# Patient Record
Sex: FEMALE | Race: WHITE | NOT HISPANIC OR LATINO | Employment: OTHER | URBAN - METROPOLITAN AREA
[De-identification: names, ages, dates, MRNs, and addresses within clinical notes are randomized per-mention and may not be internally consistent; named-entity substitution may affect disease eponyms.]

---

## 2017-01-03 ENCOUNTER — TELEPHONE (OUTPATIENT)
Dept: INTERNAL MEDICINE | Facility: CLINIC | Age: 82
End: 2017-01-03

## 2017-01-03 DIAGNOSIS — R19.7 DIARRHEA OF PRESUMED INFECTIOUS ORIGIN: Primary | ICD-10-CM

## 2017-01-03 NOTE — TELEPHONE ENCOUNTER
Spoke with patient. Recurrent diarrhea since Christmas. No f/c or abdominal pain. Likely recurrent C diff. Will obtain stool sample, check lytes since has been hypokalemic in past, and contact GI re possible fecal transplant. Patient agrees with plan. Orders placed.

## 2017-01-03 NOTE — TELEPHONE ENCOUNTER
Spoke with pt, c/o watery stools for the last 5 days ,  has about 3 per day. Pt said she is able to sleep thru the night with out any loose stools at night.  Appt offered,but wondering if she can get some advise over the phone.  Please call pt.  Renetta Alejandre RN  ---------------------          ----- Message from Camila Hill sent at 1/3/2017  8:38 AM CST -----  Regarding: pt with C diff again  Contact: [phone]  Pt calling with C diff and she wants to know what her next step is going to be? She has had it in the past and she wants to know how she is going to get rid of it. Maybe see a specialist? Please call the pt back at this number [phone].    Thanks,  [facility]  Please DO NOT send message and or reply back to sender. Call center Representatives DO NOT respond to Messages

## 2017-01-06 DIAGNOSIS — R19.7 DIARRHEA OF PRESUMED INFECTIOUS ORIGIN: ICD-10-CM

## 2017-01-06 LAB
ANION GAP SERPL CALCULATED.3IONS-SCNC: 9 MMOL/L (ref 3–14)
BUN SERPL-MCNC: 25 MG/DL (ref 7–30)
CALCIUM SERPL-MCNC: 9.2 MG/DL (ref 8.5–10.1)
CHLORIDE SERPL-SCNC: 106 MMOL/L (ref 94–109)
CO2 SERPL-SCNC: 24 MMOL/L (ref 20–32)
CREAT SERPL-MCNC: 0.95 MG/DL (ref 0.52–1.04)
GFR SERPL CREATININE-BSD FRML MDRD: 56 ML/MIN/1.7M2
GLUCOSE SERPL-MCNC: 118 MG/DL (ref 70–99)
POTASSIUM SERPL-SCNC: 3.6 MMOL/L (ref 3.4–5.3)
SODIUM SERPL-SCNC: 139 MMOL/L (ref 133–144)

## 2017-01-06 PROCEDURE — 80048 BASIC METABOLIC PNL TOTAL CA: CPT | Performed by: INTERNAL MEDICINE

## 2017-01-06 PROCEDURE — 36415 COLL VENOUS BLD VENIPUNCTURE: CPT | Performed by: INTERNAL MEDICINE

## 2017-01-10 ENCOUNTER — PRE VISIT (OUTPATIENT)
Dept: GASTROENTEROLOGY | Facility: CLINIC | Age: 82
End: 2017-01-10

## 2017-01-10 NOTE — TELEPHONE ENCOUNTER
1.  Date/reason for appt: 1/31/17 -- c diff  2.  Referring provider: Frankie Martinez  3.  Call to patient (Yes / No - short description): no, referred  4.  Previous care at / records requested from: [facility] -- records in [facility].

## 2017-01-11 DIAGNOSIS — R19.7 DIARRHEA OF PRESUMED INFECTIOUS ORIGIN: ICD-10-CM

## 2017-01-11 LAB
C DIFF TOX B STL QL: NORMAL
SPECIMEN SOURCE: NORMAL

## 2017-01-11 PROCEDURE — 87493 C DIFF AMPLIFIED PROBE: CPT | Performed by: INTERNAL MEDICINE

## 2017-01-26 ENCOUNTER — TELEPHONE (OUTPATIENT)
Dept: GASTROENTEROLOGY | Facility: CLINIC | Age: 82
End: 2017-01-26

## 2017-01-31 ENCOUNTER — OFFICE VISIT (OUTPATIENT)
Dept: GASTROENTEROLOGY | Facility: CLINIC | Age: 82
End: 2017-01-31

## 2017-01-31 VITALS
OXYGEN SATURATION: 98 % | HEART RATE: 74 BPM | BODY MASS INDEX: 26.21 KG/M2 | WEIGHT: 167 LBS | SYSTOLIC BLOOD PRESSURE: 130 MMHG | HEIGHT: 67 IN | DIASTOLIC BLOOD PRESSURE: 67 MMHG

## 2017-01-31 DIAGNOSIS — A04.72 C. DIFFICILE COLITIS: Primary | ICD-10-CM

## 2017-01-31 ASSESSMENT — PAIN SCALES - GENERAL: PAINLEVEL: NO PAIN (0)

## 2017-01-31 NOTE — PATIENT INSTRUCTIONS
It was a pleasure taking care of you today.  I've included a brief summary of our discussion and care plan from today's visit below.  Please review this information with your primary care provider.  _______________________________________________________________________    My recommendations are summarized as follows:    --  If you have diarrhea - please have your stool tested for C diff    --  If you test positive for C diff, we will treat you with Dificid (Fidaxomicin) then plan for a fecal transplant    --  Please review the fecal transplant literature given to you today in the offie.     Return to GI Clinic in 6 months to review your progress.    _______________________________________________________________________    Who do I call with any questions after my visit?  Please be in touch if there are any further questions that arise following today's visit.  There are multiple ways to contact your gastroenterology care team.        During business hours, you may reach a Gastroenterology nurse at [phone] and choose option 3.         To schedule or reschedule an appointment, please call [phone].       You can always send a secure message through Coin.  Coin messages are answered by your nurse or doctor typically within 24 hours.  Please allow extra time on weekends and holidays.        For urgent/emergent questions after business hours, you may reach the on-call GI Fellow by contacting the [facility] at [phone].     How will I get the results of any tests ordered?    You will receive all of your results.  If you have signed up for Coin, any tests ordered at your visit will be available to you after your physician reviews them.  Typically this takes 1-2 weeks.  If there are urgent results that require a change in your care plan, your physician or nurse will call you to discuss the next steps.      What is Coin?  Coin is a secure way for you to access all of your  healthcare records from the [facility].  It is a web based computer program, so you can sign on to it from any location.  It also allows you to send secure messages to your care team.  I recommend signing up for [employer] access if you have not already done so and are comfortable with using a computer.      How to I schedule a follow-up visit?  If you did not schedule a follow-up visit today, please call [phone] to schedule a follow-up office visit.        Sincerely,    Masood Tinsley MD    [facility]  Division of Gastroenterology

## 2017-01-31 NOTE — MR AVS SNAPSHOT
After Visit Summary   1/31/2017    Dulce Yeh    MRN: [id number]           Patient Information     Date Of Birth          1935        Visit Information        Provider Department      1/31/2017 11:40 AM Masood Tinsley MD [facility]        Today's Diagnoses     C. difficile colitis    -  1       Care Instructions    It was a pleasure taking care of you today.  I've included a brief summary of our discussion and care plan from today's visit below.  Please review this information with your primary care provider.  _______________________________________________________________________    My recommendations are summarized as follows:    --  If you have diarrhea - please have your stool tested for C diff    --  If you test positive for C diff, we will treat you with Dificid (Fidaxomicin) then plan for a fecal transplant    --  [facility]     Return to [facility] Clinic in 6 months to review your progress.    _______________________________________________________________________    Who do I call with any questions after my visit?  Please be in touch if there are any further questions that arise following today's visit.  There are multiple ways to contact your gastroenterology care team.        During business hours, you may reach a Gastroenterology nurse at [phone] and choose option 3.         To schedule or reschedule an appointment, please call [phone].       You can always send a secure message through [employer].  [employer] messages are answered by your nurse or doctor typically within 24 hours.  Please allow extra time on weekends and holidays.        For urgent/emergent questions after business hours, you may reach the on-call GI Fellow by contacting the [facility] at [phone].     How will I get the results of any tests ordered?    You will receive all of your results.  If  you have signed up for Zhaoganghart, any tests ordered at your visit will be available to you after your physician reviews them.  Typically this takes 1-2 weeks.  If there are urgent results that require a change in your care plan, your physician or nurse will call you to discuss the next steps.      What is Zhaoganghart?  [employer] is a secure way for you to access all of your healthcare records from the [facility].  It is a web based computer program, so you can sign on to it from any location.  It also allows you to send secure messages to your care team.  I recommend signing up for [employer] access if you have not already done so and are comfortable with using a computer.      How to I schedule a follow-up visit?  If you did not schedule a follow-up visit today, please call [phone] to schedule a follow-up office visit.        Sincerely,    Masood Tinsley MD    [facility]  Division of Gastroenterology              Follow-ups after your visit        Your next 10 appointments already scheduled     Feb 06, 2017 12:30 PM   (Arrive by 12:15 PM)   Return Visit with Ginger Davis MD   [facility] ([facility])    [address] [place] [address]  [address]rd [facility] [id number] [phone]            May 30, 2017  8:40 AM   (Arrive by 8:25 AM)   Return Visit with Masood Tinsley MD   [facility] ([facility])    [address] [place] [address]  [address] [facility] [id number] [phone]              Future tests that were ordered for you today     Open Standing Orders        Priority Remaining Interval Expires Ordered    Clostridium difficile toxin B PCR Routine 5/5 1/31/2018 1/31/2017            Who to contact     Please call your clinic at [phone] to:    Ask questions about your health    Make or cancel appointments    Discuss your medicines    Learn about your test results    Speak to your doctor  "  If you have compliments or concerns about an experience at your clinic, or if you wish to file a complaint, please contact [facility] Patient Relations at [phone] or email us at [email or web address]         Additional Information About Your Visit        FashFoliohart Information     Dianxin is an electronic gateway that provides easy, online access to your medical records. With Dianxin, you can request a clinic appointment, read your test results, renew a prescription or communicate with your care team.     To sign up for Dianxin visit the website at [email or web address]   You will be asked to enter the access code listed below, as well as some personal information. Please follow the directions to create your username and password.     Your access code is: -[email or web address]  Expires: 2017  6:32 PM     Your access code will  in 90 days. If you need help or a new code, please contact your [facility] or call [phone] for assistance.        Care EveryWhere ID     This is your Care EveryWhere ID. This could be used by other organizations to access your [place] medical records  [id number]        Your Vitals Were     Pulse Height BMI (Body Mass Index) Pulse Oximetry Last Period       74 1.702 m (5' 7\") 26.15 kg/m2 98% (LMP Unknown)        Blood Pressure from Last 3 Encounters:   17 130/67   16 161/69   16 155/87    Weight from Last 3 Encounters:   17 75.751 kg (167 lb)   16 76.93 kg (169 lb 9.6 oz)   16 81.33 kg (179 lb 4.8 oz)               Primary Care Provider Office Phone # Fax #    Nathalie Turner [phone]        [facility] [address] [place] [address] 747  [facility] [address]        Thank you!     Thank you for choosing [facility]  for your care. Our goal is always to provide you with excellent care. Hearing back from our patients is one way we can " continue to improve our services. Please take a few minutes to complete the written survey that you may receive in the mail after your visit with us. Thank you!             Your Updated Medication List - Protect others around you: Learn how to safely use, store and throw away your medicines at www.disposemymeds.org.          This list is accurate as of: 1/31/17 12:51 PM.  Always use your most recent med list.                   Brand Name Dispense Instructions for use    ascorbic acid 500 MG tablet    VITAMIN C     Take 500 mg by mouth daily.       aspirin 81 MG tablet      Take 81 mg by mouth daily       BIOTIN PO      Take by mouth daily       calcium carb 1250 mg (500 mg [place])/vitamin D 200 units 500-200 MG-UNIT per tablet    OSCAL with D     Take 1 tablet by mouth 2 times daily (with meals).       clobetasol 0.05 % Crea cream    CLOBETASOL PROPIONATE EMULSION    15 g    Apply topically as needed       cyanocobalamin 1000 MCG tablet    vitamin  B-12     Take 1,000 mcg by mouth daily.       glucosamine chondroitin 1500 complex Caps      Take  by mouth.       hydrochlorothiazide 25 MG tablet    HYDRODIURIL    90 tablet    Take 1 tablet (25 mg) by mouth daily       hydrocortisone 2.5 % cream    ANUSOL-HC    30 g    Apply to hemorrhoids twice a day as needed.       levothyroxine 88 MCG tablet    SYNTHROID/LEVOTHROID    90 tablet    Take 1 tablet (88 mcg) by mouth daily       Magnesium 300 MG Caps      Take  by mouth.       metoprolol 100 MG 24 hr tablet    TOPROL-XL    90 tablet    Take 1 tablet (100 mg) by mouth daily       MILK OF MAGNESIA PO          OMEGA 3 PO          potassium chloride SA 20 MEQ CR tablet    K-DUR/KLOR-CON M    100 tablet    Take 1 tablet (20 mEq) by mouth daily       terbinafine 1 % cream    lamISIL AT    30 g    Apply to affected areas on feet daily for 4 weeks.       triamcinolone 0.025 % cream    KENALOG    15 g    Apply topically 2 times daily       valsartan 320 MG tablet    DIOVAN    90  tablet    Take 1 tablet (320 mg) by mouth daily       ZYRTEC PO      Take  by mouth.

## 2017-01-31 NOTE — PROGRESS NOTES
GI CLINIC VISIT    CC/REFERRING MD:  Nathalie Turner  REASON FOR CONSULTATION:   The pt is a 82 year old female who I was asked to see in consultation at the request of Dr. Nathalie Turner for   Chief Complaint   Patient presents with     Consult     HX of C-Diff       ASSESSMENT/PLAN:  82 year old female with multiple recurrent C difficile infection who is currently not symptomatic and is  C diff negative.      We had a long discussion about the risks and benefits of FMT for multiple recurrent C difficile infection.  We discussed that the efficacy for multiple recurrent C diff is greater than 90% and that there have not been serious side effects reported in the literature.  We discussed the risks including transmission of potentially pathogenic microbiota (with possible infectious, inflammatory or metabolic consequences).  We discussed that there were likely unknown risks and that the long term risks were currently unknown. We discussed our donor screening protocol.  Patient was given handouts on FMT as well as FMT consent process to review.      -- If diarrhea returns, test for C diff - 5 standing tests ordered today  -- If positive in next 6 months without any new antibiotic trigger, plan for initial treatment with fidaxomicin given poor tolerance to high dose vancomycin, followed by FMT  -- If C diff positive more than 6 months out or positive due to a new antibiotic provication would favor 10 days of fixacomicin following by 4 weeks of low dose vancomycin and then watching for further spontaneous recurrences.        RTC 6 months    Thank you for this consultation.  It was a pleasure to participate in the care of this patient; please contact us with any further questions.  A total of 55 minutes was spent with this patient, >50% of which was counseling regarding the above delineated issues.      Masood Tinsley MD   of Medicine  Division of Gastroenterology, Hepatology and  [facility]      HPI  Initialy diagnosis of C diff after knee replacement in 2015.  Was treated with flagyl for 2 weeks.  Had a spontaneous recurrence in 2015.  At that time was hospitalized for dehydration and low potassium.  She was treated with oral vancomycin for 2 weeks.  She has had multiple recurrences as outlined below.  Treatment regimens include two courses of vancomycin including a vancomycin taper (summer 2016).  She had also had fidaxomicin twice.      She has had prior normal colonoscopies and is no longer getting screening colonoscopies.  Currently she is feeling well having three stools a day.  Most recent C diff testing was negative.     C diff:   8/6/15: Positive  9/30/15: Positive  8/3/16: Positive - fidaxomicin followed by vancomycin taper (6 weeks)   11/5/16: Positive   1/11/17: Negative     C difficile checklist  History of severe/complicated CDI: No  Other hospitalizations related to CDI: Yes    Courses of treatment for CDI:  [x] Flagyl  [x] Vancomycin 10-14 days  [x] Vancomycin taper  [] Rifaximin chaser  [x] fidaxomicin or fidaxomicin chaser  [] Other:     Estimated duration of CDI + recurrent CDI: 18 months  Estimated # of CDI recurrences: 3-4  Concurrent infections and other treatments: No  History of fecal incontinence: No  Renal insufficiency: No    Pertinent CDI medications:   [] PPI  [] Immunosuppressive drugs:   [x] Statins: valsartan  [x] Probiotics  [] Other relevant medications  Previous GI problems: constipation    Surgical history:   [] Appendectomy  [] Gastric surgery  [x] Cholecystectomy  [] Other abdominal surgery  [] Other surgery  Specific diets: Mediterranean   Smoking: No  History of antibiotics in distant past: No  Current antibiotic treatment: No    ROS:    No fevers or chills  No weight loss  No blurry vision, double vision or change in vision  No sore throat  No lymphadenopathy  No headache, paraesthesias, or weakness in a limb  No shortness of  breath or wheezing  No chest pain or pressure  No arthralgias or myalgias  No rashes or skin changes  No odynophagia or dysphagia  No BRBPR, hematochezia, melena  No dysuria, frequency or urgency  No hot/cold intolerance or polyria  No anxiety or depression    PROBLEM LIST  Patient Active Problem List    Diagnosis Date Noted     Essential hypertension 10/11/2011     Priority: High     Problem list name updated by automated process. 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       Hyperlipidemia 10/11/2011     Priority: Medium     Problem list name updated by automated process. 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 Provider to review       S/P cholecystectomy 10/11/2011     Priority: Low       PERTINENT PAST MEDICAL HISTORY:  Past Medical History   Diagnosis Date     Hypertension      Allergy      Eosinophilia 10/11/2011     Aortic stenosis 10/11/2011     Unspecified hypothyroidism  10/11/2011     S/P cholecystectomy 10/11/2011     History of chickenpox      Hyperlipidaemia      C. 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 August 2016       PREVIOUS SURGERIES:  Past Surgical History   Procedure Laterality Date     Bilateral tubal ligation       Appendectomy       Ankle surgery       Cholecystectomy       Orthopedic surgery         PREVIOUS ENDOSCOPY:  Colonoscopy 2010: Normal    ALLERGIES:     Allergies   Allergen Reactions     Sulfa Drugs Hives     Clonidine Rash     Diltiazem Rash       PERTINENT MEDICATIONS:    Current outpatient prescriptions:      aspirin 81 MG tablet, Take 81 mg by mouth daily, Disp: , Rfl:      potassium chloride SA (K-DUR,KLOR-CON M) 20 MEQ tablet, Take 1 tablet (20 mEq) by mouth daily, Disp: 100 tablet, Rfl: 1     levothyroxine (SYNTHROID, LEVOTHROID) 88 MCG tablet, Take 1 tablet (88 mcg) by mouth daily, Disp: 90 tablet, Rfl: 3     metoprolol (TOPROL-XL) 100 MG 24 hr tablet, Take 1 tablet (100 mg) by mouth daily, Disp: 90 tablet, Rfl: 3     hydrochlorothiazide (HYDRODIURIL) 25 MG tablet, Take 1 tablet (25 mg) by mouth daily, Disp: 90 tablet, Rfl: 3     valsartan (DIOVAN) 320 MG tablet, Take 1 tablet (320 mg) by mouth daily, Disp: 90 tablet, Rfl: 2     BIOTIN PO, Take by mouth daily, Disp: , Rfl:      calcium carb 1250 mg, 500 mg [place],/vitamin D 200 units (OSCAL WITH D) 500-200 MG-UNIT per tablet, Take 1 tablet by mouth 2 times daily (with meals)., Disp: , Rfl:      ascorbic acid (VITAMIN C) 500 MG tablet, Take 500 mg by mouth daily., Disp: , Rfl:      cyanocolbalamin (VITAMIN B-12) 1000 MCG tablet, Take 1,000 mcg by mouth daily., Disp: , Rfl:      hydrocortisone (ANUSOL-HC) 2.5 % rectal cream, Apply to hemorrhoids twice a day as needed., Disp: 30 g, Rfl: 3     clobetasol (CLOBETASOL PROPIONATE EMULSION) 0.05 % CREA, Apply topically as needed, Disp: 15 g, Rfl: 0     triamcinolone (KENALOG) 0.025 % cream, Apply topically 2  "times daily, Disp: 15 g, Rfl: 0     terbinafine (LAMISIL AT) 1 % cream, Apply to affected areas on feet daily for 4 weeks., Disp: 30 g, Rfl: 2     Magnesium Hydroxide (MILK OF MAGNESIA PO), , Disp: , Rfl:      Omega-3 Fatty Acids (OMEGA 3 PO), , Disp: , Rfl:      Glucosamine-Chondroit-Vit C-Mn (GLUCOSAMINE CHONDR 1500 COMPLX) CAPS, Take  by mouth., Disp: , Rfl:      Magnesium 300 MG CAPS, Take  by mouth., Disp: , Rfl:      Cetirizine HCl (ZYRTEC PO), Take  by mouth., Disp: , Rfl:     SOCIAL HISTORY:  Social History     Social History     Marital Status:      Spouse Name: N/A     Number of Children: 3     Years of Education: N/A     Occupational History     Not on file.     Social History Main Topics     Smoking status: Never Smoker      Smokeless tobacco: Never Used     Alcohol Use: No     Drug Use: No     Sexual Activity: Yes     Birth Control/ Protection: Post-menopausal     Other Topics Concern     Caffeine Concern Yes     coffee, tea     Exercise Yes     swims 3x a week     Social History Narrative    Lives alone, children nearby       FAMILY HISTORY:  Family History   Problem Relation Age of Onset     CANCER Mother      lung cancer       Past/family/social history reviewed and no changes    PHYSICAL EXAMINATION:  Constitutional: aaox3, cooperative, pleasant, not dyspneic/diaphoretic, no acute distress  Vitals reviewed: /67 mmHg  Pulse 74  Ht 1.702 m (5' 7\")  Wt 75.751 kg (167 lb)  BMI 26.15 kg/m2  SpO2 98%  LMP  (LMP Unknown)  Wt:   Wt Readings from Last 2 Encounters:   01/31/17 75.751 kg (167 lb)   11/07/16 76.93 kg (169 lb 9.6 oz)      Eyes: Sclera anicteric/injected  Ears/nose/mouth/throat: Normal oropharynx without ulcers or exudate, mucus membranes moist, hearing intact  Neck: supple, thyroid normal size  CV: No edema  Respiratory: Unlabored breathing  Lymph: No axillary, submandibular, supraclavicular or inguinal lymphadenopathy  Abd: Nondistended, +bs, no hepatosplenomegaly, nontender, " no peritoneal signs  Skin: warm, perfused, no jaundice  Psych: Normal affect  MSK: Normal gait      PERTINENT STUDIES:  Most recent CBC:  Recent Labs   Lab Test  11/04/16   1807  08/02/16   1901   WBC  9.5  6.5   HGB  12.8  13.6   HCT  37.0  39.9   PLT  300  195     Most recent hepatic panel:  Recent Labs   Lab Test  08/07/15   1741  08/05/15   1537   ALT  20  30   AST  11  24     Most recent creatinine:  Recent Labs   Lab Test  01/06/17   0852  11/04/16   1807   CR  0.95  1.43*

## 2017-01-31 NOTE — LETTER
1/31/2017       RE: Dulce Yeh  [address]  [place] [address]     Dear Colleague,    Thank you for referring your patient, Dulce Yeh, to the [facility] GASTROENTEROLOGY AND IBD at [facility]. Please see a copy of my visit note below.    GI CLINIC VISIT    CC/REFERRING MD:  Nathalie Turner  REASON FOR CONSULTATION:   The pt is a 82 year old female who I was asked to see in consultation at the request of Dr. Nathalie Turner for   Chief Complaint   Patient presents with     Consult     HX of C-Diff       ASSESSMENT/PLAN:  82 year old female with multiple recurrent C difficile infection who is currently not symptomatic and is  C diff negative.      We had a long discussion about the risks and benefits of FMT for multiple recurrent C difficile infection.  We discussed that the efficacy for multiple recurrent C diff is greater than 90% and that there have not been serious side effects reported in the literature.  We discussed the risks including transmission of potentially pathogenic microbiota (with possible infectious, inflammatory or metabolic consequences).  We discussed that there were likely unknown risks and that the long term risks were currently unknown. We discussed our donor screening protocol.  Patient was given handouts on FMT as well as FMT consent process to review.      -- If diarrhea returns, test for C diff - 5 standing tests ordered today  -- If positive in next 6 months without any new antibiotic trigger, plan for initial treatment with fidaxomicin given poor tolerance to high dose vancomycin, followed by FMT  -- If C diff positive more than 6 months out or positive due to a new antibiotic provication would favor 10 days of fixacomicin following by 4 weeks of low dose vancomycin and then watching for further spontaneous recurrences.        RTC 6 months    Thank you for this consultation.  It was a pleasure to participate in the care of  this patient; please contact us with any further questions.  A total of 55 minutes was spent with this patient, >50% of which was counseling regarding the above delineated issues.      Masood Tinsley MD   of Medicine  Division of Gastroenterology, Hepatology and Nutrition  [facility]      HPI  Initialy diagnosis of C diff after knee replacement in 2015.  Was treated with flagyl for 2 weeks.  Had a spontaneous recurrence in 2015.  At that time was hospitalized for dehydration and low potassium.  She was treated with oral vancomycin for 2 weeks.  She has had multiple recurrences as outlined below.  Treatment regimens include two courses of vancomycin including a vancomycin taper (summer 2016).  She had also had fidaxomicin twice.      She has had prior normal colonoscopies and is no longer getting screening colonoscopies.  Currently she is feeling well having three stools a day.  Most recent C diff testing was negative.     C diff:   8/6/15: Positive  9/30/15: Positive  8/3/16: Positive - fidaxomicin followed by vancomycin taper (6 weeks)   11/5/16: Positive   1/11/17: Negative     C difficile checklist  History of severe/complicated CDI: No  Other hospitalizations related to CDI: Yes    Courses of treatment for CDI:  [x] Flagyl  [x] Vancomycin 10-14 days  [x] Vancomycin taper  [] Rifaximin chaser  [x] fidaxomicin or fidaxomicin chaser  [] Other:     Estimated duration of CDI + recurrent CDI: 18 months  Estimated # of CDI recurrences: 3-4  Concurrent infections and other treatments: No  History of fecal incontinence: No  Renal insufficiency: No    Pertinent CDI medications:   [] PPI  [] Immunosuppressive drugs:   [x] Statins: valsartan  [x] Probiotics  [] Other relevant medications  Previous GI problems: constipation    Surgical history:   [] Appendectomy  [] Gastric surgery  [x] Cholecystectomy  [] Other abdominal surgery  [] Other surgery  Specific diets: Mediterranean   Smoking:  No  History of antibiotics in distant past: No  Current antibiotic treatment: No    ROS:    No fevers or chills  No weight loss  No blurry vision, double vision or change in vision  No sore throat  No lymphadenopathy  No headache, paraesthesias, or weakness in a limb  No shortness of breath or wheezing  No chest pain or pressure  No arthralgias or myalgias  No rashes or skin changes  No odynophagia or dysphagia  No BRBPR, hematochezia, melena  No dysuria, frequency or urgency  No hot/cold intolerance or polyria  No anxiety or depression    PROBLEM LIST  Patient Active Problem List    Diagnosis Date Noted     Essential hypertension 10/11/2011     Priority: High     Problem list name updated by automated process. 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       Hyperlipidemia 10/11/2011     Priority: Medium     Problem list name updated by automated process. Provider to review       Elevated LFTs 10/11/2011     Priority: Medium     Recurrent vulvar infection 10/18/2011     Priority: Low     Grp D enterococcus sensitive to Ampicillin if needs       Hypothyroidism 10/11/2011     Priority: Low     Problem list name updated by automated  process. Provider to review       S/P cholecystectomy 10/11/2011     Priority: Low       PERTINENT PAST MEDICAL HISTORY:  Past Medical History   Diagnosis Date     Hypertension      Allergy      Eosinophilia 10/11/2011     Aortic stenosis 10/11/2011     Unspecified hypothyroidism 10/11/2011     S/P cholecystectomy 10/11/2011     History of chickenpox      Hyperlipidaemia      C. 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 August 2016       PREVIOUS SURGERIES:  Past Surgical History   Procedure Laterality Date     Bilateral tubal ligation       Appendectomy       Ankle surgery       Cholecystectomy       Orthopedic surgery         PREVIOUS ENDOSCOPY:  Colonoscopy 2010: Normal    ALLERGIES:     Allergies   Allergen Reactions     Sulfa Drugs Hives     Clonidine Rash     Diltiazem Rash       PERTINENT MEDICATIONS:    Current outpatient prescriptions:      aspirin 81 MG tablet, Take 81 mg by mouth daily, Disp: , Rfl:      potassium chloride SA (K-DUR,KLOR-CON M) 20 MEQ tablet, Take 1 tablet (20 mEq) by mouth daily, Disp: 100 tablet, Rfl: 1     levothyroxine (SYNTHROID, LEVOTHROID) 88 MCG tablet, Take 1 tablet (88 mcg) by mouth daily, Disp: 90 tablet, Rfl: 3     metoprolol (TOPROL-XL) 100 MG 24 hr tablet, Take 1 tablet (100 mg) by mouth daily, Disp: 90 tablet, Rfl: 3     hydrochlorothiazide (HYDRODIURIL) 25 MG tablet, Take 1 tablet (25 mg) by mouth daily, Disp: 90 tablet, Rfl: 3     valsartan (DIOVAN) 320 MG tablet, Take 1 tablet (320 mg) by mouth daily, Disp: 90 tablet, Rfl: 2     BIOTIN PO, Take by mouth daily, Disp: , Rfl:      calcium carb 1250 mg, 500 mg [place],/vitamin D 200 units (OSCAL WITH D) 500-200 MG-UNIT per tablet, Take 1 tablet by mouth 2 times daily (with meals)., Disp: , Rfl:      ascorbic acid (VITAMIN C) 500 MG tablet, Take 500 mg by mouth daily., Disp: , Rfl:      cyanocolbalamin (VITAMIN B-12) 1000 MCG tablet, Take 1,000 mcg by mouth daily., Disp: , Rfl:      " hydrocortisone (ANUSOL-HC) 2.5 % rectal cream, Apply to hemorrhoids twice a day as needed., Disp: 30 g, Rfl: 3     clobetasol (CLOBETASOL PROPIONATE EMULSION) 0.05 % CREA, Apply topically as needed, Disp: 15 g, Rfl: 0     triamcinolone (KENALOG) 0.025 % cream, Apply topically 2 times daily, Disp: 15 g, Rfl: 0     terbinafine (LAMISIL AT) 1 % cream, Apply to affected areas on feet daily for 4 weeks., Disp: 30 g, Rfl: 2     Magnesium Hydroxide (MILK OF MAGNESIA PO), , Disp: , Rfl:      Omega-3 Fatty Acids (OMEGA 3 PO), , Disp: , Rfl:      Glucosamine-Chondroit-Vit C-Mn (GLUCOSAMINE CHONDR 1500 COMPLX) CAPS, Take  by mouth., Disp: , Rfl:      Magnesium 300 MG CAPS, Take  by mouth., Disp: , Rfl:      Cetirizine HCl (ZYRTEC PO), Take  by mouth., Disp: , Rfl:     SOCIAL HISTORY:  Social History     Social History     Marital Status:      Spouse Name: N/A     Number of Children: 3     Years of Education: N/A     Occupational History     Not on file.     Social History Main Topics     Smoking status: Never Smoker      Smokeless tobacco: Never Used     Alcohol Use: No     Drug Use: No     Sexual Activity: Yes     Birth Control/ Protection: Post-menopausal     Other Topics Concern     Caffeine Concern Yes     coffee, tea     Exercise Yes     swims 3x a week     Social History Narrative    Lives alone, children nearby       FAMILY HISTORY:  Family History   Problem Relation Age of Onset     CANCER Mother      lung cancer       Past/family/social history reviewed and no changes    PHYSICAL EXAMINATION:  Constitutional: aaox3, cooperative, pleasant, not dyspneic/diaphoretic, no acute distress  Vitals reviewed: /67 mmHg  Pulse 74  Ht 1.702 m (5' 7\")  Wt 75.751 kg (167 lb)  BMI 26.15 kg/m2  SpO2 98%  LMP  (LMP Unknown)  Wt:   Wt Readings from Last 2 Encounters:   01/31/17 75.751 kg (167 lb)   11/07/16 76.93 kg (169 lb 9.6 oz)      Eyes: Sclera anicteric/injected  Ears/nose/mouth/throat: Normal oropharynx " without ulcers or exudate, mucus membranes moist, hearing intact  Neck: supple, thyroid normal size  CV: No edema  Respiratory: Unlabored breathing  Lymph: No axillary, submandibular, supraclavicular or inguinal lymphadenopathy  Abd: Nondistended, +bs, no hepatosplenomegaly, nontender, no peritoneal signs  Skin: warm, perfused, no jaundice  Psych: Normal affect  MSK: Normal gait      PERTINENT STUDIES:  Most recent CBC:  Recent Labs   Lab Test  11/04/16   1807  08/02/16   1901   WBC  9.5  6.5   HGB  12.8  13.6   HCT  37.0  39.9   PLT  300  195     Most recent hepatic panel:  Recent Labs   Lab Test  08/07/15   1741  08/05/15   1537   ALT  20  30   AST  11  24     Most recent creatinine:  Recent Labs   Lab Test  01/06/17   0852  11/04/16   1807   CR  0.95  1.43*       Again, thank you for allowing me to participate in the care of your patient.      Sincerely,    Masood Tinsley MD

## 2017-01-31 NOTE — NURSING NOTE
"Chief Complaint   Patient presents with     Consult     HX of C-Diff       Filed Vitals:    01/31/17 1143   BP: 130/67   Pulse: 74   Height: 1.702 m (5' 7\")   Weight: 75.751 kg (167 lb)   SpO2: 98%       Body mass index is 26.15 kg/(m^2).                              "

## 2017-02-03 ENCOUNTER — PRE VISIT (OUTPATIENT)
Dept: CARDIOLOGY | Facility: CLINIC | Age: 82
End: 2017-02-03

## 2017-02-03 DIAGNOSIS — I35.0 AORTIC VALVE STENOSIS, UNSPECIFIED ETIOLOGY: Primary | ICD-10-CM

## 2017-02-06 ENCOUNTER — RADIANT APPOINTMENT (OUTPATIENT)
Dept: CARDIOLOGY | Facility: CLINIC | Age: 82
End: 2017-02-06

## 2017-02-06 ENCOUNTER — OFFICE VISIT (OUTPATIENT)
Dept: CARDIOLOGY | Facility: CLINIC | Age: 82
End: 2017-02-06
Attending: INTERNAL MEDICINE
Payer: MEDICARE

## 2017-02-06 VITALS
SYSTOLIC BLOOD PRESSURE: 168 MMHG | DIASTOLIC BLOOD PRESSURE: 79 MMHG | OXYGEN SATURATION: 100 % | BODY MASS INDEX: 26.48 KG/M2 | WEIGHT: 168.7 LBS | HEART RATE: 65 BPM | HEIGHT: 67 IN

## 2017-02-06 DIAGNOSIS — I10 POORLY-CONTROLLED HYPERTENSION: Primary | ICD-10-CM

## 2017-02-06 DIAGNOSIS — I35.0 AORTIC VALVE STENOSIS, UNSPECIFIED ETIOLOGY: ICD-10-CM

## 2017-02-06 DIAGNOSIS — I44.7 LBBB (LEFT BUNDLE BRANCH BLOCK): ICD-10-CM

## 2017-02-06 PROCEDURE — 99213 OFFICE O/P EST LOW 20 MIN: CPT | Mod: ZF

## 2017-02-06 PROCEDURE — 93010 ELECTROCARDIOGRAM REPORT: CPT | Mod: ZP | Performed by: INTERNAL MEDICINE

## 2017-02-06 PROCEDURE — 99214 OFFICE O/P EST MOD 30 MIN: CPT | Mod: 25 | Performed by: INTERNAL MEDICINE

## 2017-02-06 PROCEDURE — 93005 ELECTROCARDIOGRAM TRACING: CPT | Mod: ZF

## 2017-02-06 RX ORDER — AMLODIPINE BESYLATE 2.5 MG/1
2.5 TABLET ORAL DAILY
Qty: 90 TABLET | Refills: 0 | Status: SHIPPED | OUTPATIENT
Start: 2017-02-06 | End: 2017-10-18

## 2017-02-06 ASSESSMENT — PAIN SCALES - GENERAL: PAINLEVEL: NO PAIN (0)

## 2017-02-06 NOTE — Clinical Note
2/6/2017      RE: Dulce Yeh  [address]  [place] [address]       Dear Colleague,    Thank you for the opportunity to participate in the care of your patient, Dulce Yeh, at the [facility] at [facility]. Please see a copy of my visit note below.    HPI: 82 year old woman who returns for reevaluation of aortic valve stenosis, She was initially referred for cardiac evaluation in 2015 prior to left knee replacement. She was noted to have progression of the aortic valve stenosis. Echo in 2013 showed mild-moderate AS, mean gradient 15 and estimated valve area of 1.2 cm2. In 2015, aortic valve gradients had increased with a mean gradient of 25 mmHg and valve area 0.8-1cm2, consistent with moderate to severe aortic stenosis. She was advised to proceed with surgery without further workup since she was asymptomatic from the valve disease. She also hypertension, for several years for which she takes HCTZ, valsartan and metoprolol. The patient does not have a history of stroke, heart failure, syncope. Denies renal disease or diabetes.     She reports no cardiopulmonary issues after her surgery. She developed C diff which was difficult to treat after left knee replacement at [facility]. She reports being free of infection now. She is doing well overall and  Goes to the  3-4 times a week, swims and does strength training. Gets mildly dyspneic with 3 flights of stairs, this has been stable over years. From a cardiac standpoint, denies any chest pain, orthopnea, PND, palpitations or syncope.       PAST MEDICAL HISTORY:  Past Medical History   Diagnosis Date     Hypertension      Allergy      Eosinophilia 10/11/2011     Aortic stenosis 10/11/2011     Unspecified hypothyroidism 10/11/2011     S/P cholecystectomy 10/11/2011     History of chickenpox      Hyperlipidaemia      C. difficile colitis August-October 2015     Recurrent colitis due to Clostridium  difficile August 2016       CURRENT MEDICATIONS:  Current Outpatient Prescriptions   Medication Sig Dispense Refill     aspirin 81 MG tablet Take 81 mg by mouth daily       hydrocortisone (ANUSOL-HC) 2.5 % rectal cream Apply to hemorrhoids twice a day as needed. 30 g 3     potassium chloride SA (K-DUR,KLOR-CON M) 20 MEQ tablet Take 1 tablet (20 mEq) by mouth daily 100 tablet 1     levothyroxine (SYNTHROID, LEVOTHROID) 88 MCG tablet Take 1 tablet (88 mcg) by mouth daily 90 tablet 3     metoprolol (TOPROL-XL) 100 MG 24 hr tablet Take 1 tablet (100 mg) by mouth daily 90 tablet 3     clobetasol (CLOBETASOL PROPIONATE EMULSION) 0.05 % CREA Apply topically as needed 15 g 0     hydrochlorothiazide (HYDRODIURIL) 25 MG tablet Take 1 tablet (25 mg) by mouth daily 90 tablet 3     valsartan (DIOVAN) 320 MG tablet Take 1 tablet (320 mg) by mouth daily 90 tablet 2     triamcinolone (KENALOG) 0.025 % cream Apply topically 2 times daily 15 g 0     terbinafine (LAMISIL AT) 1 % cream Apply to affected areas on feet daily for 4 weeks. 30 g 2     Magnesium Hydroxide (MILK OF MAGNESIA PO)        Omega-3 Fatty Acids (OMEGA 3 PO)        BIOTIN PO Take by mouth daily       calcium carb 1250 mg, 500 mg [place],/vitamin D 200 units (OSCAL WITH D) 500-200 MG-UNIT per tablet Take 1 tablet by mouth 2 times daily (with meals).       ascorbic acid (VITAMIN C) 500 MG tablet Take 500 mg by mouth daily.       cyanocolbalamin (VITAMIN B-12) 1000 MCG tablet Take 1,000 mcg by mouth daily.       Glucosamine-Chondroit-Vit C-Mn (GLUCOSAMINE CHONDR 1500 COMPLX) CAPS Take  by mouth.       Magnesium 300 MG CAPS Take  by mouth.       Cetirizine HCl (ZYRTEC PO) Take  by mouth.         PAST SURGICAL HISTORY:  Past Surgical History   Procedure Laterality Date     Bilateral tubal ligation       Appendectomy       Ankle surgery       Cholecystectomy       Orthopedic surgery         ALLERGIES     Allergies   Allergen Reactions     Sulfa Drugs Hives     Clonidine Rash  "    Diltiazem Rash       FAMILY HISTORY:  Family History   Problem Relation Age of Onset     CANCER Mother      lung cancer       SOCIAL HISTORY:  History     Social History     Marital Status:      Spouse Name: N/A     Number of Children: 3     Years of Education: N/A     Social History Main Topics     Smoking status: Never Smoker      Smokeless tobacco: Never Used     Alcohol Use: No     Drug Use: No     Sexual Activity: Yes     Birth Control/ Protection: Post-menopausal     Other Topics Concern     Caffeine Concern Yes     coffee, tea     Exercise Yes     swims 3x a week     Social History Narrative    Lives alone, children nearby       ROS:   Constitutional: No fever, chills, or sweats. No weight gain/loss   ENT: No visual disturbance, ear ache, epistaxis, sore throat  Allergies/Immunologic: Negative.   Respiratory: No cough, hemoptysia  Cardiovascular: As per HPI  GI: No nausea, vomiting, hematemesis, melena, or hematochezia  : No urinary frequency, dysuria, or hematuria  Integument: Negative  Psychiatric: Negative  Neuro: Negative  Endocrinology: Negative   Musculoskeletal: Negative    EXAM:  /79 mmHg  Pulse 65  Ht 1.702 m (5' 7\")  Wt 76.522 kg (168 lb 11.2 oz)  BMI 26.42 kg/m2  SpO2 100%  LMP  (LMP Unknown)     Repeat 180/84 mmHg    In general, the patient is a pleasant female in no apparent distress.      HEENT: NC/AT.  PERRLA.  EOMI.  Sclerae white, not injected.    Neck: Carotids 2+ bilaterally without bruits.  No jugular venous distension. No thyromegaly.   Heart: RRR. Normal S1, 3/6 systolic ejection murmur with radiation to both carotids, preserved S2. No rub, click, or gallop. There is no heave.    Lungs: Clear bilaterally.  No rhonchi, wheezes, rales.   Abdomen: Soft, nontender, nondistended.   Extremities: No edema.  The pulses are 2+at the radial and DP bilaterally.  Neuro: grossly non focal, gait mildly antalgic.  Skin: no rashes.    Labs:  LIPID RESULTS:  Lab Results "   Component Value Date    CHOL 205* 10/10/2012    HDL 34* 10/10/2012     10/10/2012    TRIG 216* 10/10/2012    CHOLHDLRATIO 6.1* 10/10/2012       LIVER ENZYME RESULTS:  Lab Results   Component Value Date    AST 11 08/07/2015    ALT 20 08/07/2015       CBC RESULTS:  Lab Results   Component Value Date    WBC 9.5 11/04/2016    RBC 4.38 11/04/2016    HGB 12.8 11/04/2016    HCT 37.0 11/04/2016    MCV 85 11/04/2016    MCH 29.2 11/04/2016    MCHC 34.6 11/04/2016    RDW 13.5 11/04/2016     11/04/2016       BMP RESULTS:  Lab Results   Component Value Date     01/06/2017    POTASSIUM 3.6 01/06/2017    CHLORIDE 106 01/06/2017    CO2 24 01/06/2017    ANIONGAP 9 01/06/2017    * 01/06/2017    BUN 25 01/06/2017    CR 0.95 01/06/2017    GFRESTIMATED 56* 01/06/2017    GFRESTBLACK 68 01/06/2017    CR 9.2 01/06/2017        A1C RESULTS:  Lab Results   Component Value Date    A1C 6.0 10/10/2012       Cardiac data:  ECG from today shows SR, LBBB    Echo from today was personally reviewed  AV stenosis - moderate to severe, 3.1m/s 22 mmHg, DANIS 1 cm2.    EKG 5/29/15   Sinus bradycardia  Left bundle branch block  Abnormal ECG  When compared with ECG of 02-OCT-2013 09:21,  No significant change was found    ECHO 6.1.15  Preserved biventricular function, moderate to severe aortic stenosis,mean gradient 25 mmHg and valve area 0.8-1cm2. No pulmonary hypertension. no pericardial effusion.      NM MPI Adenosine (3/15/2013)  1. Normal myocardial SPECT study with a summed stress score of 0.  2. Normal left ventricular systolic function.    ECHO (3/10/2013)  No significant change from prior study  Mild to moderate aortic stenosis is present      Assessment and Plan:   82 year old with hypertension, moderate to severe AS.   1. Hypertension - elevated today. Advised to start amlodipine 2.5 mg/d if SBP remains >150 mmHg.    2. LBBB - we reviewed the findings of her ECG that again demonstrates LBBB and sinus rhythm   3. Aortic  stenosis, moderate to severe - her echo today is notable for stable moderate to severe aortic stenosis. She does not report any active anginal or heart failure symptoms or syncope. Advised continued surveillance. OK to continue her current level of exercise and advised against heavy lifting.  4. CV Prevention, primary - she is currently on aspirin. Declines statin as she is worried about the side effect profile.     Follow up in 6 months; with Dr. Turner in 2 weeks for blood pressure.    Ginger Davis MD, MS  Staff Cardiologist, [facility]   Pager: [phone]      CC  Patient Care Team:  Nathalie Turner MD as PCP - General (Internal Medicine)  Gian Khanna MD as MD (Orthopedics)  Mary Jane Carrillo MD as MD (Internal Medicine)

## 2017-02-06 NOTE — NURSING NOTE
Chief Complaint   Patient presents with     Follow Up For     manage aortic stenosis and hypertension/ECHO prior to visit/EKG

## 2017-02-06 NOTE — PATIENT INSTRUCTIONS
PATIENT INSTRUCTIONS:  1. Follow up with Dr. Davis in six months  2. Maintain a cardiac healthy diet  3. New medication changes   Amlodipine 2.5 mg by mouth every day (take BP for a week before you start the medication)  4. I will call you next week, write down your blood pressures to see if the new medication is working          Destinee Shaikh RN  Nurse Coordinator  Phone number [phone]  Please use [employer] to communicate with your HealthCare Provider      If you have an urgent need after hours (8:00 am to 4:30 pm) please call [phone] and ask for the cardiology fellow on call.

## 2017-02-06 NOTE — MR AVS SNAPSHOT
After Visit Summary   2/6/2017    Dulce Yeh    MRN: [id number]           Patient Information     Date Of Birth          1935        Visit Information        Provider Department      2/6/2017 12:30 PM Ginger Davis MD [facility]        Today's Diagnoses     Aortic valve stenosis, unspecified etiology           Care Instructions    PATIENT INSTRUCTIONS:  1. Follow up with Dr. Davis in six months  2. Maintain a cardiac healthy diet  3. New medication changes   Amlodipine 2.5 mg by mouth every day (take BP for a week before you start the medication)  4. I will call you next week, write down your blood pressures to see if the new medication is working          Destinee Shaikh RN  Nurse Coordinator  Phone number [phone]  Please use [employer] to communicate with your HealthCare Provider      If you have an urgent need after hours (8:00 am to 4:30 pm) please call [phone] and ask for the cardiology fellow on call.              Follow-ups after your visit        Follow-up notes from your care team     Return in about 6 months (around 8/6/2017), or if symptoms worsen or fail to improve, for Dr. Davis for aortic stenosis.      Your next 10 appointments already scheduled     Feb 10, 2017  9:30 AM   (Arrive by 9:15 AM)   Return Visit with Aaliyah Cordero MD   [facility] ([facility])    [address] [place] [id number] [phone]            May 30, 2017  8:40 AM   (Arrive by 8:25 AM)   Return Visit with Masood Tinsley MD   [facility] ([facility])    [address] [place] [id number]   [phone]            Aug 07, 2017 12:30 PM   (Arrive by 12:15 PM)   Return Visit with Ginger Davis MD   [facility] ([facility])    [address] [place] [id number] [phone]              Who to  "contact     If you have questions or need follow up information about today's clinic visit or your schedule please contact [facility] directly at [phone].  Normal or non-critical lab and imaging results will be communicated to you by MyChart, letter or phone within 4 business days after the clinic has received the results. If you do not hear from us within 7 days, please contact the clinic through [facility] or phone. If you have a critical or abnormal lab result, we will notify you by phone as soon as possible.  Submit refill requests through "[employer] or call your pharmacy and they will forward the refill request to us. Please allow 3 business days for your refill to be completed.          Additional Information About Your Visit        [facility] [employer] [facility] Information     "[employer] lets you send messages to your doctor, view your test results, renew your prescriptions, schedule appointments and more. To sign up, go to [email or web address] . Click on \"Log in\" on the left side of the screen, which will take you to the Welcome page. Then click on \"Sign up Now\" on the right side of the page.     You will be asked to enter the access code listed below, as well as some personal information. Please follow the directions to create your username and password.     Your access code is: [email or web address]  Expires: 2017  1:25 PM     Your access code will  in 90 days. If you need help or a new code, please call your [facility] clinic or [phone].        Care EveryWhere ID     This is your Care EveryWhere ID. This could be used by other organizations to access your [facility] medical records  [id number]        Your Vitals Were     Pulse Height BMI (Body Mass Index) Pulse Oximetry Last Period       65 1.702 m (5' 7\") 26.42 kg/m2 100% (LMP Unknown)        Blood Pressure from Last 3 Encounters:   17 168/79   17 130/67   16 161/69    Weight from Last 3 Encounters:   17 76.522 kg (168 lb 11.2 oz) "   01/31/17 75.751 kg (167 lb)   11/07/16 76.93 kg (169 lb 9.6 oz)              We Performed the Following     EKG 12-lead, tracing only (Future)          Today's Medication Changes          These changes are accurate as of: 2/6/17  1:25 PM.  If you have any questions, ask your nurse or doctor.               Start taking these medicines.        Dose/Directions    amLODIPine 2.5 MG tablet   Commonly known as:  NORVASC   Used for:  Aortic valve stenosis, unspecified etiology   Started by:  Ginger Davis MD        Dose:  2.5 mg   Take 1 tablet (2.5 mg) by mouth daily   Quantity:  90 tablet   Refills:  0            Where to get your medicines      These medications were sent to [id number] - [place], [place] - [address] AT [facility]  [address], [place] [address]    Hours:  24-hours Phone:  [phone]    - amLODIPine 2.5 MG tablet             Primary Care Provider Office Phone # Fax #    Nathalie Turner [phone]        [facility] [address] [facility] [address]  [facility] [address]        Thank you!     Thank you for choosing [facility]  for your care. Our goal is always to provide you with excellent care. Hearing back from our patients is one way we can continue to improve our services. Please take a few minutes to complete the written survey that you may receive in the mail after your visit with us. Thank you!             Your Updated Medication List - Protect others around you: Learn how to safely use, store and throw away your medicines at www.disposemymeds.org.          This list is accurate as of: 2/6/17  1:25 PM.  Always use your most recent med list.                   Brand Name Dispense Instructions for use    amLODIPine 2.5 MG tablet    NORVASC    90 tablet    Take 1 tablet (2.5 mg) by mouth daily       ascorbic acid 500 MG tablet    VITAMIN C     Take 500 mg by mouth daily.       aspirin 81 MG tablet      Take 81 mg by mouth  daily       BIOTIN PO      Take by mouth daily       calcium carb 1250 mg (500 mg [place])/vitamin D 200 units 500-200 MG-UNIT per tablet    OSCAL with D     Take 1 tablet by mouth 2 times daily (with meals).       clobetasol 0.05 % Crea cream    CLOBETASOL PROPIONATE EMULSION    15 g    Apply topically as needed       cyanocobalamin 1000 MCG tablet    vitamin  B-12     Take 1,000 mcg by mouth daily.       glucosamine chondroitin 1500 complex Caps      Take  by mouth.       hydrochlorothiazide 25 MG tablet    HYDRODIURIL    90 tablet    Take 1 tablet (25 mg) by mouth daily       hydrocortisone 2.5 % cream    ANUSOL-HC    30 g    Apply to hemorrhoids twice a day as needed.       levothyroxine 88 MCG tablet    SYNTHROID/LEVOTHROID    90 tablet    Take 1 tablet (88 mcg) by mouth daily       Magnesium 300 MG Caps      Take  by mouth.       metoprolol 100 MG 24 hr tablet    TOPROL-XL    90 tablet    Take 1 tablet (100 mg) by mouth daily       MILK OF MAGNESIA PO          OMEGA 3 PO          potassium chloride SA 20 MEQ CR tablet    K-DUR/KLOR-CON M    100 tablet    Take 1 tablet (20 mEq) by mouth daily       terbinafine 1 % cream    lamISIL AT    30 g    Apply to affected areas on feet daily for 4 weeks.       triamcinolone 0.025 % cream    KENALOG    15 g    Apply topically 2 times daily       valsartan 320 MG tablet    DIOVAN    90 tablet    Take 1 tablet (320 mg) by mouth daily       ZYRTEC PO      Take  by mouth.

## 2017-02-06 NOTE — PROGRESS NOTES
HPI: 82 year old woman who returns for reevaluation of aortic valve stenosis, She was initially referred for cardiac evaluation in 2015 prior to left knee replacement. She was noted to have progression of the aortic valve stenosis. Echo in 2013 showed mild-moderate AS, mean gradient 15 and estimated valve area of 1.2 cm2. In 2015, aortic valve gradients had increased with a mean gradient of 25 mmHg and valve area 0.8-1cm2, consistent with moderate to severe aortic stenosis. She was advised to proceed with surgery without further workup since she was asymptomatic from the valve disease. She also hypertension, for several years for which she takes HCTZ, valsartan and metoprolol. The patient does not have a history of stroke, heart failure, syncope. Denies renal disease or diabetes.     She reports no cardiopulmonary issues after her surgery. She developed C diff which was difficult to treat after left knee replacement at [facility]. She reports being free of infection now. She is doing well overall and  Goes to the  3-4 times a week, swims and does strength training. Gets mildly dyspneic with 3 flights of stairs, this has been stable over years. From a cardiac standpoint, denies any chest pain, orthopnea, PND, palpitations or syncope.       PAST MEDICAL HISTORY:  Past Medical History   Diagnosis Date     Hypertension      Allergy      Eosinophilia 10/11/2011     Aortic stenosis 10/11/2011     Unspecified hypothyroidism 10/11/2011     S/P cholecystectomy 10/11/2011     History of chickenpox      Hyperlipidaemia      C. 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 August 2016       CURRENT MEDICATIONS:  Current Outpatient Prescriptions   Medication Sig Dispense Refill     aspirin 81 MG tablet Take 81 mg by mouth daily       hydrocortisone (ANUSOL-HC) 2.5 % rectal cream Apply to hemorrhoids twice a day as needed. 30 g 3     potassium chloride SA (K-DUR,KLOR-CON M) 20 MEQ  tablet Take 1 tablet (20 mEq) by mouth daily 100 tablet 1     levothyroxine (SYNTHROID, LEVOTHROID) 88 MCG tablet Take 1 tablet (88 mcg) by mouth daily 90 tablet 3     metoprolol (TOPROL-XL) 100 MG 24 hr tablet Take 1 tablet (100 mg) by mouth daily 90 tablet 3     clobetasol (CLOBETASOL PROPIONATE EMULSION) 0.05 % CREA Apply topically as needed 15 g 0     hydrochlorothiazide (HYDRODIURIL) 25 MG tablet Take 1 tablet (25 mg) by mouth daily 90 tablet 3     valsartan (DIOVAN) 320 MG tablet Take 1 tablet (320 mg) by mouth daily 90 tablet 2     triamcinolone (KENALOG) 0.025 % cream Apply topically 2 times daily 15 g 0     terbinafine (LAMISIL AT) 1 % cream Apply to affected areas on feet daily for 4 weeks. 30 g 2     Magnesium Hydroxide (MILK OF MAGNESIA PO)        Omega-3 Fatty Acids (OMEGA 3 PO)        BIOTIN PO Take by mouth daily       calcium carb 1250 mg, 500 mg [place],/vitamin D 200 units (OSCAL WITH D) 500-200 MG-UNIT per tablet Take 1 tablet by mouth 2 times daily (with meals).       ascorbic acid (VITAMIN C) 500 MG tablet Take 500 mg by mouth daily.       cyanocolbalamin (VITAMIN B-12) 1000 MCG tablet Take 1,000 mcg by mouth daily.       Glucosamine-Chondroit-Vit C-Mn (GLUCOSAMINE CHONDR 1500 COMPLX) CAPS Take  by mouth.       Magnesium 300 MG CAPS Take  by mouth.       Cetirizine HCl (ZYRTEC PO) Take  by mouth.         PAST SURGICAL HISTORY:  Past Surgical History   Procedure Laterality Date     Bilateral tubal ligation       Appendectomy       Ankle surgery       Cholecystectomy       Orthopedic surgery         ALLERGIES     Allergies   Allergen Reactions     Sulfa Drugs Hives     Clonidine Rash     Diltiazem Rash       FAMILY HISTORY:  Family History   Problem Relation Age of Onset     CANCER Mother      lung cancer       SOCIAL HISTORY:  History     Social History     Marital Status:      Spouse Name: N/A     Number of Children: 3     Years of Education: N/A     Social History Main Topics     Smoking  "status: Never Smoker      Smokeless tobacco: Never Used     Alcohol Use: No     Drug Use: No     Sexual Activity: Yes     Birth Control/ Protection: Post-menopausal     Other Topics Concern     Caffeine Concern Yes     coffee, tea     Exercise Yes     swims 3x a week     Social History Narrative    Lives alone, children nearby       ROS:   Constitutional: No fever, chills, or sweats. No weight gain/loss   ENT: No visual disturbance, ear ache, epistaxis, sore throat  Allergies/Immunologic: Negative.   Respiratory: No cough, hemoptysia  Cardiovascular: As per HPI  GI: No nausea, vomiting, hematemesis, melena, or hematochezia  : No urinary frequency, dysuria, or hematuria  Integument: Negative  Psychiatric: Negative  Neuro: Negative  Endocrinology: Negative   Musculoskeletal: Negative    EXAM:  /79 mmHg  Pulse 65  Ht 1.702 m (5' 7\")  Wt 76.522 kg (168 lb 11.2 oz)  BMI 26.42 kg/m2  SpO2 100%  LMP  (LMP Unknown)     Repeat 180/84 mmHg    In general, the patient is a pleasant female in no apparent distress.      HEENT: NC/AT.  PERRLA.  EOMI.  Sclerae white, not injected.    Neck: Carotids 2+ bilaterally without bruits.  No jugular venous distension. No thyromegaly.   Heart: RRR. Normal S1, 3/6 systolic ejection murmur with radiation to both carotids, preserved S2. No rub, click, or gallop. There is no heave.    Lungs: Clear bilaterally.  No rhonchi, wheezes, rales.   Abdomen: Soft, nontender, nondistended.   Extremities: No edema.  The pulses are 2+at the radial and DP bilaterally.  Neuro: grossly non focal, gait mildly antalgic.  Skin: no rashes.    Labs:  LIPID RESULTS:  Lab Results   Component Value Date    CHOL 205* 10/10/2012    HDL 34* 10/10/2012     10/10/2012    TRIG 216* 10/10/2012    CHOLHDLRATIO 6.1* 10/10/2012       LIVER ENZYME RESULTS:  Lab Results   Component Value Date    AST 11 08/07/2015    ALT 20 08/07/2015       CBC RESULTS:  Lab Results   Component Value Date    WBC 9.5 11/04/2016 "    RBC 4.38 11/04/2016    HGB 12.8 11/04/2016    HCT 37.0 11/04/2016    MCV 85 11/04/2016    MCH 29.2 11/04/2016    MCHC 34.6 11/04/2016    RDW 13.5 11/04/2016     11/04/2016       BMP RESULTS:  Lab Results   Component Value Date     01/06/2017    POTASSIUM 3.6 01/06/2017    CHLORIDE 106 01/06/2017    CO2 24 01/06/2017    ANIONGAP 9 01/06/2017    * 01/06/2017    BUN 25 01/06/2017    CR 0.95 01/06/2017    GFRESTIMATED 56* 01/06/2017    GFRESTBLACK 68 01/06/2017    CR 9.2 01/06/2017        A1C RESULTS:  Lab Results   Component Value Date    A1C 6.0 10/10/2012       Cardiac data:  ECG from today shows SR, LBBB    Echo from today was personally reviewed  AV stenosis - moderate to severe, 3.1m/s 22 mmHg, DANIS 1 cm2.    EKG 5/29/15   Sinus bradycardia  Left bundle branch block  Abnormal ECG  When compared with ECG of 02-OCT-2013 09:21,  No significant change was found    ECHO 6.1.15  Preserved biventricular function, moderate to severe aortic stenosis,mean gradient 25 mmHg and valve area 0.8-1cm2. No pulmonary hypertension. no pericardial effusion.      NM MPI Adenosine (3/15/2013)  1. Normal myocardial SPECT study with a summed stress score of 0.  2. Normal left ventricular systolic function.    ECHO (3/10/2013)  No significant change from prior study  Mild to moderate aortic stenosis is present      Assessment and Plan:   82 year old with hypertension, moderate to severe AS.   1. Hypertension - elevated today. Advised to start amlodipine 2.5 mg/d if SBP remains >150 mmHg.    2. LBBB - we reviewed the findings of her ECG that again demonstrates LBBB and sinus rhythm   3. Aortic stenosis, moderate to severe - her echo today is notable for stable moderate to severe aortic stenosis. She does not report any active anginal or heart failure symptoms or syncope. Advised continued surveillance. OK to continue her current level of exercise and advised against heavy lifting.  4. CV Prevention, primary - she is  currently on aspirin. Declines statin as she is worried about the side effect profile.     Follow up in 6 months; with Dr. Marie in 2 weeks for blood pressure.    Ginger Davis MD, MS  Staff Cardiologist, [facility]   Pager: [phone]      CC  Patient Care Team:  Nathalie Marie MD as PCP - General (Internal Medicine)  Gian Khanna MD as MD (Orthopedics)  Mary Jane Carrillo MD as MD (Internal Medicine)  NATHALIE MARIE

## 2017-02-08 LAB — INTERPRETATION ECG - MUSE: NORMAL

## 2017-02-10 ENCOUNTER — OFFICE VISIT (OUTPATIENT)
Dept: DERMATOLOGY | Facility: CLINIC | Age: 82
End: 2017-02-10

## 2017-02-10 DIAGNOSIS — L82.1 SEBORRHEIC KERATOSIS: ICD-10-CM

## 2017-02-10 DIAGNOSIS — L21.9 DERMATITIS, SEBORRHEIC: Primary | ICD-10-CM

## 2017-02-10 DIAGNOSIS — D18.01 CHERRY ANGIOMA: ICD-10-CM

## 2017-02-10 DIAGNOSIS — B35.3 TINEA PEDIS OF BOTH FEET: ICD-10-CM

## 2017-02-10 RX ORDER — FLUOCINONIDE TOPICAL SOLUTION USP, 0.05% 0.5 MG/ML
SOLUTION TOPICAL
Qty: 60 ML | Refills: 3 | Status: SHIPPED | OUTPATIENT
Start: 2017-02-10 | End: 2017-10-18

## 2017-02-10 RX ORDER — KETOCONAZOLE 20 MG/ML
SHAMPOO TOPICAL
Qty: 120 ML | Refills: 11 | Status: SHIPPED | OUTPATIENT
Start: 2017-02-10 | End: 2017-10-18

## 2017-02-10 ASSESSMENT — PAIN SCALES - GENERAL: PAINLEVEL: NO PAIN (0)

## 2017-02-10 NOTE — LETTER
2/10/2017       RE: Dulce Yeh  [address] [place] [address]     Dear Colleague,    Thank you for referring your patient, Dulce Yeh, to the [facility] at [facility]. Please see a copy of my visit note below.    DERMATOLOGY CLINIC VISIT NOTE      CHIEF COMPLAINT:  Multiple rashes and skin check.      DERMATOLOGY PROBLEM LIST:    1.  History of tinea pedis.   2.  Cherry angioma.   3.  Benign lichenoid keratoses.   4.  Seborrheic keratoses.   5.  Seborrheic dermatitis of the scalp.   6.  Xerosis cutis with associated pruritus.      HISTORY OF PRESENT ILLNESS:  Ms. Yeh is an 82-year-old female presenting to Dermatology Clinic for skin check today.  She has no history of skin cancer.  She notes widespread pruritus over her upper back and intermittent eruptions of a rash on her anterior chest.  She washes her body with Dial soap.  She uses Jergens lotion sometimes.  She has scaling of her scalp.  She bought Head and Shoulders 2 days ago and has used this once.  She notes that her toenails are also abnormal.  She has never been treated for a fungal infection.        Patient Active Problem List   Diagnosis     Essential hypertension     Hyperlipidemia     Eosinophilia     Aortic stenosis     Hypothyroidism     S/P cholecystectomy     Elevated LFTs     Vulvar dystrophy -- LS     Recurrent vulvar infection     Steatohepatitis     Fatigue     Weakness     Diaphoresis     Hemorrhoids     Near syncope     Lichen sclerosus et atrophicus of the vulva     Hypothyroidism due to acquired atrophy of thyroid     Recurrent colitis due to Clostridium difficile       Allergies   Allergen Reactions     Sulfa Drugs Hives     Clonidine Rash     Diltiazem Rash         Current Outpatient Prescriptions   Medication     ketoconazole (NIZORAL) 2 % shampoo     fluocinonide (LIDEX) 0.05 % solution     amLODIPine (NORVASC) 2.5 MG tablet     aspirin 81 MG tablet      hydrocortisone (ANUSOL-HC) 2.5 % rectal cream     potassium chloride SA (K-DUR,KLOR-CON M) 20 MEQ tablet     levothyroxine (SYNTHROID, LEVOTHROID) 88 MCG tablet     metoprolol (TOPROL-XL) 100 MG 24 hr tablet     clobetasol (CLOBETASOL PROPIONATE EMULSION) 0.05 % CREA     hydrochlorothiazide (HYDRODIURIL) 25 MG tablet     valsartan (DIOVAN) 320 MG tablet     triamcinolone (KENALOG) 0.025 % cream     terbinafine (LAMISIL AT) 1 % cream     Magnesium Hydroxide (MILK OF MAGNESIA PO)     Omega-3 Fatty Acids (OMEGA 3 PO)     BIOTIN PO     calcium carb 1250 mg, 500 mg [place],/vitamin D 200 units (OSCAL WITH D) 500-200 MG-UNIT per tablet     ascorbic acid (VITAMIN C) 500 MG tablet     cyanocolbalamin (VITAMIN B-12) 1000 MCG tablet     Glucosamine-Chondroit-Vit C-Mn (GLUCOSAMINE CHONDR 1500 COMPLX) CAPS     Magnesium 300 MG CAPS     Cetirizine HCl (ZYRTEC PO)     No current facility-administered medications for this visit.            REVIEW OF SYSTEMS:  Feeling well without additional skin cancers.      PHYSICAL EXAMINATION:   GENERAL:  The patient is a healthy-appearing, 82-year-old female in no distress.   HEENT:  Conjunctivae are clear.   PULMONARY:  Breathing comfortably on room air.   ABDOMEN:  No abdominal distention.   CARDIOVASCULAR:  Extremities are warm and well perfused.   SKIN:  Exam today includes the scalp, face, neck, chest, abdomen, back, arms, legs, hands, feet and buttocks.  Skin exam is normal except for as follows:   - Examination of the scalp shows diffuse xerosis with central xerotic scale.   - Examination of the occipital inferior scalp with scale with slight erythema and induration.   - Circular, angulated erosions on the upper back and anterior chest with diffuse xerosis.   - Distal subungual debris and white discoloration of the toenail plates.   - Scattered, hyperpigmented, light-brown and medium-brown, hyperkeratotic papules on the legs.   - Smooth-topped, cherry-red papules on the chest and abdomen.    - Waxy, 2 mm, hyperkeratotic papule on the left lateral canthus.      ASSESSMENT AND PLAN:   1.  Seborrheic keratoses:  Discussed that these are benign growths of the top layer of skin.  No treatment advised.       2.  Pruritus:  The patient with xerosis today.  Advised to use a more mild soap and a thick emollient on a daily basis to prevent xerosis, which will be more likely to result in dermatitis and irritation.     3.  Seborrheic dermatitis:  Prescribed Lidex solution to use on the scalp nightly as needed in addition to ketoconazole shampoo to be used 3-4 times per week as needed.     4.  Cherry angiomas:  Benign vascular papules.  No treatment advised.     5.  Tinea pedis and onychomycosis:  Oral terbinafine would likely be required to treat nail infection.  The patient declines this treatment option.      The patient is to return in 1 year or sooner if needed.     Aaliyah Cordero MD  Dermatology Staff

## 2017-02-10 NOTE — PROGRESS NOTES
DERMATOLOGY CLINIC VISIT NOTE      CHIEF COMPLAINT:  Multiple rashes and skin check.      DERMATOLOGY PROBLEM LIST:    1.  History of tinea pedis.   2.  Cherry angioma.   3.  Benign lichenoid keratoses.   4.  Seborrheic keratoses.   5.  Seborrheic dermatitis of the scalp.   6.  Xerosis cutis with associated pruritus.      HISTORY OF PRESENT ILLNESS:  Ms. Yeh is an 82-year-old female presenting to Dermatology Clinic for skin check today.  She has no history of skin cancer.  She notes widespread pruritus over her upper back and intermittent eruptions of a rash on her anterior chest.  She washes her body with Dial soap.  She uses Jergens lotion sometimes.  She has scaling of her scalp.  She bought Head and Shoulders 2 days ago and has used this once.  She notes that her toenails are also abnormal.  She has never been treated for a fungal infection.        Patient Active Problem List   Diagnosis     Essential hypertension     Hyperlipidemia     Eosinophilia     Aortic stenosis     Hypothyroidism     S/P cholecystectomy     Elevated LFTs     Vulvar dystrophy -- LS     Recurrent vulvar infection     Steatohepatitis     Fatigue     Weakness     Diaphoresis     Hemorrhoids     Near syncope     Lichen sclerosus et atrophicus of the vulva     Hypothyroidism due to acquired atrophy of thyroid     Recurrent colitis due to Clostridium difficile       Allergies   Allergen Reactions     Sulfa Drugs Hives     Clonidine Rash     Diltiazem Rash         Current Outpatient Prescriptions   Medication     ketoconazole (NIZORAL) 2 % shampoo     fluocinonide (LIDEX) 0.05 % solution     amLODIPine (NORVASC) 2.5 MG tablet     aspirin 81 MG tablet     hydrocortisone (ANUSOL-HC) 2.5 % rectal cream     potassium chloride SA (K-DUR,KLOR-CON M) 20 MEQ tablet     levothyroxine (SYNTHROID, LEVOTHROID) 88 MCG tablet     metoprolol (TOPROL-XL) 100 MG 24 hr tablet     clobetasol (CLOBETASOL PROPIONATE EMULSION) 0.05 % CREA     hydrochlorothiazide  (HYDRODIURIL) 25 MG tablet     valsartan (DIOVAN) 320 MG tablet     triamcinolone (KENALOG) 0.025 % cream     terbinafine (LAMISIL AT) 1 % cream     Magnesium Hydroxide (MILK OF MAGNESIA PO)     Omega-3 Fatty Acids (OMEGA 3 PO)     BIOTIN PO     calcium carb 1250 mg, 500 mg [place],/vitamin D 200 units (OSCAL WITH D) 500-200 MG-UNIT per tablet     ascorbic acid (VITAMIN C) 500 MG tablet     cyanocolbalamin (VITAMIN B-12) 1000 MCG tablet     Glucosamine-Chondroit-Vit C-Mn (GLUCOSAMINE CHONDR 1500 COMPLX) CAPS     Magnesium 300 MG CAPS     Cetirizine HCl (ZYRTEC PO)     No current facility-administered medications for this visit.            REVIEW OF SYSTEMS:  Feeling well without additional skin cancers.      PHYSICAL EXAMINATION:   GENERAL:  The patient is a healthy-appearing, 82-year-old female in no distress.   HEENT:  Conjunctivae are clear.   PULMONARY:  Breathing comfortably on room air.   ABDOMEN:  No abdominal distention.   CARDIOVASCULAR:  Extremities are warm and well perfused.   SKIN:  Exam today includes the scalp, face, neck, chest, abdomen, back, arms, legs, hands, feet and buttocks.  Skin exam is normal except for as follows:   - Examination of the scalp shows diffuse xerosis with central xerotic scale.   - Examination of the occipital inferior scalp with scale with slight erythema and induration.   - Circular, angulated erosions on the upper back and anterior chest with diffuse xerosis.   - Distal subungual debris and white discoloration of the toenail plates.   - Scattered, hyperpigmented, light-brown and medium-brown, hyperkeratotic papules on the legs.   - Smooth-topped, cherry-red papules on the chest and abdomen.   - Waxy, 2 mm, hyperkeratotic papule on the left lateral canthus.      ASSESSMENT AND PLAN:   1.  Seborrheic keratoses:  Discussed that these are benign growths of the top layer of skin.  No treatment advised.       2.  Pruritus:  The patient with xerosis today.  Advised to use a more mild  soap and a thick emollient on a daily basis to prevent xerosis, which will be more likely to result in dermatitis and irritation.     3.  Seborrheic dermatitis:  Prescribed Lidex solution to use on the scalp nightly as needed in addition to ketoconazole shampoo to be used 3-4 times per week as needed.     4.  Cherry angiomas:  Benign vascular papules.  No treatment advised.     5.  Tinea pedis and onychomycosis:  Oral terbinafine would likely be required to treat nail infection.  The patient declines this treatment option.      The patient is to return in 1 year or sooner if needed.     Aaliyah Cordero MD  Dermatology Staff

## 2017-02-10 NOTE — PATIENT INSTRUCTIONS
"Pediatric Dermatology  [facility]  [address]  [place] [address]  [phone]    Gentle Skin Care  Below is a list of products our providers recommend for gentle skin care.  Moisturizers:      Thicker; Aquaphor Ointment, Vaseline, Petrolium Jelly, Eucerin and Vanicream    Avoid Lotions (too thin)  Mild Cleansers:    Dove- Fragrance Free    CeraVe     Vanicream Cleansing Bar    Cetaphil Cleanser     Aquaphor 2 in1 Gentle Wash and Shampoo       Laundry Products:    All Free and Clear    Cheer Free    Generic Brands are okay as long as they are  Fragrance Free      Avoid fabric softeners  and dryer sheets   Sunscreens: SPF 30 or greater     Sunscreens that contain Zinc Oxide or Titanium Dioxide should be applied, these are physical blockers. Spray or  chemical  sunscreens should be avoided.        Shampoo and Conditioners:    Free and Clear by Vanicream    Aquaphor 2 in 1 Gentle Wash and Shampoo    California Baby  super sensitive   Oils:    Mineral Oil     Emu Oil     For some patients, coconut and sunflower seed oil      Generic Products are an okay substitute, but make sure they are fragrance free.  *Avoid product that have fragrance added to them. Organic does not mean  fragrance free.  In fact patients with sensitive skin can become quite irritated by organic products.     1. Daily bathing is recommended. Make sure you are applying a good moisturizer after bathing every time.  2. Use Moisturizing creams at least twice daily to the whole body. Your provider may recommend a lighter or heavier moisturizer based on your child s severity and that time of year it is.  3. Creams are more moisturizing than lotions  4. Products should be fragrance free- soaps, creams, detergents.  Products such as Gary and Gary as well as the Cetaphil \"Baby\" line contain fragrance and may irritate your child's sensitive skin.    Care Plan:  1. Keep bathing and showering short, less than 15 " minutes   2. Always use lukewarm warm when possible. AVOID very HOT or COLD water  3. DO NOT use bubble bath  4. Limit the use of soaps. Focus on the skin folds, face, armpits, groin and feet  5. Do NOT vigorously scrub when you cleanse your skin  6. After bathing, PAT your skin lightly with a towel. DO NOT rub or scrub when drying  7. ALWAYS apply a moisturizer immediately after bathing. This helps to  lock in  the moisture. * IF YOU WERE PRESCRIBED A TOPICAL MEDICATION, APPLY YOUR MEDICATION FIRST THEN COVER WITH YOUR DAILY MOISTURIZER  8. Reapply moisturizing agents at least twice daily to your whole body  9. Do not use products such as powders, perfumes, or colognes on your skin  10. Avoid saunas and steam baths. This temperature is too HOT  11. Avoid tight or  scratchy  clothing such as wool  12. Always wash new clothing before wearing them for the first time  13. Sometimes a humidifier or vaporizer can be used at night can help the dry skin. Remember to keep it clean to avoid mold growth.  14.

## 2017-02-10 NOTE — NURSING NOTE
Dermatology Rooming Note    Dulce Yeh's goals for this visit include:   Chief Complaint   Patient presents with     Derm Problem     Skin Cancer Exam, back of the head, right posterior knee,under left arm.      Lyn Iyer CMA

## 2017-02-10 NOTE — MR AVS SNAPSHOT
After Visit Summary   2/10/2017    Dulce Yeh    MRN: [id number]           Patient Information     Date Of Birth          1935        Visit Information        Provider Department      2/10/2017 9:30 AM Aaliyah Cordero MD [facility] Dermatology        Today's Diagnoses     Dermatitis, seborrheic    -  1       Care Instructions    Pediatric Dermatology  [address]  [place] [address]  [phone]    Gentle Skin Care  Below is a list of products our providers recommend for gentle skin care.  Moisturizers:      Thicker; Aquaphor Ointment, Vaseline, Petrolium Jelly, Eucerin and Vanicream    Avoid Lotions (too thin)  Mild Cleansers:    Dove- Fragrance Free    CeraVe     Vanicream Cleansing Bar    Cetaphil Cleanser     Aquaphor 2 in1 Gentle Wash and Shampoo       Laundry Products:    All Free and Clear    Cheer Free    Generic Brands are okay as long as they are  Fragrance Free      Avoid fabric softeners  and dryer sheets   Sunscreens: SPF 30 or greater     Sunscreens that contain Zinc Oxide or Titanium Dioxide should be applied, these are physical blockers. Spray or  chemical  sunscreens should be avoided.        Shampoo and Conditioners:    Free and Clear by Vanicream    Aquaphor 2 in 1 Gentle Wash and Shampoo    California Baby  super sensitive   Oils:    Mineral Oil     Emu Oil     For some patients, coconut and sunflower seed oil      Generic Products are an okay substitute, but make sure they are fragrance free.  *Avoid product that have fragrance added to them. Organic does not mean  fragrance free.  In fact patients with sensitive skin can become quite irritated by organic products.     1. Daily bathing is recommended. Make sure you are applying a good moisturizer after bathing every time.  2. Use Moisturizing creams at least twice daily to the whole body. Your provider may recommend a lighter or heavier moisturizer based on your child s  "severity and that time of year it is.  3. Creams are more moisturizing than lotions  4. Products should be fragrance free- soaps, creams, detergents.  Products such as Gary and Gary as well as the Cetaphil \"Baby\" line contain fragrance and may irritate your child's sensitive skin.    Care Plan:  1. Keep bathing and showering short, less than 15 minutes   2. Always use lukewarm warm when possible. AVOID very HOT or COLD water  3. DO NOT use bubble bath  4. Limit the use of soaps. Focus on the skin folds, face, armpits, groin and feet  5. Do NOT vigorously scrub when you cleanse your skin  6. After bathing, PAT your skin lightly with a towel. DO NOT rub or scrub when drying  7. ALWAYS apply a moisturizer immediately after bathing. This helps to  lock in  the moisture. * IF YOU WERE PRESCRIBED A TOPICAL MEDICATION, APPLY YOUR MEDICATION FIRST THEN COVER WITH YOUR DAILY MOISTURIZER  8. Reapply moisturizing agents at least twice daily to your whole body  9. Do not use products such as powders, perfumes, or colognes on your skin  10. Avoid saunas and steam baths. This temperature is too HOT  11. Avoid tight or  scratchy  clothing such as wool  12. Always wash new clothing before wearing them for the first time  13. Sometimes a humidifier or vaporizer can be used at night can help the dry skin. Remember to keep it clean to avoid mold growth.  14.         Follow-ups after your visit        Your next 10 appointments already scheduled     May 30, 2017  8:40 AM   (Arrive by 8:25 AM)   Return Visit with Masood Tinsley MD   [facility] ([facility])    [address] [place] [address]  4th [facility] [id number] [phone]            Aug 07, 2017 12:30 PM   (Arrive by 12:15 PM)   Return Visit with Ginger Davis MD   [facility] ([facility])    [address] [place] [id number] [phone]              Who to " contact     Please call your clinic at [phone] to:    Ask questions about your health    Make or cancel appointments    Discuss your medicines    Learn about your test results    Speak to your doctor   If you have compliments or concerns about an experience at your clinic, or if you wish to file a complaint, please contact [facility] Patient Relations at [phone] or email us at [email or web address]         Additional Information About Your Visit        [employer] Information     Specle is an electronic gateway that provides easy, online access to your medical records. With Specle, you can request a clinic appointment, read your test results, renew a prescription or communicate with your care team.     To sign up for Specle visit the website at [email or web address]   You will be asked to enter the access code listed below, as well as some personal information. Please follow the directions to create your username and password.     Your access code is: [email or web address]  Expires: 2017  1:25 PM     Your access code will  in 90 days. If you need help or a new code, please contact your [facility] Clinic or call [phone] for assistance.        Care EveryWhere ID     This is your Care EveryWhere ID. This could be used by other organizations to access your [place] medical records  [id number]        Your Vitals Were     Last Period                   (LMP Unknown)            Blood Pressure from Last 3 Encounters:   17 168/79   17 130/67   16 161/69    Weight from Last 3 Encounters:   17 76.522 kg (168 lb 11.2 oz)   17 75.751 kg (167 lb)   16 76.93 kg (169 lb 9.6 oz)              Today, you had the following     No orders found for display         Today's Medication Changes          These changes are accurate as of: 2/10/17  9:51 AM.  If you have any questions, ask your nurse or doctor.                Start taking these medicines.        Dose/Directions    fluocinonide 0.05 % solution   Commonly known as:  LIDEX   Used for:  Dermatitis, seborrheic        To scalp nightly as needed for rash and itch.   Quantity:  60 mL   Refills:  3       ketoconazole 2 % shampoo   Commonly known as:  NIZORAL   Used for:  Dermatitis, seborrheic        Leave on scalp 5 minutes then rinse. Use 3 times per week.   Quantity:  120 mL   Refills:  11            Where to get your medicines      These medications were sent to [id number] - [place], [place] - [address] AT [facility] [address]  [address], [place] [address]    Hours:  24-hours Phone:  [phone]    - fluocinonide 0.05 % solution  - ketoconazole 2 % shampoo             Primary Care Provider Office Phone # Fax #    Nathalie Turner [phone]        [facility] [address] [place] [address] [phone]  [facility] [address]        Thank you!     Thank you for choosing [facility] DERMATOLOGY  for your care. Our goal is always to provide you with excellent care. Hearing back from our patients is one way we can continue to improve our services. Please take a few minutes to complete the written survey that you may receive in the mail after your visit with us. Thank you!             Your Updated Medication List - Protect others around you: Learn how to safely use, store and throw away your medicines at www.disposemymeds.org.          This list is accurate as of: 2/10/17  9:51 AM.  Always use your most recent med list.                   Brand Name Dispense Instructions for use    amLODIPine 2.5 MG tablet    NORVASC    90 tablet    Take 1 tablet (2.5 mg) by mouth daily       ascorbic acid 500 MG tablet    VITAMIN C     Take 500 mg by mouth daily.       aspirin 81 MG tablet      Take 81 mg by mouth daily       BIOTIN PO      Take by mouth daily       calcium carb 1250 mg (500 mg [place])/vitamin D 200 units 500-200 MG-UNIT per tablet    OSCAL  with D     Take 1 tablet by mouth 2 times daily (with meals).       clobetasol 0.05 % Crea cream    CLOBETASOL PROPIONATE EMULSION    15 g    Apply topically as needed       cyanocobalamin 1000 MCG tablet    vitamin  B-12     Take 1,000 mcg by mouth daily.       fluocinonide 0.05 % solution    LIDEX    60 mL    To scalp nightly as needed for rash and itch.       glucosamine chondroitin 1500 complex Caps      Take  by mouth.       hydrochlorothiazide 25 MG tablet    HYDRODIURIL    90 tablet    Take 1 tablet (25 mg) by mouth daily       hydrocortisone 2.5 % cream    ANUSOL-HC    30 g    Apply to hemorrhoids twice a day as needed.       ketoconazole 2 % shampoo    NIZORAL    120 mL    Leave on scalp 5 minutes then rinse. Use 3 times per week.       levothyroxine 88 MCG tablet    SYNTHROID/LEVOTHROID    90 tablet    Take 1 tablet (88 mcg) by mouth daily       Magnesium 300 MG Caps      Take  by mouth.       metoprolol 100 MG 24 hr tablet    TOPROL-XL    90 tablet    Take 1 tablet (100 mg) by mouth daily       MILK OF MAGNESIA PO          OMEGA 3 PO          potassium chloride SA 20 MEQ CR tablet    K-DUR/KLOR-CON M    100 tablet    Take 1 tablet (20 mEq) by mouth daily       terbinafine 1 % cream    lamISIL AT    30 g    Apply to affected areas on feet daily for 4 weeks.       triamcinolone 0.025 % cream    KENALOG    15 g    Apply topically 2 times daily       valsartan 320 MG tablet    DIOVAN    90 tablet    Take 1 tablet (320 mg) by mouth daily       ZYRTEC PO      Take  by mouth.

## 2017-02-13 DIAGNOSIS — I10 ESSENTIAL HYPERTENSION: ICD-10-CM

## 2017-02-13 PROBLEM — L82.1 SEBORRHEIC KERATOSIS: Status: ACTIVE | Noted: 2017-02-13

## 2017-02-13 PROBLEM — B35.3 TINEA PEDIS OF BOTH FEET: Status: ACTIVE | Noted: 2017-02-13

## 2017-02-13 PROBLEM — L21.9 DERMATITIS, SEBORRHEIC: Status: ACTIVE | Noted: 2017-02-13

## 2017-02-13 PROBLEM — D18.01 CHERRY ANGIOMA: Status: ACTIVE | Noted: 2017-02-13

## 2017-02-13 RX ORDER — VALSARTAN 320 MG/1
320 TABLET ORAL DAILY
Qty: 90 TABLET | Refills: 2 | Status: SHIPPED | OUTPATIENT
Start: 2017-02-13 | End: 2017-10-18

## 2017-05-23 ENCOUNTER — TELEPHONE (OUTPATIENT)
Dept: GASTROENTEROLOGY | Facility: CLINIC | Age: 82
End: 2017-05-23

## 2017-05-24 ENCOUNTER — CARE COORDINATION (OUTPATIENT)
Dept: GASTROENTEROLOGY | Facility: CLINIC | Age: 82
End: 2017-05-24

## 2017-05-24 ENCOUNTER — TELEPHONE (OUTPATIENT)
Dept: GASTROENTEROLOGY | Facility: CLINIC | Age: 82
End: 2017-05-24

## 2017-05-24 DIAGNOSIS — A04.72 C. DIFFICILE COLITIS: ICD-10-CM

## 2017-05-24 LAB
C DIFF TOX B STL QL: NORMAL
SPECIMEN SOURCE: NORMAL

## 2017-05-24 PROCEDURE — 87493 C DIFF AMPLIFIED PROBE: CPT | Performed by: INTERNAL MEDICINE

## 2017-05-24 NOTE — TELEPHONE ENCOUNTER
Dulce is calling to CX her 5/30 appointment with Dr. Tinsley.  She reports she's doing fine, denies questions or issues for Dr. Tinsley.  She will reschedule, aware it may be September.   She will call if has other concerns.

## 2017-06-20 ENCOUNTER — RADIANT APPOINTMENT (OUTPATIENT)
Dept: MAMMOGRAPHY | Facility: CLINIC | Age: 82
End: 2017-06-20

## 2017-06-20 DIAGNOSIS — Z12.31 VISIT FOR SCREENING MAMMOGRAM: ICD-10-CM

## 2017-09-05 ENCOUNTER — TELEPHONE (OUTPATIENT)
Dept: INTERNAL MEDICINE | Facility: CLINIC | Age: 82
End: 2017-09-05

## 2017-09-05 DIAGNOSIS — I10 ESSENTIAL HYPERTENSION: Primary | ICD-10-CM

## 2017-09-05 RX ORDER — METOPROLOL SUCCINATE 100 MG/1
100 TABLET, EXTENDED RELEASE ORAL DAILY
Qty: 90 TABLET | Refills: 0 | Status: SHIPPED | OUTPATIENT
Start: 2017-09-05 | End: 2017-10-18

## 2017-09-05 NOTE — TELEPHONE ENCOUNTER
----- Message from Yenny Carrizales sent at 2017  3:50 PM CDT -----  Regarding: metoprolol Rx Expires in 2 weeks  Contact: [phone]  Maggy has 18 pills left of Metoprolol.  Her prescription is due to  and she does have a final appointment scheduled with Dr. Turner in October.      Thank you,    Madeleine

## 2017-09-15 ENCOUNTER — PRE VISIT (OUTPATIENT)
Dept: CARDIOLOGY | Facility: CLINIC | Age: 82
End: 2017-09-15

## 2017-09-15 DIAGNOSIS — I35.0 AORTIC VALVE STENOSIS: Primary | ICD-10-CM

## 2017-09-18 ENCOUNTER — OFFICE VISIT (OUTPATIENT)
Dept: CARDIOLOGY | Facility: CLINIC | Age: 82
End: 2017-09-18
Attending: INTERNAL MEDICINE
Payer: MEDICARE

## 2017-09-18 VITALS
HEART RATE: 77 BPM | OXYGEN SATURATION: 97 % | SYSTOLIC BLOOD PRESSURE: 151 MMHG | DIASTOLIC BLOOD PRESSURE: 77 MMHG | HEIGHT: 67 IN | WEIGHT: 177.8 LBS | BODY MASS INDEX: 27.91 KG/M2

## 2017-09-18 DIAGNOSIS — I35.0 AORTIC VALVE STENOSIS, UNSPECIFIED ETIOLOGY: ICD-10-CM

## 2017-09-18 DIAGNOSIS — I10 ESSENTIAL HYPERTENSION: ICD-10-CM

## 2017-09-18 DIAGNOSIS — I44.7 LBBB (LEFT BUNDLE BRANCH BLOCK): Primary | ICD-10-CM

## 2017-09-18 PROCEDURE — 99213 OFFICE O/P EST LOW 20 MIN: CPT | Mod: ZF

## 2017-09-18 PROCEDURE — 99214 OFFICE O/P EST MOD 30 MIN: CPT | Mod: ZP | Performed by: INTERNAL MEDICINE

## 2017-09-18 PROCEDURE — 93010 ELECTROCARDIOGRAM REPORT: CPT | Mod: ZP | Performed by: INTERNAL MEDICINE

## 2017-09-18 PROCEDURE — 93005 ELECTROCARDIOGRAM TRACING: CPT | Mod: ZF

## 2017-09-18 ASSESSMENT — PAIN SCALES - GENERAL: PAINLEVEL: NO PAIN (0)

## 2017-09-18 NOTE — NURSING NOTE

## 2017-09-18 NOTE — PROGRESS NOTES
HPI: 82 year old woman who returns for reevaluation of aortic valve stenosis, She was initially referred for cardiac evaluation in 2015 prior to left knee replacement for progressive aortic valve stenosis. Echo in 2013 showed mild-moderate AS, mean gradient 15 and estimated valve area of 1.2 cm2. In 2015, aortic valve gradients had increased with a mean gradient of 25 mmHg and valve area 0.8-1cm2, consistent with moderate to severe aortic stenosis. She was advised to proceed with surgery without further workup since she was asymptomatic from the valve disease. She also has hypertension, for several years for which she takes HCTZ, valsartan and metoprolol. The patient does not have a history of stroke, heart failure, syncope. Denies renal disease or diabetes.     From a cardiac standpoint, denies any chest pain, orthopnea, PND, palpitations or syncope. Reports stable exertional dyspnea, occasional dizziness while in bed and has undergone hearing evaluation. She did not start amlodipine. Otherwise, no major issues.       PAST MEDICAL HISTORY:  Past Medical History:   Diagnosis Date     Allergy      Aortic stenosis 10/11/2011     C. difficile colitis August-October 2015     Eosinophilia 10/11/2011     History of chickenpox      Hyperlipidaemia      Hypertension      Recurrent colitis due to Clostridium difficile August 2016     S/P cholecystectomy 10/11/2011     Unspecified hypothyroidism 10/11/2011       CURRENT MEDICATIONS:  Current Outpatient Prescriptions   Medication Sig Dispense Refill     metoprolol (TOPROL-XL) 100 MG 24 hr tablet Take 1 tablet (100 mg) by mouth daily 90 tablet 0     valsartan (DIOVAN) 320 MG tablet Take 1 tablet (320 mg) by mouth daily 90 tablet 2     fluocinonide (LIDEX) 0.05 % solution To scalp nightly as needed for rash and itch. 60 mL 3     aspirin 81 MG tablet Take 81 mg by mouth daily       hydrocortisone (ANUSOL-HC) 2.5 % rectal cream Apply to hemorrhoids twice a day as needed. 30 g 3      potassium chloride SA (K-DUR,KLOR-CON M) 20 MEQ tablet Take 1 tablet (20 mEq) by mouth daily 100 tablet 1     levothyroxine (SYNTHROID, LEVOTHROID) 88 MCG tablet Take 1 tablet (88 mcg) by mouth daily 90 tablet 3     clobetasol (CLOBETASOL PROPIONATE EMULSION) 0.05 % CREA Apply topically as needed 15 g 0     hydrochlorothiazide (HYDRODIURIL) 25 MG tablet Take 1 tablet (25 mg) by mouth daily 90 tablet 3     triamcinolone (KENALOG) 0.025 % cream Apply topically 2 times daily 15 g 0     terbinafine (LAMISIL AT) 1 % cream Apply to affected areas on feet daily for 4 weeks. 30 g 2     Magnesium Hydroxide (MILK OF MAGNESIA PO)        Omega-3 Fatty Acids (OMEGA 3 PO)        BIOTIN PO Take by mouth daily       calcium carb 1250 mg, 500 mg [place],/vitamin D 200 units (OSCAL WITH D) 500-200 MG-UNIT per tablet Take 1 tablet by mouth 2 times daily (with meals).       ascorbic acid (VITAMIN C) 500 MG tablet Take 500 mg by mouth daily.       cyanocolbalamin (VITAMIN B-12) 1000 MCG tablet Take 1,000 mcg by mouth daily.       Glucosamine-Chondroit-Vit C-Mn (GLUCOSAMINE CHONDR 1500 COMPLX) CAPS Take  by mouth.       Magnesium 300 MG CAPS Take  by mouth.       Cetirizine HCl (ZYRTEC PO) Take  by mouth.       ketoconazole (NIZORAL) 2 % shampoo Leave on scalp 5 minutes then rinse. Use 3 times per week. (Patient not taking: Reported on 9/18/2017) 120 mL 11     amLODIPine (NORVASC) 2.5 MG tablet Take 1 tablet (2.5 mg) by mouth daily (Patient not taking: Reported on 9/18/2017) 90 tablet 0       PAST SURGICAL HISTORY:  Past Surgical History:   Procedure Laterality Date     ANKLE SURGERY       APPENDECTOMY       bilateral tubal ligation       CHOLECYSTECTOMY       ORTHOPEDIC SURGERY         ALLERGIES     Allergies   Allergen Reactions     Sulfa Drugs Hives     Clonidine Rash     Diltiazem Rash       FAMILY HISTORY:  Family History   Problem Relation Age of Onset     CANCER Mother      lung cancer       SOCIAL HISTORY:  History     Social  "History     Marital Status:      Spouse Name: N/A     Number of Children: 3     Years of Education: N/A     Social History Main Topics     Smoking status: Never Smoker      Smokeless tobacco: Never Used     Alcohol Use: No     Drug Use: No     Sexual Activity: Yes     Birth Control/ Protection: Post-menopausal     Other Topics Concern     Caffeine Concern Yes     coffee, tea     Exercise Yes     swims 3x a week     Social History Narrative    Lives alone, children nearby       ROS:   Constitutional: No fever, chills, or sweats. No weight gain/loss   ENT: No visual disturbance, ear ache, epistaxis, sore throat  Allergies/Immunologic: Negative.   Respiratory: No cough, hemoptysia  Cardiovascular: As per HPI  GI: No nausea, vomiting, hematemesis, melena, or hematochezia  : No urinary frequency, dysuria, or hematuria  Integument: Negative  Psychiatric: Negative  Neuro: Negative  Endocrinology: Negative   Musculoskeletal: Negative    EXAM:  /77 (BP Location: Left arm, Patient Position: Chair, Cuff Size: Adult Regular)  Pulse 77  Ht 1.702 m (5' 7\")  Wt 80.6 kg (177 lb 12.8 oz)  LMP  (LMP Unknown)  SpO2 97%  BMI 27.85 kg/m2     In general, the patient is a pleasant female in no apparent distress.      HEENT: NC/AT.  PERRLA.  EOMI.  Sclerae white, not injected.    Neck: Carotids 2+ bilaterally without bruits.  No jugular venous distension. No thyromegaly.   Heart: RRR. Normal S1, 3/6 systolic ejection murmur with radiation to both carotids, preserved S2. No rub, click, or gallop. There is no heave.    Lungs: Clear bilaterally.  No rhonchi, wheezes, rales.   Abdomen: Soft, nontender, nondistended.   Extremities: No edema.  The pulses are 2+at the radial and DP bilaterally.  Neuro: grossly non focal, gait mildly antalgic.  Skin: no rashes.    Labs:  LIPID RESULTS:  Lab Results   Component Value Date    CHOL 205 (H) 10/10/2012    HDL 34 (L) 10/10/2012     10/10/2012    TRIG 216 (H) 10/10/2012    " CHOLHDLRATIO 6.1 (H) 10/10/2012       LIVER ENZYME RESULTS:  Lab Results   Component Value Date    AST 11 08/07/2015    ALT 20 08/07/2015       CBC RESULTS:  Lab Results   Component Value Date    WBC 9.5 11/04/2016    RBC 4.38 11/04/2016    HGB 12.8 11/04/2016    HCT 37.0 11/04/2016    MCV 85 11/04/2016    MCH 29.2 11/04/2016    MCHC 34.6 11/04/2016    RDW 13.5 11/04/2016     11/04/2016       BMP RESULTS:  Lab Results   Component Value Date     01/06/2017    POTASSIUM 3.6 01/06/2017    CHLORIDE 106 01/06/2017    CO2 24 01/06/2017    ANIONGAP 9 01/06/2017     (H) 01/06/2017    BUN 25 01/06/2017    CR 0.95 01/06/2017    GFRESTIMATED 56 (L) 01/06/2017    GFRESTBLACK 68 01/06/2017    CR 9.2 01/06/2017        A1C RESULTS:  Lab Results   Component Value Date    A1C 6.0 10/10/2012       Cardiac data:  ECG from today shows SR, LBBB    Echo 2/6/17  AV stenosis - moderate to severe, 3.1m/s 22 mmHg, DANIS 1 cm2. Normal biventricular function.    EKG 5/29/15   Sinus bradycardia  Left bundle branch block  Abnormal ECG  When compared with ECG of 02-OCT-2013 09:21,  No significant change was found    ECHO 6.1.15  Preserved biventricular function, moderate to severe aortic stenosis,mean gradient 25 mmHg and valve area 0.8-1cm2. No pulmonary hypertension. no pericardial effusion.      NM MPI Adenosine (3/15/2013)  1. Normal myocardial SPECT study with a summed stress score of 0.  2. Normal left ventricular systolic function.    ECHO (3/10/2013)  No significant change from prior study  Mild to moderate aortic stenosis is present      Assessment and Plan:   82 year old with hypertension, moderate to severe AS.   1. Hypertension - 151/77 mmHg today. Currently on diovan 320 mg/d, metoprolol xl 100mg/d, will add hctz 25 mg/d since SBP remains >150 mmHg. Reassess in 1 week.  2. LBBB -chronic LBBB and in sinus rhythm   3. Aortic stenosis, moderate to severe - her echo in Feb 2017 notable for stable moderate to severe  aortic stenosis. She does not report any active anginal or heart failure symptoms or syncope. Advised continued surveillance. OK to continue her current level of exercise and advised against heavy lifting.  4. CV Prevention, primary - she is currently on aspirin. Declines statin as she is worried about the side effect profile. Limited data in her age group to prove benefit.     Follow up in 6 months    Ginger Davis MD, MS  Staff Cardiologist, [facility]   Pager: [phone]      CC  Patient Care Team:  Nathalie Marie MD as PCP - General (Internal Medicine)  Gian Khanna MD as MD (Orthopedics)  Mar yJane Carrillo MD as MD (Internal Medicine)  NATHALIE MARIE

## 2017-09-18 NOTE — PATIENT INSTRUCTIONS
Patient Instructions:  It was a pleasure to see you in the cardiology clinic today.      If you have any questions, you can reach my nurse, Destinee Shaikh, at [phone].  Press Option #1 for the [facility], and then press Option #3 for nursing.  We are encouraging the use of [employer] to communicate with your HealthCare Provider    Note the new medications: none  Stop the following medications: none    Follow the American Heart Association Diet and Lifestyle recommendations:  Limit saturated fat, trans fat, sodium, red meat, sweets and sugar-sweetened beverages. If you choose to eat red meat, compare labels and select the leanest cuts available.  Aim for at least 150 minutes of moderate physical activity or 75 minutes of vigorous physical activity   or an equal combination of both   each week.    The results from today include: none  Please follow up with Dr. Ginger Davis in six months with a limited ECHO    Sincerely,    Ginger Davis MD     If you have an urgent need after hours (8:00 am to 4:30 pm) please call [phone] and ask for the cardiology fellow on call.

## 2017-09-18 NOTE — NURSING NOTE
Chief Complaint   Patient presents with     Follow Up For     manage HTN & AS/EKG/last visit February 2017     Vitals were taken and medications were reconciled.  ANIKA Medina  12:57 PM

## 2017-09-18 NOTE — LETTER
9/18/2017      RE: Dulce Yeh  [address]  [place] [address]       Dear Colleague,    Thank you for the opportunity to participate in the care of your patient, Dulce Yeh, at the [facility]. Please see a copy of my visit note below.    HPI: 82 year old woman who returns for reevaluation of aortic valve stenosis, She was initially referred for cardiac evaluation in 2015 prior to left knee replacement for progressive aortic valve stenosis. Echo in 2013 showed mild-moderate AS, mean gradient 15 and estimated valve area of 1.2 cm2. In 2015, aortic valve gradients had increased with a mean gradient of 25 mmHg and valve area 0.8-1cm2, consistent with moderate to severe aortic stenosis. She was advised to proceed with surgery without further workup since she was asymptomatic from the valve disease. She also has hypertension, for several years for which she takes HCTZ, valsartan and metoprolol. The patient does not have a history of stroke, heart failure, syncope. Denies renal disease or diabetes.     From a cardiac standpoint, denies any chest pain, orthopnea, PND, palpitations or syncope. Reports stable exertional dyspnea, occasional dizziness while in bed and has undergone hearing evaluation. She did not start amlodipine. Otherwise, no major issues.       PAST MEDICAL HISTORY:  Past Medical History:   Diagnosis Date     Allergy      Aortic stenosis 10/11/2011     C. difficile colitis August-October 2015     Eosinophilia 10/11/2011     History of chickenpox      Hyperlipidaemia      Hypertension      Recurrent colitis due to Clostridium difficile August 2016     S/P cholecystectomy 10/11/2011     Unspecified hypothyroidism 10/11/2011       CURRENT MEDICATIONS:  Current Outpatient Prescriptions   Medication Sig Dispense Refill     metoprolol (TOPROL-XL) 100 MG 24 hr tablet Take 1 tablet (100 mg) by mouth daily 90 tablet 0     valsartan  (DIOVAN) 320 MG tablet Take 1 tablet (320 mg) by mouth daily 90 tablet 2     fluocinonide (LIDEX) 0.05 % solution To scalp nightly as needed for rash and itch. 60 mL 3     aspirin 81 MG tablet Take 81 mg by mouth daily       hydrocortisone (ANUSOL-HC) 2.5 % rectal cream Apply to hemorrhoids twice a day as needed. 30 g 3     potassium chloride SA (K-DUR,KLOR-CON M) 20 MEQ tablet Take 1 tablet (20 mEq) by mouth daily 100 tablet 1     levothyroxine (SYNTHROID, LEVOTHROID) 88 MCG tablet Take 1 tablet (88 mcg) by mouth daily 90 tablet 3     clobetasol (CLOBETASOL PROPIONATE EMULSION) 0.05 % CREA Apply topically as needed 15 g 0     hydrochlorothiazide (HYDRODIURIL) 25 MG tablet Take 1 tablet (25 mg) by mouth daily 90 tablet 3     triamcinolone (KENALOG) 0.025 % cream Apply topically 2 times daily 15 g 0     terbinafine (LAMISIL AT) 1 % cream Apply to affected areas on feet daily for 4 weeks. 30 g 2     Magnesium Hydroxide (MILK OF MAGNESIA PO)        Omega-3 Fatty Acids (OMEGA 3 PO)        BIOTIN PO Take by mouth daily       calcium carb 1250 mg, 500 mg [place],/vitamin D 200 units (OSCAL WITH D) 500-200 MG-UNIT per tablet Take 1 tablet by mouth 2 times daily (with meals).       ascorbic acid (VITAMIN C) 500 MG tablet Take 500 mg by mouth daily.       cyanocolbalamin (VITAMIN B-12) 1000 MCG tablet Take 1,000 mcg by mouth daily.       Glucosamine-Chondroit-Vit C-Mn (GLUCOSAMINE CHONDR 1500 COMPLX) CAPS Take  by mouth.       Magnesium 300 MG CAPS Take  by mouth.       Cetirizine HCl (ZYRTEC PO) Take  by mouth.       ketoconazole (NIZORAL) 2 % shampoo Leave on scalp 5 minutes then rinse. Use 3 times per week. (Patient not taking: Reported on 9/18/2017) 120 mL 11     amLODIPine (NORVASC) 2.5 MG tablet Take 1 tablet (2.5 mg) by mouth daily (Patient not taking: Reported on 9/18/2017) 90 tablet 0       PAST SURGICAL HISTORY:  Past Surgical History:   Procedure Laterality Date     ANKLE SURGERY       APPENDECTOMY       bilateral  "tubal ligation       CHOLECYSTECTOMY       ORTHOPEDIC SURGERY         ALLERGIES     Allergies   Allergen Reactions     Sulfa Drugs Hives     Clonidine Rash     Diltiazem Rash       FAMILY HISTORY:  Family History   Problem Relation Age of Onset     CANCER Mother      lung cancer       SOCIAL HISTORY:  History     Social History     Marital Status:      Spouse Name: N/A     Number of Children: 3     Years of Education: N/A     Social History Main Topics     Smoking status: Never Smoker      Smokeless tobacco: Never Used     Alcohol Use: No     Drug Use: No     Sexual Activity: Yes     Birth Control/ Protection: Post-menopausal     Other Topics Concern     Caffeine Concern Yes     coffee, tea     Exercise Yes     swims 3x a week     Social History Narrative    Lives alone, children nearby       ROS:   Constitutional: No fever, chills, or sweats. No weight gain/loss   ENT: No visual disturbance, ear ache, epistaxis, sore throat  Allergies/Immunologic: Negative.   Respiratory: No cough, hemoptysia  Cardiovascular: As per HPI  GI: No nausea, vomiting, hematemesis, melena, or hematochezia  : No urinary frequency, dysuria, or hematuria  Integument: Negative  Psychiatric: Negative  Neuro: Negative  Endocrinology: Negative   Musculoskeletal: Negative    EXAM:  /77 (BP Location: Left arm, Patient Position: Chair, Cuff Size: Adult Regular)  Pulse 77  Ht 1.702 m (5' 7\")  Wt 80.6 kg (177 lb 12.8 oz)  LMP  (LMP Unknown)  SpO2 97%  BMI 27.85 kg/m2     In general, the patient is a pleasant female in no apparent distress.      HEENT: NC/AT.  PERRLA.  EOMI.  Sclerae white, not injected.    Neck: Carotids 2+ bilaterally without bruits.  No jugular venous distension. No thyromegaly.   Heart: RRR. Normal S1, 3/6 systolic ejection murmur with radiation to both carotids, preserved S2. No rub, click, or gallop. There is no heave.    Lungs: Clear bilaterally.  No rhonchi, wheezes, rales.   Abdomen: Soft, nontender, " nondistended.   Extremities: No edema.  The pulses are 2+at the radial and DP bilaterally.  Neuro: grossly non focal, gait mildly antalgic.  Skin: no rashes.    Labs:  LIPID RESULTS:  Lab Results   Component Value Date    CHOL 205 (H) 10/10/2012    HDL 34 (L) 10/10/2012     10/10/2012    TRIG 216 (H) 10/10/2012    CHOLHDLRATIO 6.1 (H) 10/10/2012       LIVER ENZYME RESULTS:  Lab Results   Component Value Date    AST 11 08/07/2015    ALT 20 08/07/2015       CBC RESULTS:  Lab Results   Component Value Date    WBC 9.5 11/04/2016    RBC 4.38 11/04/2016    HGB 12.8 11/04/2016    HCT 37.0 11/04/2016    MCV 85 11/04/2016    MCH 29.2 11/04/2016    MCHC 34.6 11/04/2016    RDW 13.5 11/04/2016     11/04/2016       BMP RESULTS:  Lab Results   Component Value Date     01/06/2017    POTASSIUM 3.6 01/06/2017    CHLORIDE 106 01/06/2017    CO2 24 01/06/2017    ANIONGAP 9 01/06/2017     (H) 01/06/2017    BUN 25 01/06/2017    CR 0.95 01/06/2017    GFRESTIMATED 56 (L) 01/06/2017    GFRESTBLACK 68 01/06/2017    CR 9.2 01/06/2017        A1C RESULTS:  Lab Results   Component Value Date    A1C 6.0 10/10/2012       Cardiac data:  ECG from today shows SR, LBBB    Echo 2/6/17  AV stenosis - moderate to severe, 3.1m/s 22 mmHg, DANIS 1 cm2. Normal biventricular function.    EKG 5/29/15   Sinus bradycardia  Left bundle branch block  Abnormal ECG  When compared with ECG of 02-OCT-2013 09:21,  No significant change was found    ECHO 6.1.15  Preserved biventricular function, moderate to severe aortic stenosis,mean gradient 25 mmHg and valve area 0.8-1cm2. No pulmonary hypertension. no pericardial effusion.      NM MPI Adenosine (3/15/2013)  1. Normal myocardial SPECT study with a summed stress score of 0.  2. Normal left ventricular systolic function.    ECHO (3/10/2013)  No significant change from prior study  Mild to moderate aortic stenosis is present      Assessment and Plan:   82 year old with hypertension, moderate to  severe AS.   1. Hypertension - 151/77 mmHg today. Currently on diovan 320 mg/d, metoprolol xl 100mg/d, will add hctz 25 mg/d since SBP remains >150 mmHg. Reassess in 1 week.  2. LBBB -chronic LBBB and in sinus rhythm   3. Aortic stenosis, moderate to severe - her echo in Feb 2017 notable for stable moderate to severe aortic stenosis. She does not report any active anginal or heart failure symptoms or syncope. Advised continued surveillance. OK to continue her current level of exercise and advised against heavy lifting.  4. CV Prevention, primary - she is currently on aspirin. Declines statin as she is worried about the side effect profile. Limited data in her age group to prove benefit.     Follow up in 6 months    Ginger Davis MD, MS  Staff Cardiologist, [facility]   Pager: [phone]      CC  Patient Care Team:  Nathalie Marie MD as PCP - General (Internal Medicine)  Gian Khanna MD as MD (Orthopedics)  Mary Jane Carrillo MD as MD (Internal Medicine)  NATHALIE MARIE

## 2017-09-18 NOTE — MR AVS SNAPSHOT
After Visit Summary   9/18/2017    Dulce Yeh    MRN: [id number]           Patient Information     Date Of Birth          1935        Visit Information        Provider Department      9/18/2017 1:00 PM Ginger Davis MD [facility]        Today's Diagnoses     Aortic valve stenosis          Care Instructions    Patient Instructions:  It was a pleasure to see you in the cardiology clinic today.      If you have any questions, you can reach my nurse, Destinee Shaikh, at [phone].  Press Option #1 for the [facility], and then press Option #3 for nursing.  We are encouraging the use of V[employer] to communicate with your HealthCare Provider    Note the new medications: none  Stop the following medications: none    Follow the American Heart Association Diet and Lifestyle recommendations:  Limit saturated fat, trans fat, sodium, red meat, sweets and sugar-sweetened beverages. If you choose to eat red meat, compare labels and select the leanest cuts available.  Aim for at least 150 minutes of moderate physical activity or 75 minutes of vigorous physical activity - or an equal combination of both - each week.    The results from today include: none  Please follow up with Dr. Ginger Davis in six months with a limited ECHO    Sincerely,    Ginger Davis MD     If you have an urgent need after hours (8:00 am to 4:30 pm) please call [phone] and ask for the cardiology fellow on call.                    Follow-ups after your visit        Follow-up notes from your care team     Return in about 6 months (around 3/18/2018), or if symptoms worsen or fail to improve.      Your next 10 appointments already scheduled     Oct 18, 2017 10:25 AM CDT   (Arrive by 10:10 AM)   Return Visit with Nathalie Turner MD   [facility] ([facility])    [address] [place] [address] [phone]            Mar 26,  "2018  1:30 PM CDT   Ech Limited with [id number]   [facility] ([facility])    [address] [place] [address] [phone]           1.  Please bring or wear a comfortable two-piece outfit. 2.  You may eat, drink and take your normal medicines. 3.  For any questions that cannot be answered, please contact the ordering physician            Mar 26, 2018  2:30 PM CDT   (Arrive by 2:15 PM)   Return Visit with Ginger Davis MD   [facility] ([facility])    [address] [place] [address] [phone]              Future tests that were ordered for you today     Open Future Orders        Priority Expected Expires Ordered    Echocardiogram Limited Routine  9/18/2018 9/18/2017            Who to contact     If you have questions or need follow up information about today's clinic visit or your schedule please contact [facility] directly at [phone].  Normal or non-critical lab and imaging results will be communicated to you by Sosedihart, letter or phone within 4 business days after the clinic has received the results. If you do not hear from us within 7 days, please contact the clinic through idiagt or phone. If you have a critical or abnormal lab result, we will notify you by phone as soon as possible.  Submit refill requests through [employer] or call your pharmacy and they will forward the refill request to us. Please allow 3 business days for your refill to be completed.          Additional Information About Your Visit        [employer] Information     [employer] lets you send messages to your doctor, view your test results, renew your prescriptions, schedule appointments and more. To sign up, go to [email or web address] . Click on \"Log in\" on the left side of the screen, which will take you to the Welcome page. Then click on \"Sign up Now\" on the right side of the page.     You will be asked to enter " "the access code listed below, as well as some personal information. Please follow the directions to create your username and password.     Your access code is: [email or web address]  Expires: 2017  1:44 PM     Your access code will  in 90 days. If you need help or a new code, please call your [facility] clinic or [phone].        Care EveryWhere ID     This is your Care EveryWhere ID. This could be used by other organizations to access your [facility] medical records  [id number]        Your Vitals Were     Pulse Height Last Period Pulse Oximetry BMI (Body Mass Index)       77 1.702 m (5' 7\") (LMP Unknown) 97% 27.85 kg/m2        Blood Pressure from Last 3 Encounters:   17 151/77   17 168/79   17 130/67    Weight from Last 3 Encounters:   17 80.6 kg (177 lb 12.8 oz)   17 76.5 kg (168 lb 11.2 oz)   17 75.8 kg (167 lb)              We Performed the Following     EKG 12-lead, tracing only (Future)        Primary Care Provider Office Phone # Fax #    Nathalie Turner [phone]       [address] [place] [address] [address] [place] [address]        Equal Access to Services     [facility] : Hadii aad ku hadasho Soomaali, waaxda luqadaha, qaybta kaalmada adeegyada, nancy jennings . So [facility] [phone].    ATENCIÓN: Si habla español, tiene a merritt disposición servicios gratuitos de asistencia lingüística. Llame al [phone].    We comply with applicable federal civil rights laws and [place] laws. We do not discriminate on the basis of race, color, national origin, age, disability sex, sexual orientation or gender identity.            Thank you!     Thank you for choosing [facility]  for your care. Our goal is always to provide you with excellent care. Hearing back from our patients is one way we can continue to improve our services. Please take a few minutes to complete the written survey that you may receive in the mail after your visit " with us. Thank you!             Your Updated Medication List - Protect others around you: Learn how to safely use, store and throw away your medicines at www.disposemymeds.org.          This list is accurate as of: 9/18/17  1:44 PM.  Always use your most recent med list.                   Brand Name Dispense Instructions for use Diagnosis    amLODIPine 2.5 MG tablet    NORVASC    90 tablet    Take 1 tablet (2.5 mg) by mouth daily    Aortic valve stenosis, unspecified etiology       ascorbic acid 500 MG tablet    VITAMIN C     Take 500 mg by mouth daily.        aspirin 81 MG tablet      Take 81 mg by mouth daily        BIOTIN PO      Take by mouth daily        calcium carb 1250 mg (500 mg [place])/vitamin D 200 units 500-200 MG-UNIT per tablet    OSCAL with D     Take 1 tablet by mouth 2 times daily (with meals).        clobetasol 0.05 % Crea cream    CLOBETASOL PROPIONATE EMULSION    15 g    Apply topically as needed    Lichen sclerosus et atrophicus of the vulva       cyanocobalamin 1000 MCG tablet    vitamin  B-12     Take 1,000 mcg by mouth daily.        fluocinonide 0.05 % solution    LIDEX    60 mL    To scalp nightly as needed for rash and itch.    Dermatitis, seborrheic       glucosamine chondroitin 1500 complex Caps      Take  by mouth.        hydrochlorothiazide 25 MG tablet    HYDRODIURIL    90 tablet    Take 1 tablet (25 mg) by mouth daily    Benign essential hypertension       hydrocortisone 2.5 % cream    ANUSOL-HC    30 g    Apply to hemorrhoids twice a day as needed.    Hemorrhoids, unspecified hemorrhoid type       ketoconazole 2 % shampoo    NIZORAL    120 mL    Leave on scalp 5 minutes then rinse. Use 3 times per week.    Dermatitis, seborrheic       levothyroxine 88 MCG tablet    SYNTHROID/LEVOTHROID    90 tablet    Take 1 tablet (88 mcg) by mouth daily    Hypothyroidism due to acquired atrophy of thyroid       Magnesium 300 MG Caps      Take  by mouth.        metoprolol 100 MG 24 hr tablet     TOPROL-XL    90 tablet    Take 1 tablet (100 mg) by mouth daily    Essential hypertension       MILK OF MAGNESIA PO           OMEGA 3 PO           potassium chloride SA 20 MEQ CR tablet    K-DUR/KLOR-CON M    100 tablet    Take 1 tablet (20 mEq) by mouth daily    Hypokalemia, C. difficile colitis       terbinafine 1 % cream    lamISIL AT    30 g    Apply to affected areas on feet daily for 4 weeks.    Tinea pedis       triamcinolone 0.025 % cream    KENALOG    15 g    Apply topically 2 times daily    Rash and nonspecific skin eruption       valsartan 320 MG tablet    DIOVAN    90 tablet    Take 1 tablet (320 mg) by mouth daily    Essential hypertension       ZYRTEC PO      Take  by mouth.

## 2017-09-19 LAB — INTERPRETATION ECG - MUSE: NORMAL

## 2017-10-18 ENCOUNTER — OFFICE VISIT (OUTPATIENT)
Dept: INTERNAL MEDICINE | Facility: CLINIC | Age: 82
End: 2017-10-18

## 2017-10-18 VITALS
BODY MASS INDEX: 28.36 KG/M2 | RESPIRATION RATE: 16 BRPM | WEIGHT: 181.1 LBS | SYSTOLIC BLOOD PRESSURE: 152 MMHG | HEART RATE: 69 BPM | DIASTOLIC BLOOD PRESSURE: 70 MMHG

## 2017-10-18 DIAGNOSIS — I10 BENIGN ESSENTIAL HYPERTENSION: ICD-10-CM

## 2017-10-18 DIAGNOSIS — E03.4 HYPOTHYROIDISM DUE TO ACQUIRED ATROPHY OF THYROID: ICD-10-CM

## 2017-10-18 DIAGNOSIS — I35.0 AORTIC VALVE STENOSIS, ETIOLOGY OF CARDIAC VALVE DISEASE UNSPECIFIED: ICD-10-CM

## 2017-10-18 DIAGNOSIS — Z23 NEED FOR PROPHYLACTIC VACCINATION AND INOCULATION AGAINST INFLUENZA: ICD-10-CM

## 2017-10-18 DIAGNOSIS — K64.9 HEMORRHOIDS, UNSPECIFIED HEMORRHOID TYPE: ICD-10-CM

## 2017-10-18 DIAGNOSIS — I10 ESSENTIAL HYPERTENSION: Primary | ICD-10-CM

## 2017-10-18 DIAGNOSIS — K75.81 STEATOHEPATITIS: ICD-10-CM

## 2017-10-18 DIAGNOSIS — N90.4 LICHEN SCLEROSUS ET ATROPHICUS OF THE VULVA: ICD-10-CM

## 2017-10-18 DIAGNOSIS — Z23 NEED FOR TDAP VACCINATION: ICD-10-CM

## 2017-10-18 LAB
ALBUMIN SERPL-MCNC: 3.9 G/DL (ref 3.4–5)
ALP SERPL-CCNC: 52 U/L (ref 40–150)
ALT SERPL W P-5'-P-CCNC: 39 U/L (ref 0–50)
ANION GAP SERPL CALCULATED.3IONS-SCNC: 8 MMOL/L (ref 3–14)
AST SERPL W P-5'-P-CCNC: 33 U/L (ref 0–45)
BILIRUB SERPL-MCNC: 0.6 MG/DL (ref 0.2–1.3)
BUN SERPL-MCNC: 24 MG/DL (ref 7–30)
CALCIUM SERPL-MCNC: 9.3 MG/DL (ref 8.5–10.1)
CHLORIDE SERPL-SCNC: 101 MMOL/L (ref 94–109)
CO2 SERPL-SCNC: 27 MMOL/L (ref 20–32)
CREAT SERPL-MCNC: 0.87 MG/DL (ref 0.52–1.04)
GFR SERPL CREATININE-BSD FRML MDRD: 62 ML/MIN/1.7M2
GLUCOSE SERPL-MCNC: 109 MG/DL (ref 70–99)
POTASSIUM SERPL-SCNC: 3.9 MMOL/L (ref 3.4–5.3)
PROT SERPL-MCNC: 7.9 G/DL (ref 6.8–8.8)
SODIUM SERPL-SCNC: 136 MMOL/L (ref 133–144)
TSH SERPL DL<=0.005 MIU/L-ACNC: 0.44 MU/L (ref 0.4–4)

## 2017-10-18 RX ORDER — FLUOCINONIDE TOPICAL SOLUTION USP, 0.05% 0.5 MG/ML
SOLUTION TOPICAL 2 TIMES DAILY
COMMUNITY
End: 2021-04-16

## 2017-10-18 RX ORDER — VALSARTAN 320 MG/1
320 TABLET ORAL DAILY
Qty: 90 TABLET | Refills: 2 | Status: SHIPPED | OUTPATIENT
Start: 2017-10-18 | End: 2018-06-19

## 2017-10-18 RX ORDER — METOPROLOL SUCCINATE 100 MG/1
100 TABLET, EXTENDED RELEASE ORAL DAILY
Qty: 90 TABLET | Refills: 1 | Status: SHIPPED | OUTPATIENT
Start: 2017-10-18 | End: 2018-06-19

## 2017-10-18 RX ORDER — HYDROCHLOROTHIAZIDE 25 MG/1
25 TABLET ORAL DAILY
Qty: 90 TABLET | Refills: 3 | Status: SHIPPED | OUTPATIENT
Start: 2017-10-18 | End: 2018-10-01

## 2017-10-18 RX ORDER — LEVOTHYROXINE SODIUM 88 UG/1
88 TABLET ORAL DAILY
Qty: 90 TABLET | Refills: 3 | Status: SHIPPED | OUTPATIENT
Start: 2017-10-18 | End: 2018-08-13

## 2017-10-18 ASSESSMENT — PAIN SCALES - GENERAL: PAINLEVEL: NO PAIN (0)

## 2017-10-18 NOTE — NURSING NOTE
Chief Complaint   Patient presents with     Medication Request     patient states she needs a refill on medication     ТАТЬЯНА ALVAREZ at 10:31 AM on 10/18/2017.

## 2017-10-18 NOTE — MR AVS SNAPSHOT
After Visit Summary   10/18/2017    Dulce Yeh    MRN: [id number]           Patient Information     Date Of Birth          1935        Visit Information        Provider Department      10/18/2017 10:25 AM Nathalie Turner MD [facility]        Today's Diagnoses     Essential hypertension    -  1    Need for prophylactic vaccination and inoculation against influenza        Aortic valve stenosis, etiology of cardiac valve disease unspecified        Lichen sclerosus et atrophicus of the vulva        Hypothyroidism due to acquired atrophy of thyroid        Benign essential hypertension        Need for Tdap vaccination        Steatohepatitis        Hemorrhoids, unspecified hemorrhoid type          Care Instructions    Primary Care Center: [phone]     Primary Care Center Medication Refill Request Information:  * Please contact your pharmacy regarding ANY request for medication refills.  ** [facility] Prescription Fax = [phone]  * Please allow 3 business days for routine medication refills.  * Please allow 5 business days for controlled substance medication refills.     Primary Care Center Test Result notification information:  *You will be notified with in 7-10 days of your appointment day regarding the results of your test.  If you are on MyChart you will be notified as soon as the provider has reviewed the results and signed off on them.    FOLLOWUP WITH NEW PRIMARY CARE PROVIDER in 6 months or sooner as needed:  Ivonne Sol MD - she has seen her previously  MD Gilda Mayen MD Sally Berryman, MD Heather Thompson Buum, MD Jamie Santilli, MD            Follow-ups after your visit        Follow-up notes from your care team     Return in about 6 months (around 4/18/2018).      Your next 10 appointments already scheduled     Oct 18, 2017 11:45 AM CDT   LAB with  LAB    Health Lab ([facility])    [address] [place] [address]  [facility] [address] [phone]           Patient must bring picture ID. Patient should be prepared to give a urine specimen  Please do not eat 10-12 hours before your appointment if you are coming in fasting for labs on lipids, cholesterol, or glucose (sugar). Pregnant women should follow their Care Team instructions. Water with medications is okay. Do not drink coffee or other fluids. If you have concerns about taking  your medications, please ask at office or if scheduling via Luminosot, send a message by clicking on Secure Messaging, Message Your Care Team.            Mar 26, 2018  1:30 PM CDT   Ech Limited with BRENNAN[address] ([facility])    [address] [place] [address] [phone]           1.  Please bring or wear a comfortable two-piece outfit. 2.  You may eat, drink and take your normal medicines. 3.  For any questions that cannot be answered, please contact the ordering physician            Mar 26, 2018  2:30 PM CDT   (Arrive by 2:15 PM)   Return Visit with Ginger Davis MD   [facility] ([facility])    [address] [place] [address] [phone]              Future tests that were ordered for you today     Open Future Orders        Priority Expected Expires Ordered    TSH with free T4 reflex Routine 10/18/2017 11/1/2017 10/18/2017            Who to contact     Please call your clinic at [phone] to:    Ask questions about your health    Make or cancel appointments    Discuss your medicines    Learn about your test results    Speak to your doctor   If you have compliments or concerns about an experience at your clinic, or if you wish to file a complaint, please contact [facility] Patient Relations at [phone] or email us at [email or web address]         Additional Information About Your Visit        DigiSyn[employer] Information      [employer] is an electronic gateway that provides easy, online access to your medical records. With [employer], you can request a clinic appointment, read your test results, renew a prescription or communicate with your care team.     To sign up for [employer] visit the website at [email or web address]   You will be asked to enter the access code listed below, as well as some personal information. Please follow the directions to create your username and password.     Your access code is: [email or web address]  Expires: 2017  1:44 PM     Your access code will  in 90 days. If you need help or a new code, please contact your [facility] or call [phone] for assistance.        Care EveryWhere ID     This is your Care EveryWhere ID. This could be used by other organizations to access your [facility] medical records  [id number]        Your Vitals Were     Pulse Respirations Last Period BMI (Body Mass Index)          69 16 (LMP Unknown) 28.36 kg/m2         Blood Pressure from Last 3 Encounters:   10/18/17 152/70   17 151/77   17 168/79    Weight from Last 3 Encounters:   10/18/17 82.1 kg (181 lb 1.6 oz)   17 80.6 kg (177 lb 12.8 oz)   17 76.5 kg (168 lb 11.2 oz)              We Performed the Following     Comprehensive metabolic panel     FLU VACCINE, INCREASED ANTIGEN, PRESV FREE, AGE 65+ [69860]          Today's Medication Changes          These changes are accurate as of: 10/18/17 11:29 AM.  If you have any questions, ask your nurse or doctor.               Start taking these medicines.        Dose/Directions    Tdap (tetanus-diptheria-acell pertussis) 5-2.5-18.5 LF-MCG/0.5 injection   Commonly known as:  BOOSTRIX   Used for:  Need for Tdap vaccination   Started by:  Nathalie Turner MD        Dose:  0.5 mL   Inject 0.5 mLs into the muscle once for 1 dose   Quantity:  0.5 mL   Refills:  0         These medicines have changed or have updated prescriptions.         Dose/Directions    fluocinonide 0.05 % solution   Commonly known as:  LIDEX   This may have changed:  Another medication with the same name was removed. Continue taking this medication, and follow the directions you see here.   Changed by:  Nathalie Turner MD        Apply topically 2 times daily   Refills:  0         Stop taking these medicines if you haven't already. Please contact your care team if you have questions.     amLODIPine 2.5 MG tablet   Commonly known as:  NORVASC   Stopped by:  Nathalie Turner MD           ketoconazole 2 % shampoo   Commonly known as:  NIZORAL   Stopped by:  Nathalie Turner MD                Where to get your medicines      These medications were sent to [id number] - [place], [place] - [address] AT [facility] [address]  [address], [place] [address]    Hours:  24-hours Phone:  [phone]     hydrochlorothiazide 25 MG tablet    hydrocortisone 2.5 % cream    levothyroxine 88 MCG tablet    metoprolol 100 MG 24 hr tablet    Tdap (tetanus-diptheria-acell pertussis) 5-2.5-18.5 LF-MCG/0.5 injection    valsartan 320 MG tablet                Primary Care Provider Office Phone # Fax #    Nathalie Turner [phone]       [address] [place] [address] [address]  [facility] [address]        Equal Access to Services     [facility] AH: Hadii aad ku hadasho Soomaali, waaxda luqadaha, qaybta kaalmada adeegyada, nancy mcgraw haybogdan jennings . So [facility] [phone].    ATENCIÓN: Si habla español, tiene a merritt disposición servicios gratuitos de asistencia lingüística. Pablo ortega [phone].    We comply with applicable federal civil rights laws and [place] laws. We do not discriminate on the basis of race, color, national origin, age, disability, sex, sexual orientation, or gender identity.            Thank you!     Thank you for choosing [facility]  for your care. Our goal is always to provide you with  excellent care. Hearing back from our patients is one way we can continue to improve our services. Please take a few minutes to complete the written survey that you may receive in the mail after your visit with us. Thank you!             Your Updated Medication List - Protect others around you: Learn how to safely use, store and throw away your medicines at www.disposemymeds.org.          This list is accurate as of: 10/18/17 11:29 AM.  Always use your most recent med list.                   Brand Name Dispense Instructions for use Diagnosis    ascorbic acid 500 MG tablet    VITAMIN C     Take 500 mg by mouth daily.        aspirin 81 MG tablet      Take 81 mg by mouth daily        BIOTIN PO      Take by mouth daily        calcium carb 1250 mg (500 mg [place])/vitamin D 200 units 500-200 MG-UNIT per tablet    OSCAL with D     Take 1 tablet by mouth 2 times daily (with meals).        clobetasol 0.05 % Crea cream    CLOBETASOL PROPIONATE EMULSION    15 g    Apply topically as needed    Lichen sclerosus et atrophicus of the vulva       cyanocobalamin 1000 MCG tablet    vitamin  B-12     Take 1,000 mcg by mouth daily.        fluocinonide 0.05 % solution    LIDEX     Apply topically 2 times daily        glucosamine chondroitin 1500 complex Caps      Take  by mouth.        hydrochlorothiazide 25 MG tablet    HYDRODIURIL    90 tablet    Take 1 tablet (25 mg) by mouth daily    Benign essential hypertension       hydrocortisone 2.5 % cream    ANUSOL-HC    30 g    Apply to hemorrhoids twice a day as needed.    Hemorrhoids, unspecified hemorrhoid type       levothyroxine 88 MCG tablet    SYNTHROID/LEVOTHROID    90 tablet    Take 1 tablet (88 mcg) by mouth daily    Hypothyroidism due to acquired atrophy of thyroid       Magnesium 300 MG Caps      Take  by mouth.        metoprolol 100 MG 24 hr tablet    TOPROL-XL    90 tablet    Take 1 tablet (100 mg) by mouth daily    Essential hypertension       MILK OF MAGNESIA PO           OMEGA  3 PO           potassium chloride SA 20 MEQ CR tablet    K-DUR/KLOR-CON M    100 tablet    Take 1 tablet (20 mEq) by mouth daily    Hypokalemia, C. difficile colitis       Tdap (tetanus-diptheria-acell pertussis) 5-2.5-18.5 LF-MCG/0.5 injection    BOOSTRIX    0.5 mL    Inject 0.5 mLs into the muscle once for 1 dose    Need for Tdap vaccination       terbinafine 1 % cream    lamISIL AT    30 g    Apply to affected areas on feet daily for 4 weeks.    Tinea pedis       triamcinolone 0.025 % cream    KENALOG    15 g    Apply topically 2 times daily    Rash and nonspecific skin eruption       valsartan 320 MG tablet    DIOVAN    90 tablet    Take 1 tablet (320 mg) by mouth daily    Essential hypertension       ZYRTEC PO      Take  by mouth.

## 2017-10-18 NOTE — NURSING NOTE
"Injectable Influenza Immunization Documentation    1.  Has the patient received the information for the injectable influenza vaccine? YES     2. Is the patient 6 months of age or older? YES     3. Does the patient have any of the following contraindications?         Severe allergy to eggs? No     Severe allergic reaction to previous influenza vaccines? No   Severe allergy to latex? No       History of Guillain-[place] syndrome? No     Currently have a temperature greater than 100.4F? No        4.  Severely egg allergic patients should have flu vaccine eligibility assessed by an MD, RN, or pharmacist, and those who received flu vaccine should be observed for 15 min by an MD, RN, Pharmacist, Medical Technician, or member of clinic staff.\": YES    5. Latex-allergic patients should be given latex-free influenza vaccine No. Please reference the Vaccine latex table to determine if your clinic s product is latex-containing.       Vaccination given by ТАТЬЯНА ALVAREZ at 2:20 PM on 10/18/2017.          "

## 2017-10-18 NOTE — PROGRESS NOTES
Dulce Yeh is a 82 year old year old female being seen today for   Chief Complaint   Patient presents with     Medication Request     patient states she needs a refill on medication       HPI: Ms. Yeh is here for follow-up on several issues. She has been doing well; she's not had any recurrence of C. difficile infection to this year. She was seen by Dr. Yen of GI in the spring who recommended to treat her with Dificid rather than other oral antibiotics if she does develop recurrent C. difficile again. She was seen recently by Dr. Davis of cardiology regarding her aortic stenosis with a plan for follow-up again in 6 months. She generally feels as if things are going well.      In the past, we tried carvedilol, diltiazem, clonidine and amlodipine, and with all of those medications, she has developed intolerable side effects.    ASSESSMENT/PLAN:  #1 82-year-old woman here for follow-up on chronic medical conditions. We will give her the flu shot today. She is also due for Td which we will give her. she'll be due for mammogram in June 2018. Were no longer doing colonoscopies for colon cancer screening because of her age. She'll normal DEXA scan in 2006 at the age of 71 and has had no fractures   #2 benign essential hypertension blood pressure is marginallycontrolled however she is on maximum dose of her current medications and has not tolerated other blood pressure medicines in the past despite repeated attempts. In the past we have tried carvedilol diltiazem clonidine and amlodipine and with all of those medications she developed intolerable side effects.   #3 aortic valve stenosis moderate to severe stable on most recent echocardiogram August 3. Follow-up with cardiology in 6 months   #4 hypothyroidism check TSH today and refill levothyroxine   #5 lichen sclerosus for which uses clobetasol as needed   #6previous steatohepatitis with mild abnormalities in LFTs we'll recheck those today   #7hemorrhoids for  which uses h cortisone cream as needed   #7 previous recurrent C. difficile infection but no episodes this year   #8 status post knee replacement.   #9 f/u 6 months with new primary care provider or sooner as needed.          Patient Active Problem List   Diagnosis     Essential hypertension     Hyperlipidemia     Eosinophilia     Aortic stenosis     Hypothyroidism     S/P cholecystectomy     Elevated LFTs     Vulvar dystrophy -- LS     Recurrent vulvar infection     Steatohepatitis     Fatigue     Weakness     Diaphoresis     Hemorrhoids     Near syncope     Lichen sclerosus et atrophicus of the vulva     Hypothyroidism due to acquired atrophy of thyroid     Recurrent colitis due to Clostridium difficile     Dermatitis, seborrheic     Cherry angioma     Seborrheic keratosis     Tinea pedis of both feet       Family History   Problem Relation Age of Onset     CANCER Mother      lung cancer       Immunization History   Administered Date(s) Administered     Influenza (High Dose) 3 valent vaccine 10/22/2014, 11/18/2015, 11/07/2016     Influenza (IIV3) 10/12/2011, 10/10/2012, 12/09/2013     Pneumococcal (PCV 13) 10/22/2014     Pneumococcal 23 valent 12/13/2000     TD (ADULT, 7+) 01/17/2007     Zoster vaccine, live 12/28/2009       ROS:       Constitutional: no fevers, night sweats or unintentional weight change   Eyes: no vision change, diplopia or red eyes   Ears, Nose, Mouth, Throat: no tinnitus or hearing change, no epistaxis or nasal discharge, no oral lesions, throat clear   Cardiovascular: no chest pain, palpitations, or pain with walking, no orthopnea or PND   Respiratory: no dyspnea, cough, shortness of breath or wheezing   GI: no nausea, vomiting, diarrhea or constipation, no abdominal pain   : no change in urine, no dysuria or hematuria,   Gyn:No breast lumps, no breast pain, no vaginal discharge or irregular bleeding   Integumentary: no concerning lesions or moles   Neuro: no loss of strength or sensation,  no numbness or tingling, no tremor, no dizziness, no headache   Psych: no depression or anxiety, no sleep problems      Current Outpatient Prescriptions   Medication Sig Dispense Refill     fluocinonide (LIDEX) 0.05 % solution Apply topically 2 times daily       metoprolol (TOPROL-XL) 100 MG 24 hr tablet Take 1 tablet (100 mg) by mouth daily 90 tablet 0     valsartan (DIOVAN) 320 MG tablet Take 1 tablet (320 mg) by mouth daily 90 tablet 2     aspirin 81 MG tablet Take 81 mg by mouth daily       hydrocortisone (ANUSOL-HC) 2.5 % rectal cream Apply to hemorrhoids twice a day as needed. 30 g 3     potassium chloride SA (K-DUR,KLOR-CON M) 20 MEQ tablet Take 1 tablet (20 mEq) by mouth daily 100 tablet 1     levothyroxine (SYNTHROID, LEVOTHROID) 88 MCG tablet Take 1 tablet (88 mcg) by mouth daily 90 tablet 3     clobetasol (CLOBETASOL PROPIONATE EMULSION) 0.05 % CREA Apply topically as needed 15 g 0     hydrochlorothiazide (HYDRODIURIL) 25 MG tablet Take 1 tablet (25 mg) by mouth daily 90 tablet 3     triamcinolone (KENALOG) 0.025 % cream Apply topically 2 times daily 15 g 0     terbinafine (LAMISIL AT) 1 % cream Apply to affected areas on feet daily for 4 weeks. 30 g 2     Magnesium Hydroxide (MILK OF MAGNESIA PO)        Omega-3 Fatty Acids (OMEGA 3 PO)        BIOTIN PO Take by mouth daily       calcium carb 1250 mg, 500 mg [place],/vitamin D 200 units (OSCAL WITH D) 500-200 MG-UNIT per tablet Take 1 tablet by mouth 2 times daily (with meals).       ascorbic acid (VITAMIN C) 500 MG tablet Take 500 mg by mouth daily.       cyanocolbalamin (VITAMIN B-12) 1000 MCG tablet Take 1,000 mcg by mouth daily.       Glucosamine-Chondroit-Vit C-Mn (GLUCOSAMINE CHONDR 1500 COMPLX) CAPS Take  by mouth.       Magnesium 300 MG CAPS Take  by mouth.       Cetirizine HCl (ZYRTEC PO) Take  by mouth.                 PHYSICAL EXAM:     /70  Pulse 69  Resp 16  Wt 82.1 kg (181 lb 1.6 oz)  LMP  (LMP Unknown)  BMI 28.36 kg/m2    Wt Readings  from Last 1 Encounters:   10/18/17 82.1 kg (181 lb 1.6 oz)       Constitutional: no distress, comfortable, pleasant   Eyes: anicteric, normal extra-ocular movements   Ears, Nose and Throat: tympanic membranes clear, nose clear and free of lesions, throat clear, neck supple with full range of motion, no thyromegaly.   Cardiovascular: regular rate and rhythm, normal S1 and S2, 3/6 systolic murmur loudest over aortic area;  no rubs or gallops, peripheral pulses full and symmetric   Respiratory: clear to auscultation, no wheezes or crackles, normal breath sounds   Gastrointestinal: positive bowel sounds, nontender, no hepatosplenomegaly, no masses   Musculoskeletal: full range of motion, no edema   Skin: no concerning lesions, no jaundice   Breast: breast are symmetric, no lumps, no nipple discharge or dimpling  Neurological: cranial nerves intact, normal strength and sensation, reflexes at patella and biceps normal, normal gait, no tremor   Psychological: appropriate mood   Lymphatic: no cervical, supraclavicular or axillary lymphadenopathy        Nathalie Turner MD

## 2017-10-18 NOTE — LETTER
Patient:  Dulce Yeh  :   1935  MRN:     [id number]        Ms. Dulce Yeh  [address] [place] [address]        2017    Dear Ms. Yeh,    Thank you for choosing the [facility] for your healthcare needs.  We appreciate the opportunity to serve you.    The following are your recent test results.     Results for orders placed or performed in visit on 10/18/17   Comprehensive metabolic panel   Result Value Ref Range    Sodium 136 133 - 144 mmol/L    Potassium 3.9 3.4 - 5.3 mmol/L    Chloride 101 94 - 109 mmol/L    Carbon Dioxide 27 20 - 32 mmol/L    Anion Gap 8 3 - 14 mmol/L    Glucose 109 (H) 70 - 99 mg/dL    Urea Nitrogen 24 7 - 30 mg/dL    Creatinine 0.87 0.52 - 1.04 mg/dL    GFR Estimate 62 >60 mL/min/1.7m2    GFR Estimate If Black 75 >60 mL/min/1.7m2    Calcium 9.3 8.5 - 10.1 mg/dL    Bilirubin Total 0.6 0.2 - 1.3 mg/dL    Albumin 3.9 3.4 - 5.0 g/dL    Protein Total 7.9 6.8 - 8.8 g/dL    Alkaline Phosphatase 52 40 - 150 U/L    ALT 39 0 - 50 U/L    AST 33 0 - 45 U/L   TSH with free T4 reflex   Result Value Ref Range    TSH 0.44 0.40 - 4.00 mU/L       Here are your lab results from our recent visit. All looked fine. Please let me know if you have any questions. It was a pleasure to see you - all the best!      Sincerely,    Nathalie Turner MD/ky

## 2017-10-18 NOTE — PATIENT INSTRUCTIONS
[facility]: [phone]     [facility] Medication Refill Request Information:  * Please contact your pharmacy regarding ANY request for medication refills.  ** [facility] Prescription Fax = [phone]  * Please allow 3 business days for routine medication refills.  * Please allow 5 business days for controlled substance medication refills.     Primary [facility] Test Result notification information:  *You will be notified with in 7-10 days of your appointment day regarding the results of your test.  If you are on MyChart you will be notified as soon as the provider has reviewed the results and signed off on them.    FOLLOWUP WITH NEW PRIMARY CARE PROVIDER in 6 months or sooner as needed:  Ivonne Sol MD - she has seen her previously  MD Gilda Mayen MD Sally Berryman, MD Heather Thompson Buum, MD Jamie Santilli, MD

## 2018-03-26 ENCOUNTER — RADIANT APPOINTMENT (OUTPATIENT)
Dept: CARDIOLOGY | Facility: CLINIC | Age: 83
End: 2018-03-26
Payer: MEDICARE

## 2018-03-26 ENCOUNTER — OFFICE VISIT (OUTPATIENT)
Dept: CARDIOLOGY | Facility: CLINIC | Age: 83
End: 2018-03-26
Attending: INTERNAL MEDICINE
Payer: MEDICARE

## 2018-03-26 VITALS
WEIGHT: 189 LBS | BODY MASS INDEX: 28.64 KG/M2 | SYSTOLIC BLOOD PRESSURE: 146 MMHG | HEIGHT: 68 IN | OXYGEN SATURATION: 96 % | DIASTOLIC BLOOD PRESSURE: 75 MMHG | HEART RATE: 71 BPM

## 2018-03-26 DIAGNOSIS — I44.7 LBBB (LEFT BUNDLE BRANCH BLOCK): ICD-10-CM

## 2018-03-26 DIAGNOSIS — I10 ESSENTIAL HYPERTENSION: ICD-10-CM

## 2018-03-26 DIAGNOSIS — I35.9 AORTIC VALVE DISORDER: ICD-10-CM

## 2018-03-26 DIAGNOSIS — I35.0 AORTIC VALVE STENOSIS, UNSPECIFIED ETIOLOGY: Primary | ICD-10-CM

## 2018-03-26 PROCEDURE — 99214 OFFICE O/P EST MOD 30 MIN: CPT | Mod: ZP | Performed by: INTERNAL MEDICINE

## 2018-03-26 PROCEDURE — 93005 ELECTROCARDIOGRAM TRACING: CPT | Mod: ZF

## 2018-03-26 PROCEDURE — G0463 HOSPITAL OUTPT CLINIC VISIT: HCPCS | Mod: 25,ZF

## 2018-03-26 PROCEDURE — 93010 ELECTROCARDIOGRAM REPORT: CPT | Mod: ZP | Performed by: INTERNAL MEDICINE

## 2018-03-26 ASSESSMENT — PAIN SCALES - GENERAL: PAINLEVEL: NO PAIN (0)

## 2018-03-26 NOTE — PATIENT INSTRUCTIONS
Patient Instructions:  It was a pleasure to see you in the cardiology clinic today.      If you have any questions, call  Destinee Shaikh RN, at [phone].  Press Option #1 for the [facility], and then press Option #3 for nursing.  We are encouraging the use of [place] Powerhart to communicate with your HealthCare Provider    Note the new medications: none  Stop the following medications: none    The results from today include: ECHO  Please follow up with Dr. Ginger Davis with a limited ECHO      If you have an urgent need after hours (8:00 am to 4:30 pm) please call [phone] and ask for the cardiology fellow on call.      Dr. Mili Islas

## 2018-03-26 NOTE — MR AVS SNAPSHOT
After Visit Summary   3/26/2018    Dulce Yeh    MRN: [id number]           Patient Information     Date Of Birth          1935        Visit Information        Provider Department      3/26/2018 2:30 PM Ginger Davis MD [facility]        Today's Diagnoses     Poorly-controlled hypertension    -  1      Care Instructions    Patient Instructions:  It was a pleasure to see you in the cardiology clinic today.      If you have any questions, call  Destinee Shaikh RN, at [phone].  Press Option #1 for the [facility], and then press Option #3 for nursing.  We are encouraging the use of Raidarrrhart to communicate with your HealthCare Provider    Note the new medications: none  Stop the following medications: none    The results from today include: ECHO  Please follow up with Dr. Ginger Davis with a limited ECHO      If you have an urgent need after hours (8:00 am to 4:30 pm) please call [phone] and ask for the cardiology fellow on call.            Follow-ups after your visit        Additional Services     Follow-Up with Cardiologist                 Your next 10 appointments already scheduled     Mar 26, 2018  2:30 PM CDT   (Arrive by 2:15 PM)   Return Visit with MD ADINA [facility] ([facility])    [address] [place] [address]  [address] [place] [address] [phone]            Oct 01, 2018 11:30 AM CDT   Ech Limited with [address] Echo ([facility])    [address] [place] [address]  3rd Floor  [facility] [address] [phone]           1.  Please bring or wear a comfortable two-piece outfit. 2.  You may eat, drink and take your normal medicines. 3.  For any questions that cannot be answered, please contact the ordering physician            Oct 01, 2018 12:30 PM CDT   (Arrive by 12:15 PM)   Return Visit with MD ADINA [facility] ([facility]  "[place])    [address] [facility]  [address] [place] [id number] [phone]              Future tests that were ordered for you today     Open Future Orders        Priority Expected Expires Ordered    Follow-Up with Cardiologist Routine 2018 2018 3/26/2018    Echocardiogram Limited Routine 2018 2018 3/26/2018            Who to contact     If you have questions or need follow up information about today's clinic visit or your schedule please contact [facility] directly at [phone].  Normal or non-critical lab and imaging results will be communicated to you by CenturyLinkhart, letter or phone within 4 business days after the clinic has received the results. If you do not hear from us within 7 days, please contact the clinic through Only Mallorcat or phone. If you have a critical or abnormal lab result, we will notify you by phone as soon as possible.  Submit refill requests through [employer] or call your pharmacy and they will forward the refill request to us. Please allow 3 business days for your refill to be completed.          Additional Information About Your Visit        [employer] Information     [employer] lets you send messages to your doctor, view your test results, renew your prescriptions, schedule appointments and more. To sign up, go to [email or web address] . Click on \"Log in\" on the left side of the screen, which will take you to the Welcome page. Then click on \"Sign up Now\" on the right side of the page.     You will be asked to enter the access code listed below, as well as some personal information. Please follow the directions to create your username and password.     Your access code is: [email or web address]  Expires: 6/10/2018  6:30 AM     Your access code will  in 90 days. If you need help or a new code, please call your [facility] clinic or [phone].        Care EveryWhere ID     This is your Care EveryWhere ID. This could be used by other organizations to access your " "[facility] medical records  [id number]        Your Vitals Were     Pulse Height Last Period Pulse Oximetry BMI (Body Mass Index)       71 1.715 m (5' 7.5\") (LMP Unknown) 96% 29.16 kg/m2        Blood Pressure from Last 3 Encounters:   03/26/18 146/75   10/18/17 152/70   09/18/17 151/77    Weight from Last 3 Encounters:   03/26/18 85.7 kg (189 lb)   10/18/17 82.1 kg (181 lb 1.6 oz)   09/18/17 80.6 kg (177 lb 12.8 oz)              We Performed the Following     EKG 12-lead, tracing only (Same Day)        Primary Care Provider Fax #    Physician No Ref-Primary [phone]       No address on file        Equal Access to Services     MEGAN SORTO : Melida Lopez, wapawan luqadaha, qaybta kaalmada blair, nancy jennings . So [facility] [phone].    ATENCIÓN: Si habla español, tiene a merritt disposición servicios gratuitos de asistencia lingüística. Llame al [phone].    We comply with applicable federal civil rights laws and [place] laws. We do not discriminate on the basis of race, color, national origin, age, disability, sex, sexual orientation, or gender identity.            Thank you!     Thank you for choosing [facility]  for your care. Our goal is always to provide you with excellent care. Hearing back from our patients is one way we can continue to improve our services. Please take a few minutes to complete the written survey that you may receive in the mail after your visit with us. Thank you!             Your Updated Medication List - Protect others around you: Learn how to safely use, store and throw away your medicines at www.disposemymeds.org.          This list is accurate as of 3/26/18  2:19 PM.  Always use your most recent med list.                   Brand Name Dispense Instructions for use Diagnosis    ascorbic acid 500 MG tablet    VITAMIN C     Take 500 mg by mouth daily.        aspirin 81 MG tablet      Take 81 mg by mouth daily        BIOTIN PO      " Take by mouth daily        Calcium carb-Vitamin D 500 mg [place]-200 units 500-200 MG-UNIT per tablet    OSCAL with D;Oyster Shell Calcium     Take 1 tablet by mouth 2 times daily (with meals).        clobetasol 0.05 % Crea cream    CLOBETASOL PROPIONATE EMULSION    15 g    Apply topically as needed    Lichen sclerosus et atrophicus of the vulva       cyanocobalamin 1000 MCG tablet    vitamin  B-12     Take 1,000 mcg by mouth daily.        fluocinonide 0.05 % solution    LIDEX     Apply topically 2 times daily        glucosamine chondroitin 1500 complex Caps      Take  by mouth.        hydrochlorothiazide 25 MG tablet    HYDRODIURIL    90 tablet    Take 1 tablet (25 mg) by mouth daily    Benign essential hypertension       hydrocortisone 2.5 % cream    ANUSOL-HC    30 g    Apply to hemorrhoids twice a day as needed.    Hemorrhoids, unspecified hemorrhoid type       levothyroxine 88 MCG tablet    SYNTHROID/LEVOTHROID    90 tablet    Take 1 tablet (88 mcg) by mouth daily    Hypothyroidism due to acquired atrophy of thyroid       Magnesium 300 MG Caps      Take  by mouth.        metoprolol succinate 100 MG 24 hr tablet    TOPROL-XL    90 tablet    Take 1 tablet (100 mg) by mouth daily    Essential hypertension       MILK OF MAGNESIA PO           OMEGA 3 PO           potassium chloride SA 20 MEQ CR tablet    K-DUR/KLOR-CON M    100 tablet    Take 1 tablet (20 mEq) by mouth daily    Hypokalemia, C. difficile colitis       terbinafine 1 % cream    lamISIL AT    30 g    Apply to affected areas on feet daily for 4 weeks.    Tinea pedis       triamcinolone 0.025 % cream    KENALOG    15 g    Apply topically 2 times daily    Rash and nonspecific skin eruption       valsartan 320 MG tablet    DIOVAN    90 tablet    Take 1 tablet (320 mg) by mouth daily    Essential hypertension       ZYRTEC PO      Take  by mouth.

## 2018-03-26 NOTE — PROGRESS NOTES
HPI: 83 year old woman who returns for reevaluation of aortic valve stenosis. She was initially referred for cardiac evaluation in 2015 prior to left knee replacement for progressive aortic valve stenosis. Echo in 2013 showed mild-moderate AS, mean gradient 15 and estimated valve area of 1.2 cm2. In 2015, aortic valve gradients had increased with a mean gradient of 25 mmHg and valve area 0.8-1cm2, consistent with moderate to severe aortic stenosis. She was advised to proceed with surgery without further workup since she was asymptomatic from the valve disease. She also has hypertension, for several years for which she takes HCTZ, valsartan and metoprolol. The patient does not have a history of stroke, heart failure, syncope. Denies renal disease or diabetes.     From a cardiac standpoint, denies any chest pain, orthopnea, PND, palpitations or syncope. Reports stable exertional dyspnea, usually after 2 flight of stairs, occasional dizziness and vertigo while in bed and has undergone hearing evaluation. Otherwise, no major issues.  She is gained some weight after recent vacation.       PAST MEDICAL HISTORY:  Past Medical History:   Diagnosis Date     Allergy      Aortic stenosis 10/11/2011     C. difficile colitis August-October 2015     Eosinophilia 10/11/2011     History of chickenpox      Hyperlipidaemia      Hypertension      Recurrent colitis due to Clostridium difficile August 2016     S/P cholecystectomy 10/11/2011     Unspecified hypothyroidism 10/11/2011       CURRENT MEDICATIONS:  Current Outpatient Prescriptions   Medication Sig Dispense Refill     fluocinonide (LIDEX) 0.05 % solution Apply topically 2 times daily       metoprolol (TOPROL-XL) 100 MG 24 hr tablet Take 1 tablet (100 mg) by mouth daily 90 tablet 1     valsartan (DIOVAN) 320 MG tablet Take 1 tablet (320 mg) by mouth daily 90 tablet 2     levothyroxine (SYNTHROID/LEVOTHROID) 88 MCG tablet Take 1 tablet (88 mcg) by mouth daily 90 tablet 3      hydrochlorothiazide (HYDRODIURIL) 25 MG tablet Take 1 tablet (25 mg) by mouth daily 90 tablet 3     hydrocortisone (ANUSOL-HC) 2.5 % cream Apply to hemorrhoids twice a day as needed. 30 g 3     aspirin 81 MG tablet Take 81 mg by mouth daily       potassium chloride SA (K-DUR,KLOR-CON M) 20 MEQ tablet Take 1 tablet (20 mEq) by mouth daily 100 tablet 1     clobetasol (CLOBETASOL PROPIONATE EMULSION) 0.05 % CREA Apply topically as needed 15 g 0     terbinafine (LAMISIL AT) 1 % cream Apply to affected areas on feet daily for 4 weeks. 30 g 2     Omega-3 Fatty Acids (OMEGA 3 PO)        BIOTIN PO Take by mouth daily       calcium carb 1250 mg, 500 mg [place],/vitamin D 200 units (OSCAL WITH D) 500-200 MG-UNIT per tablet Take 1 tablet by mouth 2 times daily (with meals).       ascorbic acid (VITAMIN C) 500 MG tablet Take 500 mg by mouth daily.       cyanocolbalamin (VITAMIN B-12) 1000 MCG tablet Take 1,000 mcg by mouth daily.       Glucosamine-Chondroit-Vit C-Mn (GLUCOSAMINE CHONDR 1500 COMPLX) CAPS Take  by mouth.       Magnesium 300 MG CAPS Take  by mouth.       Cetirizine HCl (ZYRTEC PO) Take  by mouth.       triamcinolone (KENALOG) 0.025 % cream Apply topically 2 times daily (Patient not taking: Reported on 3/26/2018) 15 g 0     Magnesium Hydroxide (MILK OF MAGNESIA PO)          PAST SURGICAL HISTORY:  Past Surgical History:   Procedure Laterality Date     ANKLE SURGERY       APPENDECTOMY       bilateral tubal ligation       CHOLECYSTECTOMY       ORTHOPEDIC SURGERY         ALLERGIES     Allergies   Allergen Reactions     Sulfa Drugs Hives     Clonidine Rash     Diltiazem Rash       FAMILY HISTORY:  Family History   Problem Relation Age of Onset     CANCER Mother      lung cancer       SOCIAL HISTORY:  History     Social History     Marital Status:      Spouse Name: N/A     Number of Children: 3     Years of Education: N/A     Social History Main Topics     Smoking status: Never Smoker      Smokeless tobacco: Never  "Used     Alcohol Use: No     Drug Use: No     Sexual Activity: Yes     Birth Control/ Protection: Post-menopausal     Other Topics Concern     Caffeine Concern Yes     coffee, tea     Exercise Yes     swims 3x a week     Social History Narrative    Lives alone, children nearby       ROS:   Constitutional: No fever, chills, or sweats. No weight gain/loss   ENT: No visual disturbance, ear ache, epistaxis, sore throat  Allergies/Immunologic: Negative.   Respiratory: No cough, hemoptysia  Cardiovascular: As per HPI  GI: No nausea, vomiting, hematemesis, melena, or hematochezia  : No urinary frequency, dysuria, or hematuria  Integument: Negative  Psychiatric: Negative  Neuro: Negative  Endocrinology: Negative   Musculoskeletal: Negative    EXAM:  Ht 1.715 m (5' 7.5\")  Wt 85.7 kg (189 lb)  LMP  (LMP Unknown)  BMI 29.16 kg/m2     In general, the patient is a pleasant female in no apparent distress.      HEENT: NC/AT.  PERRLA.  EOMI.  Sclerae white, not injected.    Neck: Carotids 2+ bilaterally without bruits.  No jugular venous distension. No thyromegaly.   Heart: RRR. Normal S1, 3/6 systolic ejection murmur with radiation to both carotids, preserved S2. No rub, click, or gallop. There is no heave.    Lungs: Clear bilaterally.  No rhonchi, wheezes, rales.   GI: Soft, nontender, nondistended.   Extremities: No edema.  The pulses are 2+at the radial and DP bilaterally.  Neuro: grossly non focal, gait normal  Skin: no rashes.    Labs:  LIPID RESULTS:  Lab Results   Component Value Date    CHOL 205 (H) 10/10/2012    HDL 34 (L) 10/10/2012     10/10/2012    TRIG 216 (H) 10/10/2012    CHOLHDLRATIO 6.1 (H) 10/10/2012       LIVER ENZYME RESULTS:  Lab Results   Component Value Date    AST 33 10/18/2017    ALT 39 10/18/2017       CBC RESULTS:  Lab Results   Component Value Date    WBC 9.5 11/04/2016    RBC 4.38 11/04/2016    HGB 12.8 11/04/2016    HCT 37.0 11/04/2016    MCV 85 11/04/2016    MCH 29.2 11/04/2016    [facility] " 34.6 11/04/2016    RDW 13.5 11/04/2016     11/04/2016       BMP RESULTS:  Lab Results   Component Value Date     10/18/2017    POTASSIUM 3.9 10/18/2017    CHLORIDE 101 10/18/2017    CO2 27 10/18/2017    ANIONGAP 8 10/18/2017     (H) 10/18/2017    BUN 24 10/18/2017    CR 0.87 10/18/2017    GFRESTIMATED 62 10/18/2017    GFRESTBLACK 75 10/18/2017    CR 9.3 10/18/2017        A1C RESULTS:  Lab Results   Component Value Date    A1C 6.0 10/10/2012       Cardiac data:  ECG from today shows SR at 71, LBBB        Echo today  AV stenosis - moderate to severe, 3.2m/s 24 mmHg, DANIS 1.1 cm2. Normal biventricular function.    Echo 2/6/17  AV stenosis - moderate to severe, 3.1m/s 22 mmHg, DANIS 1 cm2. Normal biventricular function.    EKG 5/29/15   Sinus bradycardia  Left bundle branch block  Abnormal ECG  When compared with ECG of 02-OCT-2013 09:21,  No significant change was found    ECHO 6.1.15  Preserved biventricular function, moderate to severe aortic stenosis,mean gradient 25 mmHg and valve area 0.8-1cm2. No pulmonary hypertension. no pericardial effusion.      NM MPI Adenosine (3/15/2013)  1. Normal myocardial SPECT study with a summed stress score of 0.  2. Normal left ventricular systolic function.    ECHO (3/10/2013)  No significant change from prior study  Mild to moderate aortic stenosis is present      Assessment and Plan:   83 year old with hypertension, moderate to severe AS.   1. Hypertension - 146/75 mmHg today, usually better controlled at home in the 130s systolic. Currently on diovan 320 mg/d, metoprolol xl 100mg/d, hctz 25 mg/d.   2. LBBB -chronic LBBB and in sinus rhythm   3. Aortic stenosis, moderate to severe - her echo today notable for stable moderate to severe aortic stenosis. She does not report any active anginal or heart failure symptoms or syncope.  Slightly more symptomatic with exertional dyspnea.  Continued surveillance with an echocardiogram in 6 months. OK to continue her  current level of exercise and advised against heavy lifting.  4. CV Prevention, primary - she is currently on aspirin. Declines statin as she is worried about the side effect profile. Limited data in her age group to prove benefit.     Follow up in 6 months with a limited echo    Ginger Davis MD, MS  Staff Cardiologist, [facility]   Pager: [phone]      CC  Patient Care Team:  No Ref-Primary, Physician as PCP - General  Gian Khanna MD as MD (Orthopedics)  Mary Jane Carrillo MD as MD (Internal Medicine)  ASHLEY MARIE

## 2018-03-26 NOTE — NURSING NOTE
Chief Complaint   Patient presents with     Follow Up For      manage aortic valve stenosis, EKG      Vitals were taken and medications were reconciled. EKG was performed.    Cait Weber MA    1:43 PM

## 2018-03-26 NOTE — LETTER
3/26/2018      RE: Dulce Yeh  [address]  [place] [address]       Dear Colleague,    Thank you for the opportunity to participate in the care of your patient, Dulce Yeh, at the [facility] at [facility]. Please see a copy of my visit note below.    HPI: 83 year old woman who returns for reevaluation of aortic valve stenosis. She was initially referred for cardiac evaluation in 2015 prior to left knee replacement for progressive aortic valve stenosis. Echo in 2013 showed mild-moderate AS, mean gradient 15 and estimated valve area of 1.2 cm2. In 2015, aortic valve gradients had increased with a mean gradient of 25 mmHg and valve area 0.8-1cm2, consistent with moderate to severe aortic stenosis. She was advised to proceed with surgery without further workup since she was asymptomatic from the valve disease. She also has hypertension, for several years for which she takes HCTZ, valsartan and metoprolol. The patient does not have a history of stroke, heart failure, syncope. Denies renal disease or diabetes.     From a cardiac standpoint, denies any chest pain, orthopnea, PND, palpitations or syncope. Reports stable exertional dyspnea, usually after 2 flight of stairs, occasional dizziness and vertigo while in bed and has undergone hearing evaluation. Otherwise, no major issues.  She is gained some weight after recent vacation.       PAST MEDICAL HISTORY:  Past Medical History:   Diagnosis Date     Allergy      Aortic stenosis 10/11/2011     C. difficile colitis August-October 2015     Eosinophilia 10/11/2011     History of chickenpox      Hyperlipidaemia      Hypertension      Recurrent colitis due to Clostridium difficile August 2016     S/P cholecystectomy 10/11/2011     Unspecified hypothyroidism 10/11/2011       CURRENT MEDICATIONS:  Current Outpatient Prescriptions   Medication Sig Dispense Refill     fluocinonide (LIDEX) 0.05 % solution  Apply topically 2 times daily       metoprolol (TOPROL-XL) 100 MG 24 hr tablet Take 1 tablet (100 mg) by mouth daily 90 tablet 1     valsartan (DIOVAN) 320 MG tablet Take 1 tablet (320 mg) by mouth daily 90 tablet 2     levothyroxine (SYNTHROID/LEVOTHROID) 88 MCG tablet Take 1 tablet (88 mcg) by mouth daily 90 tablet 3     hydrochlorothiazide (HYDRODIURIL) 25 MG tablet Take 1 tablet (25 mg) by mouth daily 90 tablet 3     hydrocortisone (ANUSOL-HC) 2.5 % cream Apply to hemorrhoids twice a day as needed. 30 g 3     aspirin 81 MG tablet Take 81 mg by mouth daily       potassium chloride SA (K-DUR,KLOR-CON M) 20 MEQ tablet Take 1 tablet (20 mEq) by mouth daily 100 tablet 1     clobetasol (CLOBETASOL PROPIONATE EMULSION) 0.05 % CREA Apply topically as needed 15 g 0     terbinafine (LAMISIL AT) 1 % cream Apply to affected areas on feet daily for 4 weeks. 30 g 2     Omega-3 Fatty Acids (OMEGA 3 PO)        BIOTIN PO Take by mouth daily       calcium carb 1250 mg, 500 mg [place],/vitamin D 200 units (OSCAL WITH D) 500-200 MG-UNIT per tablet Take 1 tablet by mouth 2 times daily (with meals).       ascorbic acid (VITAMIN C) 500 MG tablet Take 500 mg by mouth daily.       cyanocolbalamin (VITAMIN B-12) 1000 MCG tablet Take 1,000 mcg by mouth daily.       Glucosamine-Chondroit-Vit C-Mn (GLUCOSAMINE CHONDR 1500 COMPLX) CAPS Take  by mouth.       Magnesium 300 MG CAPS Take  by mouth.       Cetirizine HCl (ZYRTEC PO) Take  by mouth.       triamcinolone (KENALOG) 0.025 % cream Apply topically 2 times daily (Patient not taking: Reported on 3/26/2018) 15 g 0     Magnesium Hydroxide (MILK OF MAGNESIA PO)          PAST SURGICAL HISTORY:  Past Surgical History:   Procedure Laterality Date     ANKLE SURGERY       APPENDECTOMY       bilateral tubal ligation       CHOLECYSTECTOMY       ORTHOPEDIC SURGERY         ALLERGIES     Allergies   Allergen Reactions     Sulfa Drugs Hives     Clonidine Rash     Diltiazem Rash       FAMILY  "HISTORY:  Family History   Problem Relation Age of Onset     CANCER Mother      lung cancer       SOCIAL HISTORY:  History     Social History     Marital Status:      Spouse Name: N/A     Number of Children: 3     Years of Education: N/A     Social History Main Topics     Smoking status: Never Smoker      Smokeless tobacco: Never Used     Alcohol Use: No     Drug Use: No     Sexual Activity: Yes     Birth Control/ Protection: Post-menopausal     Other Topics Concern     Caffeine Concern Yes     coffee, tea     Exercise Yes     swims 3x a week     Social History Narrative    Lives alone, children nearby       ROS:   Constitutional: No fever, chills, or sweats. No weight gain/loss   ENT: No visual disturbance, ear ache, epistaxis, sore throat  Allergies/Immunologic: Negative.   Respiratory: No cough, hemoptysia  Cardiovascular: As per HPI  GI: No nausea, vomiting, hematemesis, melena, or hematochezia  : No urinary frequency, dysuria, or hematuria  Integument: Negative  Psychiatric: Negative  Neuro: Negative  Endocrinology: Negative   Musculoskeletal: Negative    EXAM:  Ht 1.715 m (5' 7.5\")  Wt 85.7 kg (189 lb)  LMP  (LMP Unknown)  BMI 29.16 kg/m2     In general, the patient is a pleasant female in no apparent distress.      HEENT: NC/AT.  PERRLA.  EOMI.  Sclerae white, not injected.    Neck: Carotids 2+ bilaterally without bruits.  No jugular venous distension. No thyromegaly.   Heart: RRR. Normal S1, 3/6 systolic ejection murmur with radiation to both carotids, preserved S2. No rub, click, or gallop. There is no heave.    Lungs: Clear bilaterally.  No rhonchi, wheezes, rales.   GI: Soft, nontender, nondistended.   Extremities: No edema.  The pulses are 2+at the radial and DP bilaterally.  Neuro: grossly non focal, gait normal  Skin: no rashes.    Labs:  LIPID RESULTS:  Lab Results   Component Value Date    CHOL 205 (H) 10/10/2012    HDL 34 (L) 10/10/2012     10/10/2012    TRIG 216 (H) 10/10/2012    " CHOLHDLRATIO 6.1 (H) 10/10/2012       LIVER ENZYME RESULTS:  Lab Results   Component Value Date    AST 33 10/18/2017    ALT 39 10/18/2017       CBC RESULTS:  Lab Results   Component Value Date    WBC 9.5 11/04/2016    RBC 4.38 11/04/2016    HGB 12.8 11/04/2016    HCT 37.0 11/04/2016    MCV 85 11/04/2016    MCH 29.2 11/04/2016    MCHC 34.6 11/04/2016    RDW 13.5 11/04/2016     11/04/2016       BMP RESULTS:  Lab Results   Component Value Date     10/18/2017    POTASSIUM 3.9 10/18/2017    CHLORIDE 101 10/18/2017    CO2 27 10/18/2017    ANIONGAP 8 10/18/2017     (H) 10/18/2017    BUN 24 10/18/2017    CR 0.87 10/18/2017    GFRESTIMATED 62 10/18/2017    GFRESTBLACK 75 10/18/2017    CR 9.3 10/18/2017        A1C RESULTS:  Lab Results   Component Value Date    A1C 6.0 10/10/2012       Cardiac data:  ECG from today shows SR at 71, LBBB        Echo today  AV stenosis - moderate to severe, 3.2m/s 24 mmHg, DANIS 1.1 cm2. Normal biventricular function.    Echo 2/6/17  AV stenosis - moderate to severe, 3.1m/s 22 mmHg, DANIS 1 cm2. Normal biventricular function.    EKG 5/29/15   Sinus bradycardia  Left bundle branch block  Abnormal ECG  When compared with ECG of 02-OCT-2013 09:21,  No significant change was found    ECHO 6.1.15  Preserved biventricular function, moderate to severe aortic stenosis,mean gradient 25 mmHg and valve area 0.8-1cm2. No pulmonary hypertension. no pericardial effusion.    NM MPI Adenosine (3/15/2013)  1. Normal myocardial SPECT study with a summed stress score of 0.  2. Normal left ventricular systolic function.    ECHO (3/10/2013)  No significant change from prior study  Mild to moderate aortic stenosis is present    Assessment and Plan:   83 year old with hypertension, moderate to severe AS.   1. Hypertension - 146/75 mmHg today, usually better controlled at home in the 130s systolic. Currently on diovan 320 mg/d, metoprolol xl 100mg/d, hctz 25 mg/d.   2. LBBB -chronic LBBB and in sinus  rhythm   3. Aortic stenosis, moderate to severe - her echo today notable for stable moderate to severe aortic stenosis. She does not report any active anginal or heart failure symptoms or syncope.  Slightly more symptomatic with exertional dyspnea.  Continued surveillance with an echocardiogram in 6 months. OK to continue her current level of exercise and advised against heavy lifting.  4. CV Prevention, primary - she is currently on aspirin. Declines statin as she is worried about the side effect profile. Limited data in her age group to prove benefit.     Follow up in 6 months with a limited echo    Ginger Davis MD, MS  Staff Cardiologist, [facility]   Pager: [phone]    CC  Patient Care Team:  No Ref-Primary, Physician as PCP - Gian Kruse MD as MD (Orthopedics)  Mary Jane Carrillo MD as MD (Internal Medicine)  ASHLEY MARIE

## 2018-03-27 ENCOUNTER — TELEPHONE (OUTPATIENT)
Dept: INTERNAL MEDICINE | Facility: CLINIC | Age: 83
End: 2018-03-27

## 2018-03-27 NOTE — TELEPHONE ENCOUNTER
----- Message from Ivonne Sol MD sent at 3/26/2018  6:09 PM CDT -----  Regarding: RE: New Patient  Yes.  No problem.    Ena  ----- Message -----     From: Krystal Stokes, RN     Sent: 3/26/2018   3:15 PM       To: Ivonne Sol MD  Subject: FW: New Patient                                      ----- Message -----     From: Kaitlyn Shafer     Sent: 3/26/2018   2:27 PM       To: Ivonne Sol MD, Krystal Stokes, RN  Subject: New Patient                                      Hi Dr. Slo  This patient is a previous patient of Dr. Turner. She saw you a couple times back in 2016 and really liked you. She is wondering if she can re-establish care with you now? Thanks    Appt set up for April 9th, 2018 at 3:10pm with Dr Sol.  Krystal Stokes RN 9:22 AM on 3/27/2018.

## 2018-03-28 LAB — INTERPRETATION ECG - MUSE: NORMAL

## 2018-04-09 ENCOUNTER — OFFICE VISIT (OUTPATIENT)
Dept: INTERNAL MEDICINE | Facility: CLINIC | Age: 83
End: 2018-04-09
Payer: MEDICARE

## 2018-04-09 VITALS
DIASTOLIC BLOOD PRESSURE: 71 MMHG | RESPIRATION RATE: 16 BRPM | SYSTOLIC BLOOD PRESSURE: 138 MMHG | BODY MASS INDEX: 28.95 KG/M2 | WEIGHT: 187.6 LBS | OXYGEN SATURATION: 97 % | HEART RATE: 67 BPM

## 2018-04-09 DIAGNOSIS — I10 BENIGN ESSENTIAL HYPERTENSION: ICD-10-CM

## 2018-04-09 DIAGNOSIS — Z23 NEED FOR VACCINATION: Primary | ICD-10-CM

## 2018-04-09 LAB — POTASSIUM SERPL-SCNC: 4 MMOL/L (ref 3.4–5.3)

## 2018-04-09 ASSESSMENT — PAIN SCALES - GENERAL: PAINLEVEL: MILD PAIN (2)

## 2018-04-09 NOTE — PROGRESS NOTES
[facility]  Primary Care Center   Ivonne Sol MD  04/09/2018      Chief Complaint:   Establish Care       History of Present Illness:   Dulce Yeh is a 83 year old female with a history of C. Difficile colitis, hypertension, hyperlipidemia and hyperparathyroidism who presents to establish care. She would like to keep up on her blood work, double check her blood pressure, and review her medical history and concerns.  She is a former patient of Dr. Nathalie Turner and had a complete physical examination October 2017.    Possible gout:  The patient has had gout before, about once a year, and she thinks that this has flared again. Her pain in her right great toe, where she has gotten gout before, came on two days ago. She is wondering if this is is gout versus arthritis; much of her pain is localized between her first and second toes.  The pain is mild and she is able to stand on it.  There is no swelling or redness.  She has no history of kidney stones.  Her last Uric acid was in 2014 was 7.2.  When she exercises at the [facility], she states that her teacher has her go on her toes a lot, and she is wondering if she should stop this part of the exercise as well.       Heart Health:  In terms of the patient's heart health, she had a recent echocardiogram on 3/26 by Dr. Davis (see below). She denies recent shortness of breath or notable heart symptoms.     Echocardiogram 03/26/18   Interpretation Summary  Moderate aortic stenosis is present.  The peak aortic velocity is 3.2 m/sec.  The aortic valve area is 1.1 cm^2, by the continuity equation    Blood work:  The patient had blood work done in November 2017 and October 2016 when she came to see Dr. Turner for an annual physical at that time including kidney function, thyroid levels, and potassium levels. She notes that she would like to get these results on paper, sent to her, whenever we do blood work in the future.     Other concerns discussed:  1) History of C. Diff  "Colitis - She has had this several times, and states that it was \"just horrible.\" She went to go see Dr. Masood Tinsley at the [facility] about this, and since then, she has been two years without it and would really like to not get it again. Since then, she has started taking probiotics and drinking Roger, which she does not like particularly but wants to do everything she can to not get it again.     Health Maintenance:  Mammogram (females age 40 - 75): yearly or every 2 years? - Up to Date 2017 negative   Glucose: every 5 years, yearly if risk factors? - Up to Date 2017  Lipid panel: every 5 years, yearly if risk factors - Up to Date   Immunizations (Tetanus every 10 years, Influenza yearly, Pneumonia PPV-23 and PCV-13 age ? 65 or sooner if risk factors) - Up to Date (see below)  Immunization History   Administered Date(s) Administered     Influenza (High Dose) 3 valent vaccine 10/22/2014, 11/18/2015, 11/07/2016, 10/18/2017     Influenza (IIV3) PF 10/12/2011, 10/10/2012, 12/09/2013     Pneumo Conj 13-V (2010&after) 10/22/2014     Pneumococcal 23 valent 12/13/2000     TD (ADULT, 7+) 01/17/2007     TDAP Vaccine (Boostrix) 10/18/2017     Zoster vaccine, live 12/28/2009        The following health maintenance issues were also reviewed and addressed as needed:  Blood pressure (>18 years annually)  Lifestyle habits (including exercise, diet, weight)  Health risk behaviors (sexual history, alcohol, tobacco, drug use, partner violence)  Routine eye, dental, hearing, and skin exams  Advanced directives     Review of Systems:   Pertinent items are noted in HPI, remainder of complete ROS is negative.      Active Medications:      fluocinonide (LIDEX) 0.05 % solution, Apply topically 2 times daily, Disp: , Rfl:      metoprolol (TOPROL-XL) 100 MG 24 hr tablet, Take 1 tablet (100 mg) by mouth daily, Disp: 90 tablet, Rfl: 1     valsartan (DIOVAN) 320 MG tablet, Take 1 tablet (320 mg) by mouth daily, Disp: 90 tablet, Rfl: 2     " levothyroxine (SYNTHROID/LEVOTHROID) 88 MCG tablet, Take 1 tablet (88 mcg) by mouth daily, Disp: 90 tablet, Rfl: 3     hydrochlorothiazide (HYDRODIURIL) 25 MG tablet, Take 1 tablet (25 mg) by mouth daily, Disp: 90 tablet, Rfl: 3     hydrocortisone (ANUSOL-HC) 2.5 % cream, Apply to hemorrhoids twice a day as needed., Disp: 30 g, Rfl: 3     aspirin 81 MG tablet, Take 81 mg by mouth daily, Disp: , Rfl:      potassium chloride SA (K-DUR,KLOR-CON M) 20 MEQ tablet, Take 1 tablet (20 mEq) by mouth daily, Disp: 100 tablet, Rfl: 1     clobetasol (CLOBETASOL PROPIONATE EMULSION) 0.05 % CREA, Apply topically as needed, Disp: 15 g, Rfl: 0     triamcinolone (KENALOG) 0.025 % cream, Apply topically 2 times daily (Patient not taking: Reported on 3/26/2018), Disp: 15 g, Rfl: 0     terbinafine (LAMISIL AT) 1 % cream, Apply to affected areas on feet daily for 4 weeks., Disp: 30 g, Rfl: 2     Magnesium Hydroxide (MILK OF MAGNESIA PO), , Disp: , Rfl:      Omega-3 Fatty Acids (OMEGA 3 PO), , Disp: , Rfl:      BIOTIN PO, Take by mouth daily, Disp: , Rfl:      calcium carb 1250 mg, 500 mg [place],/vitamin D 200 units (OSCAL WITH D) 500-200 MG-UNIT per tablet, Take 1 tablet by mouth 2 times daily (with meals)., Disp: , Rfl:      ascorbic acid (VITAMIN C) 500 MG tablet, Take 500 mg by mouth daily., Disp: , Rfl:      cyanocolbalamin (VITAMIN B-12) 1000 MCG tablet, Take 1,000 mcg by mouth daily., Disp: , Rfl:      Glucosamine-Chondroit-Vit C-Mn (GLUCOSAMINE CHONDR 1500 COMPLX) CAPS, Take  by mouth., Disp: , Rfl:      Magnesium 300 MG CAPS, Take  by mouth., Disp: , Rfl:      Cetirizine HCl (ZYRTEC PO), Take  by mouth., Disp: , Rfl:       Allergies:   Sulfa drugs; Clonidine; and Diltiazem      Past Medical History:  Allergy  Aortic stenosis  C. difficile colitis, recurrent  Eosinophilia   History of chickenpox  Hyperlipidemia   Hypertension  Hypothyroidism  Recurrent vulvar infection  Steatohepatitis  Fatigue  Weakness  Hemorrhoids  Lichen  sclerosis et atrophicus of the vulva   Cherry angioma  Tinea pedis of both feet      Past Surgical History:  Ankle surgery  Appendectomy  Bilateral tubal ligation  Cholecystectomy  Orthopedic surgery     Family History:   Lung cancer - Mother       Social History:   The patient was alone.  Smoking Status: Never   Smokeless Tobacco: Never   Alcohol Use: No       Physical Exam:   /71  Pulse 67  Resp 16  Wt 85.1 kg (187 lb 9.6 oz)  LMP  (LMP Unknown)  SpO2 97%  Breastfeeding? No  BMI 28.95 kg/m2     Constitutional: Healthy, alert, no distress and cooperative  Extremities: Right great toe metatarsal phalangeal joint is slightly erythematous but not swollen. She is tender between her first and second toes, and along the lateral first toe.    Labs reviewed for the last 2 years with patient    Assessment and Plan:  Need for vaccination  We discussed the new shingles vaccination. She will check with her insurance provider on the cost before she gets it, as the cost can be prohibitive. I advised her to get it at [facility] or [facility] if she can.   - ZOSTER VACCINE RECOMBINANT ADJUVANTED IM NJX (SHINGRIX)    Benign essential hypertension  I will place an order for a lab draw to measure her potassium level; the remainder of her labs are up to date, per her records with her past care provider Dr. Turner. These results will be sent to her in a hard paper copy, at her request.   - Potassium     Toe and foot pain  I think this is more likely to be osteoarthritis than gout.  I explained to her that hydrochlorothiazide, which she notes she has taken for a long time, causes hyperuricemia commonly. We reviewed treatment options for this, including checking her uric acid, putting her on allopurinol, or just curbing her toe exercises and not taking any medications right now. She does not want to be on allopurinol or have a uric acid today. Therefore, I encouraged her to take it easy on her feet, and to continue using her  metatarsal shoes pads.    History of C. Difficile Colitis  We reviewed her history of C. Diff and the best ways to try and avoid this, including trying not to take antibiotics as much as possible and continue using her probiotics.          Follow-up: As needed and in October 2018    I spent a total of 15 minutes face-to-face with Dulce Yeh during today's office visit.  Over 50% of this time was spent counseling the patient and/or coordinating care regarding toe pain.  See note for details.          Scribe Disclosure:   I, Shilpi Eduardo, am serving as a scribe to document services personally performed by Ivonne Sol MD at this visit, based upon the provider's statements to me. All documentation has been reviewed by the aforementioned provider prior to being entered into the official medical record.     Portions of this medical record were completed by a scribe. UPON MY REVIEW AND AUTHENTICATION BY ELECTRONIC SIGNATURE, this confirms (a) I performed the applicable clinical services, and (b) the record is accurate.

## 2018-04-09 NOTE — PATIENT INSTRUCTIONS
[facility]: [phone]     [facility] Medication Refill Request Information:  * Please contact your pharmacy regarding ANY request for medication refills.  ** [facility] Prescription Fax = [phone]  * Please allow 3 business days for routine medication refills.  * Please allow 5 business days for controlled substance medication refills.     [facility] Test Result notification information:  *You will be notified with in 7-10 days of your appointment day regarding the results of your test.  If you are on MyChart you will be notified as soon as the provider has reviewed the results and signed off on them.

## 2018-04-09 NOTE — NURSING NOTE
Chief Complaint   Patient presents with     Establish Care     Patient is here to establish a new PCP.      Fabiana Moran LPN at 2:55 PM on 4/9/2018.

## 2018-04-09 NOTE — MR AVS SNAPSHOT
After Visit Summary   4/9/2018    Dulce Yeh    MRN: [id number]           Patient Information     Date Of Birth          1935        Visit Information        Provider Department      4/9/2018 3:10 PM Ivonne Sol MD [facility]        Today's Diagnoses     Need for vaccination    -  1    Benign essential hypertension          Care Instructions    [facility]: [phone]     Primary Care Center Medication Refill Request Information:  * Please contact your pharmacy regarding ANY request for medication refills.  ** [facility] Prescription Fax = [phone]  * Please allow 3 business days for routine medication refills.  * Please allow 5 business days for controlled substance medication refills.     Primary Care Center Test Result notification information:  *You will be notified with in 7-10 days of your appointment day regarding the results of your test.  If you are on MyChart you will be notified as soon as the provider has reviewed the results and signed off on them.          Follow-ups after your visit        Your next 10 appointments already scheduled     Apr 09, 2018  3:45 PM CDT   LAB with [facility] ([facility])    [address] [place] [id number] [phone]           Please do not eat 10-12 hours before your appointment if you are coming in fasting for labs on lipids, cholesterol, or glucose (sugar). This does not apply to pregnant women. Water, hot tea and black coffee (with nothing added) are okay. Do not drink other fluids, diet soda or chew gum.            Oct 01, 2018 11:30 AM CDT   [facility] with [address] [facility] ([facility])    [address] [place] [address] [phone]           1.  Please bring or wear a comfortable two-piece outfit. 2.  You may eat, drink and take your normal medicines. 3.  For any questions that cannot  be answered, please contact the ordering physician            Oct 01, 2018 12:30 PM CDT   (Arrive by 12:15 PM)   Return Visit with Ginger Davis MD   [facility] ([facility])    [address] [place] [id number] [phone]              Future tests that were ordered for you today     Open Future Orders        Priority Expected Expires Ordered    Potassium Routine 2018            Who to contact     Please call your clinic at [phone] to:    Ask questions about your health    Make or cancel appointments    Discuss your medicines    Learn about your test results    Speak to your doctor            Additional Information About Your Visit        MyChart Information     [employer] is an electronic gateway that provides easy, online access to your medical records. With SETVI, you can request a clinic appointment, read your test results, renew a prescription or communicate with your care team.     To sign up for SETVI visit the website at [email or web address]   You will be asked to enter the access code listed below, as well as some personal information. Please follow the directions to create your username and password.     Your access code is: [email or web address]  Expires: 6/10/2018  6:30 AM     Your access code will  in 90 days. If you need help or a new code, please contact your [facility] or call [phone] for assistance.        Care EveryWhere ID     This is your Care EveryWhere ID. This could be used by other organizations to access your [place] medical records  [id number]        Your Vitals Were     Pulse Respirations Last Period Pulse Oximetry Breastfeeding? BMI (Body Mass Index)    67 16 (LMP Unknown) 97% No 28.95 kg/m2       Blood Pressure from Last 3 Encounters:   18 138/71   18 146/75   10/18/17 152/70    Weight from Last 3 Encounters:   18 85.1 kg (187 lb 9.6 oz)    03/26/18 85.7 kg (189 lb)   10/18/17 82.1 kg (181 lb 1.6 oz)              We Performed the Following     ZOSTER VACCINE RECOMBINANT ADJUVANTED IM NJX (SHINGRIX)        Primary Care Provider Office Phone # Fax #    Ivonne Sol [phone] [address] [address]        Equal Access to Services     [facility] Hospital[facility] : Hadii aad ku hadasho Soomaali, waaxda luqadaha, qaybta kaalmada adeegyada, waxay idiin hayaan adeeg khyunsh ladarreln . So [facility] [phone].    ATENCIÓN: Si michela church, tiene a merritt disposición servicios gratuitos de asistencia lingüística. Llame al [phone].    We comply with applicable federal civil rights laws and [place] laws. We do not discriminate on the basis of race, color, national origin, age, disability, sex, sexual orientation, or gender identity.            Thank you!     Thank you for choosing [facility]  for your care. Our goal is always to provide you with excellent care. Hearing back from our patients is one way we can continue to improve our services. Please take a few minutes to complete the written survey that you may receive in the mail after your visit with us. Thank you!             Your Updated Medication List - Protect others around you: Learn how to safely use, store and throw away your medicines at www.disposemymeds.org.          This list is accurate as of 4/9/18  3:20 PM.  Always use your most recent med list.                   Brand Name Dispense Instructions for use Diagnosis    ascorbic acid 500 MG tablet    VITAMIN C     Take 500 mg by mouth daily.        aspirin 81 MG tablet      Take 81 mg by mouth daily        BIOTIN PO      Take by mouth daily        Calcium carb-Vitamin D 500 mg [place]-200 units 500-200 MG-UNIT per tablet    OSCAL with D;Oyster Shell Calcium     Take 1 tablet by mouth 2 times daily (with meals).        clobetasol 0.05 % Crea cream    CLOBETASOL PROPIONATE EMULSION    15 g    Apply  topically as needed    Lichen sclerosus et atrophicus of the vulva       cyanocobalamin 1000 MCG tablet    vitamin  B-12     Take 1,000 mcg by mouth daily.        fluocinonide 0.05 % solution    LIDEX     Apply topically 2 times daily        glucosamine chondroitin 1500 complex Caps      Take  by mouth.        hydrochlorothiazide 25 MG tablet    HYDRODIURIL    90 tablet    Take 1 tablet (25 mg) by mouth daily    Benign essential hypertension       hydrocortisone 2.5 % cream    ANUSOL-HC    30 g    Apply to hemorrhoids twice a day as needed.    Hemorrhoids, unspecified hemorrhoid type       levothyroxine 88 MCG tablet    SYNTHROID/LEVOTHROID    90 tablet    Take 1 tablet (88 mcg) by mouth daily    Hypothyroidism due to acquired atrophy of thyroid       Magnesium 300 MG Caps      Take  by mouth.        metoprolol succinate 100 MG 24 hr tablet    TOPROL-XL    90 tablet    Take 1 tablet (100 mg) by mouth daily    Essential hypertension       MILK OF MAGNESIA PO           OMEGA 3 PO           potassium chloride SA 20 MEQ CR tablet    K-DUR/KLOR-CON M    100 tablet    Take 1 tablet (20 mEq) by mouth daily    Hypokalemia, C. difficile colitis       terbinafine 1 % cream    lamISIL AT    30 g    Apply to affected areas on feet daily for 4 weeks.    Tinea pedis       triamcinolone 0.025 % cream    KENALOG    15 g    Apply topically 2 times daily    Rash and nonspecific skin eruption       valsartan 320 MG tablet    DIOVAN    90 tablet    Take 1 tablet (320 mg) by mouth daily    Essential hypertension       ZYRTEC PO      Take  by mouth.

## 2018-06-18 ENCOUNTER — TELEPHONE (OUTPATIENT)
Dept: INTERNAL MEDICINE | Facility: CLINIC | Age: 83
End: 2018-06-18

## 2018-06-18 NOTE — TELEPHONE ENCOUNTER
[facility] Call Center    Phone Message    May a detailed message be left on voicemail: yes    Reason for Call: Medication Refill Request    Has the patient contacted the pharmacy for the refill? Yes   Name of medication being requested: Metoprolol & Valsartan  Provider who prescribed the medication: Ebenezer Kerr)  Pharmacy: [facility]  Date medication is needed: 3 days         Action Taken: Message routed to:  [facility]

## 2018-06-19 DIAGNOSIS — I10 ESSENTIAL HYPERTENSION: ICD-10-CM

## 2018-06-19 RX ORDER — VALSARTAN 320 MG/1
320 TABLET ORAL DAILY
Qty: 90 TABLET | Refills: 1 | Status: SHIPPED | OUTPATIENT
Start: 2018-06-19 | End: 2018-07-19 | Stop reason: ALTCHOICE

## 2018-06-19 RX ORDER — METOPROLOL SUCCINATE 100 MG/1
100 TABLET, EXTENDED RELEASE ORAL DAILY
Qty: 90 TABLET | Refills: 1 | Status: SHIPPED | OUTPATIENT
Start: 2018-06-19 | End: 2018-08-13

## 2018-06-26 ENCOUNTER — RADIANT APPOINTMENT (OUTPATIENT)
Dept: MAMMOGRAPHY | Facility: CLINIC | Age: 83
End: 2018-06-26
Attending: INTERNAL MEDICINE
Payer: MEDICARE

## 2018-06-26 ENCOUNTER — TELEPHONE (OUTPATIENT)
Dept: INTERNAL MEDICINE | Facility: CLINIC | Age: 83
End: 2018-06-26

## 2018-06-26 DIAGNOSIS — Z12.31 VISIT FOR SCREENING MAMMOGRAM: ICD-10-CM

## 2018-06-26 NOTE — TELEPHONE ENCOUNTER
Called patient back and she complains of red, swollen left toe.  The toe that is affected is the toe next to the large toe on the left foot.  She complains of shooting pain that comes then gets better at times for the last 3 weeks. Now shooting pain is constant and very red. She has a history of her toes 'flaring up' because of gout and has tried to refrain from taking any medication and/or blood tests as per last visit with the provider.  She has altered her diet and used ice packs and still has the discomfort.  The patient is inquiring if she should get a blood test for gout today as she will be in the clinic for a mammogram.  Will inform RN of request.  Quinten Balderrama LPN at 9:43 AM on 6/26/2018

## 2018-06-26 NOTE — TELEPHONE ENCOUNTER
[facility] Call Center    Phone Message    May a detailed message be left on voicemail: yes    Reason for Call: Other: Pt asking for a call back. She states she has had gout before, thinks she might have it again, is coming to the [facility] today at 12:30 and is wondering if she could get a blood test done. Please call back to advise.     Action Taken: Message routed to:  [facility]

## 2018-06-26 NOTE — TELEPHONE ENCOUNTER
After consulting with RN patient has to be seen to evaluate any blood tests or medication to be prescribed.  Call patient back and notified her of this.  She said she would consider other options possibly with a foot doctor to take care of this great. Quinten Balderrama LPN at 10:04 AM on 6/26/2018      Left message for pt to call back.  Krystal Stokes RN 12:35 PM on 6/26/2018.

## 2018-06-27 NOTE — TELEPHONE ENCOUNTER
[facility] Call Center    Phone Message    May a detailed message be left on voicemail: yes    Reason for Call: Other: Pt returning Krystal's phone call.      Action Taken: Message routed to:  [facility]   Message left for pt that she will need to follow up for swollen inflamed toe. Clinic numbers given for questions or concerns.  Krystal Stokes RN 9:31 AM on 6/27/2018.

## 2018-07-11 ENCOUNTER — RECORDS - HEALTHEAST (OUTPATIENT)
Dept: LAB | Facility: CLINIC | Age: 83
End: 2018-07-11

## 2018-07-11 LAB — URATE SERPL-MCNC: 10 MG/DL (ref 2–7.5)

## 2018-07-19 DIAGNOSIS — I10 BENIGN ESSENTIAL HYPERTENSION: Primary | ICD-10-CM

## 2018-07-19 RX ORDER — LOSARTAN POTASSIUM 100 MG/1
100 TABLET ORAL DAILY
Qty: 90 TABLET | Refills: 3 | Status: SHIPPED | OUTPATIENT
Start: 2018-07-19 | End: 2018-11-07

## 2018-08-13 ENCOUNTER — OFFICE VISIT (OUTPATIENT)
Dept: INTERNAL MEDICINE | Facility: CLINIC | Age: 83
End: 2018-08-13
Payer: MEDICARE

## 2018-08-13 VITALS — WEIGHT: 184.7 LBS | BODY MASS INDEX: 28.5 KG/M2

## 2018-08-13 DIAGNOSIS — I10 ESSENTIAL HYPERTENSION: ICD-10-CM

## 2018-08-13 DIAGNOSIS — M10.9 GOUT OF LEFT FOOT, UNSPECIFIED CAUSE, UNSPECIFIED CHRONICITY: Primary | ICD-10-CM

## 2018-08-13 DIAGNOSIS — E03.4 HYPOTHYROIDISM DUE TO ACQUIRED ATROPHY OF THYROID: ICD-10-CM

## 2018-08-13 DIAGNOSIS — R21 RASH AND NONSPECIFIC SKIN ERUPTION: ICD-10-CM

## 2018-08-13 DIAGNOSIS — M10.9 GOUT OF LEFT FOOT, UNSPECIFIED CAUSE, UNSPECIFIED CHRONICITY: ICD-10-CM

## 2018-08-13 DIAGNOSIS — B35.3 TINEA PEDIS OF LEFT FOOT: ICD-10-CM

## 2018-08-13 LAB
ANION GAP SERPL CALCULATED.3IONS-SCNC: 8 MMOL/L (ref 3–14)
BASOPHILS # BLD AUTO: 0.1 10E9/L (ref 0–0.2)
BASOPHILS NFR BLD AUTO: 1.2 %
BUN SERPL-MCNC: 19 MG/DL (ref 7–30)
CALCIUM SERPL-MCNC: 9.3 MG/DL (ref 8.5–10.1)
CHLORIDE SERPL-SCNC: 101 MMOL/L (ref 94–109)
CO2 SERPL-SCNC: 27 MMOL/L (ref 20–32)
CREAT SERPL-MCNC: 0.82 MG/DL (ref 0.52–1.04)
DIFFERENTIAL METHOD BLD: NORMAL
EOSINOPHIL # BLD AUTO: 0.4 10E9/L (ref 0–0.7)
EOSINOPHIL NFR BLD AUTO: 8.5 %
ERYTHROCYTE [DISTWIDTH] IN BLOOD BY AUTOMATED COUNT: 13.1 % (ref 10–15)
GFR SERPL CREATININE-BSD FRML MDRD: 66 ML/MIN/1.7M2
GLUCOSE SERPL-MCNC: 117 MG/DL (ref 70–99)
HCT VFR BLD AUTO: 40.4 % (ref 35–47)
HGB BLD-MCNC: 13.9 G/DL (ref 11.7–15.7)
IMM GRANULOCYTES # BLD: 0 10E9/L (ref 0–0.4)
IMM GRANULOCYTES NFR BLD: 0 %
LYMPHOCYTES # BLD AUTO: 1.4 10E9/L (ref 0.8–5.3)
LYMPHOCYTES NFR BLD AUTO: 27.5 %
MCH RBC QN AUTO: 29 PG (ref 26.5–33)
MCHC RBC AUTO-ENTMCNC: 34.4 G/DL (ref 31.5–36.5)
MCV RBC AUTO: 84 FL (ref 78–100)
MONOCYTES # BLD AUTO: 0.6 10E9/L (ref 0–1.3)
MONOCYTES NFR BLD AUTO: 11.4 %
NEUTROPHILS # BLD AUTO: 2.7 10E9/L (ref 1.6–8.3)
NEUTROPHILS NFR BLD AUTO: 51.4 %
NRBC # BLD AUTO: 0 10*3/UL
NRBC BLD AUTO-RTO: 0 /100
PLATELET # BLD AUTO: 181 10E9/L (ref 150–450)
POTASSIUM SERPL-SCNC: 3.8 MMOL/L (ref 3.4–5.3)
RBC # BLD AUTO: 4.79 10E12/L (ref 3.8–5.2)
SODIUM SERPL-SCNC: 137 MMOL/L (ref 133–144)
URATE SERPL-MCNC: 7.6 MG/DL (ref 2.6–6)
WBC # BLD AUTO: 5.2 10E9/L (ref 4–11)

## 2018-08-13 RX ORDER — LEVOTHYROXINE SODIUM 88 UG/1
88 TABLET ORAL DAILY
Qty: 90 TABLET | Refills: 3 | Status: SHIPPED | OUTPATIENT
Start: 2018-08-13 | End: 2019-08-23

## 2018-08-13 RX ORDER — METOPROLOL SUCCINATE 100 MG/1
100 TABLET, EXTENDED RELEASE ORAL DAILY
Qty: 90 TABLET | Refills: 3 | Status: SHIPPED | OUTPATIENT
Start: 2018-08-13 | End: 2019-08-23

## 2018-08-13 RX ORDER — TRIAMCINOLONE ACETONIDE 0.25 MG/G
CREAM TOPICAL 2 TIMES DAILY
Qty: 15 G | Refills: 3 | Status: SHIPPED | OUTPATIENT
Start: 2018-08-13 | End: 2018-08-13

## 2018-08-13 ASSESSMENT — PAIN SCALES - GENERAL: PAINLEVEL: NO PAIN (0)

## 2018-08-13 NOTE — MR AVS SNAPSHOT
After Visit Summary   8/13/2018    Dulce Yeh    MRN: [id number]           Patient Information     Date Of Birth          1935        Visit Information        Provider Department      8/13/2018 10:55 AM Ivonne Sol MD [facility]        Today's Diagnoses     Gout of left foot, unspecified cause, unspecified chronicity    -  1    Hypothyroidism due to acquired atrophy of thyroid        Essential hypertension        Rash and nonspecific skin eruption        Tinea pedis of left foot          Care Instructions    Primary Care Center Phone Number [phone]  Primary Care Center Medication Refill Request Information:  * Please contact your pharmacy regarding ANY request for medication refills.  ** [facility] Prescription Fax = [phone]  * Please allow 3 business days for routine medication refills.  * Please allow 5 business days for controlled substance medication refills.     Primary Care Center Test Result notification information:  *You will be notified with in 7-10 days of your appointment day regarding the results of your test.  If you are on MyChart you will be notified as soon as the provider has reviewed the results and signed off on them.        1.   For gout: Discontinue the hydrochlorothiazide.    If you get another attack take ibuprofen 200 mg tablets as follows:    Day 1: 3 tablets 3 times daily  Day 2-5: 2 tablets 3 times daily    2.   For hypertension, monitor it daily with a goal of achieving less than 130/80.    Okay to continue losartan one half of a 100 mg tablet.  If the blood pressure starts rising off the hydrochlorothiazide, increase to full tablet of 100 mg losartan daily.                    Follow-ups after your visit        Your next 10 appointments already scheduled     Aug 13, 2018 11:45 AM CDT   LAB with  LAB    [facility] ([facility])    [address] [place] [id number] [phone]            Please do not eat 10-12 hours before your appointment if you are coming in fasting for labs on lipids, cholesterol, or glucose (sugar). This does not apply to pregnant women. Water, hot tea and black coffee (with nothing added) are okay. Do not drink other fluids, diet soda or chew gum.            Oct 01, 2018 11:30 AM CDT   [facility] with [id number]   [facility] ([facility])    [address]  3rd Floor  [facility] [id number] [phone]           1.  Please bring or wear a comfortable two-piece outfit. 2.  You may eat, drink and take your normal medicines. 3.  For any questions that cannot be answered, please contact the ordering physician 4.  Please do not wear perfumes or scented lotions on the day of your exam.            Oct 01, 2018 12:30 PM CDT   (Arrive by 12:15 PM)   Return Visit with Ginger Davis MD   [facility] ([facility])    [address]  [address] [place] [id number] [phone]              Future tests that were ordered for you today     Open Future Orders        Priority Expected Expires Ordered    Basic metabolic panel Routine 8/13/2018 8/27/2018 8/13/2018    Uric acid Routine 8/13/2018 8/13/2019 8/13/2018    CBC with platelets differential Routine 8/13/2018 8/27/2018 8/13/2018            Who to contact     Please call your clinic at [phone] to:    Ask questions about your health    Make or cancel appointments    Discuss your medicines    Learn about your test results    Speak to your doctor            Additional Information About Your Visit        Basis[employer] AG[employer] Information     yetu is an electronic gateway that provides easy, online access to your medical records. With yetu, you can request a clinic appointment, read your test results, renew a prescription or communicate with your care team.     To sign up for yetu visit the website at [email or web address]   You will be asked to enter  the access code listed below, as well as some personal information. Please follow the directions to create your username and password.     Your access code is: [email or web address]  Expires: 2018  6:30 AM     Your access code will  in 90 days. If you need help or a new code, please contact your [facility] or call [phone] for assistance.        Care EveryWhere ID     This is your Care EveryWhere ID. This could be used by other organizations to access your [facility] medical records  [id number]        Your Vitals Were     Last Period BMI (Body Mass Index)                (LMP Unknown) 28.5 kg/m2           Blood Pressure from Last 3 Encounters:   18 (P) 143/72   18 138/71   18 146/75    Weight from Last 3 Encounters:   18 83.8 kg (184 lb 11.2 oz)   18 85.1 kg (187 lb 9.6 oz)   18 85.7 kg (189 lb)                 Today's Medication Changes          These changes are accurate as of 18 11:27 AM.  If you have any questions, ask your nurse or doctor.               Start taking these medicines.        Dose/Directions    Ketoconazole-Hydrocortisone 2 & 1 % Kit   Used for:  Tinea pedis of left foot   Started by:  Ivonne Sol MD        Apply to left foot twice daily as needed   Quantity:  15 g   Refills:  1         Stop taking these medicines if you haven't already. Please contact your care team if you have questions.     potassium chloride SA 20 MEQ CR tablet   Commonly known as:  K-DUR/KLOR-CON M   Stopped by:  Ivonne Sol MD           terbinafine 1 % cream   Commonly known as:  lamISIL AT   Stopped by:  Ivonne Sol MD           triamcinolone 0.025 % cream   Commonly known as:  KENALOG   Stopped by:  Ivonne Sol MD                Where to get your medicines      These medications were sent to [id number] - [place], [place] - [address] AT [facility] [address]  [facility] [address], [place]  [address]    Hours:  24-hours Phone:  [phone]     Ketoconazole-Hydrocortisone 2 & 1 % Kit    levothyroxine 88 MCG tablet    metoprolol succinate 100 MG 24 hr tablet                Primary Care Provider Office Phone # Fax #    Ivonnemarlen Sol [phone] [address] [address]        Equal Access to Services     STEVEN SORTO : Hadii aad ku hadasho Soomaali, waaxda luqadaha, qaybta kaalmada adeegyada, waxay idiin haysarahn adebrittany pendleton ladarreln ah. So [facility] [phone].    ATENCIÓN: Si habla espedilson, tiene a merritt disposición servicios gratuitos de asistencia lingüística. [facility] [phone].    We comply with applicable federal civil rights laws and [place] laws. We do not discriminate on the basis of race, color, national origin, age, disability, sex, sexual orientation, or gender identity.            Thank you!     Thank you for choosing [facility]  for your care. Our goal is always to provide you with excellent care. Hearing back from our patients is one way we can continue to improve our services. Please take a few minutes to complete the written survey that you may receive in the mail after your visit with us. Thank you!             Your Updated Medication List - Protect others around you: Learn how to safely use, store and throw away your medicines at www.disposemymeds.org.          This list is accurate as of 8/13/18 11:27 AM.  Always use your most recent med list.                   Brand Name Dispense Instructions for use Diagnosis    ascorbic acid 500 MG tablet    VITAMIN C     Take 500 mg by mouth daily.        aspirin 81 MG tablet      Take 81 mg by mouth daily        BIOTIN PO      Take by mouth daily        Calcium carb-Vitamin D 500 mg [place]-200 units 500-200 MG-UNIT per tablet    OSCAL with D;Oyster Shell Calcium     Take 1 tablet by mouth 2 times daily (with meals).        clobetasol 0.05 % Crea cream    CLOBETASOL PROPIONATE EMULSION     15 g    Apply topically as needed    Lichen sclerosus et atrophicus of the vulva       cyanocobalamin 1000 MCG tablet    vitamin  B-12     Take 1,000 mcg by mouth daily.        fluocinonide 0.05 % solution    LIDEX     Apply topically 2 times daily        glucosamine chondroitin 1500 complex Caps      Take  by mouth.        hydrochlorothiazide 25 MG tablet    HYDRODIURIL    90 tablet    Take 1 tablet (25 mg) by mouth daily    Benign essential hypertension       hydrocortisone 2.5 % cream    ANUSOL-HC    30 g    Apply to hemorrhoids twice a day as needed.    Hemorrhoids, unspecified hemorrhoid type       Ketoconazole-Hydrocortisone 2 & 1 % Kit     15 g    Apply to left foot twice daily as needed    Tinea pedis of left foot       levothyroxine 88 MCG tablet    SYNTHROID/LEVOTHROID    90 tablet    Take 1 tablet (88 mcg) by mouth daily    Hypothyroidism due to acquired atrophy of thyroid       losartan 100 MG tablet    COZAAR    90 tablet    Take 1 tablet (100 mg) by mouth daily    Benign essential hypertension       Magnesium 300 MG Caps      Take  by mouth.        metoprolol succinate 100 MG 24 hr tablet    TOPROL-XL    90 tablet    Take 1 tablet (100 mg) by mouth daily    Essential hypertension       MILK OF MAGNESIA PO           OMEGA 3 PO           ZYRTEC PO      Take  by mouth.

## 2018-08-13 NOTE — PROGRESS NOTES
[facility]  Primary Care Center   Ivonne Sol MD  08/13/2018      Chief Complaint:   Arthritis       History of Present Illness:   Dulce Yeh is a 83 year old female with a history of Clostridium difficile colitis, hyperparathyroidism, hyperlipidemia, and hypertension, who presents alone for evaluation of arthritis follow up. The patient develops what she believes to be a gout flare up about twice a year. Her first episode occurred about 2 years ago where she experiences redness and pain in her. Her last appointment was on 04/09/2018 where she was prescribed  and told to eat celery and drink cherry juice daily and some of the redness and pain subsided. She was also seen at the [facility] where they took her acid levels but the results are not in her chart.  She reports that the level was 10 mg/dL.      Today, the patient has minimal pain and redness in her second left toe and takes Advil as necessary but tries to limit her self due to anxiety of bad side effects. The patient is not experiencing pain in knees or in other places.     Dizziness: She changed from valsartan to losartan and has experienced dizziness.      Essential hypertension: Today she took her blood pressure at home and denies it was hypertensive with a reading of 119.     Health Care Maintenance/Other:  1. TSH on 10/18/18 with normal results   2. She does not take potassium     Review of Systems:   Pertinent items are noted in HPI, remainder of complete ROS is negative.      Active Medications:      ascorbic acid (VITAMIN C) 500 MG tablet, Take 500 mg by mouth daily., Disp: , Rfl:      aspirin 81 MG tablet, Take 81 mg by mouth daily, Disp: , Rfl:      BIOTIN PO, Take by mouth daily, Disp: , Rfl:      calcium carb 1250 mg, 500 mg [place],/vitamin D 200 units (OSCAL WITH D) 500-200 MG-UNIT per tablet, Take 1 tablet by mouth 2 times daily (with meals)., Disp: , Rfl:      Cetirizine HCl (ZYRTEC PO), Take  by mouth., Disp: , Rfl:       clobetasol (CLOBETASOL PROPIONATE EMULSION) 0.05 % CREA, Apply topically as needed, Disp: 15 g, Rfl: 0     cyanocolbalamin (VITAMIN B-12) 1000 MCG tablet, Take 1,000 mcg by mouth daily., Disp: , Rfl:      fluocinonide (LIDEX) 0.05 % solution, Apply topically 2 times daily, Disp: , Rfl:      Glucosamine-Chondroit-Vit C-Mn (GLUCOSAMINE CHONDR 1500 COMPLX) CAPS, Take  by mouth., Disp: , Rfl:      hydrochlorothiazide (HYDRODIURIL) 25 MG tablet, Take 1 tablet (25 mg) by mouth daily, Disp: 90 tablet, Rfl: 3     hydrocortisone (ANUSOL-HC) 2.5 % cream, Apply to hemorrhoids twice a day as needed., Disp: 30 g, Rfl: 3     levothyroxine (SYNTHROID/LEVOTHROID) 88 MCG tablet, Take 1 tablet (88 mcg) by mouth daily, Disp: 90 tablet, Rfl: 3     losartan (COZAAR) 100 MG tablet, Take 1 tablet (100 mg) by mouth daily, Disp: 90 tablet, Rfl: 3     Magnesium 300 MG CAPS, Take  by mouth., Disp: , Rfl:      Magnesium Hydroxide (MILK OF MAGNESIA PO), , Disp: , Rfl:      metoprolol succinate (TOPROL-XL) 100 MG 24 hr tablet, Take 1 tablet (100 mg) by mouth daily, Disp: 90 tablet, Rfl: 1     Omega-3 Fatty Acids (OMEGA 3 PO), , Disp: , Rfl:      potassium chloride SA (K-DUR,KLOR-CON M) 20 MEQ tablet, Take 1 tablet (20 mEq) by mouth daily, Disp: 100 tablet, Rfl: 1     terbinafine (LAMISIL AT) 1 % cream, Apply to affected areas on feet daily for 4 weeks., Disp: 30 g, Rfl: 2     triamcinolone (KENALOG) 0.025 % cream, Apply topically 2 times daily (Patient not taking: Reported on 3/26/2018), Disp: 15 g, Rfl: 0      Allergies:   Sulfa drugs  Clonidine  Diltiazem      Past Medical History:  Aortic stenosis   Recurrent Clostridium difficile colitis   Eosinophilia   H/o chicken pox   Hyperlipidemia   Essential hypertension   Hypothyroidism due to acquired atrophy of thyroid   Vulvar dystrophy   Recurrent vulvar infection   Steatohepatitis   Hemorrhoids   Near syncope   Lichen sclerosus et atrophicus of the vulva   Seborrheic dermatitis   Cherry angioma    Seborrheic keratosis   Bilateral tinea pedis     Past Surgical History:  Cholecystectomy   Ankle surgery   Appendectomy   Bilateral tubal ligation   Orthopedic surgery     Family History:   Lung cancer       Social History:   The patient is  with 3 children, a nonsmoker, and does not consume alcohol.      Physical Exam:   BP (P) 143/72  Pulse (P) 64  Wt 83.8 kg (184 lb 11.2 oz)  LMP  (LMP Unknown)  SpO2 (P) 98%  BMI 28.5 kg/m2     Constitutional: Healthy, alert, no distress and cooperative  Extremities: Tenderness in left second metatarsophalangeal joint, PIP and distal joint normal.  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    Skin: Redness left second toe without swelling.  Lateral of foot peeling and red.   Psychiatric: Mentation appears normal and affect normal         Assessment and Plan:  1.  Chronic gout.  Occurs twice yearly.  Elevated uric acid almost certainly due to hydrochlorothiazide.  Plan to discontinue that and monitor hypertension at home.  We talked about approaches to treatment ranging from ibuprofen to colchicine and prednisone.  She selected ibuprofen and I have outlined a 5 day regimen should she have a recurrence.  Will recheck uric acid here today.  GFR 58-60.  Will recheck BMP today    2.  Essential hypertension.  She will monitor her blood pressure with a goal of less than 130/80, having discontinued HCTZ.  She will reduce her losartan to 50 mg daily.  If the blood pressure begins to rise she will resume the 100 mg per day.  We will continue the metoprolol at current dose.    3.  Athlete's foot.. left    Dulce was seen today for arthritis.    Diagnoses and all orders for this visit:    Gout of left foot, unspecified cause, unspecified chronicity  -     Basic metabolic panel; Future  -     Uric acid; Future  -     CBC with platelets differential; Future    Hypothyroidism due to acquired atrophy of thyroid  -     levothyroxine (SYNTHROID/LEVOTHROID) 88 MCG tablet; Take 1  tablet (88 mcg) by mouth daily    Essential hypertension  -     metoprolol succinate (TOPROL-XL) 100 MG 24 hr tablet; Take 1 tablet (100 mg) by mouth daily    Rash and nonspecific skin eruption  -     Discontinue: triamcinolone (KENALOG) 0.025 % cream; Apply topically 2 times daily    Tinea pedis of left foot  -     Ketoconazole-Hydrocortisone 2 & 1 % KIT; Apply to left foot twice daily as needed        I spent a total of 25 minutes face-to-face with Dulce Yeh during today's office visit.  Over 50% of this time was spent counseling the patient and/or coordinating care regarding gout, hypertension and skin rash.  See note for details.       Follow-up: Data Unavailable         Scribe Disclosure:  We, Sue Morris and Kelsey Dietz, are serving as the scribes to document services personally performed by Ivonne Sol MD at this visit, based upon the provider's statements to me. All documentation has been reviewed by the aforementioned provider prior to being entered into the official medical record.     Portions of this medical record were completed by a scribe. UPON MY REVIEW AND AUTHENTICATION BY ELECTRONIC SIGNATURE, this confirms (a) I performed the applicable clinical services, and (b) the record is accurate.     Ivonne Sol

## 2018-08-13 NOTE — PATIENT INSTRUCTIONS
Primary Care Center Phone Number [phone]  Primary [facility] Medication Refill Request Information:  * Please contact your pharmacy regarding ANY request for medication refills.  ** [facility] Prescription Fax = [phone]  * Please allow 3 business days for routine medication refills.  * Please allow 5 business days for controlled substance medication refills.     Primary Care Center Test Result notification information:  *You will be notified with in 7-10 days of your appointment day regarding the results of your test.  If you are on MyChart you will be notified as soon as the provider has reviewed the results and signed off on them.        1.   For gout: Discontinue the hydrochlorothiazide.    If you get another attack take ibuprofen 200 mg tablets as follows:    Day 1: 3 tablets 3 times daily  Day 2-5: 2 tablets 3 times daily    2.   For hypertension, monitor it daily with a goal of achieving less than 130/80.    Okay to continue losartan one half of a 100 mg tablet.  If the blood pressure starts rising off the hydrochlorothiazide, increase to full tablet of 100 mg losartan daily.

## 2018-10-01 ENCOUNTER — RADIANT APPOINTMENT (OUTPATIENT)
Dept: CARDIOLOGY | Facility: CLINIC | Age: 83
End: 2018-10-01
Payer: MEDICARE

## 2018-10-01 ENCOUNTER — OFFICE VISIT (OUTPATIENT)
Dept: CARDIOLOGY | Facility: CLINIC | Age: 83
End: 2018-10-01
Attending: INTERNAL MEDICINE
Payer: MEDICARE

## 2018-10-01 VITALS
HEIGHT: 67 IN | OXYGEN SATURATION: 96 % | BODY MASS INDEX: 28.74 KG/M2 | WEIGHT: 183.1 LBS | DIASTOLIC BLOOD PRESSURE: 81 MMHG | SYSTOLIC BLOOD PRESSURE: 171 MMHG | HEART RATE: 62 BPM

## 2018-10-01 DIAGNOSIS — I10 POORLY-CONTROLLED HYPERTENSION: Primary | ICD-10-CM

## 2018-10-01 DIAGNOSIS — Z13.6 SCREENING FOR ABDOMINAL AORTIC ANEURYSM (AAA) PERFORMED: ICD-10-CM

## 2018-10-01 DIAGNOSIS — I35.9 AORTIC VALVE DISORDER: ICD-10-CM

## 2018-10-01 DIAGNOSIS — I44.7 LBBB (LEFT BUNDLE BRANCH BLOCK): ICD-10-CM

## 2018-10-01 DIAGNOSIS — Z82.49 FHX: ABDOMINAL AORTIC ANEURYSM: ICD-10-CM

## 2018-10-01 PROCEDURE — 99215 OFFICE O/P EST HI 40 MIN: CPT | Mod: ZP | Performed by: INTERNAL MEDICINE

## 2018-10-01 PROCEDURE — 93005 ELECTROCARDIOGRAM TRACING: CPT | Mod: ZF

## 2018-10-01 PROCEDURE — G0463 HOSPITAL OUTPT CLINIC VISIT: HCPCS | Mod: 25,ZF

## 2018-10-01 PROCEDURE — 93010 ELECTROCARDIOGRAM REPORT: CPT | Mod: ZP | Performed by: INTERNAL MEDICINE

## 2018-10-01 ASSESSMENT — PAIN SCALES - GENERAL: PAINLEVEL: NO PAIN (0)

## 2018-10-01 NOTE — MR AVS SNAPSHOT
After Visit Summary   10/1/2018    Dulce Yeh    MRN: [id number]           Patient Information     Date Of Birth          1935        Visit Information        Provider Department      10/1/2018 12:30 PM Ginger Davis MD [facility]        Today's Diagnoses     Aortic stenosis    -  1    FHx: abdominal aortic aneurysm        Screening for abdominal aortic aneurysm (AAA) performed          Care Instructions    Patient Instructions:  It was a pleasure to see you in the cardiology clinic today.      If you have any questions, call  Destinee Shaikh RN, at [phone].  Press Option #1 for the [facility], and then press Option #3 for nursing.  We are encouraging the use of MyChart to communicate with your HealthCare Provider    Note the new medications: try taking the Cozaar 50 mg in the morning and 50 mg at night with food  Stop the following medications: none    The results from today include: limited ECHO and abdominal ultrasound  Please follow up with Dr. Ginger Davis in six months      If you have an urgent need after hours (8:00 am to 4:30 pm) please call [phone] and ask for the cardiology fellow on call.            Follow-ups after your visit        Additional Services     Follow-Up with Cardiologist                 Your next 10 appointments already scheduled     Apr 29, 2019  9:00 AM CDT   US AORTIC IMAGING with [id number]   [facility] ([facility])    [address] [place] [address] [phone]           How do I prepare for my exam? (Food and drink instructions) Adults: No eating, smoking, gum chewing or drinking for 8 hours before the exam. You may take medicine with a small sip of water.  Children: * Infants, breast-fed: may have breast milk up to 2 hours before exam. * Infants, formula: may have bottle until 4 hours before exam. * Children 1-5 years: No food or drink for 4 hours  before exam. * Children 6 -12 years: No food or drink for 6 hours before exam. * Children over 12 years: No food or drink for 8 hours before exam.  * J Tube Fed: No need to stop feedings.  What should I wear: Wear comfortable clothes.  How long does the exam take: Most ultrasounds take 30 to 60 minutes.  What should I bring: Bring a list of your medicines, including vitamins, minerals and over-the-counter drugs. It is safest to leave personal items at home.  Do I need a :  No  is needed.  What do I need to tell my doctor: Tell your doctor about any allergies you may have.  What should I do after the exam: No restrictions, You may resume normal activities.  What is this test: An ultrasound uses sound waves to make pictures of the body. Sound waves do not cause pain. The only discomfort may be the pressure of the wand against your skin or full bladder.  Who should I call with questions: If you have any questions, please call the Imaging Department where you will have your exam. Directions, parking instructions, and other information is available on our website, [email or web address].            Apr 29, 2019  9:30 AM CDT   [facility] [employer] with [address] ([facility])    [address] [place] [address]  3rd Floor  [facility] [address] [phone]           1.  Please bring or wear a comfortable two-piece outfit. 2.  You may eat, drink and take your normal medicines. 3.  For any questions that cannot be answered, please contact the ordering physician 4.  Please do not wear perfumes or scented lotions on the day of your exam.            Apr 29, 2019 10:30 AM CDT   (Arrive by 10:15 AM)   Return Visit with Ginger Davis MD   [facility] ([facility])    [address] [place] [address]  [address] [place] [address] [phone]              Future tests that were ordered for you today     Open Future Orders        Priority Expected Expires Ordered     "Follow-Up with Cardiologist Routine 3/30/2019 10/1/2019 10/1/2018    Echocardiogram Limited Routine 10/1/2018 10/1/2019 10/1/2018    US Aortic Imaging (vascular lab) Routine  10/1/2019 10/1/2018            Who to contact     If you have questions or need follow up information about today's clinic visit or your schedule please contact [facility] directly at [phone].  Normal or non-critical lab and imaging results will be communicated to you by [employer], letter or phone within 4 business days after the clinic has received the results. If you do not hear from us within 7 days, please contact the clinic through [employer] or phone. If you have a critical or abnormal lab result, we will notify you by phone as soon as possible.  Submit refill requests through [employer] or call your pharmacy and they will forward the refill request to us. Please allow 3 business days for your refill to be completed.          Additional Information About Your Visit        [employer] Information     [employer] lets you send messages to your doctor, view your test results, renew your prescriptions, schedule appointments and more. To sign up, go to [email or web address] . Click on \"Log in\" on the left side of the screen, which will take you to the Welcome page. Then click on \"Sign up Now\" on the right side of the page.     You will be asked to enter the access code listed below, as well as some personal information. Please follow the directions to create your username and password.     Your access code is: [email or web address]  Expires: 2018  2:03 PM     Your access code will  in 90 days. If you need help or a new code, please call your [place] clinic or [phone].        Care EveryWhere ID     This is your Care EveryWhere ID. This could be used by other organizations to access your [place] medical records  [id number]        Your Vitals Were     Pulse Height Last Period Pulse Oximetry BMI (Body Mass Index)       62 1.702 m (5' " "7\") (LMP Unknown) 96% 28.68 kg/m2        Blood Pressure from Last 3 Encounters:   10/01/18 171/81   08/13/18 (P) 143/72   04/09/18 138/71    Weight from Last 3 Encounters:   10/01/18 83.1 kg (183 lb 1.6 oz)   08/13/18 83.8 kg (184 lb 11.2 oz)   04/09/18 85.1 kg (187 lb 9.6 oz)              We Performed the Following     EKG 12-lead, tracing only (Same Day)     Follow-Up with Cardiologist        Primary Care Provider Office Phone # Fax #    Ivonne VYAS [phone]       1 [address] [address]        Equal Access to Services     MEGAN SORTO : Melida zavala Sodinora, waaxsaeid luqadaha, qaybta kaalmada adebrittanyyasaeid, nancy jennings . So [facility] [phone].    ATENCIÓN: Si habla español, tiene a merritt disposición servicios gratuitos de asistencia lingüística. [facility] [phone].    We comply with applicable federal civil rights laws and [place] laws. We do not discriminate on the basis of race, color, national origin, age, disability, sex, sexual orientation, or gender identity.            Thank you!     Thank you for choosing [facility]  for your care. Our goal is always to provide you with excellent care. Hearing back from our patients is one way we can continue to improve our services. Please take a few minutes to complete the written survey that you may receive in the mail after your visit with us. Thank you!             Your Updated Medication List - Protect others around you: Learn how to safely use, store and throw away your medicines at www.disposemymeds.org.          This list is accurate as of 10/1/18  2:03 PM.  Always use your most recent med list.                   Brand Name Dispense Instructions for use Diagnosis    ascorbic acid 500 MG tablet    VITAMIN C     Take 500 mg by mouth daily.        aspirin 81 MG tablet      Take 81 mg by mouth daily        BIOTIN PO      Take by mouth daily        calcium carbonate 500 mg-vitamin " D 200 units 500-200 MG-UNIT per tablet    OSCAL with D;OYSTER SHELL CALCIUM     Take 1 tablet by mouth 2 times daily (with meals).        clobetasol 0.05 % Crea cream    CLOBETASOL PROPIONATE EMULSION    15 g    Apply topically as needed    Lichen sclerosus et atrophicus of the vulva       cyanocobalamin 1000 MCG tablet    vitamin  B-12     Take 1,000 mcg by mouth daily.        fluocinonide 0.05 % solution    LIDEX     Apply topically 2 times daily        glucosamine chondroitin 1500 complex Caps      Take  by mouth.        levothyroxine 88 MCG tablet    SYNTHROID/LEVOTHROID    90 tablet    Take 1 tablet (88 mcg) by mouth daily    Hypothyroidism due to acquired atrophy of thyroid       losartan 100 MG tablet    COZAAR    90 tablet    Take 1 tablet (100 mg) by mouth daily    Benign essential hypertension       Magnesium 300 MG Caps      Take  by mouth.        metoprolol succinate 100 MG 24 hr tablet    TOPROL-XL    90 tablet    Take 1 tablet (100 mg) by mouth daily    Essential hypertension       MILK OF MAGNESIA PO           OMEGA 3 PO           ZYRTEC PO      Take  by mouth.

## 2018-10-01 NOTE — LETTER
10/1/2018      RE: Dulce Yeh  [address]  [place] [address]       Dear Colleague,    Thank you for the opportunity to participate in the care of your patient, Dulce Yeh, at the [facility] at [facility]. Please see a copy of my visit note below.    HPI: 83 year old woman who returns for reevaluation of aortic valve stenosis. She was initially referred for cardiac evaluation in 2015 prior to left knee replacement for progressive aortic valve stenosis. Echo in 2013 showed mild-moderate AS, mean gradient 15 and estimated valve area of 1.2 cm2. In 2015, aortic valve gradients had increased with a mean gradient of 25 mmHg and valve area 0.8-1cm2, consistent with moderate to severe aortic stenosis. She was advised to proceed with surgery without further workup since she was asymptomatic from the valve disease. She also has hypertension, for several years for which she now takes losartan and metoprolol. She was previously on valsartan (recalled) and hydrochlorothiazide which gave her gout.The patient does not have a history of stroke, heart failure, syncope. Denies renal disease or diabetes.     From a cardiac standpoint, denies any chest pain, orthopnea, PND, palpitations or syncope. Reports stable exertional dyspnea, usually after 2 flight of stairs, occasional dizziness and vertigo while in bed and has undergone hearing evaluation. Otherwise, no major issues.  She is gained some weight after recent vacation.       PAST MEDICAL HISTORY:  Past Medical History:   Diagnosis Date     Allergy      Aortic stenosis 10/11/2011     C. difficile colitis August-October 2015     Eosinophilia 10/11/2011     History of chickenpox      Hyperlipidaemia      Hypertension      Recurrent colitis due to Clostridium difficile August 2016     S/P cholecystectomy 10/11/2011     Unspecified hypothyroidism 10/11/2011       CURRENT MEDICATIONS:  Current Outpatient  Prescriptions   Medication Sig Dispense Refill     ascorbic acid (VITAMIN C) 500 MG tablet Take 500 mg by mouth daily.       aspirin 81 MG tablet Take 81 mg by mouth daily       BIOTIN PO Take by mouth daily       calcium carb 1250 mg, 500 mg [place],/vitamin D 200 units (OSCAL WITH D) 500-200 MG-UNIT per tablet Take 1 tablet by mouth 2 times daily (with meals).       Cetirizine HCl (ZYRTEC PO) Take  by mouth.       clobetasol (CLOBETASOL PROPIONATE EMULSION) 0.05 % CREA Apply topically as needed 15 g 0     cyanocolbalamin (VITAMIN B-12) 1000 MCG tablet Take 1,000 mcg by mouth daily.       fluocinonide (LIDEX) 0.05 % solution Apply topically 2 times daily       Glucosamine-Chondroit-Vit C-Mn (GLUCOSAMINE CHONDR 1500 COMPLX) CAPS Take  by mouth.       levothyroxine (SYNTHROID/LEVOTHROID) 88 MCG tablet Take 1 tablet (88 mcg) by mouth daily 90 tablet 3     losartan (COZAAR) 100 MG tablet Take 1 tablet (100 mg) by mouth daily 90 tablet 3     Magnesium 300 MG CAPS Take  by mouth.       Magnesium Hydroxide (MILK OF MAGNESIA PO)        metoprolol succinate (TOPROL-XL) 100 MG 24 hr tablet Take 1 tablet (100 mg) by mouth daily 90 tablet 3     Omega-3 Fatty Acids (OMEGA 3 PO)        hydrochlorothiazide (HYDRODIURIL) 25 MG tablet Take 1 tablet (25 mg) by mouth daily (Patient not taking: Reported on 10/1/2018) 90 tablet 3     hydrocortisone (ANUSOL-HC) 2.5 % cream Apply to hemorrhoids twice a day as needed. (Patient not taking: Reported on 10/1/2018) 30 g 3     Ketoconazole-Hydrocortisone 2 & 1 % KIT Apply to left foot twice daily as needed (Patient not taking: Reported on 10/1/2018) 15 g 1       PAST SURGICAL HISTORY:  Past Surgical History:   Procedure Laterality Date     ANKLE SURGERY       APPENDECTOMY       bilateral tubal ligation       CHOLECYSTECTOMY       ORTHOPEDIC SURGERY         ALLERGIES     Allergies   Allergen Reactions     Sulfa Drugs Hives     Clonidine Rash     Diltiazem Rash       FAMILY HISTORY:  Family History  "  Problem Relation Age of Onset     Cancer Mother      lung cancer       SOCIAL HISTORY:  History     Social History     Marital Status:      Spouse Name: N/A     Number of Children: 3     Years of Education: N/A     Social History Main Topics     Smoking status: Never Smoker      Smokeless tobacco: Never Used     Alcohol Use: No     Drug Use: No     Sexual Activity: Yes     Birth Control/ Protection: Post-menopausal     Other Topics Concern     Caffeine Concern Yes     coffee, tea     Exercise Yes     swims 3x a week     Social History Narrative    Lives alone, children nearby       ROS:   Constitutional: No fever, chills, or sweats. No weight gain/loss   ENT: No visual disturbance, ear ache, epistaxis, sore throat  Allergies/Immunologic: Negative.   Respiratory: No cough, hemoptysia  Cardiovascular: As per HPI  GI: No nausea, vomiting, hematemesis, melena, or hematochezia  : No urinary frequency, dysuria, or hematuria  Integument: Negative  Psychiatric: Negative  Neuro: Negative  Endocrinology: Negative   Musculoskeletal: Negative    EXAM:  /81 (BP Location: Left arm, Patient Position: Chair, Cuff Size: Adult Regular)  Pulse 62  Ht 1.702 m (5' 7\")  Wt 83.1 kg (183 lb 1.6 oz)  LMP  (LMP Unknown)  SpO2 96%  BMI 28.68 kg/m2     In general, the patient is a pleasant female in no apparent distress.      HEENT: NC/AT.  PERRLA.  EOMI.  Sclerae white, not injected.    Neck: Carotids 2+ bilaterally without bruits.  No jugular venous distension. No thyromegaly.   Heart: RRR. Normal S1, 3/6 systolic ejection murmur with radiation to both carotids, preserved S2. No rub, click, or gallop. There is no heave.    Lungs: Clear bilaterally.  No rhonchi, wheezes, rales.   GI: Soft, nontender, nondistended.   Extremities: No edema.  The pulses are 2+at the radial and DP bilaterally.  Neuro: grossly non focal, gait normal  Skin: no rashes.  Endocrine: no thyromegaly  Musculoskeletal: no joint swelling or " tenderness  Psych: pleasant and conversant      Labs:  LIPID RESULTS:  Lab Results   Component Value Date    CHOL 205 (H) 10/10/2012    HDL 34 (L) 10/10/2012     10/10/2012    TRIG 216 (H) 10/10/2012    CHOLHDLRATIO 6.1 (H) 10/10/2012       LIVER ENZYME RESULTS:  Lab Results   Component Value Date    AST 33 10/18/2017    ALT 39 10/18/2017       CBC RESULTS:  Lab Results   Component Value Date    WBC 5.2 08/13/2018    RBC 4.79 08/13/2018    HGB 13.9 08/13/2018    HCT 40.4 08/13/2018    MCV 84 08/13/2018    MCH 29.0 08/13/2018    MCHC 34.4 08/13/2018    RDW 13.1 08/13/2018     08/13/2018       BMP RESULTS:  Lab Results   Component Value Date     08/13/2018    POTASSIUM 3.8 08/13/2018    CHLORIDE 101 08/13/2018    CO2 27 08/13/2018    ANIONGAP 8 08/13/2018     (H) 08/13/2018    BUN 19 08/13/2018    CR 0.82 08/13/2018    GFRESTIMATED 66 08/13/2018    GFRESTBLACK 80 08/13/2018    CR 9.3 08/13/2018        A1C RESULTS:  Lab Results   Component Value Date    A1C 6.0 10/10/2012       Cardiac data:  ECG from today shows SR at 62, LBBB        Echo today  AV stenosis - moderate to severe, 3.2m/s 23 mmHg, DANIS 09 cm2. Normal biventricular function. No significant change compared to prior in March 2018.     Echo 2/6/17  AV stenosis - moderate to severe, 3.1m/s 22 mmHg, DANIS 1 cm2. Normal biventricular function.    EKG 5/29/15   Sinus bradycardia  Left bundle branch block  Abnormal ECG  When compared with ECG of 02-OCT-2013 09:21,  No significant change was found    ECHO 6.1.15  Preserved biventricular function, moderate to severe aortic stenosis,mean gradient 25 mmHg and valve area 0.8-1cm2. No pulmonary hypertension. no pericardial effusion.      NM MPI Adenosine (3/15/2013)  1. Normal myocardial SPECT study with a summed stress score of 0.  2. Normal left ventricular systolic function.    ECHO (3/10/2013)  No significant change from prior study  Mild to moderate aortic stenosis is  present      Assessment and Plan:   83 year old with   Hypertension   Moderate to severe aortic stenosis  LBBB -chronic LBBB and in sinus rhythm   Hypothyroidism  Family history of abdominal aortic aneurysm     Blood pressure today 171/81 mmHg, usually better controlled at home in the 130s systolic. Currently on losartan 50 mg/d, metoprolol xl 100mg/d. Will increase losartan to 50 mg bid and continue to monitor at home. Her echo today notable for stable moderate to severe aortic stenosis. She does not report any active anginal or heart failure symptoms or syncope.  Slightly more symptomatic with exertional dyspnea.  Continued surveillance with an echocardiogram in 6 months. OK to continue her current level of exercise and advised against heavy lifting. She is not on aspirin for primary prevention and she will resume. She declines statin as she is worried about the side effect profile. Limited data in her age group to prove benefit.  Abdominal aorta evaluation with an ultrasound given her family history of ruptured aneurysm.    Follow up in 6 months with a limited echo and AA US    Ginger Davis MD, MS  Staff Cardiologist, [facility]   Pager: [phone]      CC  Patient Care Team:  Ivonne Sol MD as PCP - General (Internal Medicine)  Gian Khanna MD as MD (Orthopedics)      Ginger Davis MD

## 2018-10-01 NOTE — NURSING NOTE
Chief Complaint   Patient presents with     Follow Up For      manage aortic valve stenosis     Vitals were taken and medications were reconciled. EKG was performed    Onelia DONALDSON  12:06 PM

## 2018-10-01 NOTE — PROGRESS NOTES
HPI: 83 year old woman who returns for reevaluation of aortic valve stenosis. She was initially referred for cardiac evaluation in 2015 prior to left knee replacement for progressive aortic valve stenosis. Echo in 2013 showed mild-moderate AS, mean gradient 15 and estimated valve area of 1.2 cm2. In 2015, aortic valve gradients had increased with a mean gradient of 25 mmHg and valve area 0.8-1cm2, consistent with moderate to severe aortic stenosis. She was advised to proceed with surgery without further workup since she was asymptomatic from the valve disease. She also has hypertension, for several years for which she takes HCTZ, valsartan and metoprolol. The patient does not have a history of stroke, heart failure, syncope. Denies renal disease or diabetes.     From a cardiac standpoint, denies any chest pain, orthopnea, PND, palpitations or syncope. Reports stable exertional dyspnea, usually after 2 flight of stairs, occasional dizziness and vertigo while in bed and has undergone hearing evaluation. Otherwise, no major issues.  She is gained some weight after recent vacation.       PAST MEDICAL HISTORY:  Past Medical History:   Diagnosis Date     Allergy      Aortic stenosis 10/11/2011     C. difficile colitis August-October 2015     Eosinophilia 10/11/2011     History of chickenpox      Hyperlipidaemia      Hypertension      Recurrent colitis due to Clostridium difficile August 2016     S/P cholecystectomy 10/11/2011     Unspecified hypothyroidism 10/11/2011       CURRENT MEDICATIONS:  Current Outpatient Prescriptions   Medication Sig Dispense Refill     ascorbic acid (VITAMIN C) 500 MG tablet Take 500 mg by mouth daily.       aspirin 81 MG tablet Take 81 mg by mouth daily       BIOTIN PO Take by mouth daily       calcium carb 1250 mg, 500 mg [place],/vitamin D 200 units (OSCAL WITH D) 500-200 MG-UNIT per tablet Take 1 tablet by mouth 2 times daily (with meals).       Cetirizine HCl (ZYRTEC PO) Take  by mouth.        clobetasol (CLOBETASOL PROPIONATE EMULSION) 0.05 % CREA Apply topically as needed 15 g 0     cyanocolbalamin (VITAMIN B-12) 1000 MCG tablet Take 1,000 mcg by mouth daily.       fluocinonide (LIDEX) 0.05 % solution Apply topically 2 times daily       Glucosamine-Chondroit-Vit C-Mn (GLUCOSAMINE CHONDR 1500 COMPLX) CAPS Take  by mouth.       levothyroxine (SYNTHROID/LEVOTHROID) 88 MCG tablet Take 1 tablet (88 mcg) by mouth daily 90 tablet 3     losartan (COZAAR) 100 MG tablet Take 1 tablet (100 mg) by mouth daily 90 tablet 3     Magnesium 300 MG CAPS Take  by mouth.       Magnesium Hydroxide (MILK OF MAGNESIA PO)        metoprolol succinate (TOPROL-XL) 100 MG 24 hr tablet Take 1 tablet (100 mg) by mouth daily 90 tablet 3     Omega-3 Fatty Acids (OMEGA 3 PO)        hydrochlorothiazide (HYDRODIURIL) 25 MG tablet Take 1 tablet (25 mg) by mouth daily (Patient not taking: Reported on 10/1/2018) 90 tablet 3     hydrocortisone (ANUSOL-HC) 2.5 % cream Apply to hemorrhoids twice a day as needed. (Patient not taking: Reported on 10/1/2018) 30 g 3     Ketoconazole-Hydrocortisone 2 & 1 % KIT Apply to left foot twice daily as needed (Patient not taking: Reported on 10/1/2018) 15 g 1       PAST SURGICAL HISTORY:  Past Surgical History:   Procedure Laterality Date     ANKLE SURGERY       APPENDECTOMY       bilateral tubal ligation       CHOLECYSTECTOMY       ORTHOPEDIC SURGERY         ALLERGIES     Allergies   Allergen Reactions     Sulfa Drugs Hives     Clonidine Rash     Diltiazem Rash       FAMILY HISTORY:  Family History   Problem Relation Age of Onset     Cancer Mother      lung cancer       SOCIAL HISTORY:  History     Social History     Marital Status:      Spouse Name: N/A     Number of Children: 3     Years of Education: N/A     Social History Main Topics     Smoking status: Never Smoker      Smokeless tobacco: Never Used     Alcohol Use: No     Drug Use: No     Sexual Activity: Yes     Birth Control/ Protection:  "Post-menopausal     Other Topics Concern     Caffeine Concern Yes     coffee, tea     Exercise Yes     swims 3x a week     Social History Narrative    Lives alone, children nearby       ROS:   Constitutional: No fever, chills, or sweats. No weight gain/loss   ENT: No visual disturbance, ear ache, epistaxis, sore throat  Allergies/Immunologic: Negative.   Respiratory: No cough, hemoptysia  Cardiovascular: As per HPI  GI: No nausea, vomiting, hematemesis, melena, or hematochezia  : No urinary frequency, dysuria, or hematuria  Integument: Negative  Psychiatric: Negative  Neuro: Negative  Endocrinology: Negative   Musculoskeletal: Negative    EXAM:  /81 (BP Location: Left arm, Patient Position: Chair, Cuff Size: Adult Regular)  Pulse 62  Ht 1.702 m (5' 7\")  Wt 83.1 kg (183 lb 1.6 oz)  LMP  (LMP Unknown)  SpO2 96%  BMI 28.68 kg/m2     In general, the patient is a pleasant female in no apparent distress.      HEENT: NC/AT.  PERRLA.  EOMI.  Sclerae white, not injected.    Neck: Carotids 2+ bilaterally without bruits.  No jugular venous distension. No thyromegaly.   Heart: RRR. Normal S1, 3/6 systolic ejection murmur with radiation to both carotids, preserved S2. No rub, click, or gallop. There is no heave.    Lungs: Clear bilaterally.  No rhonchi, wheezes, rales.   GI: Soft, nontender, nondistended.   Extremities: No edema.  The pulses are 2+at the radial and DP bilaterally.  Neuro: grossly non focal, gait normal  Skin: no rashes.    Labs:  LIPID RESULTS:  Lab Results   Component Value Date    CHOL 205 (H) 10/10/2012    HDL 34 (L) 10/10/2012     10/10/2012    TRIG 216 (H) 10/10/2012    CHOLHDLRATIO 6.1 (H) 10/10/2012       LIVER ENZYME RESULTS:  Lab Results   Component Value Date    AST 33 10/18/2017    ALT 39 10/18/2017       CBC RESULTS:  Lab Results   Component Value Date    WBC 5.2 08/13/2018    RBC 4.79 08/13/2018    HGB 13.9 08/13/2018    HCT 40.4 08/13/2018    MCV 84 08/13/2018    MCH 29.0 " 08/13/2018    MCHC 34.4 08/13/2018    RDW 13.1 08/13/2018     08/13/2018       BMP RESULTS:  Lab Results   Component Value Date     08/13/2018    POTASSIUM 3.8 08/13/2018    CHLORIDE 101 08/13/2018    CO2 27 08/13/2018    ANIONGAP 8 08/13/2018     (H) 08/13/2018    BUN 19 08/13/2018    CR 0.82 08/13/2018    GFRESTIMATED 66 08/13/2018    GFRESTBLACK 80 08/13/2018    CR 9.3 08/13/2018        A1C RESULTS:  Lab Results   Component Value Date    A1C 6.0 10/10/2012       Cardiac data:  ECG from today shows SR at 71, LBBB        Echo today  AV stenosis - moderate to severe, 3.2m/s 24 mmHg, DANIS 1.1 cm2. Normal biventricular function.    Echo 2/6/17  AV stenosis - moderate to severe, 3.1m/s 22 mmHg, DANIS 1 cm2. Normal biventricular function.    EKG 5/29/15   Sinus bradycardia  Left bundle branch block  Abnormal ECG  When compared with ECG of 02-OCT-2013 09:21,  No significant change was found    ECHO 6.1.15  Preserved biventricular function, moderate to severe aortic stenosis,mean gradient 25 mmHg and valve area 0.8-1cm2. No pulmonary hypertension. no pericardial effusion.      NM MPI Adenosine (3/15/2013)  1. Normal myocardial SPECT study with a summed stress score of 0.  2. Normal left ventricular systolic function.    ECHO (3/10/2013)  No significant change from prior study  Mild to moderate aortic stenosis is present      Assessment and Plan:   83 year old with hypertension, moderate to severe AS.   1. Hypertension - 171/81 mmHg today, usually better controlled at home in the 130s systolic. Currently on diovan 320 mg/d, metoprolol xl 100mg/d, hctz 25 mg/d.   2. LBBB -chronic LBBB and in sinus rhythm   3. Aortic stenosis, moderate to severe - her echo today notable for stable moderate to severe aortic stenosis. She does not report any active anginal or heart failure symptoms or syncope.  Slightly more symptomatic with exertional dyspnea.  Continued surveillance with an echocardiogram in 6 months. OK to  continue her current level of exercise and advised against heavy lifting.  4. CV Prevention, primary - she is currently on aspirin. Declines statin as she is worried about the side effect profile. Limited data in her age group to prove benefit.     Follow up in 6 months with a limited echo    Ginger Davis MD, MS  Staff Cardiologist, [facility]   Pager: [phone]      CC  Patient Care Team:  Ivonne Sol MD as PCP - General (Internal Medicine)  Gian Khanna MD as MD (Orthopedics)  ASHLEY MARIE

## 2018-10-01 NOTE — PATIENT INSTRUCTIONS
Patient Instructions:  It was a pleasure to see you in the cardiology clinic today.      If you have any questions, call  Destinee Shaikh RN, at [phone].  Press Option #1 for the [facility], and then press Option #3 for nursing.  We are encouraging the use of MyChart to communicate with your HealthCare Provider    Note the new medications: try taking the Cozaar 50 mg in the morning and 50 mg at night with food  Stop the following medications: none    The results from today include: limited ECHO and abdominal ultrasound  Please follow up with Dr. Ginger Davis in six months      If you have an urgent need after hours (8:00 am to 4:30 pm) please call [phone] and ask for the cardiology fellow on call.

## 2018-10-01 NOTE — NURSING NOTE
Cardiac Testing: Patient given instructions regarding  echocardiogram  and abdominal ultrasound. Discussed purpose, preparation, procedure and when to expect results reported back to the patient. Patient demonstrated understanding of this information and agreed to call with further questions or concerns.  Med Reconcile: Reviewed and verified all current medications with the patient. The updated medication list was printed and given to the patient.  Return Appointment: Patient given instructions regarding scheduling next clinic visit. Patient demonstrated understanding of this information and agreed to call with further questions or concerns.  Patient stated she understood all health information given and agreed to call with further questions or concerns.

## 2018-10-01 NOTE — PROGRESS NOTES
HPI: 83 year old woman who returns for reevaluation of aortic valve stenosis. She was initially referred for cardiac evaluation in 2015 prior to left knee replacement for progressive aortic valve stenosis. Echo in 2013 showed mild-moderate AS, mean gradient 15 and estimated valve area of 1.2 cm2. In 2015, aortic valve gradients had increased with a mean gradient of 25 mmHg and valve area 0.8-1cm2, consistent with moderate to severe aortic stenosis. She was advised to proceed with surgery without further workup since she was asymptomatic from the valve disease. She also has hypertension, for several years for which she now takes losartan and metoprolol. She was previously on valsartan (recalled) and hydrochlorothiazide which gave her gout.The patient does not have a history of stroke, heart failure, syncope. Denies renal disease or diabetes.     From a cardiac standpoint, denies any chest pain, orthopnea, PND, palpitations or syncope. Reports stable exertional dyspnea, usually after 2 flight of stairs, occasional dizziness and vertigo while in bed and has undergone hearing evaluation. Otherwise, no major issues.  She is gained some weight after recent vacation.       PAST MEDICAL HISTORY:  Past Medical History:   Diagnosis Date     Allergy      Aortic stenosis 10/11/2011     C. difficile colitis August-October 2015     Eosinophilia 10/11/2011     History of chickenpox      Hyperlipidaemia      Hypertension      Recurrent colitis due to Clostridium difficile August 2016     S/P cholecystectomy 10/11/2011     Unspecified hypothyroidism 10/11/2011       CURRENT MEDICATIONS:  Current Outpatient Prescriptions   Medication Sig Dispense Refill     ascorbic acid (VITAMIN C) 500 MG tablet Take 500 mg by mouth daily.       aspirin 81 MG tablet Take 81 mg by mouth daily       BIOTIN PO Take by mouth daily       calcium carb 1250 mg, 500 mg [place],/vitamin D 200 units (OSCAL WITH D) 500-200 MG-UNIT per tablet Take 1 tablet by  mouth 2 times daily (with meals).       Cetirizine HCl (ZYRTEC PO) Take  by mouth.       clobetasol (CLOBETASOL PROPIONATE EMULSION) 0.05 % CREA Apply topically as needed 15 g 0     cyanocolbalamin (VITAMIN B-12) 1000 MCG tablet Take 1,000 mcg by mouth daily.       fluocinonide (LIDEX) 0.05 % solution Apply topically 2 times daily       Glucosamine-Chondroit-Vit C-Mn (GLUCOSAMINE CHONDR 1500 COMPLX) CAPS Take  by mouth.       levothyroxine (SYNTHROID/LEVOTHROID) 88 MCG tablet Take 1 tablet (88 mcg) by mouth daily 90 tablet 3     losartan (COZAAR) 100 MG tablet Take 1 tablet (100 mg) by mouth daily 90 tablet 3     Magnesium 300 MG CAPS Take  by mouth.       Magnesium Hydroxide (MILK OF MAGNESIA PO)        metoprolol succinate (TOPROL-XL) 100 MG 24 hr tablet Take 1 tablet (100 mg) by mouth daily 90 tablet 3     Omega-3 Fatty Acids (OMEGA 3 PO)        hydrochlorothiazide (HYDRODIURIL) 25 MG tablet Take 1 tablet (25 mg) by mouth daily (Patient not taking: Reported on 10/1/2018) 90 tablet 3     hydrocortisone (ANUSOL-HC) 2.5 % cream Apply to hemorrhoids twice a day as needed. (Patient not taking: Reported on 10/1/2018) 30 g 3     Ketoconazole-Hydrocortisone 2 & 1 % KIT Apply to left foot twice daily as needed (Patient not taking: Reported on 10/1/2018) 15 g 1       PAST SURGICAL HISTORY:  Past Surgical History:   Procedure Laterality Date     ANKLE SURGERY       APPENDECTOMY       bilateral tubal ligation       CHOLECYSTECTOMY       ORTHOPEDIC SURGERY         ALLERGIES     Allergies   Allergen Reactions     Sulfa Drugs Hives     Clonidine Rash     Diltiazem Rash       FAMILY HISTORY:  Family History   Problem Relation Age of Onset     Cancer Mother      lung cancer       SOCIAL HISTORY:  History     Social History     Marital Status:      Spouse Name: N/A     Number of Children: 3     Years of Education: N/A     Social History Main Topics     Smoking status: Never Smoker      Smokeless tobacco: Never Used      "Alcohol Use: No     Drug Use: No     Sexual Activity: Yes     Birth Control/ Protection: Post-menopausal     Other Topics Concern     Caffeine Concern Yes     coffee, tea     Exercise Yes     swims 3x a week     Social History Narrative    Lives alone, children nearby       ROS:   Constitutional: No fever, chills, or sweats. No weight gain/loss   ENT: No visual disturbance, ear ache, epistaxis, sore throat  Allergies/Immunologic: Negative.   Respiratory: No cough, hemoptysia  Cardiovascular: As per HPI  GI: No nausea, vomiting, hematemesis, melena, or hematochezia  : No urinary frequency, dysuria, or hematuria  Integument: Negative  Psychiatric: Negative  Neuro: Negative  Endocrinology: Negative   Musculoskeletal: Negative    EXAM:  /81 (BP Location: Left arm, Patient Position: Chair, Cuff Size: Adult Regular)  Pulse 62  Ht 1.702 m (5' 7\")  Wt 83.1 kg (183 lb 1.6 oz)  LMP  (LMP Unknown)  SpO2 96%  BMI 28.68 kg/m2     In general, the patient is a pleasant female in no apparent distress.      HEENT: NC/AT.  PERRLA.  EOMI.  Sclerae white, not injected.    Neck: Carotids 2+ bilaterally without bruits.  No jugular venous distension. No thyromegaly.   Heart: RRR. Normal S1, 3/6 systolic ejection murmur with radiation to both carotids, preserved S2. No rub, click, or gallop. There is no heave.    Lungs: Clear bilaterally.  No rhonchi, wheezes, rales.   GI: Soft, nontender, nondistended.   Extremities: No edema.  The pulses are 2+at the radial and DP bilaterally.  Neuro: grossly non focal, gait normal  Skin: no rashes.  Endocrine: no thyromegaly  Musculoskeletal: no joint swelling or tenderness  Psych: pleasant and conversant      Labs:  LIPID RESULTS:  Lab Results   Component Value Date    CHOL 205 (H) 10/10/2012    HDL 34 (L) 10/10/2012     10/10/2012    TRIG 216 (H) 10/10/2012    CHOLHDLRATIO 6.1 (H) 10/10/2012       LIVER ENZYME RESULTS:  Lab Results   Component Value Date    AST 33 10/18/2017    " ALT 39 10/18/2017       CBC RESULTS:  Lab Results   Component Value Date    WBC 5.2 08/13/2018    RBC 4.79 08/13/2018    HGB 13.9 08/13/2018    HCT 40.4 08/13/2018    MCV 84 08/13/2018    MCH 29.0 08/13/2018    MCHC 34.4 08/13/2018    RDW 13.1 08/13/2018     08/13/2018       BMP RESULTS:  Lab Results   Component Value Date     08/13/2018    POTASSIUM 3.8 08/13/2018    CHLORIDE 101 08/13/2018    CO2 27 08/13/2018    ANIONGAP 8 08/13/2018     (H) 08/13/2018    BUN 19 08/13/2018    CR 0.82 08/13/2018    GFRESTIMATED 66 08/13/2018    GFRESTBLACK 80 08/13/2018    CR 9.3 08/13/2018        A1C RESULTS:  Lab Results   Component Value Date    A1C 6.0 10/10/2012       Cardiac data:  ECG from today shows SR at 62, LBBB        Echo today  AV stenosis - moderate to severe, 3.2m/s 23 mmHg, DANIS 09 cm2. Normal biventricular function. No significant change compared to prior in March 2018.     Echo 2/6/17  AV stenosis - moderate to severe, 3.1m/s 22 mmHg, DANIS 1 cm2. Normal biventricular function.    EKG 5/29/15   Sinus bradycardia  Left bundle branch block  Abnormal ECG  When compared with ECG of 02-OCT-2013 09:21,  No significant change was found    ECHO 6.1.15  Preserved biventricular function, moderate to severe aortic stenosis,mean gradient 25 mmHg and valve area 0.8-1cm2. No pulmonary hypertension. no pericardial effusion.      NM MPI Adenosine (3/15/2013)  1. Normal myocardial SPECT study with a summed stress score of 0.  2. Normal left ventricular systolic function.    ECHO (3/10/2013)  No significant change from prior study  Mild to moderate aortic stenosis is present      Assessment and Plan:   83 year old with   Hypertension   Moderate to severe aortic stenosis  LBBB -chronic LBBB and in sinus rhythm   Hypothyroidism  Family history of abdominal aortic aneurysm     Blood pressure today 171/81 mmHg, usually better controlled at home in the 130s systolic. Currently on losartan 50 mg/d, metoprolol xl  100mg/d. Will increase losartan to 50 mg bid and continue to monitor at home. Her echo today notable for stable moderate to severe aortic stenosis. She does not report any active anginal or heart failure symptoms or syncope.  Slightly more symptomatic with exertional dyspnea.  Continued surveillance with an echocardiogram in 6 months. OK to continue her current level of exercise and advised against heavy lifting. She is not on aspirin for primary prevention and she will resume. She declines statin as she is worried about the side effect profile. Limited data in her age group to prove benefit.  Abdominal aorta evaluation with an ultrasound given her family history of ruptured aneurysm.    Follow up in 6 months with a limited echo and AA US    Ginger Davis MD, MS  Staff Cardiologist, [facility]   Pager: [phone]      CC  Patient Care Team:  Ivonne Sol MD as PCP - General (Internal Medicine)  Gian Khanna MD as MD (Orthopedics)  ASHLEY MARIE

## 2018-10-02 LAB — INTERPRETATION ECG - MUSE: NORMAL

## 2018-11-02 ENCOUNTER — TELEPHONE (OUTPATIENT)
Dept: INTERNAL MEDICINE | Facility: CLINIC | Age: 83
End: 2018-11-02

## 2018-11-02 DIAGNOSIS — I10 BENIGN ESSENTIAL HYPERTENSION: Primary | ICD-10-CM

## 2018-11-02 NOTE — TELEPHONE ENCOUNTER
[facility] Call Center    Phone Message    May a detailed message be left on voicemail: yes    Reason for Call: Other: PT has questions about her BP medication.  Please follow up with the PT at [phone]     Action Taken: Message routed to:  [facility] ([facility]): jennifer

## 2018-11-05 NOTE — TELEPHONE ENCOUNTER
Message left to patient's home voicemail to call clinic back  Quinten Balderrama LPN at 10:27 AM on 11/5/2018

## 2018-11-06 NOTE — TELEPHONE ENCOUNTER
Message left at  the patient's home voicemail call clinic back  Quinten Balderrama LPN at 11:19 AM on 11/6/2018

## 2018-11-07 RX ORDER — VALSARTAN 320 MG/1
320 TABLET ORAL DAILY
Qty: 90 TABLET | Refills: 3 | Status: SHIPPED | OUTPATIENT
Start: 2018-11-07 | End: 2019-11-04

## 2018-11-07 NOTE — TELEPHONE ENCOUNTER
Message left to patient's home voicemail to call clinic back. (note: message to be given to patient;  Dr. Sol has DC'd losartan and given new Rx for valsartan).  Quinten Balderrama LPN at 10:22 AM on 11/7/2018

## 2018-11-07 NOTE — TELEPHONE ENCOUNTER
Patient called the clinic stating she is now able to get valsartan medication.  Writer  contacted [facility] pharmacy as this medication was recalled in the past.  However, there has been another  who is making valsartan and is available at this time.  Patient is not taking valsartan in place of losartan for blood pressure. As was originally prescribed. Will inform provider.  Quinten Balderrama LPN at 9:02 AM on 11/7/2018     Correction from last chart note:   patient IS now able to take  valsartan in place of losartan for blood pressure.  As was originally prescribed. Quinten Balderrama LPN at 9:11 AM on 11/7/2018

## 2018-11-16 ENCOUNTER — OFFICE VISIT (OUTPATIENT)
Dept: DERMATOLOGY | Facility: CLINIC | Age: 83
End: 2018-11-16
Payer: MEDICARE

## 2018-11-16 DIAGNOSIS — L85.3 XEROSIS CUTIS: ICD-10-CM

## 2018-11-16 DIAGNOSIS — B35.1 ONYCHOMYCOSIS: ICD-10-CM

## 2018-11-16 DIAGNOSIS — L81.4 SOLAR LENTIGO: ICD-10-CM

## 2018-11-16 DIAGNOSIS — B35.3 TINEA PEDIS OF BOTH FEET: ICD-10-CM

## 2018-11-16 DIAGNOSIS — L82.1 SEBORRHEIC KERATOSIS: ICD-10-CM

## 2018-11-16 DIAGNOSIS — L57.8 SUN-DAMAGED SKIN: Primary | ICD-10-CM

## 2018-11-16 DIAGNOSIS — L21.9 DERMATITIS, SEBORRHEIC: ICD-10-CM

## 2018-11-16 DIAGNOSIS — D18.01 CHERRY ANGIOMA: ICD-10-CM

## 2018-11-16 ASSESSMENT — PAIN SCALES - GENERAL: PAINLEVEL: NO PAIN (0)

## 2018-11-16 NOTE — PATIENT INSTRUCTIONS
Can get an over the counter antifungal/athlete's foot cream to apply to your feet every day.    Get a good moisturizer to apply to your skin every day to help with the dryness and itching - especially on the legs and feet.     It would be a good idea to use the selsum blue shampoo once or twice per week for maintenance (alternating with your normal shampoo) so you get less scalp itching less often.    The itchy spots on your scalp are called seborrheic keratoses and they are completely benign lesions that will never turn into skin cancer but they may get scaly and flake and may fall off and come back.    Come back in 1 year for a skin check.

## 2018-11-16 NOTE — LETTER
11/16/2018       RE: Dulce Yhe  [address]  [place] [address]     Dear Colleague,    Thank you for referring your patient, Dulce Yeh, to the [facility] DERMATOLOGY at [facility]. Please see a copy of my visit note below.    [facility] Dermatology Note      Dermatology Problem List:  1.Tinea pedis   2. Onychomycosis  3. Seborrheic dermatitis of the scalp  4. Numerous SKs including scalp  5. Xerosis     Encounter Date: Nov 16, 2018    CC:  Chief Complaint   Patient presents with     Derm Problem     Dulce is here today for a skin check, has spot on her right shin and her back      History of Present Illness:  Ms. Dulce Yeh is a 83 year old female who presents as a follow-up for skin check. The patient was last seen 2/10/17 when she had a skin check and was evaluated for SKs, pruritis, seborrheic dermatitis, and tinea pedis/onychomycosis. At the time the patient did not want an oral medication for onychomycosis. She continues to have issues with this and still does not want an oral medication. The tinea pedis continues to cause her to have itchy feet. She was prescribed a compounded hydrocortisone/ketoconazole cream for this but it wasn't covered by her insurance. She has been using an OTC antifungal spray occasionally for this. She continues to have scalp itching. She never picked up the ketoconazole and said it was too expensive but uses Lidex solution and selsum blue with flares. She has some spots on her R shin, back, and scalp that she would like evaluated. No other concerns today.    Past Medical History:   Patient Active Problem List   Diagnosis     Essential hypertension     Eosinophilia     Aortic stenosis     Hypothyroidism     S/P cholecystectomy     Elevated LFTs     Vulvar dystrophy -- LS     Recurrent vulvar infection     Steatohepatitis     Fatigue     Weakness     Diaphoresis     Hemorrhoids     Near syncope      Lichen sclerosus et atrophicus of the vulva     Hypothyroidism due to acquired atrophy of thyroid     Recurrent colitis due to Clostridium difficile     Dermatitis, seborrheic     Cherry angioma     Seborrheic keratosis     Tinea pedis of both feet     Past Medical History:   Diagnosis Date     Allergy      Aortic stenosis 10/11/2011     C. difficile colitis August-October 2015     Eosinophilia 10/11/2011     History of chickenpox      Hyperlipidaemia      Hypertension      Recurrent colitis due to Clostridium difficile August 2016     S/P cholecystectomy 10/11/2011     Unspecified hypothyroidism 10/11/2011     Past Surgical History:   Procedure Laterality Date     ANKLE SURGERY       APPENDECTOMY       bilateral tubal ligation       CHOLECYSTECTOMY       ORTHOPEDIC SURGERY         Social History:  Patient  reports that she has never smoked. She has never used smokeless tobacco. She reports that she does not drink alcohol or use illicit drugs.    Family History:  Family History   Problem Relation Age of Onset     Cancer Mother      lung cancer       Medications:  Current Outpatient Prescriptions   Medication Sig Dispense Refill     ascorbic acid (VITAMIN C) 500 MG tablet Take 500 mg by mouth daily.       aspirin 81 MG tablet Take 81 mg by mouth daily       BIOTIN PO Take by mouth daily       calcium carb 1250 mg, 500 mg [place],/vitamin D 200 units (OSCAL WITH D) 500-200 MG-UNIT per tablet Take 1 tablet by mouth 2 times daily (with meals).       Cetirizine HCl (ZYRTEC PO) Take  by mouth.       clobetasol (CLOBETASOL PROPIONATE EMULSION) 0.05 % CREA Apply topically as needed 15 g 0     cyanocolbalamin (VITAMIN B-12) 1000 MCG tablet Take 1,000 mcg by mouth daily.       fluocinonide (LIDEX) 0.05 % solution Apply topically 2 times daily       Glucosamine-Chondroit-Vit C-Mn (GLUCOSAMINE CHONDR 1500 COMPLX) CAPS Take  by mouth.       levothyroxine (SYNTHROID/LEVOTHROID) 88 MCG tablet Take 1 tablet (88 mcg) by mouth daily 90  tablet 3     Magnesium 300 MG CAPS Take  by mouth.       metoprolol succinate (TOPROL-XL) 100 MG 24 hr tablet Take 1 tablet (100 mg) by mouth daily 90 tablet 3     Omega-3 Fatty Acids (OMEGA 3 PO)        valsartan (DIOVAN) 320 MG tablet Take 1 tablet (320 mg) by mouth daily 90 tablet 3     Magnesium Hydroxide (MILK OF MAGNESIA PO)        Allergies   Allergen Reactions     Sulfa Drugs Hives     Clonidine Rash     Diltiazem Rash         Review of Systems:  -Constitutional:  Feeling well, in usual state of health.  -Skin:  As per HPI, no additional concerns.    Physical exam:  Vitals: LMP  (LMP Unknown)  GEN: This is a well developed, well-nourished female in no acute distress, in a pleasant mood.    SKIN: Total skin, which includes the head/face, both arms, chest, back, abdomen,both legs, buttocks, digits and/or nails, was examined. Groin was not examined.   -Weiss type II skin  -Many solar lentigines in sun exposed areas.  -Multiple red vascular papules on the chest and back.  -Very mild greasy scale throughout the scalp  -3 scaly brown papules on the scalp. 3 similar lesions on on the shins but the tops have been removed by itching.   -Severe xerosis of the calves and with hyperkeratosis of the feet.  -Yellowish thickened toenails on bilateral feet.   -No other lesions of concern on areas examined.     Impression/Plan:  1. Sun damaged skin without findings concerning for skin cancer today. Solar lentigenes on arms, chest, and face.    Sun precaution was advised including the use of sun screens of SPF 30 or higher, sun protective clothing, and avoidance of tanning beds.    Annual skin checks recommended.    2. Benign skin findings: seborrheic keratoses, spider veins, varicose veins, and cherry angiomas. SKs present on the bilateral shins and scalp.    Benign nature was discussed.No further intervention required at this time.     3. Onychomycosis - treated with occasional OTC antifungal spray.    Recommended  daily use of OTC antifungal cream and daily emollient application for xerosis.    4. Tinea pedis - declined oral medication    Discussed oral treatment with terbinafine but patient again declined.    5.    Xerosis - occasional emollient use    Discussed daily application of a moisturizer to help with itching.    6.    Seborrheic dermatitis - occasional itching, well controlled. Uses lidex solution and selsum blue as needed but not for maintenance.    Advised using the selsum blue 1-2 times per week for maintenance    Patient denied need for refills of lidex at this time (only uses about one bottle per year). Ok to refill in next year if patient needs.     CC Dr. Mitchell on close of this encounter.  Follow-up in 1 year, earlier for new or changing lesions.     Staff Involved:  I, Madelin Weber, saw and examined the patient in the presence of Dr. Mitchell.    Staff Physician:  I was present with the medical student who participated in the service and in the documentation of the note. I have verified the history and personally performed the physical exam and medical decision making. I agree with the assessment and plan of care as documented in the note.     Ivette Mitchell MD    Department of Dermatology  [facility]: Phone: [phone], Fax:[phone]  [facility]: Phone: [phone], Fax: [phone]

## 2018-11-16 NOTE — MR AVS SNAPSHOT
After Visit Summary   11/16/2018    Dulce Yeh    MRN: [id number]           Patient Information     Date Of Birth          1935        Visit Information        Provider Department      11/16/2018 11:00 AM Ivette Mitchell MD [facility] Dermatology        Care Instructions    Can get an over the counter antifungal/athlete's foot cream to apply to your feet every day.    Get a good moisturizer to apply to your skin every day to help with the dryness and itching - especially on the legs and feet.     It would be a good idea to use the selsum blue shampoo once or twice per week for maintenance (alternating with your normal shampoo) so you get less scalp itching less often.    The itchy spots on your scalp are called seborrheic keratoses and they are completely benign lesions that will never turn into skin cancer but they may get scaly and flake and may fall off and come back.    Come back in 1 year for a skin check.          Follow-ups after your visit        Your next 10 appointments already scheduled     Apr 29, 2019  9:00 AM CDT   US AORTIC IMAGING with [id number]   [facility] ([facility])    [address] [place] [address] [phone]           How do I prepare for my exam? (Food and drink instructions) Adults: No eating, smoking, gum chewing or drinking for 8 hours before the exam. You may take medicine with a small sip of water.  Children: * Infants, breast-fed: may have breast milk up to 2 hours before exam. * Infants, formula: may have bottle until 4 hours before exam. * Children 1-5 years: No food or drink for 4 hours before exam. * Children 6 -12 years: No food or drink for 6 hours before exam. * Children over 12 years: No food or drink for 8 hours before exam.  * J Tube Fed: No need to stop feedings.  What should I wear: Wear comfortable clothes.  How long does the exam take: Most ultrasounds take 30 to 60 minutes.  What  should I bring: Bring a list of your medicines, including vitamins, minerals and over-the-counter drugs. It is safest to leave personal items at home.  Do I need a :  No  is needed.  What do I need to tell my doctor: Tell your doctor about any allergies you may have.  What should I do after the exam: No restrictions, You may resume normal activities.  What is this test: An ultrasound uses sound waves to make pictures of the body. Sound waves do not cause pain. The only discomfort may be the pressure of the wand against your skin or full bladder.  Who should I call with questions: If you have any questions, please call the Imaging Department where you will have your exam. Directions, parking instructions, and other information is available on our website, [email or web address].            Apr 29, 2019  9:30 AM CDT   [employer] with REYNA   [facility] ([facility] [place])    [address]  [address] [facility] [address] [phone]           1.  Please bring or wear a comfortable two-piece outfit. 2.  You may eat, drink and take your normal medicines. 3.  For any questions that cannot be answered, please contact the ordering physician 4.  Please do not wear perfumes or scented lotions on the day of your exam.            Apr 29, 2019 10:30 AM CDT   (Arrive by 10:15 AM)   Return Visit with Ginger Davis MD   [facility] ([facility])    [address] [place] [address]  [address] [place] [address] [phone]              Who to contact     Please call your clinic at [phone] to:    Ask questions about your health    Make or cancel appointments    Discuss your medicines    Learn about your test results    Speak to your doctor            Additional Information About Your Visit        Post.Bid.Ship Information     [employer]Ship is an electronic gateway that provides easy, online access to your medical records. With Post.B[employer]Ship, you can request a  clinic appointment, read your test results, renew a prescription or communicate with your care team.     To sign up for [employer] visit the website at [email or web address]   You will be asked to enter the access code listed below, as well as some personal information. Please follow the directions to create your username and password.     Your access code is: [email or web address]  Expires: 2018  1:03 PM     Your access code will  in 90 days. If you need help or a new code, please contact your [facility] or call [phone] for assistance.        Care EveryWhere ID     This is your Care EveryWhere ID. This could be used by other organizations to access your [place] medical records  [id number]        Your Vitals Were     Last Period                   (LMP Unknown)            Blood Pressure from Last 3 Encounters:   10/01/18 171/81   18 (P) 143/72   18 138/71    Weight from Last 3 Encounters:   10/01/18 83.1 kg (183 lb 1.6 oz)   18 83.8 kg (184 lb 11.2 oz)   18 85.1 kg (187 lb 9.6 oz)              Today, you had the following     No orders found for display       Primary Care Provider Office Phone # Fax #    Ivonne VYAS [phone] [address] [address]        Equal Access to Services     [facility] : Hadii aad ku hadasho Soomaali, waaxda luqadaha, qaybta kaalmada adeegyada, nancy jennings . So [facility] [phone].    ATENCIÓN: Si habla español, tiene a merritt disposición servicios gratuitos de asistencia lingüística. Pablo al [phone].    We comply with applicable federal civil rights laws and [place] laws. We do not discriminate on the basis of race, color, national origin, age, disability, sex, sexual orientation, or gender identity.            Thank you!     Thank you for choosing [facility]  for your care. Our goal is always to provide you with excellent care.  Hearing back from our patients is one way we can continue to improve our services. Please take a few minutes to complete the written survey that you may receive in the mail after your visit with us. Thank you!             Your Updated Medication List - Protect others around you: Learn how to safely use, store and throw away your medicines at www.disposemymeds.org.          This list is accurate as of 11/16/18 11:56 AM.  Always use your most recent med list.                   Brand Name Dispense Instructions for use Diagnosis    ascorbic acid 500 MG tablet    VITAMIN C     Take 500 mg by mouth daily.        aspirin 81 MG tablet      Take 81 mg by mouth daily        BIOTIN PO      Take by mouth daily        calcium carbonate 500 mg-vitamin D 200 units 500-200 MG-UNIT per tablet    OSCAL with D;OYSTER SHELL CALCIUM     Take 1 tablet by mouth 2 times daily (with meals).        clobetasol 0.05 % Crea cream    CLOBETASOL PROPIONATE EMULSION    15 g    Apply topically as needed    Lichen sclerosus et atrophicus of the vulva       cyanocobalamin 1000 MCG tablet    vitamin  B-12     Take 1,000 mcg by mouth daily.        fluocinonide 0.05 % solution    LIDEX     Apply topically 2 times daily        glucosamine chondroitin 1500 complex Caps      Take  by mouth.        levothyroxine 88 MCG tablet    SYNTHROID/LEVOTHROID    90 tablet    Take 1 tablet (88 mcg) by mouth daily    Hypothyroidism due to acquired atrophy of thyroid       Magnesium 300 MG Caps      Take  by mouth.        metoprolol succinate 100 MG 24 hr tablet    TOPROL-XL    90 tablet    Take 1 tablet (100 mg) by mouth daily    Essential hypertension       MILK OF MAGNESIA PO           OMEGA 3 PO           valsartan 320 MG tablet    DIOVAN    90 tablet    Take 1 tablet (320 mg) by mouth daily    Benign essential hypertension       ZYRTEC PO      Take  by mouth.

## 2018-11-16 NOTE — PROGRESS NOTES
[facility] Dermatology Note      Dermatology Problem List:  1.Tinea pedis   2. Onychomycosis  3. Seborrheic dermatitis of the scalp  4. Numerous SKs including scalp  5. Xerosis     Encounter Date: Nov 16, 2018    CC:  Chief Complaint   Patient presents with     Derm Problem     Dulce is here today for a skin check, has spot on her right shin and her back      History of Present Illness:  Ms. Dulce Yeh is a 83 year old female who presents as a follow-up for skin check. The patient was last seen 2/10/17 when she had a skin check and was evaluated for SKs, pruritis, seborrheic dermatitis, and tinea pedis/onychomycosis. At the time the patient did not want an oral medication for onychomycosis. She continues to have issues with this and still does not want an oral medication. The tinea pedis continues to cause her to have itchy feet. She was prescribed a compounded hydrocortisone/ketoconazole cream for this but it wasn't covered by her insurance. She has been using an OTC antifungal spray occasionally for this. She continues to have scalp itching. She never picked up the ketoconazole and said it was too expensive but uses Lidex solution and selsum blue with flares. She has some spots on her R shin, back, and scalp that she would like evaluated. No other concerns today.    Past Medical History:   Patient Active Problem List   Diagnosis     Essential hypertension     Eosinophilia     Aortic stenosis     Hypothyroidism     S/P cholecystectomy     Elevated LFTs     Vulvar dystrophy -- LS     Recurrent vulvar infection     Steatohepatitis     Fatigue     Weakness     Diaphoresis     Hemorrhoids     Near syncope     Lichen sclerosus et atrophicus of the vulva     Hypothyroidism due to acquired atrophy of thyroid     Recurrent colitis due to Clostridium difficile     Dermatitis, seborrheic     Cherry angioma     Seborrheic keratosis     Tinea pedis of both feet     Past Medical History:   Diagnosis  Date     Allergy      Aortic stenosis 10/11/2011     C. difficile colitis August-October 2015     Eosinophilia 10/11/2011     History of chickenpox      Hyperlipidaemia      Hypertension      Recurrent colitis due to Clostridium difficile August 2016     S/P cholecystectomy 10/11/2011     Unspecified hypothyroidism 10/11/2011     Past Surgical History:   Procedure Laterality Date     ANKLE SURGERY       APPENDECTOMY       bilateral tubal ligation       CHOLECYSTECTOMY       ORTHOPEDIC SURGERY         Social History:  Patient  reports that she has never smoked. She has never used smokeless tobacco. She reports that she does not drink alcohol or use illicit drugs.    Family History:  Family History   Problem Relation Age of Onset     Cancer Mother      lung cancer       Medications:  Current Outpatient Prescriptions   Medication Sig Dispense Refill     ascorbic acid (VITAMIN C) 500 MG tablet Take 500 mg by mouth daily.       aspirin 81 MG tablet Take 81 mg by mouth daily       BIOTIN PO Take by mouth daily       calcium carb 1250 mg, 500 mg [place],/vitamin D 200 units (OSCAL WITH D) 500-200 MG-UNIT per tablet Take 1 tablet by mouth 2 times daily (with meals).       Cetirizine HCl (ZYRTEC PO) Take  by mouth.       clobetasol (CLOBETASOL PROPIONATE EMULSION) 0.05 % CREA Apply topically as needed 15 g 0     cyanocolbalamin (VITAMIN B-12) 1000 MCG tablet Take 1,000 mcg by mouth daily.       fluocinonide (LIDEX) 0.05 % solution Apply topically 2 times daily       Glucosamine-Chondroit-Vit C-Mn (GLUCOSAMINE CHONDR 1500 COMPLX) CAPS Take  by mouth.       levothyroxine (SYNTHROID/LEVOTHROID) 88 MCG tablet Take 1 tablet (88 mcg) by mouth daily 90 tablet 3     Magnesium 300 MG CAPS Take  by mouth.       metoprolol succinate (TOPROL-XL) 100 MG 24 hr tablet Take 1 tablet (100 mg) by mouth daily 90 tablet 3     Omega-3 Fatty Acids (OMEGA 3 PO)        valsartan (DIOVAN) 320 MG tablet Take 1 tablet (320 mg) by mouth daily 90 tablet 3      Magnesium Hydroxide (MILK OF MAGNESIA PO)        Allergies   Allergen Reactions     Sulfa Drugs Hives     Clonidine Rash     Diltiazem Rash         Review of Systems:  -Constitutional:  Feeling well, in usual state of health.  -Skin:  As per HPI, no additional concerns.    Physical exam:  Vitals: LMP  (LMP Unknown)  GEN: This is a well developed, well-nourished female in no acute distress, in a pleasant mood.    SKIN: Total skin, which includes the head/face, both arms, chest, back, abdomen,both legs, buttocks, digits and/or nails, was examined. Groin was not examined.   -Weiss type II skin  -Many solar lentigines in sun exposed areas.  -Multiple red vascular papules on the chest and back.  -Very mild greasy scale throughout the scalp  -3 scaly brown papules on the scalp. 3 similar lesions on on the shins but the tops have been removed by itching.   -Severe xerosis of the calves and with hyperkeratosis of the feet.  -Yellowish thickened toenails on bilateral feet.   -No other lesions of concern on areas examined.     Impression/Plan:  1. Sun damaged skin without findings concerning for skin cancer today. Solar lentigenes on arms, chest, and face.    Sun precaution was advised including the use of sun screens of SPF 30 or higher, sun protective clothing, and avoidance of tanning beds.    Annual skin checks recommended.    2. Benign skin findings: seborrheic keratoses, spider veins, varicose veins, and cherry angiomas. SKs present on the bilateral shins and scalp.    Benign nature was discussed.No further intervention required at this time.     3. Onychomycosis - treated with occasional OTC antifungal spray.    Recommended daily use of OTC antifungal cream and daily emollient application for xerosis.    4. Tinea pedis - declined oral medication    Discussed oral treatment with terbinafine but patient again declined.    5.    Xerosis - occasional emollient use    Discussed daily application of a moisturizer  to help with itching.    6.    Seborrheic dermatitis - occasional itching, well controlled. Uses lidex solution and selsum blue as needed but not for maintenance.    Advised using the selsum blue 1-2 times per week for maintenance    Patient denied need for refills of lidex at this time (only uses about one bottle per year). Ok to refill in next year if patient needs.     CC Dr. Mitchell on close of this encounter.  Follow-up in 1 year, earlier for new or changing lesions.     Staff Involved:  I, Madelin Weber, saw and examined the patient in the presence of Dr. Mitchell.    Staff Physician:  I was present with the medical student who participated in the service and in the documentation of the note. I have verified the history and personally performed the physical exam and medical decision making. I agree with the assessment and plan of care as documented in the note.     Ivette Mitchell MD    Department of Dermatology  [facility]: Phone: [phone], Fax:[phone]  [facility]: Phone: [phone], Fax: [phone]

## 2018-11-16 NOTE — NURSING NOTE
Chief Complaint   Patient presents with     Derm Problem     Dulce is here today for a skin check, has spot on her right shin and her back      Lyric Braga LPN

## 2019-01-08 ENCOUNTER — OFFICE VISIT (OUTPATIENT)
Dept: INTERNAL MEDICINE | Facility: CLINIC | Age: 84
End: 2019-01-08
Payer: MEDICARE

## 2019-01-08 VITALS
SYSTOLIC BLOOD PRESSURE: 150 MMHG | BODY MASS INDEX: 29.27 KG/M2 | WEIGHT: 186.9 LBS | HEART RATE: 68 BPM | DIASTOLIC BLOOD PRESSURE: 80 MMHG

## 2019-01-08 DIAGNOSIS — N90.9 IRRITATION OF EXTERNAL FEMALE GENITALIA: Primary | ICD-10-CM

## 2019-01-08 ASSESSMENT — PAIN SCALES - GENERAL: PAINLEVEL: NO PAIN (0)

## 2019-01-08 NOTE — NURSING NOTE
Chief Complaint   Patient presents with     Vaginal Bleeding     pt states she has had some bleeding        Joleen Veras CMA at 4:39 PM on 1/8/2019.

## 2019-01-08 NOTE — PATIENT INSTRUCTIONS
[facility] Medication Refill Request Information:  * Please contact your pharmacy regarding ANY request for medication refills.  ** [facility] Prescription Fax = [phone]  * Please allow 3 business days for routine medication refills.  * Please allow 5 business days for controlled substance medication refills.     [facility] Test Result notification information:  *You will be notified with in 7-10 days of your appointment day regarding the results of your test.  If you are on MyChart you will be notified as soon as the provider has reviewed the results and signed off on them.    [facility]: [phone]

## 2019-01-08 NOTE — PROGRESS NOTES
[facility]  Primary Care Center   Ivonne Sol MD  01/08/2019      Chief Complaint:   Vaginal Bleeding       History of Present Illness:   Dulce Yeh is a 83 year old female with a history of Clostridium difficile colitis, hyperparathyroidism, hyperlipidemia, and hypertension who presents for evaluation of external genital bleeding. Her labia have fused together for years and there is a small opening between them. She makes sure to keep the area clean. Two days ago she noticed a ridge in the area. When she has itching in the area she applies Clobetasol cream. There is wasa small amount of blood in her underwear yesterday. Squeezing the area caused a little more blood to flow, about a tablespoon total. This is improving, this morning she noticed a much smaller amount of blood. There is no burning with urination. She has not noticed a vaginal discharge but noted some yellowing. Her abdomen feels larger to her, there is not abdominal pain. Her bowels are moving normally and anytime she has constipation she is able to treat with milk of magnesia. Not sexually active.    Review of Systems:   Pertinent items are noted in HPI, remainder of complete ROS is negative.      Active Medications:     Current Outpatient Medications:      ascorbic acid (VITAMIN C) 500 MG tablet, Take 500 mg by mouth daily., Disp: , Rfl:      aspirin 81 MG tablet, Take 81 mg by mouth daily, Disp: , Rfl:      BIOTIN PO, Take by mouth daily, Disp: , Rfl:      calcium carb 1250 mg, 500 mg [place],/vitamin D 200 units (OSCAL WITH D) 500-200 MG-UNIT per tablet, Take 1 tablet by mouth 2 times daily (with meals)., Disp: , Rfl:      Cetirizine HCl (ZYRTEC PO), Take  by mouth., Disp: , Rfl:      clobetasol (CLOBETASOL PROPIONATE EMULSION) 0.05 % CREA, Apply topically as needed, Disp: 15 g, Rfl: 0     cyanocolbalamin (VITAMIN B-12) 1000 MCG tablet, Take 1,000 mcg by mouth daily., Disp: , Rfl:      fluocinonide (LIDEX) 0.05 % solution, Apply topically  2 times daily, Disp: , Rfl:      Glucosamine-Chondroit-Vit C-Mn (GLUCOSAMINE CHONDR 1500 COMPLX) CAPS, Take  by mouth., Disp: , Rfl:      levothyroxine (SYNTHROID/LEVOTHROID) 88 MCG tablet, Take 1 tablet (88 mcg) by mouth daily, Disp: 90 tablet, Rfl: 3     Magnesium 300 MG CAPS, Take  by mouth., Disp: , Rfl:      metoprolol succinate (TOPROL-XL) 100 MG 24 hr tablet, Take 1 tablet (100 mg) by mouth daily, Disp: 90 tablet, Rfl: 3     Omega-3 Fatty Acids (OMEGA 3 PO), , Disp: , Rfl:      valsartan (DIOVAN) 320 MG tablet, Take 1 tablet (320 mg) by mouth daily, Disp: 90 tablet, Rfl: 3     Magnesium Hydroxide (MILK OF MAGNESIA PO), , Disp: , Rfl:       Allergies:   Sulfa drugs; Clonidine; and Diltiazem      Past Medical History:  Allergy  Aortic stenosis  C. Diff infection  Eosinophilia  Chicken pox  Hyperlipidemia  Hypertension  Hypothyroidism   Elevated LFTs  Steatohepatitis   Recurrent vulvar infection  Lichen sclerosus et atrophicus of the vulva  Cherry angioma     Past Surgical History:  Ankle surgery  Appendectomy  Bilateral tubal ligation  Cholecystectomy  Orthopedic surgery    Family History:   Mother: lung cancer     Social History:     Non smoker    Physical Exam:   /80   Pulse 68   Wt 84.8 kg (186 lb 14.4 oz)   LMP  (LMP Unknown)   BMI 29.27 kg/m     Constitutional: Healthy, alert, no distress and cooperative  Abdomen: soft nontender without hapatosplenomegaly  Genitourinary:  The labia majora are fused superiorly and inferiorly.  There is a 1 cm slit opening in the midline between these fusions. The urethra is not visible.  On the left upper labium majora there is a slight irregularity of mucosa which when touched released a  1 mm piece of mucous material. There was no cyst, mass, nodule or ridge. There was no bleeding.       Because of the small opening in the lady I was unable to visualize the vaginal mucosa. However I was able to insert a finger using location and there was no tenderness,  bleeding, uterine enlargement or adnexal fullness.      Assessment and Plan:     External genital bleeding, mild  I do not see any mass lesion, sis, or evidence of infection. I'm unable to do a complete vaginal exam, but the patient is quite insistent that the area of initial meeting was external. There is a small because of irritation in that area. I've encouraged her to avoid using clobetasol cream for now and to simply wipe herself with wet washcloth, then apply Vaseline to promote healing. If she has further bleeding, she would let me know and I will refer to GYN for consideration of endoscopic vaginal examination.      Follow-up: Data Unavailable         Scribe Disclosure:   I, Ronnie Lima, am serving as a scribe to document services personally performed by Ivonne Sol MD at this visit, based upon the provider's statements to me. All documentation has been reviewed by the aforementioned provider prior to being entered into the official medical record.     Portions of this medical record were completed by a scribe. UPON MY REVIEW AND AUTHENTICATION BY ELECTRONIC SIGNATURE, this confirms (a) I performed the applicable clinical services, and (b) the record is accurate.   Ivonne Sol

## 2019-04-09 ENCOUNTER — TELEPHONE (OUTPATIENT)
Dept: CARDIOLOGY | Facility: CLINIC | Age: 84
End: 2019-04-09

## 2019-04-09 NOTE — TELEPHONE ENCOUNTER
Patient needs to reschedule Dr. Davis appointment on 4/29/19 due to Dr. Davis cancelling clinic. Chantell left message on 4/5/19, Luiza attempted call on 4/9/19 but unable to leave message. Letter sent on 4/9/19 notifying patient of cardiology appointment cancellation. Left imaging on 4/29/19 for now unless patient wants to reschedule everything to the same day.

## 2019-04-29 ENCOUNTER — ANCILLARY PROCEDURE (OUTPATIENT)
Dept: CARDIOLOGY | Facility: CLINIC | Age: 84
End: 2019-04-29
Attending: INTERNAL MEDICINE
Payer: MEDICARE

## 2019-04-29 ENCOUNTER — ANCILLARY PROCEDURE (OUTPATIENT)
Dept: ULTRASOUND IMAGING | Facility: CLINIC | Age: 84
End: 2019-04-29
Attending: INTERNAL MEDICINE
Payer: MEDICARE

## 2019-04-29 DIAGNOSIS — I35.9 AORTIC VALVE DISORDER: ICD-10-CM

## 2019-04-29 DIAGNOSIS — Z13.6 SCREENING FOR ABDOMINAL AORTIC ANEURYSM (AAA) PERFORMED: ICD-10-CM

## 2019-07-24 ENCOUNTER — ANCILLARY PROCEDURE (OUTPATIENT)
Dept: MAMMOGRAPHY | Facility: CLINIC | Age: 84
End: 2019-07-24
Attending: INTERNAL MEDICINE
Payer: MEDICARE

## 2019-07-24 DIAGNOSIS — Z12.39 BREAST SCREENING, UNSPECIFIED: ICD-10-CM

## 2019-08-22 DIAGNOSIS — I10 BENIGN ESSENTIAL HYPERTENSION: ICD-10-CM

## 2019-08-22 DIAGNOSIS — I10 ESSENTIAL HYPERTENSION: ICD-10-CM

## 2019-08-22 DIAGNOSIS — E03.4 HYPOTHYROIDISM DUE TO ACQUIRED ATROPHY OF THYROID: ICD-10-CM

## 2019-08-22 NOTE — TELEPHONE ENCOUNTER
Health Call Center    Phone Message    May a detailed message be left on voicemail: yes    Reason for Call: Medication Refill Request    Has the patient contacted the pharmacy for the refill? Yes   Name of medication being requested: metoprolol succinate (TOPROL-XL) 100 MG 24 hr tablet, levothyroxine (SYNTHROID/LEVOTHROID) 88 MCG tablet, and valsartan (DIOVAN) 320 MG tablet  Provider who prescribed the medication: Dr. Ivonne Sol  Pharmacy: [id number] - [place], [place] - [address] AT [facility] [address]  Date medication is needed: Next available, per pt    Action Taken: Message routed to:  [facility] ([facility]): Med refills

## 2019-08-23 RX ORDER — METOPROLOL SUCCINATE 100 MG/1
100 TABLET, EXTENDED RELEASE ORAL DAILY
Qty: 90 TABLET | Refills: 0 | Status: SHIPPED | OUTPATIENT
Start: 2019-08-23 | End: 2019-11-04

## 2019-08-23 RX ORDER — VALSARTAN 320 MG/1
320 TABLET ORAL DAILY
Qty: 90 TABLET | Refills: 3 | OUTPATIENT
Start: 2019-08-23

## 2019-08-23 RX ORDER — LEVOTHYROXINE SODIUM 88 UG/1
88 TABLET ORAL DAILY
Qty: 90 TABLET | Refills: 0 | Status: SHIPPED | OUTPATIENT
Start: 2019-08-23 | End: 2019-11-04

## 2019-08-23 NOTE — TELEPHONE ENCOUNTER
metoprolol succinate (TOPROL-XL) 100 MG 24 hr tablet  Last Written Prescription Date:  8/13/18  Last Fill Quantity: 90,   # refills: 3  Last Office Visit : 1/8/19  Future Office visit:  11/4/19     90 day to pharmacy    levothyroxine (SYNTHROID/LEVOTHROID) 88 MCG tablet     Last Written Prescription Date:  8/13/18  Last Fill Quantity: 90,   # refills: 3    90 day to pharmacy    valsartan has rf remaining. Will re fax order.    routed because: bp > 140 /90    tsh

## 2019-10-29 ENCOUNTER — PRE VISIT (OUTPATIENT)
Dept: INTERNAL MEDICINE | Facility: CLINIC | Age: 84
End: 2019-10-29

## 2019-11-04 ENCOUNTER — OFFICE VISIT (OUTPATIENT)
Dept: INTERNAL MEDICINE | Facility: CLINIC | Age: 84
End: 2019-11-04
Payer: MEDICARE

## 2019-11-04 VITALS
OXYGEN SATURATION: 98 % | BODY MASS INDEX: 29.76 KG/M2 | SYSTOLIC BLOOD PRESSURE: 149 MMHG | DIASTOLIC BLOOD PRESSURE: 80 MMHG | HEART RATE: 63 BPM | WEIGHT: 190 LBS

## 2019-11-04 DIAGNOSIS — E78.2 MIXED HYPERLIPIDEMIA: Primary | ICD-10-CM

## 2019-11-04 DIAGNOSIS — E03.4 HYPOTHYROIDISM DUE TO ACQUIRED ATROPHY OF THYROID: ICD-10-CM

## 2019-11-04 DIAGNOSIS — I10 BENIGN ESSENTIAL HYPERTENSION: ICD-10-CM

## 2019-11-04 DIAGNOSIS — E78.2 MIXED HYPERLIPIDEMIA: ICD-10-CM

## 2019-11-04 DIAGNOSIS — I10 ESSENTIAL HYPERTENSION: ICD-10-CM

## 2019-11-04 LAB
ANION GAP SERPL CALCULATED.3IONS-SCNC: 6 MMOL/L (ref 3–14)
BUN SERPL-MCNC: 24 MG/DL (ref 7–30)
CALCIUM SERPL-MCNC: 9.2 MG/DL (ref 8.5–10.1)
CHLORIDE SERPL-SCNC: 104 MMOL/L (ref 94–109)
CHOLEST SERPL-MCNC: 189 MG/DL
CO2 SERPL-SCNC: 28 MMOL/L (ref 20–32)
CREAT SERPL-MCNC: 1.01 MG/DL (ref 0.52–1.04)
GFR SERPL CREATININE-BSD FRML MDRD: 51 ML/MIN/{1.73_M2}
GLUCOSE SERPL-MCNC: 114 MG/DL (ref 70–99)
HDLC SERPL-MCNC: 35 MG/DL
LDLC SERPL CALC-MCNC: 111 MG/DL
MAGNESIUM SERPL-MCNC: 2.5 MG/DL (ref 1.6–2.3)
NONHDLC SERPL-MCNC: 154 MG/DL
POTASSIUM SERPL-SCNC: 4.2 MMOL/L (ref 3.4–5.3)
SODIUM SERPL-SCNC: 137 MMOL/L (ref 133–144)
TRIGL SERPL-MCNC: 218 MG/DL
TSH SERPL DL<=0.005 MIU/L-ACNC: 1.12 MU/L (ref 0.4–4)

## 2019-11-04 RX ORDER — METOPROLOL SUCCINATE 100 MG/1
100 TABLET, EXTENDED RELEASE ORAL DAILY
Qty: 90 TABLET | Refills: 3 | Status: SHIPPED | OUTPATIENT
Start: 2019-11-04 | End: 2020-11-11

## 2019-11-04 RX ORDER — LEVOTHYROXINE SODIUM 88 UG/1
88 TABLET ORAL DAILY
Qty: 90 TABLET | Refills: 3 | Status: SHIPPED | OUTPATIENT
Start: 2019-11-04 | End: 2020-11-11

## 2019-11-04 RX ORDER — VALSARTAN 320 MG/1
320 TABLET ORAL DAILY
Qty: 90 TABLET | Refills: 3 | Status: SHIPPED | OUTPATIENT
Start: 2019-11-04 | End: 2020-08-27

## 2019-11-04 ASSESSMENT — PAIN SCALES - GENERAL: PAINLEVEL: NO PAIN (0)

## 2019-11-04 NOTE — NURSING NOTE
Chief Complaint   Patient presents with     Establish Care     Pt is re-establish a new PCP.      Medication Refill     Fabiana Moran LPN at 3:01 PM on 11/4/2019.

## 2019-11-04 NOTE — PROGRESS NOTES
[facility]  Primary Care Center   Gilda Hogan MD  11/04/2019      Chief Complaint:   Establish Care and Medication Refill       History of Present Illness:   Dulce Yeh is a 84 year old female with a history of  Clostridium difficile colitis, hyperparathyroidism, hyperlipidemia, and hypertension who presents to establish [facility]. Goes to the  for exercise 12+ times per month. [place], [place] a labrador. She lives at her house and does the indoor chores. She has help with outdoor chores.     Aortic stenosis and hypertension: Sees cardiology for aortic valve stenosis. She also had a AAA screening due to her brother dying from an AAA. She has had hypertension since she had her children. She found Metoprolol and Valsartan worked for a long time. She tried other ARBs which she did not tolerate. Her at home blood pressures are usually better, around 120 systolic with some readings up to 150. Denies shortness of breath, dizziness, and fainting. She doesn't get short of breath.    Bone density: She has not had a DEXA since 2006. She has not fractured a bone since she was a teenager. She probably would not start a medication for osteoporosis.     C. Difficile: she got C. difficile after her left TKA. Her infection was reccurent  It took long to treat because they would prescribe her more affordable antibiotics first before moving on to more expensive treatments. She reports developing other issues while in the hospital like her aortic valve issue. She takes probiotics and sauerkraut. She does have constipation which she treats with milk of magnesia.    Healthcare maintenance topics:  Mammogram (females age 40 - 75): yearly or every 2 years? - Up to Date   Pap (females age 21 - 65): every 3 years, every 5 years after age 35 if cotesting done - Not applicable  Colon Cancer screening (age 50 - 75): yearly FIT or colonoscopy every 5 - 10 years - Up to Date   Hepatitis C (adults born  1945 - 1965): once per lifetime - negative  Immunizations (Tetanus every 10 years, Influenza yearly, Pneumonia PPV-23 and PCV-13 age ? 65 or sooner if risk factors) - will try to get Shingrix     Routine Health Maintenence:  Depression Screening:   PHQ-2 ( 1999 Pfizer) 11/4/2019 11/16/2018   Q1: Little interest or pleasure in doing things 0 0   Q2: Feeling down, depressed or hopeless 0 0   PHQ-2 Score 0 0   Q1: Little interest or pleasure in doing things Not at all -   Q2: Feeling down, depressed or hopeless Not at all -   PHQ-2 Score 0 -     Immunizations (zoster, pneumovax, flu, Tdap, Hep A/B):   Most Recent Immunizations   Administered Date(s) Administered     Influenza (High Dose) 3 valent vaccine 11/16/2018     Influenza (IIV3) PF 12/09/2013     Pneumo Conj 13-V (2010&after) 10/22/2014     Pneumococcal 23 valent 12/13/2000     TD (ADULT, 7+) 01/17/2007     TDAP Vaccine (Boostrix) 10/18/2017     Zoster vaccine, live 12/28/2009     Lipids:   Recent Labs   Lab Test 10/10/12  1208 10/12/11  1136   CHOL 205* 207*   HDL 34* 32*    136*   TRIG 216* 200*   CHOLHDLRATIO 6.1* 6.6*     AAA Screening (65-75 yrs): negative  Lung Ca Screening (>30 pk age 55-79 or >20 py age 50-79 + RF): not indicated  Colonoscopy (50-75 yrs): no longer indicated  Dexa (>65W or 70M yrs): deferred b/c patient doesn't want meds  Mammogram (40-75 yrs): 7/19  Pap (21-65 yrs): not indicated  Advanced Directive: not discussed          Review of Systems:  A full 10-pt Review of Systems was performed, verified and is negative except as documented in the HPI.  All health questionnaires were reviewed, verified and relevant information documented above.    Active Medications:     Current Outpatient Medications:      ascorbic acid (VITAMIN C) 500 MG tablet, Take 500 mg by mouth daily., Disp: , Rfl:      aspirin 81 MG tablet, Take 81 mg by mouth daily, Disp: , Rfl:      BIOTIN PO, Take by mouth daily, Disp: , Rfl:      calcium carb 1250 mg, 500 mg  [place],/vitamin D 200 units (OSCAL WITH D) 500-200 MG-UNIT per tablet, Take 1 tablet by mouth 2 times daily (with meals)., Disp: , Rfl:      Cetirizine HCl (ZYRTEC PO), Take  by mouth., Disp: , Rfl:      clobetasol (CLOBETASOL PROPIONATE EMULSION) 0.05 % CREA, Apply topically as needed, Disp: 15 g, Rfl: 0     cyanocolbalamin (VITAMIN B-12) 1000 MCG tablet, Take 1,000 mcg by mouth daily., Disp: , Rfl:      fluocinonide (LIDEX) 0.05 % solution, Apply topically 2 times daily, Disp: , Rfl:      Glucosamine-Chondroit-Vit C-Mn (GLUCOSAMINE CHONDR 1500 COMPLX) CAPS, Take  by mouth., Disp: , Rfl:      levothyroxine (SYNTHROID/LEVOTHROID) 88 MCG tablet, Take 1 tablet (88 mcg) by mouth daily, Disp: 90 tablet, Rfl: 3     Magnesium 300 MG CAPS, Take  by mouth., Disp: , Rfl:      Magnesium Hydroxide (MILK OF MAGNESIA PO), , Disp: , Rfl:      metoprolol succinate ER (TOPROL-XL) 100 MG 24 hr tablet, Take 1 tablet (100 mg) by mouth daily, Disp: 90 tablet, Rfl: 3     Omega-3 Fatty Acids (OMEGA 3 PO), , Disp: , Rfl:      valsartan (DIOVAN) 320 MG tablet, Take 1 tablet (320 mg) by mouth daily, Disp: 90 tablet, Rfl: 3      Allergies:   Sulfa drugs; Clonidine; and Diltiazem      Past Medical History:  Allergies  Aortic stenosis   C. 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 Has a daughter and son left.  Non smoker    Physical Exam:   BP (!) 149/80   Pulse 63   Wt 86.2 kg (190 lb)   LMP  (LMP Unknown)   SpO2 98%   BMI 29.76 kg/m     Constitutional: Alert, oriented, pleasant,  no acute distress  Head: Normocephalic, atraumatic  Eyes: Extra-ocular movements intact, no scleral icterus  ENT: Oropharynx clear, moist mucus membranes, good dentition  Neck: Supple, no lymphadenopathy  Cardiovascular: Regular rate and rhythm, 3/6 blowing holosystolic murmur, no rubs or gallops, peripheral pulses full/symmetric  Respiratory: Good air movement bilaterally, lungs clear, no wheezes/rales/rhonchi  Musculoskeletal: No edema, normal muscle tone, normal gait  Neurologic: Alert and oriented, cranial nerves 2-12 intact.  Psychiatric: normal mentation, affect and mood       Assessment and Plan:  Hypothyroidism due to acquired atrophy of thyroid  - levothyroxine (SYNTHROID/LEVOTHROID) 88 MCG tablet  Dispense: 90 tablet; Refill: 3  - TSH with free T4 reflex    Essential hypertension  Seems to be controlled at home but warrants close monitoring given aortic stenosis.  - metoprolol succinate ER (TOPROL-XL) 100 MG 24 hr tablet  Dispense: 90 tablet; Refill: 3  - Basic metabolic panel  - Magnesium    Benign essential hypertension  - valsartan (DIOVAN) 320 MG tablet  Dispense: 90 tablet; Refill: 3    Mixed hyperlipidemia  - Lipid Profile NON-FASTING     Follow-up: No follow-ups on file.        Scribe Disclosure:  I, Ronnie Lima, am serving as a scribe to document services personally performed by Gilda Hogan MD at this visit, based upon the provider's statements to me. All documentation has been reviewed by the aforementioned provider prior to being entered into the official medical record.    Portions of this medical record were completed by a scribe. UPON MY REVIEW AND AUTHENTICATION BY ELECTRONIC SIGNATURE, this confirms (a) I performed the applicable clinical services, and (b) the record is accurate.   Gilda Hogan MD  Internal Medicine    25 min spent face to face, of which >50% time spent on counseling/coordinating care exclusive of any procedure time

## 2019-11-04 NOTE — PATIENT INSTRUCTIONS
[facility] Medication Refill Request Information:  * Please contact your pharmacy regarding ANY request for medication refills.  ** [facility] Prescription Fax = [phone]  * Please allow 3 business days for routine medication refills.  * Please allow 5 business days for controlled substance medication refills.     [facility] Test Result notification information:  *You will be notified with in 7-10 days of your appointment day regarding the results of your test.  If you are on MyChart you will be notified as soon as the provider has reviewed the results and signed off on them.    [facility]: [phone]

## 2020-03-04 RX ORDER — INFLUENZA A VIRUS A/VICTORIA/4897/2022 IVR-238 (H1N1) ANTIGEN (FORMALDEHYDE INACTIVATED), INFLUENZA A VIRUS A/CALIFORNIA/122/2022 SAN-022 (H3N2) ANTIGEN (FORMALDEHYDE INACTIVATED), AND INFLUENZA B VIRUS B/MICHIGAN/01/2021 ANTIGEN (FORMALDEHYDE INACTIVATED) 60; 60; 60 UG/.5ML; UG/.5ML; UG/.5ML
INJECTION, SUSPENSION INTRAMUSCULAR
Refills: 0 | COMMUNITY
Start: 2019-10-24 | End: 2020-07-24

## 2020-06-18 ENCOUNTER — HOSPITAL ENCOUNTER (INPATIENT)
Facility: CLINIC | Age: 85
LOS: 2 days | Discharge: HOME | DRG: 246 | End: 2020-06-20
Attending: EMERGENCY MEDICINE | Admitting: INTERNAL MEDICINE
Payer: MEDICARE

## 2020-06-18 ENCOUNTER — APPOINTMENT (OUTPATIENT)
Dept: CARDIOLOGY | Facility: CLINIC | Age: 85
DRG: 246 | End: 2020-06-18
Attending: EMERGENCY MEDICINE
Payer: MEDICARE

## 2020-06-18 ENCOUNTER — ANCILLARY PROCEDURE (OUTPATIENT)
Dept: ULTRASOUND IMAGING | Facility: CLINIC | Age: 85
End: 2020-06-18
Attending: EMERGENCY MEDICINE
Payer: MEDICARE

## 2020-06-18 ENCOUNTER — APPOINTMENT (OUTPATIENT)
Dept: GENERAL RADIOLOGY | Facility: CLINIC | Age: 85
DRG: 246 | End: 2020-06-18
Attending: EMERGENCY MEDICINE
Payer: MEDICARE

## 2020-06-18 DIAGNOSIS — I10 HYPERTENSION, UNSPECIFIED TYPE: ICD-10-CM

## 2020-06-18 DIAGNOSIS — R06.02 SHORTNESS OF BREATH: ICD-10-CM

## 2020-06-18 DIAGNOSIS — I21.4 NSTEMI (NON-ST ELEVATED MYOCARDIAL INFARCTION) (H): ICD-10-CM

## 2020-06-18 DIAGNOSIS — Z20.828 CONTACT WITH AND (SUSPECTED) EXPOSURE TO OTHER VIRAL COMMUNICABLE DISEASES: ICD-10-CM

## 2020-06-18 DIAGNOSIS — I21.4 NSTEMI (NON-ST ELEVATED MYOCARDIAL INFARCTION) (H): Primary | ICD-10-CM

## 2020-06-18 DIAGNOSIS — I35.0 AORTIC VALVE STENOSIS, ETIOLOGY OF CARDIAC VALVE DISEASE UNSPECIFIED: ICD-10-CM

## 2020-06-18 DIAGNOSIS — J81.0 ACUTE PULMONARY EDEMA (H): ICD-10-CM

## 2020-06-18 LAB
ALBUMIN SERPL-MCNC: 3.6 G/DL (ref 3.4–5)
ALP SERPL-CCNC: 62 U/L (ref 40–150)
ALT SERPL W P-5'-P-CCNC: 26 U/L (ref 0–50)
ANION GAP SERPL CALCULATED.3IONS-SCNC: 8 MMOL/L (ref 3–14)
ANION GAP SERPL CALCULATED.3IONS-SCNC: 8 MMOL/L (ref 3–14)
AST SERPL W P-5'-P-CCNC: 34 U/L (ref 0–45)
BASE EXCESS BLDV CALC-SCNC: 0.1 MMOL/L
BASOPHILS # BLD AUTO: 0.1 10E9/L (ref 0–0.2)
BASOPHILS NFR BLD AUTO: 1.2 %
BILIRUB SERPL-MCNC: 0.7 MG/DL (ref 0.2–1.3)
BUN SERPL-MCNC: 18 MG/DL (ref 7–30)
BUN SERPL-MCNC: 18 MG/DL (ref 7–30)
CALCIUM SERPL-MCNC: 8.8 MG/DL (ref 8.5–10.1)
CALCIUM SERPL-MCNC: 9.2 MG/DL (ref 8.5–10.1)
CHLORIDE SERPL-SCNC: 104 MMOL/L (ref 94–109)
CHLORIDE SERPL-SCNC: 104 MMOL/L (ref 94–109)
CO2 SERPL-SCNC: 24 MMOL/L (ref 20–32)
CO2 SERPL-SCNC: 25 MMOL/L (ref 20–32)
CREAT SERPL-MCNC: 0.93 MG/DL (ref 0.52–1.04)
CREAT SERPL-MCNC: 0.99 MG/DL (ref 0.52–1.04)
DIFFERENTIAL METHOD BLD: ABNORMAL
EOSINOPHIL # BLD AUTO: 0.6 10E9/L (ref 0–0.7)
EOSINOPHIL NFR BLD AUTO: 7.1 %
ERYTHROCYTE [DISTWIDTH] IN BLOOD BY AUTOMATED COUNT: 14 % (ref 10–15)
ERYTHROCYTE [DISTWIDTH] IN BLOOD BY AUTOMATED COUNT: 14.1 % (ref 10–15)
GFR SERPL CREATININE-BSD FRML MDRD: 52 ML/MIN/{1.73_M2}
GFR SERPL CREATININE-BSD FRML MDRD: 56 ML/MIN/{1.73_M2}
GLUCOSE SERPL-MCNC: 151 MG/DL (ref 70–99)
GLUCOSE SERPL-MCNC: 168 MG/DL (ref 70–99)
HCO3 BLDV-SCNC: 25 MMOL/L (ref 21–28)
HCT VFR BLD AUTO: 45.6 % (ref 35–47)
HCT VFR BLD AUTO: 46.5 % (ref 35–47)
HGB BLD-MCNC: 14.8 G/DL (ref 11.7–15.7)
HGB BLD-MCNC: 14.9 G/DL (ref 11.7–15.7)
IMM GRANULOCYTES # BLD: 0 10E9/L (ref 0–0.4)
IMM GRANULOCYTES NFR BLD: 0.1 %
INTERPRETATION ECG - MUSE: NORMAL
INTERPRETATION ECG - MUSE: NORMAL
LACTATE BLD-SCNC: 1.3 MMOL/L (ref 0.7–2)
LACTATE BLD-SCNC: 2.3 MMOL/L (ref 0.7–2)
LIPASE SERPL-CCNC: 276 U/L (ref 73–393)
LYMPHOCYTES # BLD AUTO: 2.8 10E9/L (ref 0.8–5.3)
LYMPHOCYTES NFR BLD AUTO: 36.5 %
MCH RBC QN AUTO: 28 PG (ref 26.5–33)
MCH RBC QN AUTO: 28.4 PG (ref 26.5–33)
MCHC RBC AUTO-ENTMCNC: 31.8 G/DL (ref 31.5–36.5)
MCHC RBC AUTO-ENTMCNC: 32.7 G/DL (ref 31.5–36.5)
MCV RBC AUTO: 87 FL (ref 78–100)
MCV RBC AUTO: 88 FL (ref 78–100)
MONOCYTES # BLD AUTO: 0.7 10E9/L (ref 0–1.3)
MONOCYTES NFR BLD AUTO: 9.3 %
NEUTROPHILS # BLD AUTO: 3.6 10E9/L (ref 1.6–8.3)
NEUTROPHILS NFR BLD AUTO: 45.8 %
NRBC # BLD AUTO: 0 10*3/UL
NRBC BLD AUTO-RTO: 0 /100
NT-PROBNP SERPL-MCNC: 1194 PG/ML (ref 0–1800)
O2/TOTAL GAS SETTING VFR VENT: 21 %
PCO2 BLDV: 40 MM HG (ref 40–50)
PH BLDV: 7.4 PH (ref 7.32–7.43)
PLATELET # BLD AUTO: 194 10E9/L (ref 150–450)
PLATELET # BLD AUTO: 209 10E9/L (ref 150–450)
PO2 BLDV: 37 MM HG (ref 25–47)
POTASSIUM SERPL-SCNC: 3.5 MMOL/L (ref 3.4–5.3)
POTASSIUM SERPL-SCNC: 3.8 MMOL/L (ref 3.4–5.3)
PROCALCITONIN SERPL-MCNC: <0.05 NG/ML
PROT SERPL-MCNC: 7.8 G/DL (ref 6.8–8.8)
RBC # BLD AUTO: 5.24 10E12/L (ref 3.8–5.2)
RBC # BLD AUTO: 5.28 10E12/L (ref 3.8–5.2)
SARS-COV-2 PCR COMMENT: NORMAL
SARS-COV-2 RNA SPEC QL NAA+PROBE: NEGATIVE
SARS-COV-2 RNA SPEC QL NAA+PROBE: NORMAL
SODIUM SERPL-SCNC: 136 MMOL/L (ref 133–144)
SODIUM SERPL-SCNC: 137 MMOL/L (ref 133–144)
SPECIMEN SOURCE: NORMAL
SPECIMEN SOURCE: NORMAL
TROPONIN I SERPL-MCNC: 0.45 UG/L (ref 0–0.04)
TROPONIN I SERPL-MCNC: 0.51 UG/L (ref 0–0.04)
TROPONIN I SERPL-MCNC: <0.015 UG/L (ref 0–0.04)
TSH SERPL DL<=0.005 MIU/L-ACNC: 0.56 MU/L (ref 0.4–4)
WBC # BLD AUTO: 7.8 10E9/L (ref 4–11)
WBC # BLD AUTO: 8.9 10E9/L (ref 4–11)

## 2020-06-18 PROCEDURE — 84484 ASSAY OF TROPONIN QUANT: CPT | Performed by: EMERGENCY MEDICINE

## 2020-06-18 PROCEDURE — 99285 EMERGENCY DEPT VISIT HI MDM: CPT | Mod: 25 | Performed by: EMERGENCY MEDICINE

## 2020-06-18 PROCEDURE — 83690 ASSAY OF LIPASE: CPT | Performed by: EMERGENCY MEDICINE

## 2020-06-18 PROCEDURE — 25500064 ZZH RX 255 OP 636: Performed by: INTERNAL MEDICINE

## 2020-06-18 PROCEDURE — 99291 CRITICAL CARE FIRST HOUR: CPT | Mod: 25 | Performed by: EMERGENCY MEDICINE

## 2020-06-18 PROCEDURE — 96376 TX/PRO/DX INJ SAME DRUG ADON: CPT | Performed by: EMERGENCY MEDICINE

## 2020-06-18 PROCEDURE — 99233 SBSQ HOSP IP/OBS HIGH 50: CPT | Mod: 25 | Performed by: PHYSICIAN ASSISTANT

## 2020-06-18 PROCEDURE — 93306 TTE W/DOPPLER COMPLETE: CPT | Mod: 26 | Performed by: INTERNAL MEDICINE

## 2020-06-18 PROCEDURE — 25000128 H RX IP 250 OP 636: Performed by: STUDENT IN AN ORGANIZED HEALTH CARE EDUCATION/TRAINING PROGRAM

## 2020-06-18 PROCEDURE — U0003 INFECTIOUS AGENT DETECTION BY NUCLEIC ACID (DNA OR RNA); SEVERE ACUTE RESPIRATORY SYNDROME CORONAVIRUS 2 (SARS-COV-2) (CORONAVIRUS DISEASE [COVID-19]), AMPLIFIED PROBE TECHNIQUE, MAKING USE OF HIGH THROUGHPUT TECHNOLOGIES AS DESCRIBED BY CMS-2020-01-R: HCPCS | Performed by: EMERGENCY MEDICINE

## 2020-06-18 PROCEDURE — 21400000 ZZH R&B CCU UMMC

## 2020-06-18 PROCEDURE — 25000128 H RX IP 250 OP 636: Performed by: EMERGENCY MEDICINE

## 2020-06-18 PROCEDURE — 40000264 ECHOCARDIOGRAM COMPLETE

## 2020-06-18 PROCEDURE — 84484 ASSAY OF TROPONIN QUANT: CPT | Performed by: PHYSICIAN ASSISTANT

## 2020-06-18 PROCEDURE — 25000132 ZZH RX MED GY IP 250 OP 250 PS 637: Mod: GY | Performed by: EMERGENCY MEDICINE

## 2020-06-18 PROCEDURE — 80053 COMPREHEN METABOLIC PANEL: CPT | Performed by: EMERGENCY MEDICINE

## 2020-06-18 PROCEDURE — 93005 ELECTROCARDIOGRAM TRACING: CPT | Performed by: EMERGENCY MEDICINE

## 2020-06-18 PROCEDURE — 84443 ASSAY THYROID STIM HORMONE: CPT | Performed by: EMERGENCY MEDICINE

## 2020-06-18 PROCEDURE — 85025 COMPLETE CBC W/AUTO DIFF WBC: CPT | Performed by: EMERGENCY MEDICINE

## 2020-06-18 PROCEDURE — 93010 ELECTROCARDIOGRAM REPORT: CPT | Mod: Z6 | Performed by: EMERGENCY MEDICINE

## 2020-06-18 PROCEDURE — C9803 HOPD COVID-19 SPEC COLLECT: HCPCS | Performed by: EMERGENCY MEDICINE

## 2020-06-18 PROCEDURE — 25000132 ZZH RX MED GY IP 250 OP 250 PS 637: Mod: GY | Performed by: PHYSICIAN ASSISTANT

## 2020-06-18 PROCEDURE — 83605 ASSAY OF LACTIC ACID: CPT | Performed by: EMERGENCY MEDICINE

## 2020-06-18 PROCEDURE — 25000132 ZZH RX MED GY IP 250 OP 250 PS 637: Mod: GY | Performed by: STUDENT IN AN ORGANIZED HEALTH CARE EDUCATION/TRAINING PROGRAM

## 2020-06-18 PROCEDURE — 80048 BASIC METABOLIC PNL TOTAL CA: CPT | Performed by: EMERGENCY MEDICINE

## 2020-06-18 PROCEDURE — 85027 COMPLETE CBC AUTOMATED: CPT | Performed by: INTERNAL MEDICINE

## 2020-06-18 PROCEDURE — 82803 BLOOD GASES ANY COMBINATION: CPT | Performed by: EMERGENCY MEDICINE

## 2020-06-18 PROCEDURE — 83880 ASSAY OF NATRIURETIC PEPTIDE: CPT | Performed by: EMERGENCY MEDICINE

## 2020-06-18 PROCEDURE — 36415 COLL VENOUS BLD VENIPUNCTURE: CPT | Performed by: PHYSICIAN ASSISTANT

## 2020-06-18 PROCEDURE — 84145 PROCALCITONIN (PCT): CPT | Performed by: EMERGENCY MEDICINE

## 2020-06-18 PROCEDURE — 71045 X-RAY EXAM CHEST 1 VIEW: CPT

## 2020-06-18 PROCEDURE — 83605 ASSAY OF LACTIC ACID: CPT | Performed by: INTERNAL MEDICINE

## 2020-06-18 PROCEDURE — 96374 THER/PROPH/DIAG INJ IV PUSH: CPT | Performed by: EMERGENCY MEDICINE

## 2020-06-18 PROCEDURE — 96372 THER/PROPH/DIAG INJ SC/IM: CPT | Performed by: EMERGENCY MEDICINE

## 2020-06-18 RX ORDER — ALUMINA, MAGNESIA, AND SIMETHICONE 2400; 2400; 240 MG/30ML; MG/30ML; MG/30ML
30 SUSPENSION ORAL EVERY 4 HOURS PRN
Status: DISCONTINUED | OUTPATIENT
Start: 2020-06-18 | End: 2020-06-20 | Stop reason: HOSPADM

## 2020-06-18 RX ORDER — POTASSIUM CHLORIDE 1.5 G/1.58G
20-40 POWDER, FOR SOLUTION ORAL
Status: DISCONTINUED | OUTPATIENT
Start: 2020-06-18 | End: 2020-06-20 | Stop reason: HOSPADM

## 2020-06-18 RX ORDER — NITROGLYCERIN 0.4 MG/1
0.4 TABLET SUBLINGUAL EVERY 5 MIN PRN
Status: DISCONTINUED | OUTPATIENT
Start: 2020-06-18 | End: 2020-06-20 | Stop reason: HOSPADM

## 2020-06-18 RX ORDER — ONDANSETRON 4 MG/1
4 TABLET, ORALLY DISINTEGRATING ORAL EVERY 6 HOURS PRN
Status: DISCONTINUED | OUTPATIENT
Start: 2020-06-18 | End: 2020-06-20 | Stop reason: HOSPADM

## 2020-06-18 RX ORDER — LANOLIN ALCOHOL/MO/W.PET/CERES
3 CREAM (GRAM) TOPICAL
Status: DISCONTINUED | OUTPATIENT
Start: 2020-06-18 | End: 2020-06-20 | Stop reason: HOSPADM

## 2020-06-18 RX ORDER — METOPROLOL SUCCINATE 100 MG/1
100 TABLET, EXTENDED RELEASE ORAL DAILY
Status: DISCONTINUED | OUTPATIENT
Start: 2020-06-18 | End: 2020-06-20 | Stop reason: HOSPADM

## 2020-06-18 RX ORDER — AMOXICILLIN 250 MG
2 CAPSULE ORAL 2 TIMES DAILY PRN
Status: DISCONTINUED | OUTPATIENT
Start: 2020-06-18 | End: 2020-06-20 | Stop reason: HOSPADM

## 2020-06-18 RX ORDER — AMLODIPINE BESYLATE 5 MG/1
5 TABLET ORAL EVERY EVENING
Status: DISCONTINUED | OUTPATIENT
Start: 2020-06-18 | End: 2020-06-19

## 2020-06-18 RX ORDER — POTASSIUM CHLORIDE 7.45 MG/ML
10 INJECTION INTRAVENOUS
Status: DISCONTINUED | OUTPATIENT
Start: 2020-06-18 | End: 2020-06-20 | Stop reason: HOSPADM

## 2020-06-18 RX ORDER — AMOXICILLIN 250 MG
1 CAPSULE ORAL 2 TIMES DAILY PRN
Status: DISCONTINUED | OUTPATIENT
Start: 2020-06-18 | End: 2020-06-20 | Stop reason: HOSPADM

## 2020-06-18 RX ORDER — ACETAMINOPHEN 650 MG/1
650 SUPPOSITORY RECTAL EVERY 4 HOURS PRN
Status: DISCONTINUED | OUTPATIENT
Start: 2020-06-18 | End: 2020-06-20 | Stop reason: HOSPADM

## 2020-06-18 RX ORDER — POTASSIUM CL/LIDO/0.9 % NACL 10MEQ/0.1L
10 INTRAVENOUS SOLUTION, PIGGYBACK (ML) INTRAVENOUS
Status: DISCONTINUED | OUTPATIENT
Start: 2020-06-18 | End: 2020-06-20 | Stop reason: HOSPADM

## 2020-06-18 RX ORDER — FUROSEMIDE 10 MG/ML
20 INJECTION INTRAMUSCULAR; INTRAVENOUS ONCE
Status: COMPLETED | OUTPATIENT
Start: 2020-06-18 | End: 2020-06-18

## 2020-06-18 RX ORDER — LISINOPRIL 5 MG/1
5 TABLET ORAL DAILY
Status: DISCONTINUED | OUTPATIENT
Start: 2020-06-18 | End: 2020-06-18

## 2020-06-18 RX ORDER — ONDANSETRON 2 MG/ML
4 INJECTION INTRAMUSCULAR; INTRAVENOUS EVERY 6 HOURS PRN
Status: DISCONTINUED | OUTPATIENT
Start: 2020-06-18 | End: 2020-06-20 | Stop reason: HOSPADM

## 2020-06-18 RX ORDER — LEVOTHYROXINE SODIUM 88 UG/1
88 TABLET ORAL DAILY
Status: DISCONTINUED | OUTPATIENT
Start: 2020-06-18 | End: 2020-06-20 | Stop reason: HOSPADM

## 2020-06-18 RX ORDER — POTASSIUM CHLORIDE 29.8 MG/ML
20 INJECTION INTRAVENOUS
Status: DISCONTINUED | OUTPATIENT
Start: 2020-06-18 | End: 2020-06-18

## 2020-06-18 RX ORDER — POTASSIUM CHLORIDE 750 MG/1
20-40 TABLET, EXTENDED RELEASE ORAL
Status: DISCONTINUED | OUTPATIENT
Start: 2020-06-18 | End: 2020-06-20 | Stop reason: HOSPADM

## 2020-06-18 RX ORDER — ACETAMINOPHEN 325 MG/1
650 TABLET ORAL EVERY 4 HOURS PRN
Status: DISCONTINUED | OUTPATIENT
Start: 2020-06-18 | End: 2020-06-20 | Stop reason: HOSPADM

## 2020-06-18 RX ORDER — HYDRALAZINE HYDROCHLORIDE 20 MG/ML
10 INJECTION INTRAMUSCULAR; INTRAVENOUS EVERY 6 HOURS PRN
Status: DISCONTINUED | OUTPATIENT
Start: 2020-06-18 | End: 2020-06-20 | Stop reason: HOSPADM

## 2020-06-18 RX ORDER — ASPIRIN 81 MG/1
81 TABLET ORAL DAILY
Status: DISCONTINUED | OUTPATIENT
Start: 2020-06-18 | End: 2020-06-20

## 2020-06-18 RX ORDER — VALSARTAN 160 MG/1
320 TABLET ORAL DAILY
Status: DISCONTINUED | OUTPATIENT
Start: 2020-06-18 | End: 2020-06-20 | Stop reason: HOSPADM

## 2020-06-18 RX ORDER — LANOLIN ALCOHOL/MO/W.PET/CERES
1000 CREAM (GRAM) TOPICAL DAILY
Status: DISCONTINUED | OUTPATIENT
Start: 2020-06-18 | End: 2020-06-20 | Stop reason: HOSPADM

## 2020-06-18 RX ORDER — LIDOCAINE 40 MG/G
CREAM TOPICAL
Status: DISCONTINUED | OUTPATIENT
Start: 2020-06-18 | End: 2020-06-20 | Stop reason: HOSPADM

## 2020-06-18 RX ORDER — ASPIRIN 81 MG/1
324 TABLET, CHEWABLE ORAL ONCE
Status: COMPLETED | OUTPATIENT
Start: 2020-06-18 | End: 2020-06-18

## 2020-06-18 RX ADMIN — AMLODIPINE BESYLATE 5 MG: 5 TABLET ORAL at 20:25

## 2020-06-18 RX ADMIN — FUROSEMIDE 20 MG: 10 INJECTION, SOLUTION INTRAVENOUS at 01:20

## 2020-06-18 RX ADMIN — VALSARTAN 320 MG: 160 TABLET, FILM COATED ORAL at 09:21

## 2020-06-18 RX ADMIN — HUMAN ALBUMIN MICROSPHERES AND PERFLUTREN 6 ML: 10; .22 INJECTION, SOLUTION INTRAVENOUS at 12:11

## 2020-06-18 RX ADMIN — LEVOTHYROXINE SODIUM 88 MCG: 0.09 TABLET ORAL at 09:21

## 2020-06-18 RX ADMIN — OYSTER SHELL CALCIUM WITH VITAMIN D 1 TABLET: 500; 200 TABLET, FILM COATED ORAL at 09:20

## 2020-06-18 RX ADMIN — FUROSEMIDE 20 MG: 10 INJECTION, SOLUTION INTRAVENOUS at 03:12

## 2020-06-18 RX ADMIN — ASPIRIN 81 MG CHEWABLE TABLET 324 MG: 81 TABLET CHEWABLE at 01:19

## 2020-06-18 RX ADMIN — METOPROLOL SUCCINATE 100 MG: 100 TABLET, EXTENDED RELEASE ORAL at 09:20

## 2020-06-18 RX ADMIN — ASPIRIN 81 MG: 81 TABLET, COATED ORAL at 09:20

## 2020-06-18 RX ADMIN — ENOXAPARIN SODIUM 40 MG: 40 INJECTION SUBCUTANEOUS at 09:21

## 2020-06-18 RX ADMIN — ACETAMINOPHEN 650 MG: 325 TABLET, FILM COATED ORAL at 17:48

## 2020-06-18 ASSESSMENT — ACTIVITIES OF DAILY LIVING (ADL)
ADLS_ACUITY_SCORE: 15
ADLS_ACUITY_SCORE: 15

## 2020-06-18 ASSESSMENT — PAIN DESCRIPTION - DESCRIPTORS: DESCRIPTORS: HEADACHE

## 2020-06-18 ASSESSMENT — MIFFLIN-ST. JEOR: SCORE: 1339.46

## 2020-06-18 NOTE — Clinical Note
The first balloon was inserted into the left anterior descending and proximal left anterior descending.Max pressure = 18 mario. Total duration = 5 seconds.

## 2020-06-18 NOTE — Clinical Note
Stent deployed in the proximal left anterior descending. Max pressure = 22 mario. Total duration = 5 seconds.

## 2020-06-18 NOTE — ED NOTES
Cards 1 paged about pt tele rhytm. Pt seemed to be mild A fib at beginning of shift. On assessment of pt change, rhythm appeared to be A fib w/ RVR and some VT runs. EKG complete. Cards 1 MD stated he is not concerned.

## 2020-06-18 NOTE — ED NOTES
[facility] [place]   ED Nurse to Floor Handoff     Dulce Yeh is a 85 year old female who speaks English and lives alone,  in a home  They arrived in the ED by ambulance from home    ED Chief Complaint: Shortness of Breath    ED Dx;   Final diagnoses:   Shortness of breath   Acute pulmonary edema (H)         Needed?: No    Allergies:   Allergies   Allergen Reactions     Sulfa Drugs Hives     Clonidine Rash     Diltiazem Rash   .  Past Medical Hx:   Past Medical History:   Diagnosis Date     Allergy      Aortic stenosis 10/11/2011     C. 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    after TKA surgery     S/P cholecystectomy 10/11/2011     Unspecified hypothyroidism 10/11/2011      Baseline Mental status: WDL  Current Mental Status changes: at basesline    Infection present or suspected this encounter: no  Sepsis suspected: No  Isolation type: Special Precautions-COVID-19     Activity level - Baseline/Home:  Independent  Activity Level - Current:   Stand with Assist    Bariatric equipment needed?: No    In the ED these meds were given:   Medications   aspirin (ASA) chewable tablet 324 mg (324 mg Oral Given 6/18/20 0119)   furosemide (LASIX) injection 20 mg (20 mg Intravenous Given 6/18/20 0120)       Drips running?  No    Home pump  No    Current LDAs  Peripheral IV 06/18/20 Left Upper forearm (Active)   Site Assessment WDL 06/18/20 0046   Line Status Infusing 06/18/20 0046   Phlebitis Scale 0-->no symptoms 06/18/20 0046   Infiltration Scale 0 06/18/20 0046   Infiltration Site Treatment Method  None 06/18/20 0046   Extravasation? No 06/18/20 0046   Dressing Intervention New dressing  06/18/20 0046   Number of days: 0       Labs results:   Labs Ordered and Resulted from Time of ED Arrival Up to the Time of Departure from the ED  "  CBC WITH PLATELETS DIFFERENTIAL - Abnormal; Notable for the following components:       Result Value    RBC Count 5.28 (*)     All other components within normal limits   COMPREHENSIVE METABOLIC PANEL - Abnormal; Notable for the following components:    Glucose 168 (*)     GFR Estimate 52 (*)     GFR Estimate If Black 60 (*)     All other components within normal limits   LACTIC ACID WHOLE BLOOD - Abnormal; Notable for the following components:    Lactic Acid 2.3 (*)     All other components within normal limits   LIPASE   TROPONIN I   NT PROBNP INPATIENT   BLOOD GAS VENOUS   COVID-19 VIRUS (CORONAVIRUS) BY PCR   PROCALCITONIN   UA MACROSCOPIC WITH REFLEX TO MICRO AND CULTURE       Imaging Studies:   Recent Results (from the past 24 hour(s))   POC US Echo Limited    Impression    Limited Bedside Cardiac Ultrasound, performed and interpreted by me.   Indication: Shortness of Breath.  Parasternal long axis, parasternal short axis and apical 4 chamber views and b/l lung were acquired.   Image quality was satisfactory.    Findings:    Patient does have aortic stenosis.  Does appear that there is decreased left ventricular function.  Bilateral B-lines suggesting pulmonary edema. No obvious RV overload     XR Chest Port 1 View    Narrative    EXAM: XR CHEST PORT 1 VW  LOCATION: [facility]  DATE/TIME: 6/18/2020 1:13 AM    INDICATION: Shortness of breath.  COMPARISON: 08/13/2011.    FINDINGS: Upright portable chest. The heart size is normal. There are mild patchy infiltrates bilaterally. The lungs are otherwise clear. No pneumothorax.      Impression    IMPRESSION: Mild bilateral pneumonia.       Recent vital signs:   BP (!) 149/94   Pulse 91   Temp 97.7  F (36.5  C) (Oral)   Resp 21   Ht 1.702 m (5' 7\")   Wt 86.2 kg (190 lb)   LMP  (LMP Unknown)   SpO2 100%   Breastfeeding No   BMI 29.76 kg/m      [place] Coma Scale Score: 15 (06/18/20 0105)       Cardiac Rhythm: Normal Sinus  Pt needs tele? " Yes  Skin/wound Issues: None    Code Status: Full Code    Pain control: pt had none    Nausea control: pt had none    Abnormal labs/tests/findings requiring intervention: SOB pulmonary edema      Family present during ED course? No   Family Comments/Social Situation comments: Patient updated daughter independently    Tasks needing completion: TUAN/CAITLIN      [phone] [facility] ED

## 2020-06-18 NOTE — PROGRESS NOTES
"Focus: Admission   Admitted from: [facility]   Via: stretcher   Belongings: Placed in closet  Admission paperwork: complete.    Teaching: Call don't fall, use of console, meal times, visiting hours, orientation to unit, when to call for the RN angina/sob/dizzyness, and stressed the importance of safety.   Access: PIV    Telemetry: Placed on pt   Ht./Wt.: complete   2 RN skin assessment completed with Ashley PEDERSON RN   Neuro: AOx4, educated on using call light.  Cardiac: denies chest pain, reports episodes of palpitations and \"heart racing\" intermittently for past 2 months.  Telemetry: afib in 70-90s  Respiratory: RA, reports SOB much improved since lasix  GI/: denies nausea/diarrhea. LBM 6/17  Endocrine: N/A  Diet/Appetite: low fat, Na <2400mg diet, no caffiene diet  NPO at midnight  Skin: no skin concerns  LDA: PIV x1, SL  Activity: SBA, moves well and steady.   Pain: denies  Plan: NPO at midnight, trending troponins, monitor VS, report changes or concerns to Cards 1 care team.    "

## 2020-06-18 NOTE — Clinical Note
Stent deployed in the middle left anterior descending. Max pressure = 14 mario. Total duration = 6 seconds.

## 2020-06-18 NOTE — ED TRIAGE NOTES
Sudden onset SOB  No signs of allergic reaction  87% on RA, 10 LPM NRB 97%  /110  12 lead EKG WNL    18 L A/C fluids infusing  BG 130s  Pt. Currently speaking in full sentences but increased WOB and frequent dry cough noted

## 2020-06-18 NOTE — Clinical Note
Stent deployed in the middle left anterior descending. Max pressure = 18 mario. Total duration = 5 seconds.

## 2020-06-18 NOTE — PROVIDER NOTIFICATION
DATE:  6/18/2020   TIME OF RECEIPT FROM LAB:  2020  LAB TEST:  Troponin   LAB VALUE:  0.506  RESULTS GIVEN WITH READ-BACK TO DEANDRE Chang  TIME LAB VALUE REPORTED TO PROVIDER:  [id number]

## 2020-06-18 NOTE — Clinical Note
The first balloon was inserted into the left anterior descending and middle left anterior descending.

## 2020-06-18 NOTE — PROGRESS NOTES
Cardiology Progress Note      Assessment and Recommendation:   Dulce Yeh is a 85 year old female with PMH notable for HTN, moderate to severe aortic stenosis (DANIS 1.04 cm2, mean gradient 22mmHg in 2018) hypothyroidism, HLD, paroxsymal atrial fibrillation (remote, solitary occurrence documented in 2015) who presented to the ED by EMS with acute shortness of breath that is now resolved.      Plan:   -Observe on cardiac tele   -Start therapeutic AC with apixaban 5 mg BID   -Follow up on TTE   -Trend troponin overnight     # Acute hypoxic resp failure, resolved  # ?Hypertensive urgency w/flash pulmonary edema, resolved  # Hx of moderate to severe aortic stenosis  # Covid negative   Patient describes baseline SOB w/exercise which was more notable yesterday w/associated cough and sputum that crescendoed last night while resting, reading in bed, prompting her to call 911. per EMS initial SpO2 80's and patient was started on 10L O2, also BP noted to be 190/110 mmHg. She arrived to the ER on 10L of oxygen and her BP was coming down without intervention. CXR with bilateral infiltrates c/w edema. Weight by trend was unchanged, pt denies any new LE swelling, orthopnea, PND. BNP 1194, increased from prior. Troponin negative. EKG with known LBBB, does not meet Sgarbossa's criteria. Her BP came down spontaneously after she was started on oxygen to 160/90 mmHg and she had rapidly defervescent oxygen needs. By time of interview, patient was on room air.  Most consistent with flash pulmonary edema event in the setting of aortic stenosis and acutely elevated BP. She was noted to have rate controlled Afib - however, when she converted to afib from NSR, she was completely weaned off oxygen and feeling comfortable. Ddx also includes Severe aortic stenosis, ACS, acute decompensated HF (last TTE in 2019 with normal EF), pneumonia, PE. She denies fever, chills and no known sick contacts. Covid-19 negative and low risk PUI. Procal  negative.TSH normal.   - Procal WNL, TSH WNL  - Given Lasix 20 mg IV x 2 overnight, monitor.If severe aortic stenosis would avoid aggressive fluid shifts/preload dependent  - TTE pending   - Telemetry  - Daily weight and I/O  - Continue PTA metoprolol and valsartan  - Start amlodipine 5 mg at bedtime   - Continue Valsartan (patient w/multiple issues with hydrochlorothiazide, ACEi, losartan)     NSTEMI, likely type II   Cardiac risk factors: HTN, age   Patient chest pain free. She has stable symptoms of SOB that could be anginal equivalent versus secondary to her aortic stenosis. Her initial troponin was negative overnight but not trended, repeated in afternoon was 0.55. EKG non-ischemic. TTE with LV EF 45-50% (likely unchanged) without focal WMA. Feel this is likely 2/2 her initial HTN urgency versus her atrial fibrillation. Will trend. Cannot exclude worsening of her aortic stenosis causing cardiomyopathy and poor coronary artery perfusion though her TTE again demonstrates likely moderate. ACS seems unlikely.   -NPO MN in case she jerrica significant troponin trend  -Trend troponin   -Continue aspirin 81 mg daily. She was loaded in ER   -Patient has refused statin therapy in past due to adverse reaction.   -EKG    Paroxysmal atrial fibrillation/aflutter with variable conduction, rate controlled  [id number] (age +2, gender, HTN)  In 2015, patient had C.diff pancolitis and was acutely ill complicated by PAF w/ RVR.for about 1 day in total. Ultimately, felt 2nd to her acute illness and not started on AC. She presents in NSR with LBBB. In ER, 7 beats of NSVT noted on tele and then patient converted to atrial fibrillation with rates between  bpm. She is asymptomatic and her SBP is 130. Feel this is incidental and not necessarily related to her admission as she was NSR on arrival.   -Continue Metoprolol succinate 100 mg daily   -Cardiac tele   -AC with apixaban 5 mg BID to start this efrain   -DC w/zio and f/u in 1  "month      # Elevated lactate (resolved)   # cough   # Covid-19 negative   Unclear etiology. No leukocytosis. Hypertensive urgency upon arrival. ?allergies. CXR most consistent with pulmonary edema. Low overall suspicion for infection. Resolved without significant intervention. PNA is on differential but no fever or leukocytosis and now on RA making this seem less likely without intervention.   -Continue to eval for s/s infection      # LBBB - Present since at least . Had normal adenosine stress test in .     # HTN - continue PTA metoprolol and valsartan, add amlodipine at bedtime      # HLD - not on statin. Declined due to side effect profile per cards notes.     # Hypothyroidism - continue PTA levothyroxine     # Family history of AAA - brother  of ruptured AAA. Pt had screening in 2019 with normal infrarenal aorta (1.6cm)     ACCESS: PIV  FEN: 2gm Na, 2L fluid restriction  PPX: Heparin SC  CODE: FULL      Objective:  Most recent vital signs:  BP (!) 131/91   Pulse 107   Temp 97.7  F (36.5  C) (Oral)   Resp 21   Ht 1.702 m (5' 7\")   Wt 86.2 kg (190 lb)   LMP  (LMP Unknown)   SpO2 100%   Breastfeeding No   BMI 29.76 kg/m    Temp:  [97.7  F (36.5  C)] 97.7  F (36.5  C)  Pulse:  [] 107  Heart Rate:  [] 113  Resp:  [10-28] 21  BP: (131-173)/() 131/91  SpO2:  [99 %-100 %] 100 %  Wt Readings from Last 2 Encounters:   20 86.2 kg (190 lb)   19 86.2 kg (190 lb)       Intake/Output Summary (Last 24 hours) at 2020 1144  Last data filed at 2020 0600  Gross per 24 hour   Intake --   Output 600 ml   Net -600 ml     Physical exam:  General: In bed, in NAD  HEENT: EOMI, PERRLA, no scleral icterus or injection  CARDIAC: irreg/irreg, 2/6 JANNETH RUSB. Peripheral pulses 2+  RESP: CTAB, normal WOB and RR   GI: NABS, NT/ND, no guarding or rebound   EXTREMITIES: NO LE edema, pulses 2+.  SKIN: No acute lesions appreciated  NEURO: Alert and oriented X3, CN II-XII grossly intact, " no focal neurological deficits noted      Labs (Past three days):  CBC  Recent Labs   Lab 06/18/20  0540 06/18/20  0055   WBC 8.9 7.8   RBC 5.24* 5.28*   HGB 14.9 14.8   HCT 45.6 46.5   MCV 87 88   MCH 28.4 28.0   MCHC 32.7 31.8   RDW 14.0 14.1    209     BMP  Recent Labs   Lab 06/18/20  0512 06/18/20  0055    136   POTASSIUM 3.8 3.5   CHLORIDE 104 104   CO2 25 24   ANIONGAP 8 8   * 168*   BUN 18 18   CR 0.93 0.99   GFRESTIMATED 56* 52*   GFRESTBLACK 65 60*   CR 9.2 8.8     Troponins:  Lab Results   Component Value Date    TROPI <0.015 06/18/2020    TROPI <0.012 03/11/2013    TROPI <0.012 03/11/2013    TROPI <0.012 03/10/2013    TROPONIN 0.15 (HH) 03/10/2013    TROPONIN 0.01 08/13/2011        INRNo lab results found in last 7 days.  Liver panel  Recent Labs   Lab 06/18/20  0055   PROTTOTAL 7.8   ALBUMIN 3.6   BILITOTAL 0.7   ALKPHOS 62   AST 34   ALT 26       Imaging/procedure results:      EKG 6/18 0100      EKG 1023 6/18      CXR  FINDINGS: Upright portable chest. The heart size is normal. There are mild patchy infiltrates bilaterally. The lungs are otherwise clear. No pneumothorax.                                                                  IMPRESSION: Mild bilateral pneumonia.          Patient seen and discussed w/ Dr. Astorga.    Severo TALBOT  [facility]  Cardiology  Pager: [phone]

## 2020-06-18 NOTE — ED PROVIDER NOTES
[facility] EMERGENCY DEPARTMENT ([facility])  6/18/20    History     Chief Complaint   Patient presents with     Shortness of Breath     The history is provided by the patient, the EMS personnel and medical records.     Dulce Yeh is a 85 year old female with a past medical history significant for HTN, C. difficile, aortic stenosis, hypothyroidism, HLD, atrial fibrillation, and s/p cholecystectomy who presents here to the Emergency Department via EMS due to shortness of breath.  Patient reports that she began having sudden onset of shortness of breath.  Patient was satting at 87% on RA and was put on 10 L/min with sats increasing to 97% with normal rate of breathing by EMS.  Patient's BP in route was 191/110.  Her 12-lead EKG was within normal limits.  She had an HR of 110 in route.  On arrival to the ED patient is speaking in full sentences, however, has an increased work of breathing with a frequent dry cough.    Patient denies any chest pain, lower extremity swelling, weight gain or positional component shortness of breath.  It was more difficult walking to the ambulance.  Denies dizziness or syncope.  She is never had nothing like this in the past.  Patient denies back pain, abdominal pain, fevers, chills or COVID contacts.  She has been taking her medications regularly.  No history of blood clots.  Denies any calf pain or swelling in lower extremities that is asymmetric.  Patient is not on blood thinners.    I have reviewed the Medications, Allergies, Past Medical and Surgical History, and Social History in the [email or web address] system.  PAST MEDICAL HISTORY:   Past Medical History:   Diagnosis Date     Allergy      Aortic stenosis 10/11/2011     C. 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 Additional pertinent items: Atrial fibrillation    FAMILY HISTORY:   Family History   Problem Relation Age of Onset     Cancer Mother         lung cancer     Testicular cancer Father      Abdominal Aortic Aneurysm Brother      Diabetes Brother        SOCIAL HISTORY:   Social History     Tobacco Use     Smoking status: Never Smoker     Smokeless tobacco: Never Used   Substance Use Topics     Alcohol use: No     Social history was reviewed with the patient. Additional pertinent items: None      Patient's Medications   New Prescriptions    No medications on file   Previous Medications    ASCORBIC ACID (VITAMIN C) 500 MG TABLET    Take 500 mg by mouth daily.    ASPIRIN 81 MG TABLET    Take 81 mg by mouth daily    BIOTIN PO    Take by mouth daily    CALCIUM CARB 1250 MG, 500 MG [place],/VITAMIN D 200 UNITS (OSCAL WITH D) 500-200 MG-UNIT PER TABLET    Take 1 tablet by mouth 2 times daily (with meals).    CETIRIZINE HCL (ZYRTEC PO)    Take  by mouth.    CLOBETASOL (CLOBETASOL PROPIONATE EMULSION) 0.05 % CREA    Apply topically as needed    CYANOCOLBALAMIN (VITAMIN B-12) 1000 MCG TABLET    Take 1,000 mcg by mouth daily.    FLUOCINONIDE (LIDEX) 0.05 % SOLUTION    Apply topically 2 times daily    FLUZONE HIGH-DOSE 0.5 ML INJECTION    TO BE ADMINISTERED BY PHARMACIST FOR IMMUNIZATION    GLUCOSAMINE-CHONDROIT-VIT C-MN (GLUCOSAMINE CHONDR 1500 COMPLX) CAPS    Take  by mouth.    LEVOTHYROXINE (SYNTHROID/LEVOTHROID) 88 MCG TABLET    Take 1 tablet (88 mcg) by mouth daily    MAGNESIUM 300 MG CAPS    Take  by mouth.    MAGNESIUM HYDROXIDE (MILK OF MAGNESIA PO)        METOPROLOL SUCCINATE ER (TOPROL-XL) 100  "MG 24 HR TABLET    Take 1 tablet (100 mg) by mouth daily    OMEGA-3 FATTY ACIDS (OMEGA 3 PO)        VALSARTAN (DIOVAN) 320 MG TABLET    Take 1 tablet (320 mg) by mouth daily   Modified Medications    No medications on file   Discontinued Medications    No medications on file          Allergies   Allergen Reactions     Sulfa Drugs Hives     Clonidine Rash     Diltiazem Rash        Review of Systems  ROS: 14 point ROS neg other than the symptoms noted above in the HPI.    Physical Exam   BP: (!) 173/116  Heart Rate: 100  Temp: 97.7  F (36.5  C)  Resp: 18  Height: 170.2 cm (5' 7\")  Weight: 86.2 kg (190 lb)  SpO2: 99 %      Physical Exam  Physical Exam     Constitutional: oriented to person, place, and time. appears well-developed and well-nourished.   HENT:   Head: Normocephalic and atraumatic.   Neck: Normal range of motion.   Pulmonary/Chest: Increased respiratory effort.  Speaking in full sentences.  Crackles bilaterally..   Cardiac: Regular rate and rhythm.  Systolic ejection murmur.  Abdominal: Abdomen soft, nontender, nondistended. No rebound tenderness.  MSK: Long bones without deformity or evidence of trauma.  No lower extremity swelling.  No tenderness palpation of the calves.  Neurological: alert and oriented to person, place, and time.   Skin: Skin is warm and dry.   Psychiatric:  normal mood and affect.  behavior is normal. Thought content normal.     ED Course        Procedures          EKG Interpretation:      Interpreted by William Castillo MD  Time reviewed: 0110  Symptoms at time of EKG: SOB   Rhythm: normal sinus   Rate: Normal  Axis: Left Axis Deviation  Ectopy: premature atrial contraction  Conduction: left bundle branch block (complete)  ST Segments/ T Waves: Stable ST changes, no changes concerning for sgarbossa criteria.  Q Waves: nonspecific  Comparison to prior: Largely unchagned, V5 qrs complex changed however only one lead, unclear significance    Clinical Impression: Left bundle branch " block that is largely similar to prior EKG.  Does not meet scabietic arteria.                   Critical Care Addendum    My initial assessment, based on my review of prehospital provider report, review of nursing observations, review of vital signs, focused history and physical exam, established that Dulce Yeh has respiratory insufficiency, which requires immediate intervention, and therefore she is critically ill.     After the initial assessment, the care team initiated multiple lab tests and initiated medication therapy with lasix to provide stabilization care. Due to the critical nature of this patient, I reassessed physical exam, review of cardiac rhythm monitor, neurologic status and respiratory status multiple times prior to her disposition.     Time also spent performing documentation, discussion with consultants and coordination of care.     Critical care time (excluding teaching time and procedures): 40 minutes.     No results found for this or any previous visit (from the past 24 hour(s)).  Medications - No data to display          Assessments & Plan (with Medical Decision Making)   MDM  Patient presenting sudden onset shortness of breath.  I do feel she has flash pulmonary edema due to significant B-lines on cardiac ultrasound, aortic stenosis in addition to what appears to be a decrease left ventricular function on my bedside ultrasound.  Chest x-ray says she has a bilateral pneumonia however her onset was sudden, she has no fever, no sick contacts and she was feeling well up until this happened overnight.  We will add on a procalcitonin.  White blood count is normal.  Lactic acid is mildly elevated which I feel is more likely due to decreased cardiac output.  Blood pressure has been stable.  I avoided nitrates and blood pressure medications due to the aortic stenosis.  She was given some Lasix bedside.  She appears improved.  Patient was discussed with cardiology who admit the patient for  further care.  Discussed antibiotics due to chest x-ray read however will defer as the patient overall does not appear to fit the pneumonia type picture with the bilateral findings.  COVID swab was sent.    I have reviewed the nursing notes.    I have reviewed the findings, diagnosis, plan and need for follow up with the patient.    New Prescriptions    No medications on file       Final diagnoses:   Shortness of breath   Acute pulmonary edema (H)     IArmand, am serving as a trained medical scribe to document services personally performed by William Castillo MD, based on the provider's statements to me.   IWilliam MD, was physically present and have reviewed and verified the accuracy of this note documented by Armand Quiroz.    6/18/2020   [facility], [place], EMERGENCY DEPARTMENT     William Castillo MD  06/18/20 0209

## 2020-06-18 NOTE — H&P
Cardiology History and Physical   Dulce Yeh MRN: [id number]  Age: 85 year old, : 20    Chief Complaint: Shortness of breath    HPI:   Dulce Yeh is a 85 year old female with PMH notable for HTN, Aortic stenosis, hypothyroidism, HLD, post-op atrial fibrillation who presented to the ED by EMS after acute shortness of breath.     Pt was sitting in bed reading a book when she had acute onset shortness of breath. Felt like she couldn't take a deep breath and that she needed to cough up phlegm. When it did not improve after several minutes she called EMS. Has never had a similar episode before. Denied any associated chest pain, tightness, pressure, diaphoresis, nausea, lightheadedness. No recent LE edema, weight has been stable. No orthopnea or PND. Is relatively active at baseline - gardening and doing chores around the house. Able to walk up >2 flights of stairs without stopping. Has been quarantining at home for 3 months and if she goes to run an errand she always wears a mask. Denies cough, fever, chills, sore throat, myalgias. No known sick contacts.     Follows with Dr. Davis, last seen in clinic in 10/2018. Echo in  showed mild-moderate AS, mean gradient 15 and estimated valve area of 1.2 cm2. In , aortic valve gradients had increased with a mean gradient of 25 mmHg and valve area 0.8-1cm2, consistent with moderate to severe aortic stenosis. At that time remained minimally symptomatic with only mild exertional dyspnea. Pt had repeat TTE in 2019 with mean gradient 22 mmHg and valve area 1.04 cm2, relatively stable from prior. EF at that time was 55-60%.    EMS noted pt was satting at 87% on RA and placed on 10L/m in ambulance. Hypertensive to 190s/110s. In the ED pts BPs were improved to 150/90 without intervention. Satting 100% on RA, placed on 2L for comfort. Labs significant for negative troponin, normal BNP (1194), but increased from prior. No leukocytosis. CXR  with bilateral infiltrates most c/w pulmonary edema. Pt was given 20mg IV lasix and 325mg ASA.     Review of Systems:    Complete 10 point review of systems was performed and negative except per HPI.    Past Medical History    Reviewed and edited as appropriate  Past Medical History:   Diagnosis Date     Allergy      Aortic stenosis 10/11/2011     C. 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    after TKA surgery     S/P cholecystectomy 10/11/2011     Unspecified hypothyroidism 10/11/2011       Past Surgical History   Reviewed and edited as appropriate   Past Surgical History:   Procedure Laterality Date     ANKLE SURGERY       APPENDECTOMY       bilateral tubal ligation       CHOLECYSTECTOMY       ORTHOPEDIC SURGERY         Social History   Reviewed and edited as appropriate  Social History     Socioeconomic History     Marital status:      Spouse name: Not on file     Number of children: 3     Years of education: Not on file     Highest education level: Not on file   Occupational History     Not on file   Social Needs     Financial resource strain: Not on file     Food insecurity     Worry: Not on file     Inability: Not on file     Transportation needs     Medical: Not on file     Non-medical: Not on file   Tobacco Use     Smoking status: Never Smoker     Smokeless tobacco: Never Used   Substance and Sexual Activity     Alcohol use: No     Drug use: No     Sexual activity: Yes     Birth control/protection: Post-menopausal   Lifestyle     Physical activity     Days per week: Not on file     Minutes per session: Not on file     Stress: Not on file   Relationships     Social connections     Talks on phone: Not on file     Gets together: Not on file     Attends Yazidi service: Not on file     Active member of club or organization: Not on file     Attends  "meetings of clubs or organizations: Not on file     Relationship status: Not on file     Intimate partner violence     Fear of current or ex partner: Not on file     Emotionally abused: Not on file     Physically abused: Not on file     Forced sexual activity: Not on file   Other Topics Concern      Service Not Asked     Blood Transfusions Not Asked     Caffeine Concern Yes     Comment: coffee, tea     Occupational Exposure Not Asked     Hobby Hazards Not Asked     Sleep Concern Not Asked     Stress Concern Not Asked     Weight Concern Not Asked     Special Diet Not Asked     Back Care Not Asked     Exercise Yes     Comment: swims 3x a week     Bike Helmet Not Asked     Seat Belt Not Asked     Self-Exams Not Asked     Parent/sibling w/ CABG, MI or angioplasty before 65F 55M? Not Asked   Social History Narrative    Lives alone, children nearby        2 boys and 1 girl (1 son )     passed away 24 yo    Labs    [employer]    Previoysly worked in [place]       Family History     Reviewed and edited as appropriate  Family History   Problem Relation Age of Onset     Cancer Mother         lung cancer     Testicular cancer Father      Abdominal Aortic Aneurysm Brother      Diabetes Brother        Allergies   Reviewed and edited as appropriate     Allergies   Allergen Reactions     Sulfa Drugs Hives     Clonidine Rash     Diltiazem Rash        Prior to Admission Medications    (Not in a hospital admission)       Physical Exam   BP (!) 162/140   Pulse 87   Temp 97.7  F (36.5  C) (Oral)   Resp 28   Ht 1.702 m (5' 7\")   Wt 86.2 kg (190 lb)   LMP  (LMP Unknown)   SpO2 100%   Breastfeeding No   BMI 29.76 kg/m    No intake or output data in the 24 hours ending 20 0202  Wt:   Wt Readings from Last 2 Encounters:   20 86.2 kg (190 lb)   19 86.2 kg (190 lb)      GEN: pleasant, no acute distress  HEENT: no icterus, MMM  CV: RRR, normal s1,s2, 3/6 systolic murmur " best heard at LUSB. JVP 5  CHEST: clear to ausculation bilaterally, no rales or wheezing  ABD: soft, non-tender, normal active bowel sounds  EXTR: DP 2+ bilaterally . No clubbing, cyanosis or edema.   NEURO: alert oriented, speech fluent/appropriate, motor grossly nonfocal    Assessment and Recommendation:   Dulce Yeh is a 85 year old female with PMH notable for HTN, Aortic stenosis, hypothyroidism, HLD, atrial fibrillation who presented to the ED by EMS after waking up with acute shortness of breath.    # Shortness of breath likely 2/2 flash pulmonary edema  # Hypertension  # Hx of moderate to severe aortic stenosis  Pt with acute shortness of breath at night. CXR with bilateral infiltrates. Weight is stable from 11/2019 and pt denies any new LE swelling, orthopnea, PND. Was hypertensive when EMS arrived with SBP 160s, improved to 140s in ED. BNP 1194, increased from prior. Troponin negative. EKG with known LBBB, does not meet Sgarbossa's criteria. Most consistent with flash pulmonary edema in the setting of aortic stenosis and acutely elevated BP. Also possible that she has had recurrence of her atrial fibrillation. Ddx also includes ACS, acute decompensated HF (last TTE in 2019 with normal EF), pneumonia, PE. Reasonable to repeat TTE given last obtained >6 months ago. Low overall suspicion for PE given rapid improvement upon arrival in ER. Pt denies any infectious symptoms including cough, fever, chills and no known sick contacts. Swabbed for COVID in ER, low suspicion. Procal pending, if elevated will consider covering for CAP.  - COVID pending  - Procal pending  - TSH with reflex  - s/p 20mg IV lasix in ED. Give additional 20mg IV  - TTE  - Telemetry  - If any concern for afib, could consider ZioPatch on discharge  - Daily weight and I/O  - Continue PTA metoprolol and valsartan    # Elevated lactate  Unclear etiology. No leukocytosis. Hypertensive. CXR most consistent with pulmonary edema. Low overall  suspicion for infection. Given pulmonary edema, holding off on IV fluids.   - Procal pending  - COVID pending  - Trend lactate    # H/o post-op Afib  Had one episode in  after TKA. She reports having felt palpitations at the time. Reverted to sinus before discharge. No known recurrence. Is on metoprolol for HTN. CHADS-VASC 4 (age, HTN, female).    # LBBB - Present since at least . Had normal adenosine stress test in .    # HTN - continue PTA metoprolol and valsartan    # HLD - not on statin. Declined due to side effect profile per cards notes.    # Hypothyroidism - continue PTA levothyroxine    # Family history of AAA - brother  of ruptured AAA. Pt had screening in 2019 with normal infrarenal aorta (1.6cm)    ACCESS: PIV  FEN: 2gm Na, 2L fluid restriction  PPX: Heparin SC  CODE: FULL    Dulce is a LOW SUSPICION PUI.  Follow these instructions:    If COVID test positive -> continue isolation precautions    If COVID test negative -> discontinue COVID-specific isolation precautions    Patient and plan of care discussed with Fellow Dr. Ramirez. Will be formally staffed in .    [facility]  Internal Medicine PGY2    Data   Labs and imaging below were independently reviewed and interpreted    CBC  Recent Labs   Lab 20  0055   WBC 7.8   RBC 5.28*   HGB 14.8   HCT 46.5   MCV 88   MCH 28.0   MCHC 31.8   RDW 14.1         CMP  Recent Labs   Lab 20  0055      POTASSIUM 3.5   CHLORIDE 104   CR 8.8   CO2 24   BUN 18   CR 0.99   *   ALKPHOS 62   AST 34   ALT 26   BILITOTAL 0.7   PROTTOTAL 7.8   ALBUMIN 3.6     INRNo lab results found in last 7 days.  Troponin  Lab Results   Component Value Date    TROPI <0.015 2020    TROPI <0.012 2013    TROPI <0.012 2013    TROPI <0.012 03/10/2013    TROPONIN 0.15 (HH) 03/10/2013    TROPONIN 0.01 2011         Imaging: CXR: Mild bilateral patchy infiltrates.    EKG: Sinus with 1st degree AV block. LBBB.    Transthoracic  echocardiogram:   TTE Interpretation Summary (4/2019):  - Global and regional left ventricular function is normal with an EF of 55-60%.  - Right ventricular function, chamber size, wall motion, and thickness are normal.  - Aortic valve morphology not well visualized, however on Doppler interrogation there is moderate aortic valve stenosis, Vmax 3.1 m/s, mean graident 22 mmHg, DANIS (VTI) 1.04cmÂ .  - This study was compared to a prior TTE from 10/1/2018 There has been no significant change.    Echo 10/2018  AV stenosis - moderate to severe, 3.2m/s 23 mmHg, DANIS 09 cm2. Normal biventricular function. No significant change compared to prior in March 2018.      Echo 2/6/17  AV stenosis - moderate to severe, 3.1m/s 22 mmHg, DANIS 1 cm2. Normal biventricular function.     EKG 5/29/15   Sinus bradycardia  Left bundle branch block  Abnormal ECG  When compared with ECG of 02-OCT-2013 09:21,  No significant change was found     ECHO 6.1.15  Preserved biventricular function, moderate to severe aortic stenosis,mean gradient 25 mmHg and valve area 0.8-1cm2. No pulmonary hypertension. no pericardial effusion.     NM MPI Adenosine (3/15/2013)  1. Normal myocardial SPECT study with a summed stress score of 0.  2. Normal left ventricular systolic function.     ECHO (3/10/2013)  No significant change from prior study  Mild to moderate aortic stenosis is present

## 2020-06-19 ENCOUNTER — SURGERY (OUTPATIENT)
Age: 85
End: 2020-06-19
Payer: MEDICARE

## 2020-06-19 PROBLEM — I21.4 NSTEMI (NON-ST ELEVATED MYOCARDIAL INFARCTION) (H): Status: ACTIVE | Noted: 2020-06-18

## 2020-06-19 LAB
ANION GAP SERPL CALCULATED.3IONS-SCNC: 8 MMOL/L (ref 3–14)
BUN SERPL-MCNC: 20 MG/DL (ref 7–30)
CALCIUM SERPL-MCNC: 9.3 MG/DL (ref 8.5–10.1)
CHLORIDE SERPL-SCNC: 101 MMOL/L (ref 94–109)
CHOLEST SERPL-MCNC: 177 MG/DL
CO2 SERPL-SCNC: 26 MMOL/L (ref 20–32)
CREAT SERPL-MCNC: 0.93 MG/DL (ref 0.52–1.04)
D DIMER PPP FEU-MCNC: 0.7 UG/ML FEU (ref 0–0.5)
ERYTHROCYTE [DISTWIDTH] IN BLOOD BY AUTOMATED COUNT: 14 % (ref 10–15)
GFR SERPL CREATININE-BSD FRML MDRD: 56 ML/MIN/{1.73_M2}
GLUCOSE BLDC GLUCOMTR-MCNC: 168 MG/DL (ref 70–99)
GLUCOSE SERPL-MCNC: 113 MG/DL (ref 70–99)
HCT VFR BLD AUTO: 48 % (ref 35–47)
HDLC SERPL-MCNC: 33 MG/DL
HGB BLD-MCNC: 15.4 G/DL (ref 11.7–15.7)
KCT BLD-ACNC: 226 SEC (ref 75–150)
KCT BLD-ACNC: 302 SEC (ref 75–150)
LDLC SERPL CALC-MCNC: 130 MG/DL
MCH RBC QN AUTO: 27.8 PG (ref 26.5–33)
MCHC RBC AUTO-ENTMCNC: 32.1 G/DL (ref 31.5–36.5)
MCV RBC AUTO: 87 FL (ref 78–100)
NONHDLC SERPL-MCNC: 144 MG/DL
PLATELET # BLD AUTO: 202 10E9/L (ref 150–450)
POTASSIUM SERPL-SCNC: 3.7 MMOL/L (ref 3.4–5.3)
RBC # BLD AUTO: 5.53 10E12/L (ref 3.8–5.2)
SODIUM SERPL-SCNC: 136 MMOL/L (ref 133–144)
TRIGL SERPL-MCNC: 72 MG/DL
WBC # BLD AUTO: 7.5 10E9/L (ref 4–11)

## 2020-06-19 PROCEDURE — B2111ZZ FLUOROSCOPY OF MULTIPLE CORONARY ARTERIES USING LOW OSMOLAR CONTRAST: ICD-10-PCS | Performed by: INTERNAL MEDICINE

## 2020-06-19 PROCEDURE — 85027 COMPLETE CBC AUTOMATED: CPT | Performed by: PHYSICIAN ASSISTANT

## 2020-06-19 PROCEDURE — 21400000 ZZH R&B CCU UMMC

## 2020-06-19 PROCEDURE — 00000146 ZZHCL STATISTIC GLUCOSE BY METER IP

## 2020-06-19 PROCEDURE — 25800030 ZZH RX IP 258 OP 636: Performed by: INTERNAL MEDICINE

## 2020-06-19 PROCEDURE — 99153 MOD SED SAME PHYS/QHP EA: CPT | Performed by: INTERNAL MEDICINE

## 2020-06-19 PROCEDURE — 93460 R&L HRT ART/VENTRICLE ANGIO: CPT | Mod: 26 | Performed by: INTERNAL MEDICINE

## 2020-06-19 PROCEDURE — C1894 INTRO/SHEATH, NON-LASER: HCPCS | Performed by: INTERNAL MEDICINE

## 2020-06-19 PROCEDURE — 4A023N8 MEASUREMENT OF CARDIAC SAMPLING AND PRESSURE, BILATERAL, PERCUTANEOUS APPROACH: ICD-10-PCS | Performed by: INTERNAL MEDICINE

## 2020-06-19 PROCEDURE — 25000128 H RX IP 250 OP 636: Performed by: INTERNAL MEDICINE

## 2020-06-19 PROCEDURE — 99152 MOD SED SAME PHYS/QHP 5/>YRS: CPT | Performed by: INTERNAL MEDICINE

## 2020-06-19 PROCEDURE — C1725 CATH, TRANSLUMIN NON-LASER: HCPCS | Performed by: INTERNAL MEDICINE

## 2020-06-19 PROCEDURE — 25000132 ZZH RX MED GY IP 250 OP 250 PS 637: Mod: GY | Performed by: PHYSICIAN ASSISTANT

## 2020-06-19 PROCEDURE — 92928 PRQ TCAT PLMT NTRAC ST 1 LES: CPT | Mod: LD | Performed by: INTERNAL MEDICINE

## 2020-06-19 PROCEDURE — 027036Z DILATION OF CORONARY ARTERY, ONE ARTERY WITH THREE DRUG-ELUTING INTRALUMINAL DEVICES, PERCUTANEOUS APPROACH: ICD-10-PCS | Performed by: INTERNAL MEDICINE

## 2020-06-19 PROCEDURE — 93005 ELECTROCARDIOGRAM TRACING: CPT

## 2020-06-19 PROCEDURE — 93010 ELECTROCARDIOGRAM REPORT: CPT | Performed by: INTERNAL MEDICINE

## 2020-06-19 PROCEDURE — 25000128 H RX IP 250 OP 636: Performed by: PHYSICIAN ASSISTANT

## 2020-06-19 PROCEDURE — 25000128 H RX IP 250 OP 636: Performed by: STUDENT IN AN ORGANIZED HEALTH CARE EDUCATION/TRAINING PROGRAM

## 2020-06-19 PROCEDURE — C1887 CATHETER, GUIDING: HCPCS | Performed by: INTERNAL MEDICINE

## 2020-06-19 PROCEDURE — 25000132 ZZH RX MED GY IP 250 OP 250 PS 637: Mod: GY | Performed by: INTERNAL MEDICINE

## 2020-06-19 PROCEDURE — 25000125 ZZHC RX 250: Performed by: INTERNAL MEDICINE

## 2020-06-19 PROCEDURE — C1874 STENT, COATED/COV W/DEL SYS: HCPCS | Performed by: INTERNAL MEDICINE

## 2020-06-19 PROCEDURE — 36415 COLL VENOUS BLD VENIPUNCTURE: CPT | Performed by: PHYSICIAN ASSISTANT

## 2020-06-19 PROCEDURE — 80048 BASIC METABOLIC PNL TOTAL CA: CPT | Performed by: PHYSICIAN ASSISTANT

## 2020-06-19 PROCEDURE — 93460 R&L HRT ART/VENTRICLE ANGIO: CPT | Performed by: INTERNAL MEDICINE

## 2020-06-19 PROCEDURE — 85347 COAGULATION TIME ACTIVATED: CPT

## 2020-06-19 PROCEDURE — C9600 PERC DRUG-EL COR STENT SING: HCPCS | Mod: LD | Performed by: INTERNAL MEDICINE

## 2020-06-19 PROCEDURE — 99233 SBSQ HOSP IP/OBS HIGH 50: CPT | Mod: 25 | Performed by: PHYSICIAN ASSISTANT

## 2020-06-19 PROCEDURE — 80061 LIPID PANEL: CPT | Performed by: INTERNAL MEDICINE

## 2020-06-19 PROCEDURE — 99152 MOD SED SAME PHYS/QHP 5/>YRS: CPT | Mod: 59 | Performed by: INTERNAL MEDICINE

## 2020-06-19 PROCEDURE — C1769 GUIDE WIRE: HCPCS | Performed by: INTERNAL MEDICINE

## 2020-06-19 PROCEDURE — 85379 FIBRIN DEGRADATION QUANT: CPT | Performed by: PHYSICIAN ASSISTANT

## 2020-06-19 PROCEDURE — 36415 COLL VENOUS BLD VENIPUNCTURE: CPT | Performed by: INTERNAL MEDICINE

## 2020-06-19 PROCEDURE — 25000132 ZZH RX MED GY IP 250 OP 250 PS 637: Mod: GY | Performed by: STUDENT IN AN ORGANIZED HEALTH CARE EDUCATION/TRAINING PROGRAM

## 2020-06-19 PROCEDURE — 27210794 ZZH OR GENERAL SUPPLY STERILE: Performed by: INTERNAL MEDICINE

## 2020-06-19 DEVICE — IMPLANTABLE DEVICE: Type: IMPLANTABLE DEVICE | Status: FUNCTIONAL

## 2020-06-19 RX ORDER — IOPAMIDOL 755 MG/ML
INJECTION, SOLUTION INTRAVASCULAR
Status: DISCONTINUED | OUTPATIENT
Start: 2020-06-19 | End: 2020-06-19 | Stop reason: HOSPADM

## 2020-06-19 RX ORDER — NALOXONE HYDROCHLORIDE 0.4 MG/ML
.1-.4 INJECTION, SOLUTION INTRAMUSCULAR; INTRAVENOUS; SUBCUTANEOUS
Status: DISCONTINUED | OUTPATIENT
Start: 2020-06-19 | End: 2020-06-20 | Stop reason: HOSPADM

## 2020-06-19 RX ORDER — FENTANYL CITRATE 50 UG/ML
INJECTION, SOLUTION INTRAMUSCULAR; INTRAVENOUS
Status: DISCONTINUED | OUTPATIENT
Start: 2020-06-19 | End: 2020-06-19 | Stop reason: HOSPADM

## 2020-06-19 RX ORDER — ONDANSETRON 4 MG/1
4 TABLET, ORALLY DISINTEGRATING ORAL ONCE
Status: COMPLETED | OUTPATIENT
Start: 2020-06-19 | End: 2020-06-19

## 2020-06-19 RX ORDER — AMLODIPINE BESYLATE 2.5 MG/1
2.5 TABLET ORAL EVERY EVENING
Status: DISCONTINUED | OUTPATIENT
Start: 2020-06-19 | End: 2020-06-20 | Stop reason: HOSPADM

## 2020-06-19 RX ORDER — NALOXONE HYDROCHLORIDE 0.4 MG/ML
.2-.4 INJECTION, SOLUTION INTRAMUSCULAR; INTRAVENOUS; SUBCUTANEOUS
Status: ACTIVE | OUTPATIENT
Start: 2020-06-19 | End: 2020-06-20

## 2020-06-19 RX ORDER — SODIUM CHLORIDE 9 MG/ML
INJECTION, SOLUTION INTRAVENOUS CONTINUOUS
Status: ACTIVE | OUTPATIENT
Start: 2020-06-19 | End: 2020-06-20

## 2020-06-19 RX ORDER — FENTANYL CITRATE 50 UG/ML
25-50 INJECTION, SOLUTION INTRAMUSCULAR; INTRAVENOUS
Status: DISPENSED | OUTPATIENT
Start: 2020-06-19 | End: 2020-06-20

## 2020-06-19 RX ORDER — ATROPINE SULFATE 0.1 MG/ML
0.5 INJECTION INTRAVENOUS EVERY 5 MIN PRN
Status: DISPENSED | OUTPATIENT
Start: 2020-06-19 | End: 2020-06-20

## 2020-06-19 RX ORDER — FUROSEMIDE 10 MG/ML
INJECTION INTRAMUSCULAR; INTRAVENOUS
Status: DISCONTINUED | OUTPATIENT
Start: 2020-06-19 | End: 2020-06-19 | Stop reason: HOSPADM

## 2020-06-19 RX ORDER — ASPIRIN 81 MG/1
81 TABLET ORAL DAILY
Status: DISCONTINUED | OUTPATIENT
Start: 2020-06-20 | End: 2020-06-19

## 2020-06-19 RX ORDER — HEPARIN SODIUM 1000 [USP'U]/ML
INJECTION, SOLUTION INTRAVENOUS; SUBCUTANEOUS
Status: DISCONTINUED | OUTPATIENT
Start: 2020-06-19 | End: 2020-06-19 | Stop reason: HOSPADM

## 2020-06-19 RX ORDER — FLUMAZENIL 0.1 MG/ML
0.2 INJECTION, SOLUTION INTRAVENOUS
Status: ACTIVE | OUTPATIENT
Start: 2020-06-19 | End: 2020-06-20

## 2020-06-19 RX ORDER — NITROGLYCERIN 0.4 MG/1
0.4 TABLET SUBLINGUAL EVERY 5 MIN PRN
Status: DISCONTINUED | OUTPATIENT
Start: 2020-06-19 | End: 2020-06-19

## 2020-06-19 RX ADMIN — ASPIRIN 81 MG: 81 TABLET, COATED ORAL at 08:25

## 2020-06-19 RX ADMIN — FENTANYL CITRATE 25 MCG: 50 INJECTION, SOLUTION INTRAMUSCULAR; INTRAVENOUS at 19:09

## 2020-06-19 RX ADMIN — MIDAZOLAM 1 MG: 1 INJECTION INTRAMUSCULAR; INTRAVENOUS at 19:33

## 2020-06-19 RX ADMIN — POTASSIUM CHLORIDE 20 MEQ: 750 TABLET, EXTENDED RELEASE ORAL at 08:27

## 2020-06-19 RX ADMIN — MIDAZOLAM 0.5 MG: 1 INJECTION INTRAMUSCULAR; INTRAVENOUS at 19:08

## 2020-06-19 RX ADMIN — FENTANYL CITRATE 25 MCG: 50 INJECTION, SOLUTION INTRAMUSCULAR; INTRAVENOUS at 20:01

## 2020-06-19 RX ADMIN — FUROSEMIDE 60 MG: 10 INJECTION, SOLUTION INTRAVENOUS at 20:32

## 2020-06-19 RX ADMIN — ONDANSETRON 4 MG: 2 INJECTION INTRAMUSCULAR; INTRAVENOUS at 21:41

## 2020-06-19 RX ADMIN — METOPROLOL SUCCINATE 100 MG: 100 TABLET, EXTENDED RELEASE ORAL at 08:25

## 2020-06-19 RX ADMIN — ACETAMINOPHEN 650 MG: 325 TABLET, FILM COATED ORAL at 22:01

## 2020-06-19 RX ADMIN — FENTANYL CITRATE 25 MCG: 50 INJECTION, SOLUTION INTRAMUSCULAR; INTRAVENOUS at 19:33

## 2020-06-19 RX ADMIN — SODIUM CHLORIDE: 9 INJECTION, SOLUTION INTRAVENOUS at 21:53

## 2020-06-19 RX ADMIN — HEPARIN SODIUM 8000 UNITS: 1000 INJECTION INTRAVENOUS; SUBCUTANEOUS at 19:59

## 2020-06-19 RX ADMIN — MIDAZOLAM 0.5 MG: 1 INJECTION INTRAMUSCULAR; INTRAVENOUS at 20:15

## 2020-06-19 RX ADMIN — VALSARTAN 320 MG: 160 TABLET, FILM COATED ORAL at 08:26

## 2020-06-19 RX ADMIN — TICAGRELOR 180 MG: 90 TABLET ORAL at 20:31

## 2020-06-19 RX ADMIN — LEVOTHYROXINE SODIUM 88 MCG: 0.09 TABLET ORAL at 08:25

## 2020-06-19 RX ADMIN — MIDAZOLAM 0.5 MG: 1 INJECTION INTRAMUSCULAR; INTRAVENOUS at 20:01

## 2020-06-19 RX ADMIN — LIDOCAINE HYDROCHLORIDE 10 ML: 10 INJECTION, SOLUTION EPIDURAL; INFILTRATION; INTRACAUDAL; PERINEURAL at 19:10

## 2020-06-19 RX ADMIN — ONDANSETRON 4 MG: 4 TABLET, ORALLY DISINTEGRATING ORAL at 17:58

## 2020-06-19 RX ADMIN — IOPAMIDOL 145 ML: 755 INJECTION, SOLUTION INTRAVENOUS at 20:30

## 2020-06-19 ASSESSMENT — ACTIVITIES OF DAILY LIVING (ADL)
RETIRED_COMMUNICATION: 0-->UNDERSTANDS/COMMUNICATES WITHOUT DIFFICULTY
DRESS: 0-->INDEPENDENT
ADLS_ACUITY_SCORE: 17
TRANSFERRING: 0-->INDEPENDENT
ADLS_ACUITY_SCORE: 17
SWALLOWING: 0-->SWALLOWS FOODS/LIQUIDS WITHOUT DIFFICULTY
RETIRED_EATING: 0-->INDEPENDENT
ADLS_ACUITY_SCORE: 17
AMBULATION: 0-->INDEPENDENT
ADLS_ACUITY_SCORE: 17
ADLS_ACUITY_SCORE: 17
COGNITION: 0 - NO COGNITION ISSUES REPORTED
TOILETING: 0-->INDEPENDENT
BATHING: 0-->INDEPENDENT
WHICH_OF_THE_ABOVE_FUNCTIONAL_RISKS_HAD_A_RECENT_ONSET_OR_CHANGE?: NONE;AMBULATION
FALL_HISTORY_WITHIN_LAST_SIX_MONTHS: NO

## 2020-06-19 ASSESSMENT — MIFFLIN-ST. JEOR: SCORE: 1280.95

## 2020-06-19 ASSESSMENT — PAIN DESCRIPTION - DESCRIPTORS: DESCRIPTORS: DISCOMFORT

## 2020-06-19 NOTE — PROGRESS NOTES
Cardiology Progress Note      Assessment and Recommendation:   Dulce Yeh is a 85 year old female with PMH notable for HTN, moderate to severe aortic stenosis (DANIS 1.04 cm2, mean gradient 22mmHg in 2018) hypothyroidism, HLD, paroxsymal atrial fibrillation (remote, solitary occurrence documented in 2015) who presented to the ED by EMS with acute shortness of breath that is now resolved.      Plan:   -Rate controlled afib on tele   -NPO for coronary angiogram, RH,and possible LHC for aortic valve gradient (concern for LFLG severe aortic stenosis)   -Start therapeutic AC with apixaban 5 mg BID (pending procedure)       # Acute hypoxic resp failure, resolved  # ?Hypertensive urgency w/flash pulmonary edema, resolved  # Hx of moderate to severe aortic stenosis, ?LFLG severe aortic stenosis  # Covid negative   Patient describes baseline SOB w/exercise which was more notable yesterday w/associated cough and sputum that crescendoed last night while resting, reading in bed, prompting her to call 911. per EMS initial SpO2 80's and patient was started on 10L O2, also BP noted to be 190/110 mmHg. She arrived to the ER on 10L of oxygen and her BP was coming down without intervention. CXR with bilateral infiltrates c/w edema. Weight by trend was unchanged, pt denies any new LE swelling, orthopnea, PND. BNP 1194, increased from prior. Troponin negative. EKG with known LBBB, does not meet Sgarbossa's criteria. Her BP came down spontaneously after she was started on oxygen to 160/90 mmHg and she had rapidly defervescent oxygen needs. By time of interview, patient was on room air.  Most consistent with flash pulmonary edema event in the setting of aortic stenosis and acutely elevated BP that resolved; she has been on room air for >12 hours. She presented in NSR w/LBBB (known LBBB), and hours into her admission converted to rate controlled atrial fibrillation and this may have contributed to her presenting issues as well. TTE  showing newly reduced LV EF 35% with RV dysfunction as well, probably moderate aortic stenosis but stroke volume is low concerning for LFLG severe aortic stenosis. She also endorses a several months-long history of progressive SOB with exertion and cannot rule out underlying CAD.   - NPO for coronary angiogram, RHC, and LHC   - Telemetry  - Daily weight and I/O  - Continue PTA metoprolol and valsartan  - Start amlodipine 5> 2.5 mg at bedtime   - Continue Valsartan (patient w/multiple issues with hydrochlorothiazide, ACEi, losartan)     NSTEMI, likely type II   Newly reduced LV EF 35-40%   Cardiac risk factors: HTN, age   Patient chest pain free but progressive SOB for several months, now SOB w/ambulating to bathroom. Anginal equivalent versus ?severe aortic stenosis.  She has stable symptoms of SOB that could be anginal equivalent versus secondary to her aortic stenosis. Her initial troponin was negative overnight but not trended, repeated in afternoon was 0.55. EKG non-ischemic. TTE with LV EF 45-50% (likely unchanged) without focal WMA. Feel this is likely 2/2 her initial HTN urgency versus her atrial fibrillation. Cannot exclude worsening of her aortic stenosis causing cardiomyopathy and poor coronary artery perfusion though her TTE again demonstrates likely moderate. ACS seems unlikely.    -NPO MN in case she jerrica significant troponin trend  -Trend troponin   -Continue aspirin 81 mg daily. She was loaded in ER   -Patient has refused statin therapy in past due to adverse reaction.   -EKG    Paroxysmal atrial fibrillation/aflutter with variable conduction, rate controlled  Cvder1mani 4 (age +2, gender, HTN)  In 2015, patient had C.diff pancolitis and was acutely ill complicated by PAF w/ RVR.for about 1 day in total. Ultimately, felt 2nd to her acute illness and not started on AC. She presents in NSR with LBBB. In ER, 7 beats of NSVT noted on tele and then patient converted to atrial fibrillation with rates  "between  bpm. She is asymptomatic and her SBP is 130. Feel this is incidental and not necessarily related to her admission as she was NSR on arrival.   -Cardiac tele   -Continue Metoprolol succinate 100 mg daily   -AC with apixaban 5 mg BID to start post coronary angiogram pending course   -DC w/zio and f/u in 1 month      # Elevated lactate (resolved)   # cough   # Covid-19 negative   Unclear etiology. No leukocytosis. Hypertensive urgency upon arrival. ?allergies. CXR most consistent with pulmonary edema. Low overall suspicion for infection. Resolved without significant intervention. PNA is on differential but no fever or leukocytosis and now on RA making this seem less likely without intervention.   -Continue to eval for s/s infection      # LBBB - Present since at least . Had normal adenosine stress test in .     # HTN - continue PTA metoprolol and valsartan, add amlodipine at bedtime      # HLD - not on statin. Declined due to side effect profile per cards notes.     # Hypothyroidism - continue PTA levothyroxine     # Family history of AAA - brother  of ruptured AAA. Pt had screening in 2019 with normal infrarenal aorta (1.6cm)     ACCESS: PIV  FEN: NPO for procedure  PPX: Apixaban   CODE: FULL      Objective:  Most recent vital signs:  BP (!) 142/105 (BP Location: Right arm)   Pulse 107   Temp 97.5  F (36.4  C) (Oral)   Resp 18   Ht 1.702 m (5' 7\")   Wt 80.3 kg (177 lb 1.6 oz)   LMP  (LMP Unknown)   SpO2 97%   Breastfeeding No   BMI 27.74 kg/m    Temp:  [97.5  F (36.4  C)-98.2  F (36.8  C)] 97.5  F (36.4  C)  Heart Rate:  [] 95  Resp:  [16-18] 18  BP: (101-142)/() 142/105  SpO2:  [94 %-98 %] 97 %  Wt Readings from Last 2 Encounters:   20 80.3 kg (177 lb 1.6 oz)   19 86.2 kg (190 lb)       Intake/Output Summary (Last 24 hours) at 2020 1144  Last data filed at 2020 0600  Gross per 24 hour   Intake --   Output 600 ml   Net -600 ml     Physical " exam:  General: In bed, in NAD  HEENT: EOMI, PERRLA, no scleral icterus or injection  CARDIAC: irreg/irreg, 2/6 JANNETH RUSB. Peripheral pulses 2+  RESP: CTAB, normal WOB and RR   GI: NABS, NT/ND, no guarding or rebound   EXTREMITIES: NO LE edema, pulses 2+.  SKIN: No acute lesions appreciated  NEURO: Alert and oriented X3, CN II-XII grossly intact, no focal neurological deficits noted      Labs (Past three days):  CBC  Recent Labs   Lab 06/19/20  0531 06/18/20  0540 06/18/20  0055   WBC 7.5 8.9 7.8   RBC 5.53* 5.24* 5.28*   HGB 15.4 14.9 14.8   HCT 48.0* 45.6 46.5   MCV 87 87 88   MCH 27.8 28.4 28.0   MCHC 32.1 32.7 31.8   RDW 14.0 14.0 14.1    194 209     BMP  Recent Labs   Lab 06/19/20  0531 06/18/20  0512 06/18/20  0055    137 136   POTASSIUM 3.7 3.8 3.5   CHLORIDE 101 104 104   CO2 26 25 24   ANIONGAP 8 8 8   * 151* 168*   BUN 20 18 18   CR 0.93 0.93 0.99   GFRESTIMATED 56* 56* 52*   GFRESTBLACK 65 65 60*   CR 9.3 9.2 8.8     Troponins:  Lab Results   Component Value Date    TROPI 0.448 () 06/18/2020    TROPI 0.506 () 06/18/2020    TROPI <0.015 06/18/2020    TROPI <0.012 03/11/2013    TROPI <0.012 03/11/2013    TROPONIN 0.15 () 03/10/2013    TROPONIN 0.01 08/13/2011        INRNo lab results found in last 7 days.  Liver panel  Recent Labs   Lab 06/18/20  0055   PROTTOTAL 7.8   ALBUMIN 3.6   BILITOTAL 0.7   ALKPHOS 62   AST 34   ALT 26       Imaging/procedure results:      EKG 6/18 0100      EKG 1023 6/18      CXR  FINDINGS: Upright portable chest. The heart size is normal. There are mild patchy infiltrates bilaterally. The lungs are otherwise clear. No pneumothorax.                                                                  IMPRESSION: Mild bilateral pneumonia.          Patient seen and discussed w/ Dr. Astorga.    Severo TALBOT  [facility]  Cardiology  Pager: [phone]

## 2020-06-19 NOTE — PLAN OF CARE
"D: Admitted 6/18 with acute shortness of breath. Hx includes HTN, aortic stenosis, hypothyroidism, HLD, and paroxysmal afib.     I/A: Monitored and assessed pt status. Provided teaching on amplodipine dose. AOx4. Afebrile. VSS on room air. Afib/flutter on monitor with rates 60s-80s. Denies chest pain/palpitations. Short of breath with exertion. Complains of headache, resolved with tylenol given on prior shift. Denies nausea this shift, but states appetite has been poor. Voiding in adequate amounts. BM-, bowel sounds+, flatus+. NPO since midnight per orders. Slept intermittently between cares. Up with SBA.     P: Continue to monitor and update cards 1 team with changes/concerns.     /76 (BP Location: Right arm)   Pulse 107   Temp 98.2  F (36.8  C) (Oral)   Resp 16   Ht 1.702 m (5' 7\")   Wt 86.2 kg (190 lb)   LMP  (LMP Unknown)   SpO2 97%   Breastfeeding No   BMI 29.76 kg/m      "

## 2020-06-19 NOTE — UTILIZATION REVIEW
Admission Status; Secondary Review Determination     Under the authority of the Utilization Management Committee, the utilization review process indicated a secondary review on the above patient. The review outcome is based on review of the medical records, discussions with staff, and applying clinical experience noted on the date of the review.     (x) Inpatient Status Appropriate - This patient's medical care is consistent with medical management for inpatient care and reasonable inpatient medical practice.     RATIONALE FOR DETERMINATION:  85 year old female with PMH notable for HTN, moderate to severe aortic stenosis (DANIS 1.04 cm2, mean gradient 22mmHg in 2018) hypothyroidism, HLD, paroxsymal atrial fibrillation (remote, solitary occurrence documented in 2015) who presented to the ED by EMS 6/18/20 with acute shortness of breath felt to be due to flash pulmonary edema in the setting of possible hypertensive urgency and mod/severe aortic stenosis.  Labs notable for troponin elevation of 0.5 c/w NSTEMI and plans are for coronary angiogram today including RHC and possible LHC to evaluate for aortic valve gradient given concern for severe aortic stenosis.      At the time of admission with the information available to the attending physician more than 2 nights Hospital complex care was anticipated, based on patient risk of adverse outcome if treated as outpatient and complex care required. Inpatient admission is appropriate based on the Medicare guidelines.    The information on this document is developed by the utilization review team in order for the business office to ensure compliance. This only denotes the appropriateness of proper admission status and does not reflect the quality of care rendered.   The definitions of Inpatient Status and Observation Status used in making the determination above are those provided in the CMS Coverage Manual, Chapter 1 and Chapter 6, section 70.4.     Sincerely,     Tacos  MD Cesar  Utilization Review   Physician Advisor   [facility]

## 2020-06-20 VITALS
HEART RATE: 91 BPM | DIASTOLIC BLOOD PRESSURE: 56 MMHG | RESPIRATION RATE: 16 BRPM | OXYGEN SATURATION: 92 % | TEMPERATURE: 98.3 F | BODY MASS INDEX: 27.37 KG/M2 | SYSTOLIC BLOOD PRESSURE: 96 MMHG | WEIGHT: 174.4 LBS | HEIGHT: 67 IN

## 2020-06-20 LAB
ANION GAP SERPL CALCULATED.3IONS-SCNC: 9 MMOL/L (ref 3–14)
BUN SERPL-MCNC: 24 MG/DL (ref 7–30)
CALCIUM SERPL-MCNC: 9.5 MG/DL (ref 8.5–10.1)
CHLORIDE SERPL-SCNC: 100 MMOL/L (ref 94–109)
CO2 SERPL-SCNC: 26 MMOL/L (ref 20–32)
CREAT SERPL-MCNC: 1.05 MG/DL (ref 0.52–1.04)
ERYTHROCYTE [DISTWIDTH] IN BLOOD BY AUTOMATED COUNT: 14.3 % (ref 10–15)
GFR SERPL CREATININE-BSD FRML MDRD: 48 ML/MIN/{1.73_M2}
GLUCOSE SERPL-MCNC: 127 MG/DL (ref 70–99)
HCT VFR BLD AUTO: 51.7 % (ref 35–47)
HGB BLD-MCNC: 16.6 G/DL (ref 11.7–15.7)
KCT BLD-ACNC: 175 SEC (ref 75–150)
MCH RBC QN AUTO: 28 PG (ref 26.5–33)
MCHC RBC AUTO-ENTMCNC: 32.1 G/DL (ref 31.5–36.5)
MCV RBC AUTO: 87 FL (ref 78–100)
PLATELET # BLD AUTO: 247 10E9/L (ref 150–450)
POTASSIUM SERPL-SCNC: 4.1 MMOL/L (ref 3.4–5.3)
RBC # BLD AUTO: 5.93 10E12/L (ref 3.8–5.2)
SODIUM SERPL-SCNC: 134 MMOL/L (ref 133–144)
WBC # BLD AUTO: 9.4 10E9/L (ref 4–11)

## 2020-06-20 PROCEDURE — 85027 COMPLETE CBC AUTOMATED: CPT | Performed by: INTERNAL MEDICINE

## 2020-06-20 PROCEDURE — 99239 HOSP IP/OBS DSCHRG MGMT >30: CPT | Mod: GC | Performed by: INTERNAL MEDICINE

## 2020-06-20 PROCEDURE — 25000132 ZZH RX MED GY IP 250 OP 250 PS 637: Mod: GY | Performed by: INTERNAL MEDICINE

## 2020-06-20 PROCEDURE — 80048 BASIC METABOLIC PNL TOTAL CA: CPT | Performed by: INTERNAL MEDICINE

## 2020-06-20 PROCEDURE — 25000132 ZZH RX MED GY IP 250 OP 250 PS 637: Mod: GY | Performed by: STUDENT IN AN ORGANIZED HEALTH CARE EDUCATION/TRAINING PROGRAM

## 2020-06-20 PROCEDURE — 36415 COLL VENOUS BLD VENIPUNCTURE: CPT | Performed by: INTERNAL MEDICINE

## 2020-06-20 PROCEDURE — 25000128 H RX IP 250 OP 636: Performed by: INTERNAL MEDICINE

## 2020-06-20 PROCEDURE — 85347 COAGULATION TIME ACTIVATED: CPT

## 2020-06-20 RX ORDER — PANTOPRAZOLE SODIUM 20 MG/1
20 TABLET, DELAYED RELEASE ORAL
Status: DISCONTINUED | OUTPATIENT
Start: 2020-06-20 | End: 2020-06-20 | Stop reason: HOSPADM

## 2020-06-20 RX ORDER — AMLODIPINE BESYLATE 2.5 MG/1
2.5 TABLET ORAL EVERY EVENING
Qty: 30 TABLET | Refills: 0 | Status: SHIPPED | OUTPATIENT
Start: 2020-06-20 | End: 2020-07-08

## 2020-06-20 RX ORDER — ATORVASTATIN CALCIUM 10 MG/1
10 TABLET, FILM COATED ORAL EVERY EVENING
Qty: 30 TABLET | Refills: 0 | Status: SHIPPED | OUTPATIENT
Start: 2020-06-20 | End: 2020-07-08

## 2020-06-20 RX ORDER — PANTOPRAZOLE SODIUM 40 MG/1
40 TABLET, DELAYED RELEASE ORAL
Status: DISCONTINUED | OUTPATIENT
Start: 2020-06-20 | End: 2020-06-20

## 2020-06-20 RX ORDER — ATORVASTATIN CALCIUM 10 MG/1
10 TABLET, FILM COATED ORAL EVERY EVENING
Status: DISCONTINUED | OUTPATIENT
Start: 2020-06-20 | End: 2020-06-20 | Stop reason: HOSPADM

## 2020-06-20 RX ADMIN — VALSARTAN 320 MG: 160 TABLET, FILM COATED ORAL at 07:58

## 2020-06-20 RX ADMIN — METOPROLOL SUCCINATE 100 MG: 100 TABLET, EXTENDED RELEASE ORAL at 07:57

## 2020-06-20 RX ADMIN — LEVOTHYROXINE SODIUM 88 MCG: 0.09 TABLET ORAL at 07:58

## 2020-06-20 RX ADMIN — ASPIRIN 81 MG: 81 TABLET, COATED ORAL at 07:57

## 2020-06-20 RX ADMIN — FENTANYL CITRATE 25 MCG: 50 INJECTION, SOLUTION INTRAMUSCULAR; INTRAVENOUS at 00:27

## 2020-06-20 RX ADMIN — TICAGRELOR 90 MG: 90 TABLET ORAL at 07:55

## 2020-06-20 RX ADMIN — FENTANYL CITRATE 25 MCG: 50 INJECTION, SOLUTION INTRAMUSCULAR; INTRAVENOUS at 00:49

## 2020-06-20 ASSESSMENT — ACTIVITIES OF DAILY LIVING (ADL)
ADLS_ACUITY_SCORE: 17
ADLS_ACUITY_SCORE: 13

## 2020-06-20 ASSESSMENT — MIFFLIN-ST. JEOR: SCORE: 1268.7

## 2020-06-20 NOTE — DISCHARGE SUMMARY
[address] [address]  p: [phone]    Discharge Summary: Cardiology Service    Dulce Yeh MRN# [id number]   YOB: 1935 Age: 85 year old       Admission Date: 6/18/2020  Discharge Date: 06/20/20    Follow up:    Recommend valve clinic appointment for TAVR (cardiac CT ordered which will be performed outpatient) for severe aortic stenosis.    Follow-up with primary cardiologist  in a month.      Discharge Diagnoses:  1. Acute hypoxic resp failure  2. HTN urgency with flash pulm edema  3. Severe aortic stenosis (new diagnosis by cardiac cath on this admission)  4. NSTEMI, severe LAD disease w/p PCI x3  5. Presented with acute HFrEF 35-40% (new diagnosis) with flash acute pulmonary edema; pulmonary edema resolved.  6. Paroxysmal Afib     Pertinent Procedures:  1. Coronary Angiogram     Imaging with results:  Echocardiogram 6/19/2020:  Moderately (EF 35-40%) reduced left ventricular function is present.  Global right ventricular function is moderately reduced.  Probably moderate aortic stenosis, however accurate assessment of the aortic  valve is challenging with atrial fibrillation rhythm. The RCC and NCC appears  partially fused. The Vmax is 2.41 m/s and mean gradient is 13 mmHg. The DANIS is  calculated at 1.1 cm2 with a index of 0.54 cm/m2. The dimensionless index is  0.38.  The inferior vena cava was normal in size with preserved respiratory  variability.  No pericardial effusion is present.    Coronary Angiogram 6/19/2020:  Left Anterior Descending    Prox LAD to Mid LAD lesion is 90% stenosed. The lesion is segmental. The lesion is calcified.    First Diagonal Branch    The vessel is small. There is mild diffuse disease throughout the vessel.    Second Diagonal Branch    The vessel is small. There is mild diffuse disease throughout the vessel.    Left Circumflex    Prox Cx to Mid Cx lesion is 50% stenosed.    First  Obtuse Marginal Branch    The vessel is moderate in size. There is mild diffuse disease throughout the vessel.    Second Obtuse Marginal Branch    The vessel is small. There is moderate diffuse disease throughout the vessel.    Right Coronary Artery    The vessel was visualized by selective angiography. There was 30% diffuse vessel disease.    Intervention     Prox LAD to Mid LAD lesion    Stent    A stent was successfully placed.    Stent    A stent was successfully placed.    Stent    A stent was successfully placed.    There is a 10% residual stenosis post intervention.      Brief HPI:  Dulce Yeh is a 85 year old female with PMH notable for HTN, Aortic stenosis, hypothyroidism, HLD, atrial fibrillation who presented to the ED by EMS after waking up with acute shortness of breath, found to be in hypertensive urgency with flash pulmonary edema. Further studies showed severe LAD diseases s/p PCI x3    Hospital Course by Diagnosis:  # Acute hypoxic resp failure 2/2 flash acute pulmonary edema, resolved  # Severe aortic stenosis  Acutely hypoxic requiring supplemental O2. On admission troponin negative. EKG with known LBBB. O2 requirement decreased with BP control. Most consistent with flash pulmonary edema event in the setting of aortic stenosis. Echo shows worsening trans-valvular gradient. Discussed aortic valve replacement and patient is agreeable. CT TAVR is ordered to complete on an outpatient basis and pt will follow up with Dr. Larkin for TAVR.      NSTEMI  Severe LAD disease, s/p PCI x3  Newly reduced acute heart failure with reduced EF (LV EF 35-40%)   Chest pain free but trop elevated at 0.5. Has known LBBB and EKG neg for acute ST changes. TTE showed now focal WMA but had newly reduced LVEF to 35-40% with RV dysfunction. Underwent coronary angiogram on 6/19 which demonstrated severe LAD disease, s/p PCI x3. She is started on ticagrelor and amlodipine. Ticagrelor will continue for one year until  after stent placement (stop 06/2021). ASA has been stopped as patient is also on Eliquis for afib. She is to continue taking metoprolol and valsartan.     Paroxysmal atrial fibrillation/aflutter with variable conduction, rate controlled  [id number] 4 (age +2, gender, HTN)   In ER, 7 beats of NSVT noted on tele and then patient converted to atrial fibrillation with rates between  bpm. She is asymptomatic and her SBP is 130. Started on Eliquis on discharge. Pt is continued on pta metoprolol with good rate control.     Condition on discharge  Temp:  [97.3  F (36.3  C)-98.2  F (36.8  C)] 97.4  F (36.3  C)  Pulse:  [62-91] 91  Heart Rate:  [61-99] 80  Resp:  [16] 16  BP: ()/(56-93) 112/69  SpO2:  [92 %-99 %] 95 %  General: In bed, in NAD  CARDIAC: irreg/irreg, 2/6 JANNETH RUSB. Peripheral pulses 2+  RESP: CTAB, normal WOB and RR   GI: NABS, NT/ND, no guarding or rebound   EXTREMITIES: NO LE edema, pulses 2+.  SKIN: No acute lesions appreciated  NEURO: Alert and oriented X3, CN II-XII grossly intact, no focal neurological deficits noted      Medication Changes:  Stop taking Aspirin   Start taking amlodipine 2.5mg daily  Start taking Eliquis 5mg twice per day  Start taking Ticagrelor 90mg twice per day  Start taking atorvastatin 10mg daily and increase dose accordingly to reach high intensity     Discharge medications:   Current Discharge Medication List        START taking these medications    Details   amLODIPine (NORVASC) 2.5 MG tablet Take 1 tablet (2.5 mg) by mouth every evening  Qty: 30 tablet, Refills: 0    Associated Diagnoses: Hypertension, unspecified type      apixaban ANTICOAGULANT (ELIQUIS) 5 MG tablet Take 1 tablet (5 mg) by mouth 2 times daily  Qty: 60 tablet, Refills: 0    Associated Diagnoses: NSTEMI (non-ST elevated myocardial infarction) (H)      atorvastatin (LIPITOR) 10 MG tablet Take 1 tablet (10 mg) by mouth every evening  Qty: 30 tablet, Refills: 0    Associated Diagnoses: NSTEMI (non-ST  elevated myocardial infarction) (H)      ticagrelor (BRILINTA) 90 MG tablet Take 1 tablet (90 mg) by mouth every 12 hours  Qty: 60 tablet, Refills: 0    Associated Diagnoses: NSTEMI (non-ST elevated myocardial infarction) (H)           CONTINUE these medications which have NOT CHANGED    Details   ascorbic acid (VITAMIN C) 500 MG tablet Take 500 mg by mouth daily.      BIOTIN PO Take by mouth daily      calcium carb 1250 mg, 500 mg [place],/vitamin D 200 units (OSCAL WITH D) 500-200 MG-UNIT per tablet Take 1 tablet by mouth 2 times daily (with meals).      Cetirizine HCl (ZYRTEC PO) Take  by mouth.      clobetasol (CLOBETASOL PROPIONATE EMULSION) 0.05 % CREA Apply topically as needed  Qty: 15 g, Refills: 0    Associated Diagnoses: Lichen sclerosus et atrophicus of the vulva      cyanocolbalamin (VITAMIN B-12) 1000 MCG tablet Take 1,000 mcg by mouth daily.      fluocinonide (LIDEX) 0.05 % solution Apply topically 2 times daily      FLUZONE HIGH-DOSE 0.5 ML injection TO BE ADMINISTERED BY PHARMACIST FOR IMMUNIZATION  Refills: 0      Glucosamine-Chondroit-Vit C-Mn (GLUCOSAMINE CHONDR 1500 COMPLX) CAPS Take  by mouth.      levothyroxine (SYNTHROID/LEVOTHROID) 88 MCG tablet Take 1 tablet (88 mcg) by mouth daily  Qty: 90 tablet, Refills: 3    Associated Diagnoses: Hypothyroidism due to acquired atrophy of thyroid      Magnesium 300 MG CAPS Take  by mouth.      Magnesium Hydroxide (MILK OF MAGNESIA PO)       metoprolol succinate ER (TOPROL-XL) 100 MG 24 hr tablet Take 1 tablet (100 mg) by mouth daily  Qty: 90 tablet, Refills: 3    Associated Diagnoses: Essential hypertension      Omega-3 Fatty Acids (OMEGA 3 PO)       valsartan (DIOVAN) 320 MG tablet Take 1 tablet (320 mg) by mouth daily  Qty: 90 tablet, Refills: 3    Associated Diagnoses: Benign essential hypertension           STOP taking these medications       aspirin 81 MG tablet Comments:   Reason for Stopping:               Labs or imaging requiring follow-up after  discharge:  CT TAVR (ordered)    Follow-up:  Dr. Hedrick for CT TAVR  PCP within one week for BP and LFT check to increase atorvastatin dose    Code status:  Full     Patient Care Team:  Gilda Hogan MD as PCP - General (Internal Medicine)  Gian Khanna MD as MD (Orthopedics)     Pt discussed and staffed w/ Dr. Rizwan Reyes MD  Internal Medicine PGY2  [phone]      Attending attestation:  I saw and evaluated this patient prior to discharge. I discussed the patient with the resident and agree with the plan of care as documented in the resident note.     I personally reviewed the vital signs, medications, labs and imaging (ECG, ECHO).    I personally spent 30 min on discharge activities.    Date of service 6/20/2020    Josef Astorga MD  [facility] [facility]  Cardiovascular Division  [phone]

## 2020-06-20 NOTE — PROGRESS NOTES
DISCHARGE   Discharged to: Home  Via: Automobile  Accompanied by: Daughter  Discharge Instructions: principal diagnosis, major findings/procedures, diet, activity, medications, follow up appointments, when to call the MD, and what to watchout for (i.e. s/s of infection, increasing SOB, palpitations, Angina). Pt states understanding of all discharge instructions.   Prescriptions: To be filled at discharge pharmacy per pt's request; scripts sent to pharmacy.  Follow Up Appointments: with PCP in 1 week, with Dr. Hedrick (cardiology) in 2 weeks  Belongings: All sent with pt. Pt verifies that they are taking all belongings with them.   IV: discontinued.   Telemetry: discontinued.   Pt exhibits understanding of above discharge instructions; all questions answered.  Discharge Paperwork: faxed to discharge planner.

## 2020-06-20 NOTE — PLAN OF CARE
"/73 (BP Location: Right arm)   Pulse 62   Temp 97.5  F (36.4  C) (Oral)   Resp 16   Ht 1.702 m (5' 7\")   Wt 79.1 kg (174 lb 6.4 oz)   LMP  (LMP Unknown)   SpO2 97%   Breastfeeding No   BMI 27.31 kg/m      D: Patient arrived yesterday from ED with SOB and was found to be Afib with RVR.  I/A: Patient denies SOB, A&OX4, AVSS arrived from [facility] and PCI with 2 stents on the LAD.  Patient patient arrived to the unit at 2100 and had 2-7Fr catheters at R-Groin, proceeded to remove catheters at 0035 after ACT was 175, manual pressure with quick-clot was applied for 20-minutes and site was oozing blood, reapplied manual pressure for another 15-minutes.  G-groin is free of bleeding, hematoma and site is covered with transparent dressing.  Patient received 50mcg of Fentanly during the sheath removable process.  Patient is on oxygen 2L NC and will be weaned off ones she is off bedrest at 0600.  NS at 75ml/hr infusing at the R-arm PIV.  Patient is in afib/aflutter HR 70-80s.  On clear diet, had episode of nausea and Zofran IV given, and also c/p lower back pain and received tylenol PO given with relief.  Patient is using bedpan for voiding and esteban-care provided.  Dr. Contreras was updated on the patient's progress and status.  P: Will continue to monitor and notify Cards 1 of status changes.       "

## 2020-06-20 NOTE — PROGRESS NOTES
CLINICAL NUTRITION SERVICES    Reason for Assessment:  Heart-healthy nutrition education, received consult.    Diet History:  Spoke with pt today via phone; she reports some familiarity with heart-healthy nutrition education but has not had any formal education regarding these diet recommendations. At baseline, pt reports that she typically eats 3 meals/day (sometimes 2 if she sleeps in and combines breakfast/lunch). She likes to prepare [place] dishes with a variety of grains as well as a lentil soup. Pt reports that she also enjoys baking desserts and tries to enjoy these in moderation. Rarely dines out. Admits that there are aspects of her diet she would like to improve such as reducing salt and sugar intake. She occasionally attends cooking classes and [facility] [employer], recently learned how to make cauliflower crusts. Pt reports her son is very knowledgeable about nutrition and may be a good resource for her.      Nutrition Diagnosis:  Food- and nutrition-related knowledge deficit r/t no previous knowledge of heart-healthy diet AEB pt report of no previous formal heart-healthy nutrition education.    Nutrition Prescription/Recs:  Continue heart-healthy diet.      Interventions:  Nutrition Education  1.  Provided verbal instruction on a heart-healthy diet.  2.  Discharge instructions entered with written recommendations for heart healthy diet.     Goals:    1.  Pt will verbalize at least four foods high in saturated or trans-fats and five foods high in sodium.    2.  Pt will list at least two interventions to make current meal plan more heart-healthy.     Follow-up:   Patient to ask any further nutrition-related questions before discharge. In addition, pt may request outpatient RD appointment.    Johnathon Snowden, MS, RD, LD  Weekend pager [phone]

## 2020-06-20 NOTE — DISCHARGE INSTRUCTIONS
To follow a heart healthy diet:   - Eat a balanced diet with whole grains, fruits, vegetables, and lean protein sources.  - Achieve and maintain a healthy weight.  - Choose heart-healthy unsaturated fats. Limit saturated fats and avoid trans fats. Eat more plant based or vegetarian meals using beans and soy foods for protein.   - Eat whole, unprocessed foods to limit the amount of sodium (salt) you eat.   - Limit refined carbohydrates especially sugar, sweets, and sugar sweetened beverages.  - If you drink alcohol, do so in moderation: one serving per day (women) and two servings per day (men). One serving is equivalent to 12 ounces beer, 5 ounces wine, or 1.5 ounces liquor.

## 2020-06-20 NOTE — PLAN OF CARE
VSS. A&Ox4. Afib/flutter. Denies pain. Eating well. Voiding. R groin site CDI- some bruising noted, but soft. CMS intact. PIV discontinued. Pt pleasant, cooperative.    Plan for pt to discharge this afternoon. Pt was about to leave unit, but had large loose incontinent stool. Pt then felt lightheaded and nauseous. BP soft- maps ok. Afebrile. Sx notified. Continue to monitor until pt feeling better, then discharge. Plan to  2 medications at the discharge pharmacy. Family to  pt.

## 2020-06-21 ENCOUNTER — PATIENT OUTREACH (OUTPATIENT)
Dept: CARE COORDINATION | Facility: CLINIC | Age: 85
End: 2020-06-21

## 2020-06-22 LAB — INTERPRETATION ECG - MUSE: NORMAL

## 2020-06-22 NOTE — PROGRESS NOTES
[facility]: Post-Discharge Note  SITUATION                                                      Admission:    Admission Date: 06/18/20   Reason for Admission: Acute hypoxic resp failure  Discharge:   Discharge Date: 06/20/20  Discharge Diagnosis: Acute hypoxic resp failure  Discharge Service: Cardiology    BACKGROUND                                                      Dulce Yeh is a 85 year old female with PMH notable for HTN, Aortic stenosis, hypothyroidism, HLD, post-op atrial fibrillation who presented to the ED by EMS after acute shortness of breath.      Pt was sitting in bed reading a book when she had acute onset shortness of breath. Felt like she couldn't take a deep breath and that she needed to cough up phlegm. When it did not improve after several minutes she called EMS. Has never had a similar episode before. Denied any associated chest pain, tightness, pressure, diaphoresis, nausea, lightheadedness. No recent LE edema, weight has been stable. No orthopnea or PND. Is relatively active at baseline - gardening and doing chores around the house. Able to walk up >2 flights of stairs without stopping. Has been quarantining at home for 3 months and if she goes to run an errand she always wears a mask. Denies cough, fever, chills, sore throat, myalgias. No known sick contacts.      Follows with Dr. Davis, last seen in clinic in 10/2018. Echo in 2013 showed mild-moderate AS, mean gradient 15 and estimated valve area of 1.2 cm2. In 2015, aortic valve gradients had increased with a mean gradient of 25 mmHg and valve area 0.8-1cm2, consistent with moderate to severe aortic stenosis. At that time remained minimally symptomatic with only mild exertional dyspnea. Pt had repeat TTE in 4/2019 with mean gradient 22 mmHg and valve area 1.04 cm2, relatively stable from prior. EF at that time was 55-60%.     EMS noted pt was satting at 87% on RA and placed on 10L/m in ambulance. Hypertensive to  "190s/110s. In the ED pts BPs were improved to 150/90 without intervention. Satting 100% on RA, placed on 2L for comfort. Labs significant for negative troponin, normal BNP (1194), but increased from prior. No leukocytosis. CXR with bilateral infiltrates most c/w pulmonary edema. Pt was given 20mg IV lasix and 325mg ASA.     ASSESSMENT      Discharge Assessment  Patient reports symptoms are: Other(Patient reports some \"lower blood pressures and general aches since being home\")  Does the patient have all of their medications?: Yes  Does patient know what their new medications are for?: Yes  Does patient have a follow-up appointment scheduled?: No  Does patient have any other questions or concerns?: Yes    Post-op  Did the patient have surgery or a procedure: No  Fever: No  Chills: No  Eating & Drinking: eating and drinking without complaints/concerns  PO Intake: regular diet  Bowel Function: normal  Urinary Status: voiding without complaint/concerns    PLAN                                                      Outpatient Plan:      Recommend valve clinic appointment for TAVR (cardiac CT ordered which will be performed outpatient) for severe aortic stenosis.     Follow-up with primary cardiologist  in a month.    No future appointments.        Della Mccrary CMA                    "

## 2020-06-22 NOTE — PROGRESS NOTES
"The patient was contacted for a post-hospital call and reports some \"lower blood pressures since being home\".  Last night before bed: /78 P 78  This morning when waking up: 107/70  P80  After a shower: BP 93/63 P 67  Jut now: /78 P 78  Patient reports \"a little lightheadedness this morning but I feel steady on my feet\". Patient is also complaining of general aches in her arms and legs, worse with movement.    Patient is wondering if she is taking to high a dose of her medications.     Valsartan 160mg daily  Brilinta 90mg BID  Eliquis 5mg BID    Please contact patient to discuss further  "

## 2020-06-25 DIAGNOSIS — Z95.5 STATUS POST INSERTION OF DRUG ELUTING CORONARY ARTERY STENT: Primary | ICD-10-CM

## 2020-06-25 DIAGNOSIS — I35.0 SEVERE AORTIC STENOSIS: Primary | ICD-10-CM

## 2020-06-25 RX ORDER — CLOPIDOGREL BISULFATE 75 MG/1
TABLET ORAL
Qty: 4 TABLET | Refills: 0 | Status: SHIPPED | OUTPATIENT
Start: 2020-06-25 | End: 2020-07-24

## 2020-06-25 RX ORDER — CLOPIDOGREL BISULFATE 75 MG/1
75 TABLET ORAL DAILY
Qty: 90 TABLET | Refills: 3 | Status: SHIPPED | OUTPATIENT
Start: 2020-06-25 | End: 2021-06-14

## 2020-06-30 ENCOUNTER — HOSPITAL ENCOUNTER (OUTPATIENT)
Dept: CT IMAGING | Facility: CLINIC | Age: 85
Discharge: HOME | End: 2020-06-30
Attending: INTERNAL MEDICINE | Admitting: INTERNAL MEDICINE
Payer: MEDICARE

## 2020-06-30 DIAGNOSIS — I35.0 SEVERE AORTIC STENOSIS: ICD-10-CM

## 2020-06-30 LAB
ANION GAP SERPL CALCULATED.3IONS-SCNC: 4 MMOL/L (ref 3–14)
BUN SERPL-MCNC: 22 MG/DL (ref 7–30)
CALCIUM SERPL-MCNC: 9.4 MG/DL (ref 8.5–10.1)
CHLORIDE SERPL-SCNC: 103 MMOL/L (ref 94–109)
CO2 SERPL-SCNC: 28 MMOL/L (ref 20–32)
CREAT SERPL-MCNC: 1.02 MG/DL (ref 0.52–1.04)
GFR SERPL CREATININE-BSD FRML MDRD: 50 ML/MIN/{1.73_M2}
GLUCOSE SERPL-MCNC: 111 MG/DL (ref 70–99)
POTASSIUM SERPL-SCNC: 4.3 MMOL/L (ref 3.4–5.3)
SODIUM SERPL-SCNC: 135 MMOL/L (ref 133–144)

## 2020-06-30 PROCEDURE — 71275 CT ANGIOGRAPHY CHEST: CPT | Mod: 26 | Performed by: INTERNAL MEDICINE

## 2020-06-30 PROCEDURE — 36415 COLL VENOUS BLD VENIPUNCTURE: CPT | Performed by: INTERNAL MEDICINE

## 2020-06-30 PROCEDURE — 80048 BASIC METABOLIC PNL TOTAL CA: CPT | Performed by: INTERNAL MEDICINE

## 2020-06-30 PROCEDURE — 25000128 H RX IP 250 OP 636: Performed by: INTERNAL MEDICINE

## 2020-06-30 PROCEDURE — 71275 CT ANGIOGRAPHY CHEST: CPT

## 2020-06-30 PROCEDURE — 74174 CTA ABD&PLVS W/CONTRAST: CPT | Mod: 26 | Performed by: INTERNAL MEDICINE

## 2020-06-30 RX ORDER — IOPAMIDOL 755 MG/ML
120 INJECTION, SOLUTION INTRAVASCULAR ONCE
Status: COMPLETED | OUTPATIENT
Start: 2020-06-30 | End: 2020-06-30

## 2020-06-30 RX ADMIN — IOPAMIDOL 120 ML: 755 INJECTION, SOLUTION INTRAVENOUS at 11:32

## 2020-07-02 ENCOUNTER — VIRTUAL VISIT (OUTPATIENT)
Dept: CARDIOLOGY | Facility: CLINIC | Age: 85
End: 2020-07-02
Attending: INTERNAL MEDICINE
Payer: MEDICARE

## 2020-07-02 VITALS
HEART RATE: 85 BPM | DIASTOLIC BLOOD PRESSURE: 92 MMHG | SYSTOLIC BLOOD PRESSURE: 134 MMHG | WEIGHT: 179 LBS | BODY MASS INDEX: 28.04 KG/M2

## 2020-07-02 DIAGNOSIS — I35.0 NONRHEUMATIC AORTIC VALVE STENOSIS: Primary | ICD-10-CM

## 2020-07-02 PROCEDURE — 99443 ZZC PHYSICIAN TELEPHONE EVALUATION 21-30 MIN: CPT | Performed by: INTERNAL MEDICINE

## 2020-07-02 ASSESSMENT — PAIN SCALES - GENERAL: PAINLEVEL: NO PAIN (0)

## 2020-07-02 NOTE — LETTER
7/2/2020      RE: Dulce Yeh  [address] [place] [address]       Dear Colleague,    Thank you for the opportunity to participate in the care of your patient, Dulce Yeh, at the [facility]. Please see a copy of my visit note below.    Dulce Yeh is a 85 year old female who is being evaluated via a billable telephone visit.          [facility] HEART CARE  CARDIOVASCULAR DIVISION    VALVE CLINIC INITIAL CONSULTATION    PRIMARY CARDIOLOGIST: Dr. Davis      PERTINENT CLINICAL HISTORY:     Dulce Yeh is a very pleasant 85 year old female with severe aortic valvular stenosis referred to our clinic for evaluation and consideration for potential transcatheter aortic valve replacement (TAVR).  her severe aortic stenosis is characterized with an area of 1.1 cm2, mean gradient 13 mmHg and with LVEF 40% by echocardiogram on 6/18/2020.  Additional notable medical history of atrial fibrillation, hypertension and coronary artery disease.    Patient states overall she is mildly symptomatic with shortness of breath and near syncope.       PAST MEDICAL HISTORY:     Past Medical History:   Diagnosis Date     Allergy      Aortic stenosis 10/11/2011     C. 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    after TKA surgery     S/P cholecystectomy 10/11/2011     Unspecified hypothyroidism 10/11/2011        PAST SURGICAL HISTORY:     Past Surgical History:   Procedure Laterality Date     ANKLE SURGERY       APPENDECTOMY       bilateral tubal ligation       CHOLECYSTECTOMY       CV CORONARY ANGIOGRAM N/A 6/19/2020    Procedure: Coronary Angiogram with possible PCI. RHC, possible LHC for TV gradient;  Surgeon: Tu Hedrick MD;  Location:  HEART CARDIAC CATH LAB     CV PCI STENT DRUG ELUTING  N/A 6/19/2020    Procedure: Percutaneous Coronary Intervention Stent Drug Eluting;  Surgeon: Tu Hedrick MD;  Location:  HEART CARDIAC CATH LAB     ORTHOPEDIC SURGERY          CURRENT MEDICATIONS:     Current Outpatient Medications   Medication Sig Dispense Refill     amLODIPine (NORVASC) 2.5 MG tablet Take 1 tablet (2.5 mg) by mouth every evening 30 tablet 0     apixaban ANTICOAGULANT (ELIQUIS) 5 MG tablet Take 1 tablet (5 mg) by mouth 2 times daily 60 tablet 0     ascorbic acid (VITAMIN C) 500 MG tablet Take 500 mg by mouth daily.       atorvastatin (LIPITOR) 10 MG tablet Take 1 tablet (10 mg) by mouth every evening 30 tablet 0     BIOTIN PO Take by mouth daily       calcium carb 1250 mg, 500 mg [place],/vitamin D 200 units (OSCAL WITH D) 500-200 MG-UNIT per tablet Take 1 tablet by mouth 2 times daily (with meals).       Cetirizine HCl (ZYRTEC PO) Take  by mouth.       clobetasol (CLOBETASOL PROPIONATE EMULSION) 0.05 % CREA Apply topically as needed 15 g 0     clopidogrel (PLAVIX) 75 MG tablet Take all four tablets (300 mg) on the first day as a one time dose. The second day start one tablet (75mg) once a day. 4 tablet 0     clopidogrel (PLAVIX) 75 MG tablet Take 1 tablet (75 mg) by mouth daily 90 tablet 3     cyanocolbalamin (VITAMIN B-12) 1000 MCG tablet Take 1,000 mcg by mouth daily.       fluocinonide (LIDEX) 0.05 % solution Apply topically 2 times daily       FLUZONE HIGH-DOSE 0.5 ML injection TO BE ADMINISTERED BY PHARMACIST FOR IMMUNIZATION  0     Glucosamine-Chondroit-Vit C-Mn (GLUCOSAMINE CHONDR 1500 COMPLX) CAPS Take  by mouth.       levothyroxine (SYNTHROID/LEVOTHROID) 88 MCG tablet Take 1 tablet (88 mcg) by mouth daily 90 tablet 3     Magnesium 300 MG CAPS Take  by mouth.       Magnesium Hydroxide (MILK OF MAGNESIA PO)        metoprolol succinate ER (TOPROL-XL) 100 MG 24 hr tablet Take 1 tablet (100 mg) by mouth daily 90 tablet 3     Omega-3 Fatty Acids (OMEGA 3 PO)        valsartan (DIOVAN) 320  MG tablet Take 1 tablet (320 mg) by mouth daily 90 tablet 3        ALLERGIES:     Allergies   Allergen Reactions     Sulfa Drugs Hives     Clonidine Rash     Diltiazem Rash        FAMILY HISTORY:     Family History   Problem Relation Age of Onset     Cancer Mother         lung cancer     Testicular cancer Father      Abdominal Aortic Aneurysm Brother      Diabetes Brother         SOCIAL HISTORY:     Social History     Socioeconomic History     Marital status:      Spouse name: Not on file     Number of children: 3     Years of education: Not on file     Highest education level: Not on file   Occupational History     Not on file   Social Needs     Financial resource strain: Not on file     Food insecurity     Worry: Not on file     Inability: Not on file     Transportation needs     Medical: Not on file     Non-medical: Not on file   Tobacco Use     Smoking status: Never Smoker     Smokeless tobacco: Never Used   Substance and Sexual Activity     Alcohol use: No     Drug use: No     Sexual activity: Yes     Birth control/protection: Post-menopausal   Lifestyle     Physical activity     Days per week: Not on file     Minutes per session: Not on file     Stress: Not on file   Relationships     Social connections     Talks on phone: Not on file     Gets together: Not on file     Attends Anabaptist service: Not on file     Active member of club or organization: Not on file     Attends meetings of clubs or organizations: Not on file     Relationship status: Not on file     Intimate partner violence     Fear of current or ex partner: Not on file     Emotionally abused: Not on file     Physically abused: Not on file     Forced sexual activity: Not on file   Other Topics Concern      Service Not Asked     Blood Transfusions Not Asked     Caffeine Concern Yes     Comment: coffee, tea     Occupational Exposure Not Asked     Hobby Hazards Not Asked     Sleep Concern Not Asked     Stress Concern Not Asked      Weight Concern Not Asked     Special Diet Not Asked     Back Care Not Asked     Exercise Yes     Comment: swims 3x a week     Bike Helmet Not Asked     Seat Belt Not Asked     Self-Exams Not Asked     Parent/sibling w/ CABG, MI or angioplasty before 65F 55M? Not Asked   Social History Narrative    Lives alone, children nearby        2 boys and 1 girl (1 son )     passed away 24 yo    Labs    [employer]    Previoysly worked in [employer] and [employer]        REVIEW OF SYSTEMS:     Constitutional: No fevers or chills  Skin: No new rash or itching  Eyes: No acute change in vision  Ears/Nose/Throat: No purulent rhinorrhea, new hearing loss, or new vertigo  Respiratory: No cough or hemoptysis  Cardiovascular: See HPI  Gastrointestinal: No change in appetite, vomiting, hematemesis or diarrhea  Genitourinary: No dysuria or hematuria  Musculoskeletal: No new back pain, neck pain or muscle pain  Neurologic: No new headaches, focal weakness or behavior changes  Psychiatric: No hallucinations, excessive alcohol consumption or illegal drug usage  Hematologic/Lymphatic/Immunologic: No bleeding, chills, fever, night sweats or weight loss  Endocrine: No new cold intolerance, heat intolerance, polyphagia, polydipsia or polyuria      PHYSICAL EXAMINATION:     Not done as this is a virtual visit.      LABORATORY DATA:     LIPID RESULTS:  Lab Results   Component Value Date    CHOL 177 2020    HDL 33 (L) 2020     (H) 2020    TRIG 72 2020    CHOLHDLRATIO 6.1 (H) 10/10/2012       LIVER ENZYME RESULTS:  Lab Results   Component Value Date    AST 34 2020    ALT 26 2020       CBC RESULTS:  Lab Results   Component Value Date    WBC 9.4 2020    RBC 5.93 (H) 2020    HGB 16.6 (H) 2020    HCT 51.7 (H) 2020    MCV 87 2020    MCH 28.0 2020    MCHC 32.1 2020    RDW 14.3 2020     2020       BMP RESULTS:  Lab Results   Component  Value Date     06/30/2020    POTASSIUM 4.3 06/30/2020    CHLORIDE 103 06/30/2020    CO2 28 06/30/2020    ANIONGAP 4 06/30/2020     (H) 06/30/2020    BUN 22 06/30/2020    CR 1.02 06/30/2020    GFRESTIMATED 50 (L) 06/30/2020    GFRESTBLACK 58 (L) 06/30/2020    CR 9.4 06/30/2020        A1C RESULTS:  Lab Results   Component Value Date    A1C 6.0 10/10/2012       INR RESULTS:  No results found for: INR         CLINICAL IMPRESSION:     Dulce Yeh is a 85 year old female with symptomatic moderate to severe aortic stenosis who is a good candidate for transcatheter aortic valve replacement after confirming her severity of the aortic stenosis.  Based on echocardiographic findings her valve area is 1.1 cm.  The gradient is only of 13 mmHg.  We will schedule her for a coronary angiography right heart catheterization with a valve assessment to confirm her severity.  We will also schedule for TAVR CT the preparation for valve replacement.    Tu Hedrick MD    CC  Patient Care Team:  Gilda Hogan MD as PCP - General (Internal Medicine)  Gian Khanna MD as MD (Orthopedics)  Gilda Hogan MD as Assigned PCP  SELF, REFERRED        Please do not hesitate to contact me if you have any questions/concerns.     Sincerely,     [facility]

## 2020-07-02 NOTE — PROGRESS NOTES
"Dulce Yeh is a 85 year old female who is being evaluated via a billable telephone visit.      The patient has been notified of following:     \"This telephone visit will be conducted via a call between you and your physician/provider. We have found that certain health care needs can be provided without the need for a physical exam.  This service lets us provide the care you need with a short phone conversation.  If a prescription is necessary we can send it directly to your pharmacy.  If lab work is needed we can place an order for that and you can then stop by our lab to have the test done at a later time.    Telephone visits are billed at different rates depending on your insurance coverage. During this emergency period, for some insurers they may be billed the same as an in-person visit.  Please reach out to your insurance provider with any questions.    If during the course of the call the physician/provider feels a telephone visit is not appropriate, you will not be charged for this service.\"    Patient has given verbal consent for Telephone visit?  Yes    What phone number would you like to be contacted at? [phone]    How would you like to obtain your AVS? Mail a copy    Phone call duration:  30 minutes    Tu Hedrick MD        [facility] HEART [facility]  CARDIOVASCULAR DIVISION    VALVE CLINIC INITIAL CONSULTATION    PRIMARY CARDIOLOGIST: Dr. Davis      PERTINENT CLINICAL HISTORY:     Dulce Yeh is a very pleasant 85 year old female with severe aortic valvular stenosis referred to our clinic for evaluation and consideration for potential transcatheter aortic valve replacement (TAVR).  her severe aortic stenosis is characterized with an area of 1.1 cm2, mean gradient 13 mmHg and with LVEF 40% by echocardiogram on 6/18/2020.  Additional notable medical history of atrial fibrillation, hypertension and coronary artery disease.    Patient states overall she is mildly symptomatic with shortness " of breath and near syncope.       PAST MEDICAL HISTORY:     Past Medical History:   Diagnosis Date     Allergy      Aortic stenosis 10/11/2011     C. 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    after TKA surgery     S/P cholecystectomy 10/11/2011     Unspecified hypothyroidism 10/11/2011        PAST SURGICAL HISTORY:     Past Surgical History:   Procedure Laterality Date     ANKLE SURGERY       APPENDECTOMY       bilateral tubal ligation       CHOLECYSTECTOMY       CV CORONARY ANGIOGRAM N/A 6/19/2020    Procedure: Coronary Angiogram with possible PCI. RHC, possible LHC for TV gradient;  Surgeon: Tu Hedrick MD;  Location: [facility] CARDIAC CATH LAB     CV PCI STENT DRUG ELUTING N/A 6/19/2020    Procedure: Percutaneous Coronary Intervention Stent Drug Eluting;  Surgeon: Tu Hedrick MD;  Location: [facility] CARDIAC CATH LAB     ORTHOPEDIC SURGERY          CURRENT MEDICATIONS:     Current Outpatient Medications   Medication Sig Dispense Refill     amLODIPine (NORVASC) 2.5 MG tablet Take 1 tablet (2.5 mg) by mouth every evening 30 tablet 0     apixaban ANTICOAGULANT (ELIQUIS) 5 MG tablet Take 1 tablet (5 mg) by mouth 2 times daily 60 tablet 0     ascorbic acid (VITAMIN C) 500 MG tablet Take 500 mg by mouth daily.       atorvastatin (LIPITOR) 10 MG tablet Take 1 tablet (10 mg) by mouth every evening 30 tablet 0     BIOTIN PO Take by mouth daily       calcium carb 1250 mg, 500 mg [place],/vitamin D 200 units (OSCAL WITH D) 500-200 MG-UNIT per tablet Take 1 tablet by mouth 2 times daily (with meals).       Cetirizine HCl (ZYRTEC PO) Take  by mouth.       clobetasol (CLOBETASOL PROPIONATE EMULSION) 0.05 % CREA Apply topically as needed 15 g 0     clopidogrel (PLAVIX) 75 MG tablet Take all four tablets (300 mg) on the first day as a one time dose. The second day  start one tablet (75mg) once a day. 4 tablet 0     clopidogrel (PLAVIX) 75 MG tablet Take 1 tablet (75 mg) by mouth daily 90 tablet 3     cyanocolbalamin (VITAMIN B-12) 1000 MCG tablet Take 1,000 mcg by mouth daily.       fluocinonide (LIDEX) 0.05 % solution Apply topically 2 times daily       FLUZONE HIGH-DOSE 0.5 ML injection TO BE ADMINISTERED BY PHARMACIST FOR IMMUNIZATION  0     Glucosamine-Chondroit-Vit C-Mn (GLUCOSAMINE CHONDR 1500 COMPLX) CAPS Take  by mouth.       levothyroxine (SYNTHROID/LEVOTHROID) 88 MCG tablet Take 1 tablet (88 mcg) by mouth daily 90 tablet 3     Magnesium 300 MG CAPS Take  by mouth.       Magnesium Hydroxide (MILK OF MAGNESIA PO)        metoprolol succinate ER (TOPROL-XL) 100 MG 24 hr tablet Take 1 tablet (100 mg) by mouth daily 90 tablet 3     Omega-3 Fatty Acids (OMEGA 3 PO)        valsartan (DIOVAN) 320 MG tablet Take 1 tablet (320 mg) by mouth daily 90 tablet 3        ALLERGIES:     Allergies   Allergen Reactions     Sulfa Drugs Hives     Clonidine Rash     Diltiazem Rash        FAMILY HISTORY:     Family History   Problem Relation Age of Onset     Cancer Mother         lung cancer     Testicular cancer Father      Abdominal Aortic Aneurysm Brother      Diabetes Brother         SOCIAL HISTORY:     Social History     Socioeconomic History     Marital status:      Spouse name: Not on file     Number of children: 3     Years of education: Not on file     Highest education level: Not on file   Occupational History     Not on file   Social Needs     Financial resource strain: Not on file     Food insecurity     Worry: Not on file     Inability: Not on file     Transportation needs     Medical: Not on file     Non-medical: Not on file   Tobacco Use     Smoking status: Never Smoker     Smokeless tobacco: Never Used   Substance and Sexual Activity     Alcohol use: No     Drug use: No     Sexual activity: Yes     Birth control/protection: Post-menopausal   Lifestyle     Physical  activity     Days per week: Not on file     Minutes per session: Not on file     Stress: Not on file   Relationships     Social connections     Talks on phone: Not on file     Gets together: Not on file     Attends Mormonism service: Not on file     Active member of club or organization: Not on file     Attends meetings of clubs or organizations: Not on file     Relationship status: Not on file     Intimate partner violence     Fear of current or ex partner: Not on file     Emotionally abused: Not on file     Physically abused: Not on file     Forced sexual activity: Not on file   Other Topics Concern      Service Not Asked     Blood Transfusions Not Asked     Caffeine Concern Yes     Comment: coffee, tea     Occupational Exposure Not Asked     Hobby Hazards Not Asked     Sleep Concern Not Asked     Stress Concern Not Asked     Weight Concern Not Asked     Special Diet Not Asked     Back Care Not Asked     Exercise Yes     Comment: swims 3x a week     Bike Helmet Not Asked     Seat Belt Not Asked     Self-Exams Not Asked     Parent/sibling w/ CABG, MI or angioplasty before 65F 55M? Not Asked   Social History Narrative    Lives alone, children nearby        2 boys and 1 girl (1 son )     passed away 24 yo    Labs    [employer]    Previoysly worked in [employer]        REVIEW OF SYSTEMS:     Constitutional: No fevers or chills  Skin: No new rash or itching  Eyes: No acute change in vision  Ears/Nose/Throat: No purulent rhinorrhea, new hearing loss, or new vertigo  Respiratory: No cough or hemoptysis  Cardiovascular: See HPI  Gastrointestinal: No change in appetite, vomiting, hematemesis or diarrhea  Genitourinary: No dysuria or hematuria  Musculoskeletal: No new back pain, neck pain or muscle pain  Neurologic: No new headaches, focal weakness or behavior changes  Psychiatric: No hallucinations, excessive alcohol consumption or illegal drug  usage  Hematologic/Lymphatic/Immunologic: No bleeding, chills, fever, night sweats or weight loss  Endocrine: No new cold intolerance, heat intolerance, polyphagia, polydipsia or polyuria      PHYSICAL EXAMINATION:     Not done as this is a virtual visit.      LABORATORY DATA:     LIPID RESULTS:  Lab Results   Component Value Date    CHOL 177 06/19/2020    HDL 33 (L) 06/19/2020     (H) 06/19/2020    TRIG 72 06/19/2020    CHOLHDLRATIO 6.1 (H) 10/10/2012       LIVER ENZYME RESULTS:  Lab Results   Component Value Date    AST 34 06/18/2020    ALT 26 06/18/2020       CBC RESULTS:  Lab Results   Component Value Date    WBC 9.4 06/20/2020    RBC 5.93 (H) 06/20/2020    HGB 16.6 (H) 06/20/2020    HCT 51.7 (H) 06/20/2020    MCV 87 06/20/2020    MCH 28.0 06/20/2020    MCHC 32.1 06/20/2020    RDW 14.3 06/20/2020     06/20/2020       BMP RESULTS:  Lab Results   Component Value Date     06/30/2020    POTASSIUM 4.3 06/30/2020    CHLORIDE 103 06/30/2020    CO2 28 06/30/2020    ANIONGAP 4 06/30/2020     (H) 06/30/2020    BUN 22 06/30/2020    CR 1.02 06/30/2020    GFRESTIMATED 50 (L) 06/30/2020    GFRESTBLACK 58 (L) 06/30/2020    CR 9.4 06/30/2020        A1C RESULTS:  Lab Results   Component Value Date    A1C 6.0 10/10/2012       INR RESULTS:  No results found for: INR         CLINICAL IMPRESSION:     Dulce Yeh is a 85 year old female with symptomatic moderate to severe aortic stenosis who is a good candidate for transcatheter aortic valve replacement after confirming her severity of the aortic stenosis.  Based on echocardiographic findings her valve area is 1.1 cm.  The gradient is only of 13 mmHg.  We will schedule her for a coronary angiography right heart catheterization with a valve assessment to confirm her severity.  We will also schedule for TAVR CT the preparation for valve replacement.    Tu Hedrick MD    CC  Patient Care Team:  Gilda Hogan MD as PCP - General (Internal  Medicine)  Gian Khanna MD as MD (Orthopedics)  Gilda Hogan MD as Assigned PCP  SELF, REFERRED

## 2020-07-02 NOTE — PATIENT INSTRUCTIONS
You were seen today in the Cardiovascular Clinic at the [facility].      Cardiology Providers you saw during your visit:  Dr. Hedrick    Recommendations:  I will present all of your testing information to the valve team. Afterwards I will call you and we will set up a date for your TAVR procedure.    Follow-up:  Prior to your TAVR you will need to have a PAC clinic and a surgeon visit. You will also need a covid test, we will help with the scheduling of all of these appointments.      Thank you for your visit today!     Latia Shah RN  Structural Heart Care Coordinator   TAVR, MitraClip and Watchman Programs  [facility]    Yazmin Kulwant, Lead CMA Office: [phone]      Clinics and Surgery Center  [address] [place] [address]  Cardiology Clinic [id number]  [place] [address]

## 2020-07-03 RX ORDER — KETOROLAC TROMETHAMINE 5 MG/ML
SOLUTION OPHTHALMIC
COMMUNITY
Start: 2020-01-13 | End: 2020-07-24

## 2020-07-06 ENCOUNTER — VIRTUAL VISIT (OUTPATIENT)
Dept: CARDIOLOGY | Facility: CLINIC | Age: 85
End: 2020-07-06
Attending: INTERNAL MEDICINE
Payer: MEDICARE

## 2020-07-06 DIAGNOSIS — I35.0 MODERATE TO SEVERE AORTIC STENOSIS: ICD-10-CM

## 2020-07-06 DIAGNOSIS — I48.91 A-FIB (H): ICD-10-CM

## 2020-07-06 DIAGNOSIS — I35.9 AORTIC VALVE DISORDER: ICD-10-CM

## 2020-07-06 DIAGNOSIS — I25.10 CORONARY ARTERY DISEASE INVOLVING NATIVE CORONARY ARTERY OF NATIVE HEART WITHOUT ANGINA PECTORIS: ICD-10-CM

## 2020-07-06 DIAGNOSIS — I44.7 LBBB (LEFT BUNDLE BRANCH BLOCK): Primary | ICD-10-CM

## 2020-07-06 PROCEDURE — 99442 ZZC PHYSICIAN TELEPHONE EVALUATION 11-20 MIN: CPT | Mod: ZP | Performed by: INTERNAL MEDICINE

## 2020-07-06 ASSESSMENT — PAIN SCALES - GENERAL: PAINLEVEL: NO PAIN (0)

## 2020-07-06 NOTE — PROGRESS NOTES
"Dulce Yeh is a 85 year old female who is being evaluated via a billable telephone visit.      The patient has been notified of following:     \"This telephone visit will be conducted via a call between you and your physician/provider. We have found that certain health care needs can be provided without the need for a physical exam.  This service lets us provide the care you need with a short phone conversation.  If a prescription is necessary we can send it directly to your pharmacy.  If lab work is needed we can place an order for that and you can then stop by our lab to have the test done at a later time.    Telephone visits are billed at different rates depending on your insurance coverage. During this emergency period, for some insurers they may be billed the same as an in-person visit.  Please reach out to your insurance provider with any questions.    If during the course of the call the physician/provider feels a telephone visit is not appropriate, you will not be charged for this service.\"    Patient has given verbal consent for Telephone visit?  Yes    What phone number would you like to be contacted at? [phone]    How would you like to obtain your AVS? Mail a copy     Medications were reconciled.     Ester Mae CMA    12:28 PM    History:  85 year old female with aortic stenosis, chronic LBBB was recently admitted to the hospital from 6/18/20 to 6/20/20 for     1. Acute hypoxic resp failure  2. HTN urgency with flash pulm edema  3. Severe aortic stenosis (new diagnosis by cardiac cath on this admission)  4. NSTEMI, severe LAD disease, post PCI x3  5. Presented with acute HFrEF 35-40% (new diagnosis) with flash acute pulmonary edema; pulmonary edema resolved.  6. Paroxysmal Afib     Since her hospitalization has undergone CT for TAVR planning.     Feels much improved now, not on diuretics. She is tolerating Eliquis. Is unsure if she wants to proceed with TAVR given her age.     No orthopnea or " PND, chest pain at rest, syncope  Stable dyspnea on exertion      Current Outpatient Medications   Medication Sig Dispense Refill     apixaban ANTICOAGULANT (ELIQUIS) 5 MG tablet Take 1 tablet (5 mg) by mouth 2 times daily 60 tablet 0     ascorbic acid (VITAMIN C) 500 MG tablet Take 500 mg by mouth daily.       BIOTIN PO Take by mouth daily       calcium carb 1250 mg, 500 mg [place],/vitamin D 200 units (OSCAL WITH D) 500-200 MG-UNIT per tablet Take 1 tablet by mouth 2 times daily (with meals).       Cetirizine HCl (ZYRTEC PO) Take  by mouth.       clobetasol (CLOBETASOL PROPIONATE EMULSION) 0.05 % CREA Apply topically as needed 15 g 0     clopidogrel (PLAVIX) 75 MG tablet Take all four tablets (300 mg) on the first day as a one time dose. The second day start one tablet (75mg) once a day. 4 tablet 0     clopidogrel (PLAVIX) 75 MG tablet Take 1 tablet (75 mg) by mouth daily 90 tablet 3     cyanocolbalamin (VITAMIN B-12) 1000 MCG tablet Take 1,000 mcg by mouth daily.       fluocinonide (LIDEX) 0.05 % solution Apply topically 2 times daily       FLUZONE HIGH-DOSE 0.5 ML injection TO BE ADMINISTERED BY PHARMACIST FOR IMMUNIZATION  0     Glucosamine-Chondroit-Vit C-Mn (GLUCOSAMINE CHONDR 1500 COMPLX) CAPS Take  by mouth.       ketorolac (ACULAR) 0.5 % ophthalmic solution INSTILL 1 DROP IN LEFT EYE TID FOR 3 DAYS       levothyroxine (SYNTHROID/LEVOTHROID) 88 MCG tablet Take 1 tablet (88 mcg) by mouth daily 90 tablet 3     Magnesium 300 MG CAPS Take  by mouth.       metoprolol succinate ER (TOPROL-XL) 100 MG 24 hr tablet Take 1 tablet (100 mg) by mouth daily 90 tablet 3     Omega-3 Fatty Acids (OMEGA 3 PO)        valsartan (DIOVAN) 320 MG tablet Take 1 tablet (320 mg) by mouth daily 90 tablet 3     amLODIPine (NORVASC) 2.5 MG tablet Take 1 tablet (2.5 mg) by mouth every evening (Patient not taking: Reported on 7/2/2020) 30 tablet 0     atorvastatin (LIPITOR) 10 MG tablet Take 1 tablet (10 mg) by mouth every evening (Patient  not taking: Reported on 7/2/2020) 30 tablet 0     Magnesium Hydroxide (MILK OF MAGNESIA PO)          Allergies - reviewed     Allergies   Allergen Reactions     Sulfa Drugs Hives     Clonidine Rash     Diltiazem Rash     Past history  Active Ambulatory Problems     Diagnosis Date Noted     Hypertension 10/11/2011     Aortic stenosis 10/11/2011     Hypothyroid 10/11/2011     S/P cholecystectomy 10/11/2011     Elevated LFTs 10/11/2011     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     Recurrent vulvar infection 10/18/2011     Steatohepatitis 02/01/2012     Hemorrhoids 03/11/2013     Near syncope 03/11/2013     Lichen sclerosus et atrophicus of the vulva 10/07/2015     Hypothyroidism due to acquired atrophy of thyroid 10/08/2015     Recurrent colitis due to Clostridium difficile 08/01/2016     Dermatitis, seborrheic 02/13/2017     Cherry angioma 02/13/2017     Seborrheic keratosis 02/13/2017     Tinea pedis of both feet 02/13/2017     Anxiety 06/10/2015     Atrial fibrillation (H) 09/03/2015     Hx of fracture of fibula 07/29/2015     Hypokalemia 09/02/2015     Hyponatremia 09/02/2015     Nausea & vomiting 09/02/2015     Osteoarthritis of left ankle 07/21/2015     Postoperative anemia due to acute blood loss 06/08/2015     Primary osteoarthritis of left knee 05/21/2015     S/P total knee arthroplasty 06/08/2015     Vaginal dryness 06/10/2015     Shortness of breath 06/18/2020     NSTEMI (non-ST elevated myocardial infarction) (H) 06/18/2020     Resolved Ambulatory Problems     Diagnosis Date Noted     Hyperlipidemia 10/11/2011     Eosinophilia 10/11/2011     Fatigue 03/11/2013     Weakness 03/11/2013     Diaphoresis 03/11/2013     Past Medical History:   Diagnosis Date     Allergy      C. difficile colitis August-October 2015     History of chickenpox      Hyperlipidaemia      Unspecified hypothyroidism 10/11/2011      Social history - reviewed  Social History     Socioeconomic History     Marital status:      Spouse  name: Not on file     Number of children: 3     Years of education: Not on file     Highest education level: Not on file   Occupational History     Not on file   Social Needs     Financial resource strain: Not on file     Food insecurity     Worry: Not on file     Inability: Not on file     Transportation needs     Medical: Not on file     Non-medical: Not on file   Tobacco Use     Smoking status: Never Smoker     Smokeless tobacco: Never Used   Substance and Sexual Activity     Alcohol use: No     Drug use: No     Sexual activity: Yes     Birth control/protection: Post-menopausal   Lifestyle     Physical activity     Days per week: Not on file     Minutes per session: Not on file     Stress: Not on file   Relationships     Social connections     Talks on phone: Not on file     Gets together: Not on file     Attends Anabaptist service: Not on file     Active member of club or organization: Not on file     Attends meetings of clubs or organizations: Not on file     Relationship status: Not on file     Intimate partner violence     Fear of current or ex partner: Not on file     Emotionally abused: Not on file     Physically abused: Not on file     Forced sexual activity: Not on file   Other Topics Concern      Service Not Asked     Blood Transfusions Not Asked     Caffeine Concern Yes     Comment: coffee, tea     Occupational Exposure Not Asked     Hobby Hazards Not Asked     Sleep Concern Not Asked     Stress Concern Not Asked     Weight Concern Not Asked     Special Diet Not Asked     Back Care Not Asked     Exercise Yes     Comment: swims 3x a week     Bike Helmet Not Asked     Seat Belt Not Asked     Self-Exams Not Asked     Parent/sibling w/ CABG, MI or angioplasty before 65F 55M? Not Asked   Social History Narrative    Lives alone, children nearby        2 boys and 1 girl (1 son )     passed away 26 yo    Labs    [employer]    Previoysly worked in [email or web address]     Family  history -reviewed  Family History   Problem Relation Age of Onset     Cancer Mother         lung cancer     Testicular cancer Father      Abdominal Aortic Aneurysm Brother      Diabetes Brother      ROS: non contributory on the 10-point review of system    Exam:   In general, the patient is in no apparent distress.    LMP  (LMP Unknown)   HEENT: NC/AT.  PERRLA.  EOMI.     Neck: No jugular venous distension.    Extremities: No edema.   Neurologic: Alert and oriented to person/place/time, normal speech and affect  Skin: No rash.      Data:  Recent cardiac investigations - reviewed    Echocardiogram 6/19/2020:  Moderately (EF 35-40%) reduced left ventricular function is present.  Global right ventricular function is moderately reduced.  Probably moderate aortic stenosis, however accurate assessment of the aortic valve is challenging with atrial fibrillation rhythm. The RCC and NCC appears  partially fused. The Vmax is 2.41 m/s and mean gradient is 13 mmHg. The DANIS is calculated at 1.1 cm2 with a index of 0.54 cm/m2. The dimensionless index is 0.38.  The inferior vena cava was normal in size with preserved respiratory variability.  No pericardial effusion is present.     Coronary Angiogram 6/19/2020:  Left Anterior Descending    Prox LAD to Mid LAD lesion is 90% stenosed. The lesion is segmental. The lesion is calcified.    First Diagonal Branch    The vessel is small. There is mild diffuse disease throughout the vessel.    Second Diagonal Branch    The vessel is small. There is mild diffuse disease throughout the vessel.    Left Circumflex    Prox Cx to Mid Cx lesion is 50% stenosed.    First Obtuse Marginal Branch    The vessel is moderate in size. There is mild diffuse disease throughout the vessel.    Second Obtuse Marginal Branch    The vessel is small. There is moderate diffuse disease throughout the vessel.    Right Coronary Artery    The vessel was visualized by selective angiography. There was 30% diffuse  vessel disease.    Intervention      Prox LAD to Mid LAD lesion    Stent    A stent was successfully placed.    Stent    A stent was successfully placed.    Stent    A stent was successfully placed.    There is a 10% residual stenosis post intervention.      Labs - reviewed  Chemistry panel:   Recent Labs   Lab Test 06/30/20  1114 06/20/20  0620  06/18/20  0055 11/04/19  1600  10/18/17  1154  08/07/15  1741    134   < > 136 137   < > 136   < > 132*   POTASSIUM 4.3 4.1   < > 3.5 4.2   < > 3.9   < > 2.9*   CHLORIDE 103 100   < > 104 104   < > 101   < > 98   CO2 28 26   < > 24 28   < > 27   < > 23   ANIONGAP 4 9   < > 8 6   < > 8   < > 11   * 127*   < > 168* 114*   < > 109*   < > 132*   BUN 22 24   < > 18 24   < > 24   < > 28   CR 1.02 1.05*   < > 0.99 1.01   < > 0.87   < > 1.31*   CR 9.4 9.5   < > 8.8 9.2   < > 9.3   < > 8.6   MAG  --   --   --   --  2.5*  --   --   --  1.7   GFRESTIMATED 50* 48*   < > 52* 51*   < > 62   < > 39*   AST  --   --   --  34  --   --  33  --  11   ALT  --   --   --  26  --   --  39  --  20    < > = values in this interval not displayed.       CBC:   Recent Labs   Lab Test 06/20/20  0620 06/19/20  0531   WBC 9.4 7.5   RBC 5.93* 5.53*   HGB 16.6* 15.4   HCT 51.7* 48.0*   MCV 87 87   MCH 28.0 27.8   MCHC 32.1 32.1   RDW 14.3 14.0    202       Lipid Panel:  Recent Labs   Lab Test 06/19/20  2142 11/04/19  1600 10/10/12  1208   CHOL 177 189 205*   HDL 33* 35* 34*   * 111* 128   TRIG 72 218* 216*   CHOLHDLRATIO  --   --  6.1*       Thyroid:   TSH   Date Value Ref Range Status   06/18/2020 0.56 0.40 - 4.00 mU/L Final     T4 Free   Date Value Ref Range Status   05/18/2011 1.16 0.70 - 1.85 ng/dL Final     Comment:     Ordered by lab     Hemoglobin A1C   Date Value Ref Range Status   10/10/2012 6.0 4.3 - 6.0 % Final     No results found for: INR    Assessment and Plan:  85 year old female with    Coronary artery disease, post PCI to LAD 6/2020  Dyslipidemia  Essential  hypertension  Paroxysmal atrial fibrillation  Moderately reduced LV function, 35-40%  Moderate to severe aortic stenosis, severe by valve area on cath  Heart failure with reduced ejection fraction, Stage C ->B HF    Dulce Yeh is stable clinically and is without any active cardiac issues today. We reviewed all her recent cardiac diagnostics and I do not recommend any changes to the cardiac medical regimen at this point.  After we talked, she is open to having an evaluation for ARNIE. In terms of timing since she was recently started on DOAC, I mentioned to her that we can reassess her AF burden in 3 months with a Ziopatch and then make a decision on continuing DOAC.  Based on her weight and serum creatinine she qualifies for full dose of Eliquis despite her age.    Recommendations:   Continue current cardiac medications  COVID 19 precautions reinforced  Follow up with me 3 months    Phone call and chart review duration: 20 minutes    Ginger Davis MD, MS  Staff Cardiologist, [facility]   Pager: [phone]

## 2020-07-06 NOTE — LETTER
7/6/2020    RE: Dulce Yeh  [address] [place] [address]     Dear Colleague,    Thank you for the opportunity to participate in the care of your patient, Dulce Yeh, at the [facility] at [facility]. Please see a copy of my visit note below.    Dulce Yeh is a 85 year old female who is being evaluated via a billable telephone visit.      Medications were reconciled.   Ester Mae CMA  12:28 PM    History:  85 year old female with aortic stenosis, chronic LBBB was recently admitted to the hospital from 6/18/20 to 6/20/20 for     1. Acute hypoxic resp failure  2. HTN urgency with flash pulm edema  3. Severe aortic stenosis (new diagnosis by cardiac cath on this admission)  4. NSTEMI, severe LAD disease, post PCI x3  5. Presented with acute HFrEF 35-40% (new diagnosis) with flash acute pulmonary edema; pulmonary edema resolved.  6. Paroxysmal Afib     Since her hospitalization has undergone CT for TAVR planning.     Feels much improved now, not on diuretics. She is tolerating Eliquis. Is unsure if she wants to proceed with TAVR given her age.     No orthopnea or PND, chest pain at rest, syncope  Stable dyspnea on exertion    Current Outpatient Medications   Medication Sig Dispense Refill     apixaban ANTICOAGULANT (ELIQUIS) 5 MG tablet Take 1 tablet (5 mg) by mouth 2 times daily 60 tablet 0     ascorbic acid (VITAMIN C) 500 MG tablet Take 500 mg by mouth daily.       BIOTIN PO Take by mouth daily       calcium carb 1250 mg, 500 mg [place],/vitamin D 200 units (OSCAL WITH D) 500-200 MG-UNIT per tablet Take 1 tablet by mouth 2 times daily (with meals).       Cetirizine HCl (ZYRTEC PO) Take  by mouth.       clobetasol (CLOBETASOL PROPIONATE EMULSION) 0.05 % CREA Apply topically as needed 15 g 0     clopidogrel (PLAVIX) 75 MG tablet Take all four tablets (300 mg) on the first day as a one time dose. The second day start one tablet (75mg) once a  day. 4 tablet 0     clopidogrel (PLAVIX) 75 MG tablet Take 1 tablet (75 mg) by mouth daily 90 tablet 3     cyanocolbalamin (VITAMIN B-12) 1000 MCG tablet Take 1,000 mcg by mouth daily.       fluocinonide (LIDEX) 0.05 % solution Apply topically 2 times daily       FLUZONE HIGH-DOSE 0.5 ML injection TO BE ADMINISTERED BY PHARMACIST FOR IMMUNIZATION  0     Glucosamine-Chondroit-Vit C-Mn (GLUCOSAMINE CHONDR 1500 COMPLX) CAPS Take  by mouth.       ketorolac (ACULAR) 0.5 % ophthalmic solution INSTILL 1 DROP IN LEFT EYE TID FOR 3 DAYS       levothyroxine (SYNTHROID/LEVOTHROID) 88 MCG tablet Take 1 tablet (88 mcg) by mouth daily 90 tablet 3     Magnesium 300 MG CAPS Take  by mouth.       metoprolol succinate ER (TOPROL-XL) 100 MG 24 hr tablet Take 1 tablet (100 mg) by mouth daily 90 tablet 3     Omega-3 Fatty Acids (OMEGA 3 PO)        valsartan (DIOVAN) 320 MG tablet Take 1 tablet (320 mg) by mouth daily 90 tablet 3     amLODIPine (NORVASC) 2.5 MG tablet Take 1 tablet (2.5 mg) by mouth every evening (Patient not taking: Reported on 7/2/2020) 30 tablet 0     atorvastatin (LIPITOR) 10 MG tablet Take 1 tablet (10 mg) by mouth every evening (Patient not taking: Reported on 7/2/2020) 30 tablet 0     Magnesium Hydroxide (MILK OF MAGNESIA PO)          Allergies - reviewed     Allergies   Allergen Reactions     Sulfa Drugs Hives     Clonidine Rash     Diltiazem Rash     Past history  Active Ambulatory Problems     Diagnosis Date Noted     Hypertension 10/11/2011     Aortic stenosis 10/11/2011     Hypothyroid 10/11/2011     S/P cholecystectomy 10/11/2011     Elevated LFTs 10/11/2011     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     Recurrent vulvar infection 10/18/2011     Steatohepatitis 02/01/2012     Hemorrhoids 03/11/2013     Near syncope 03/11/2013     Lichen sclerosus et atrophicus of the vulva 10/07/2015     Hypothyroidism due to acquired atrophy of thyroid 10/08/2015     Recurrent colitis due to Clostridium difficile 08/01/2016      Dermatitis, seborrheic 02/13/2017     Cherry angioma 02/13/2017     Seborrheic keratosis 02/13/2017     Tinea pedis of both feet 02/13/2017     Anxiety 06/10/2015     Atrial fibrillation (H) 09/03/2015     Hx of fracture of fibula 07/29/2015     Hypokalemia 09/02/2015     Hyponatremia 09/02/2015     Nausea & vomiting 09/02/2015     Osteoarthritis of left ankle 07/21/2015     Postoperative anemia due to acute blood loss 06/08/2015     Primary osteoarthritis of left knee 05/21/2015     S/P total knee arthroplasty 06/08/2015     Vaginal dryness 06/10/2015     Shortness of breath 06/18/2020     NSTEMI (non-ST elevated myocardial infarction) (H) 06/18/2020     Resolved Ambulatory Problems     Diagnosis Date Noted     Hyperlipidemia 10/11/2011     Eosinophilia 10/11/2011     Fatigue 03/11/2013     Weakness 03/11/2013     Diaphoresis 03/11/2013     Past Medical History:   Diagnosis Date     Allergy      C. difficile colitis August-October 2015     History of chickenpox      Hyperlipidaemia      Unspecified hypothyroidism 10/11/2011      Social history - reviewed  Social History     Socioeconomic History     Marital status:      Spouse name: Not on file     Number of children: 3     Years of education: Not on file     Highest education level: Not on file   Occupational History     Not on file   Social Needs     Financial resource strain: Not on file     Food insecurity     Worry: Not on file     Inability: Not on file     Transportation needs     Medical: Not on file     Non-medical: Not on file   Tobacco Use     Smoking status: Never Smoker     Smokeless tobacco: Never Used   Substance and Sexual Activity     Alcohol use: No     Drug use: No     Sexual activity: Yes     Birth control/protection: Post-menopausal   Lifestyle     Physical activity     Days per week: Not on file     Minutes per session: Not on file     Stress: Not on file   Relationships     Social connections     Talks on phone: Not on file     Gets  together: Not on file     Attends Jehovah's witness service: Not on file     Active member of club or organization: Not on file     Attends meetings of clubs or organizations: Not on file     Relationship status: Not on file     Intimate partner violence     Fear of current or ex partner: Not on file     Emotionally abused: Not on file     Physically abused: Not on file     Forced sexual activity: Not on file   Other Topics Concern      Service Not Asked     Blood Transfusions Not Asked     Caffeine Concern Yes     Comment: coffee, tea     Occupational Exposure Not Asked     Hobby Hazards Not Asked     Sleep Concern Not Asked     Stress Concern Not Asked     Weight Concern Not Asked     Special Diet Not Asked     Back Care Not Asked     Exercise Yes     Comment: swims 3x a week     Bike Helmet Not Asked     Seat Belt Not Asked     Self-Exams Not Asked     Parent/sibling w/ CABG, MI or angioplasty before 65F 55M? 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    LMP  (LMP Unknown)   HEENT: NC/AT.  PERRLA.  EOMI.     Neck: No jugular venous distension.    Extremities: No edema.   Neurologic: Alert and oriented to person/place/time, normal speech and affect  Skin: No rash.    Data:  Recent cardiac investigations - reviewed    Echocardiogram 2020:  Moderately (EF 35-40%) reduced left ventricular function is present.  Global right ventricular function is moderately reduced.  Probably moderate aortic stenosis, however accurate  assessment of the aortic valve is challenging with atrial fibrillation rhythm. The RCC and NCC appears  partially fused. The Vmax is 2.41 m/s and mean gradient is 13 mmHg. The DANIS is calculated at 1.1 cm2 with a index of 0.54 cm/m2. The dimensionless index is 0.38.  The inferior vena cava was normal in size with preserved respiratory variability.  No pericardial effusion is present.     Coronary Angiogram 6/19/2020:  Left Anterior Descending    Prox LAD to Mid LAD lesion is 90% stenosed. The lesion is segmental. The lesion is calcified.    First Diagonal Branch    The vessel is small. There is mild diffuse disease throughout the vessel.    Second Diagonal Branch    The vessel is small. There is mild diffuse disease throughout the vessel.    Left Circumflex    Prox Cx to Mid Cx lesion is 50% stenosed.    First Obtuse Marginal Branch    The vessel is moderate in size. There is mild diffuse disease throughout the vessel.    Second Obtuse Marginal Branch    The vessel is small. There is moderate diffuse disease throughout the vessel.    Right Coronary Artery    The vessel was visualized by selective angiography. There was 30% diffuse vessel disease.    Intervention      Prox LAD to Mid LAD lesion    Stent    A stent was successfully placed.    Stent    A stent was successfully placed.    Stent    A stent was successfully placed.    There is a 10% residual stenosis post intervention.      Labs - reviewed  Chemistry panel:   Recent Labs   Lab Test 06/30/20  1114 06/20/20  0620  06/18/20  0055 11/04/19  1600  10/18/17  1154  08/07/15  1741    134   < > 136 137   < > 136   < > 132*   POTASSIUM 4.3 4.1   < > 3.5 4.2   < > 3.9   < > 2.9*   CHLORIDE 103 100   < > 104 104   < > 101   < > 98   CO2 28 26   < > 24 28   < > 27   < > 23   ANIONGAP 4 9   < > 8 6   < > 8   < > 11   * 127*   < > 168* 114*   < > 109*   < > 132*   BUN 22 24   < > 18 24   < > 24   < > 28   CR 1.02 1.05*   < > 0.99 1.01   < > 0.87   < > 1.31*    CR 9.4 9.5   < > 8.8 9.2   < > 9.3   < > 8.6   MAG  --   --   --   --  2.5*  --   --   --  1.7   GFRESTIMATED 50* 48*   < > 52* 51*   < > 62   < > 39*   AST  --   --   --  34  --   --  33  --  11   ALT  --   --   --  26  --   --  39  --  20    < > = values in this interval not displayed.       CBC:   Recent Labs   Lab Test 06/20/20  0620 06/19/20  0531   WBC 9.4 7.5   RBC 5.93* 5.53*   HGB 16.6* 15.4   HCT 51.7* 48.0*   MCV 87 87   MCH 28.0 27.8   MCHC 32.1 32.1   RDW 14.3 14.0    202       Lipid Panel:  Recent Labs   Lab Test 06/19/20  2142 11/04/19  1600 10/10/12  1208   CHOL 177 189 205*   HDL 33* 35* 34*   * 111* 128   TRIG 72 218* 216*   CHOLHDLRATIO  --   --  6.1*       Thyroid:   TSH   Date Value Ref Range Status   06/18/2020 0.56 0.40 - 4.00 mU/L Final     T4 Free   Date Value Ref Range Status   05/18/2011 1.16 0.70 - 1.85 ng/dL Final     Comment:     Ordered by lab     Hemoglobin A1C   Date Value Ref Range Status   10/10/2012 6.0 4.3 - 6.0 % Final     No results found for: INR    Assessment and Plan:  85 year old female with    Coronary artery disease, post PCI to LAD 6/2020  Dyslipidemia  Essential hypertension  Paroxysmal atrial fibrillation  Moderately reduced LV function, 35-40%  Moderate to severe aortic stenosis, severe by valve area on cath  Heart failure with reduced ejection fraction, Stage C ->B HF    Dulce Yeh is stable clinically and is without any active cardiac issues today. We reviewed all her recent cardiac diagnostics and I do not recommend any changes to the cardiac medical regimen at this point.  After we talked, she is open to having an evaluation for ARNIE. In terms of timing since she was recently started on DOAC, I mentioned to her that we can reassess her AF burden in 3 months with a Ziopatch and then make a decision on continuing DOAC.  Based on her weight and serum creatinine she qualifies for full dose of Eliquis despite her age.    Recommendations:    Continue current cardiac medications  COVID 19 precautions reinforced  Follow up with me 3 months    Phone call and chart review duration: 20 minutes    Ginger Davis MD, MS  Staff Cardiologist, [facility]   Pager: [phone]

## 2020-07-07 ENCOUNTER — DOCUMENTATION ONLY (OUTPATIENT)
Dept: CARE COORDINATION | Facility: CLINIC | Age: 85
End: 2020-07-07

## 2020-07-08 NOTE — PATIENT INSTRUCTIONS
Patient Instructions:  It was a pleasure to see you in the cardiology clinic today.      If you have any questions, call  Meghna Cox RN, at [phone].  Press Option #1 for the [facility], and then press Option #4  We are encouraging the use of MyChart to communicate with your HealthCare Provider    Please follow up with Dr. Davis in 3 months with a Zio patch done prior.       If you have an urgent need after hours (8:00 am to 4:30 pm) please call [phone] and ask for the cardiology fellow on call.

## 2020-07-10 ENCOUNTER — VIRTUAL VISIT (OUTPATIENT)
Dept: INTERNAL MEDICINE | Facility: CLINIC | Age: 85
End: 2020-07-10
Payer: MEDICARE

## 2020-07-10 ENCOUNTER — TELEPHONE (OUTPATIENT)
Dept: INTERNAL MEDICINE | Facility: CLINIC | Age: 85
End: 2020-07-10

## 2020-07-10 DIAGNOSIS — I35.0 AORTIC VALVE STENOSIS, ETIOLOGY OF CARDIAC VALVE DISEASE UNSPECIFIED: ICD-10-CM

## 2020-07-10 DIAGNOSIS — I21.4 NSTEMI (NON-ST ELEVATED MYOCARDIAL INFARCTION) (H): Primary | ICD-10-CM

## 2020-07-10 DIAGNOSIS — M79.662 PAIN OF LEFT LOWER LEG: ICD-10-CM

## 2020-07-10 DIAGNOSIS — I48.0 PAROXYSMAL ATRIAL FIBRILLATION (H): ICD-10-CM

## 2020-07-10 DIAGNOSIS — I10 HYPERTENSION, UNSPECIFIED TYPE: ICD-10-CM

## 2020-07-10 NOTE — PROGRESS NOTES
"    Dulce Yeh is a 85 year old female who is being evaluated via a billable telephone visit.      The patient has been notified of following:     \"This telephone visit will be conducted via a call between you and your physician/provider. The patient has consented to a video visit and informed that video and telephone visits are being performed during the COVID-19 pandemic in order to mitigate the risk of an in office visit for appropriate candidates/issues. We have found that certain health care needs can be provided without the need for a physical exam.  This service lets us provide the care you need with a short phone conversation.  If a prescription is necessary we can send it directly to your pharmacy.  If lab work is needed we can place an order for that and you can then stop by our lab to have the test done at a later time.    Telephone visits are billed at different rates depending on your insurance coverage. During this emergency period, for some insurers they may be billed the same as an in-person visit.  Please reach out to your insurance provider with any questions.    If during the course of the call the physician/provider feels a telephone visit is not appropriate, you will not be charged for this service. If the provider feels that they are unable to assess your concerns without an in person visit, you will be advised of this limitation and depending on the nature of the concern, advised to seek in person care if your provider feels you need urgent evaluation.\"    Patient has given verbal consent for Telephone visit?  Yes    How would you like to obtain your AVS? Verneicehart    Subjective     Dulce Yeh is a 85 year old female who presents to clinic today for the following health issues:  Chief Complaint   Patient presents with     Hospital F/U     Pt is following up from the hospital.        HPI    Patient has a history of recent hospitalization for acute dyspnea 6/18-6/20.  She was found to " "have HTN emergency with flash pulmonary edema. Also found to have worsening severe AORTIC STENOSIS, HFrEF, and 90% stenosis of LAD on PCI requiring 3 stents.  She was also noted to have pAFib. Givens CHADSVASC elevated, she was started on eliquis and brillinta --> changed to plavix.    Has been taking AC. Measures BP at home and ranges 120s/70-80s.  States pulse is 88, always less than 100.  She reports feeling well.  She is able to walk up stairs, do gardening.  She denies dyspnea with normal activities but with vacuuming gets tired.  Denies chest pain, swelling.    Patient also reports left leg pain, wondering about low back issues. There is pain along lateral thigh. Denies tingling.  Feels \"weak.\"  No pain in back. No recent injuries/falls.    Ladder fell on foot and hit big toe yesterday. No pain but it's \"black and blue.\" Not red or swollen.    Patient Active Problem List   Diagnosis     Hypertension     Aortic stenosis     Hypothyroid     S/P cholecystectomy     Elevated LFTs     Vulvar dystrophy -- LS     Recurrent vulvar infection     Steatohepatitis     Hemorrhoids     Near syncope     Lichen sclerosus et atrophicus of the vulva     Hypothyroidism due to acquired atrophy of thyroid     Recurrent colitis due to Clostridium difficile     Dermatitis, seborrheic     Cherry angioma     Seborrheic keratosis     Tinea pedis of both feet     Anxiety     Atrial fibrillation (H)     Hx of fracture of fibula     Hypokalemia     Hyponatremia     Nausea & vomiting     Osteoarthritis of left ankle     Postoperative anemia due to acute blood loss     Primary osteoarthritis of left knee     S/P total knee arthroplasty     Vaginal dryness     Shortness of breath     NSTEMI (non-ST elevated myocardial infarction) (H)     Past Surgical History:   Procedure Laterality Date     ANKLE SURGERY       APPENDECTOMY       bilateral tubal ligation       CHOLECYSTECTOMY       CV CORONARY ANGIOGRAM N/A 6/19/2020    Procedure: Coronary " Angiogram with possible PCI. RHC, possible LHC for TV gradient;  Surgeon: Tu Hedrick MD;  Location:  HEART CARDIAC CATH LAB     CV PCI STENT DRUG ELUTING N/A 6/19/2020    Procedure: Percutaneous Coronary Intervention Stent Drug Eluting;  Surgeon: Tu Hedrick MD;  Location: [facility] CARDIAC CATH LAB     ORTHOPEDIC SURGERY         Social History     Tobacco Use     Smoking status: Never Smoker     Smokeless tobacco: Never Used   Substance Use Topics     Alcohol use: No     Family History   Problem Relation Age of Onset     Cancer Mother         lung cancer     Testicular cancer Father      Abdominal Aortic Aneurysm Brother      Diabetes Brother          Current Outpatient Medications   Medication Sig Dispense Refill     apixaban ANTICOAGULANT (ELIQUIS) 5 MG tablet Take 1 tablet (5 mg) by mouth 2 times daily 60 tablet 0     ascorbic acid (VITAMIN C) 500 MG tablet Take 500 mg by mouth daily.       BIOTIN PO Take by mouth daily       calcium carb 1250 mg, 500 mg [place],/vitamin D 200 units (OSCAL WITH D) 500-200 MG-UNIT per tablet Take 1 tablet by mouth 2 times daily (with meals).       Cetirizine HCl (ZYRTEC PO) Take  by mouth.       clobetasol (CLOBETASOL PROPIONATE EMULSION) 0.05 % CREA Apply topically as needed 15 g 0     clopidogrel (PLAVIX) 75 MG tablet Take all four tablets (300 mg) on the first day as a one time dose. The second day start one tablet (75mg) once a day. 4 tablet 0     clopidogrel (PLAVIX) 75 MG tablet Take 1 tablet (75 mg) by mouth daily 90 tablet 3     cyanocolbalamin (VITAMIN B-12) 1000 MCG tablet Take 1,000 mcg by mouth daily.       fluocinonide (LIDEX) 0.05 % solution Apply topically 2 times daily       FLUZONE HIGH-DOSE 0.5 ML injection TO BE ADMINISTERED BY PHARMACIST FOR IMMUNIZATION  0     Glucosamine-Chondroit-Vit C-Mn (GLUCOSAMINE CHONDR 1500 COMPLX) CAPS Take  by mouth.       ketorolac (ACULAR) 0.5 % ophthalmic solution INSTILL 1 DROP IN LEFT EYE TID FOR 3 DAYS        "levothyroxine (SYNTHROID/LEVOTHROID) 88 MCG tablet Take 1 tablet (88 mcg) by mouth daily 90 tablet 3     Magnesium 300 MG CAPS Take  by mouth.       Magnesium Hydroxide (MILK OF MAGNESIA PO)        metoprolol succinate ER (TOPROL-XL) 100 MG 24 hr tablet Take 1 tablet (100 mg) by mouth daily 90 tablet 3     Omega-3 Fatty Acids (OMEGA 3 PO)        valsartan (DIOVAN) 320 MG tablet Take 1 tablet (320 mg) by mouth daily 90 tablet 3     Allergies   Allergen Reactions     Sulfa Drugs Hives     Clonidine Rash     Diltiazem Rash       Reviewed and updated as needed this visit by Provider    Review of Systems   Comprehensive review of systems negative unless indicated in the HPI.       Physical Exam   Reported vitals:  There were no vitals taken for this visit.   Wt Readings from Last 2 Encounters:   07/02/20 81.2 kg (179 lb)   06/20/20 79.1 kg (174 lb 6.4 oz)    Estimated body mass index is 28.04 kg/m  as calculated from the following:    Height as of 6/18/20: 1.702 m (5' 7\").    Weight as of 7/2/20: 81.2 kg (179 lb).   Gen: healthy, alert, no distress and cooperative  Psych: Alert and oriented times 3; coherent speech, normal   rate and volume, able to articulate logical thoughts, able   to abstract reason, no tangential thoughts, no hallucinations   or delusions, affect is normal/bright.  Respiratory: Speaking in full sentences, unlabored, no audible wheezes or cough.  Remainder of exam unable to be completed due to telephone visits    Diagnostic Test Results:  Labs reviewed in Epic      Assessment/Plan:  Dulce was seen today for hospital f/u.    Diagnoses and all orders for this visit:    NSTEMI (non-ST elevated myocardial infarction) (H)  Tolerating plavix and eliquis. No reports of new chest pain or dyspnea. Has f/u with Cards next month.  -     apixaban ANTICOAGULANT (ELIQUIS) 5 MG tablet; Take 1 tablet (5 mg) by mouth 2 times daily    Aortic valve stenosis, etiology of cardiac valve disease unspecified  Progression " to severe AORTIC STENOSIS. Minimally symptomatic. She is encouraged to consider TAVR once recovered by NSTEMI/ICM.    Paroxysmal atrial fibrillation (H)  Asymptomatic, paroxysmal. Encouraged her to continue eliquis.    Hypertension, unspecified type  Appears well-controlled based on patient self-report. She is taking BB and ARB.    Pain of left lower leg  Unclear etiology given phone visit. Patient would like to monitor and let me know if symptoms escalate.  She thinks it may be improving.          Phone call duration: 25  minutes  9:06 AM 9:31 AM        Gilda Hogan MD  Internal Medicine

## 2020-07-10 NOTE — NURSING NOTE
Chief Complaint   Patient presents with     Hospital F/U     Pt is following up from the hospital.      Video Visit Technology for this patient: No video technology available to patient, please call patient over the phone    Fabiana Moran LPN at 8:20 AM on 7/10/2020.    Fabiana Moran LPN at 8:21 AM on 7/10/2020.

## 2020-07-16 ENCOUNTER — TELEPHONE (OUTPATIENT)
Dept: CARDIOLOGY | Facility: CLINIC | Age: 85
End: 2020-07-16

## 2020-07-21 DIAGNOSIS — Z11.59 ENCOUNTER FOR SCREENING FOR OTHER VIRAL DISEASES: Primary | ICD-10-CM

## 2020-07-21 DIAGNOSIS — I35.0 SEVERE AORTIC STENOSIS: Primary | ICD-10-CM

## 2020-07-21 RX ORDER — SODIUM CHLORIDE 9 MG/ML
INJECTION, SOLUTION INTRAVENOUS CONTINUOUS
Status: CANCELLED | OUTPATIENT
Start: 2020-07-21

## 2020-07-21 RX ORDER — CEFAZOLIN SODIUM 2 G/50ML
2 SOLUTION INTRAVENOUS
Status: CANCELLED | OUTPATIENT
Start: 2020-07-21

## 2020-07-21 RX ORDER — LIDOCAINE 40 MG/G
CREAM TOPICAL
Status: CANCELLED | OUTPATIENT
Start: 2020-07-21

## 2020-07-21 NOTE — PROGRESS NOTES
Date: 7/21/2020    Time of Call: 1:37 PM     Diagnosis:  Severe aortic stenosis     [ TORB ] Ordering provider: Tu Hedrick MD  Order:   1. TAVR scheduling order  2. PAC clinic visit  3. TAVR pre procedure order set     Order received by: Latia Shah RN     Follow-up/additional notes:

## 2020-07-21 NOTE — PROGRESS NOTES
Check in to the hospital 3C at 0530.    Nothing to eat after midnight; water, apple juice or 7up/Sprite is OK up to two hours prior to your scheduled procedure.  You may take your medications in the morning with a sip of water.    Take a shower, with antibacterial soap, the night before the procedure, and the morning of the procedure.      Diagnosis: Severe aortic stenosis  Plan:  1. TAVR procedure scheduled for 7/29/20.  The hospital will call patient to tell them time of surgery.  2. Medications changes:  -- Please hold Eliquis starting on Monday 7/27.  -- Continue Plavix without interruption. Please take  mg by mouth the day before and the morning of TAVR procedure.  -- All other medications at the discretion of the PAC clinic.    If any questions please contact:  Latia Shah RN  Structural Heart Care Coordinator  [facility]  Office: [phone]

## 2020-07-24 RX ORDER — ATORVASTATIN CALCIUM 10 MG/1
10 TABLET, FILM COATED ORAL DAILY
COMMUNITY
End: 2020-07-24

## 2020-07-24 RX ORDER — UBIDECARENONE 200 MG
200 CAPSULE ORAL DAILY
COMMUNITY

## 2020-07-24 NOTE — PROGRESS NOTES
Preoperative Assessment Center Medication History Note    Medication history completed via phone call on July 24, 2020 by this writer. See Epic admission navigator for prior to admission medications. Operating room staff will still need to confirm medications and last dose information on day of surgery.     Medication history interview sources:  Patient phone interview    Changes made to PTA medication list (reason)  Added: None    Deleted: None    Changed: None    Additional medication history information (including reliability of information, actions taken by pharmacist):    -- No recent (within 30 days) course of antibiotics  -- No recent (within 30 days) course of systemic steroids  -- Patient declines being on any other prescription or over-the-counter medications    Prior to Admission medications    Medication Sig Last Dose Taking? Auth Provider   apixaban ANTICOAGULANT (ELIQUIS) 5 MG tablet Take 1 tablet (5 mg) by mouth 2 times daily Taking Yes Gilda Hogan MD   ascorbic acid (VITAMIN C) 500 MG tablet Take 500 mg by mouth daily. Taking Yes Reported, Patient   BIOTIN PO Take by mouth daily Taking Yes Reported, Patient   calcium carb 1250 mg, 500 mg [place],/vitamin D 200 units (OSCAL WITH D) 500-200 MG-UNIT per tablet Take 1 tablet by mouth 2 times daily (with meals). Taking Yes Reported, Patient   clopidogrel (PLAVIX) 75 MG tablet Take 1 tablet (75 mg) by mouth daily Taking Yes Tony Bryant MD   coenzyme Q-10 200 MG CAPS Take 200 mg by mouth daily Taking Yes Unknown, Entered By History   cyanocolbalamin (VITAMIN B-12) 1000 MCG tablet Take 1,000 mcg by mouth daily. Taking Yes Reported, Patient   levothyroxine (SYNTHROID/LEVOTHROID) 88 MCG tablet Take 1 tablet (88 mcg) by mouth daily Taking Yes Gilda Hogan MD   Magnesium 300 MG CAPS Take  by mouth. Taking Yes Reported, Patient   metoprolol succinate ER (TOPROL-XL) 100 MG 24 hr tablet Take 1 tablet (100 mg) by mouth daily Taking Yes  Gilda Hogan MD   valsartan (DIOVAN) 320 MG tablet Take 1 tablet (320 mg) by mouth daily Taking Yes Gilda Hogan MD   Cetirizine HCl (ZYRTEC PO) Take 10 mg by mouth daily as needed  Not Taking  Reported, Patient   clobetasol (CLOBETASOL PROPIONATE EMULSION) 0.05 % CREA Apply topically as needed   Nathalie Turner MD   fluocinonide (LIDEX) 0.05 % solution Apply topically 2 times daily   Reported, Patient   Glucosamine-Chondroit-Vit C-Mn (GLUCOSAMINE CHONDR 1500 COMPLX) CAPS Take 1 tablet by mouth daily  Not Taking  Reported, Patient         Medication history completed by: Jose Manuel Benavidez RPH

## 2020-07-24 NOTE — PHARMACY - PREOPERATIVE ASSESSMENT CENTER
Anticoagulation Note - Preoperative Assessment Center (PAC) Pharmacist     Patient was interviewed via phone call on July 24, 2020 prior to PAC clinic appointment. The purpose of this note is to document the perioperative anticoagulation plan outlined by the providers caring for Dulce Yeh.     Current Regimen  Anticoagulation Regimen as of July 24, 2020: Apixaban 5mg by mouth twice daily   Indication: afib  Prescriber:  Dr. Hogan  Expected Duration of therapy: indefinite    Perioperative plan  Dulce Yeh is scheduled for TAVR on 7/29/20 with Dr. Hedrick and the perioperative anticoagulation plan outlined by her surgical team is to hold apixaban starting 7/27 and to continue plavix uninterrupted. She will need to take an aspirin 325mg the day before and the morning of surgery. .     Resumption of anticoagulation after procedure will be based on surgery team assessment of bleeding risks and complications.  This plan may require re-assessment and modification by her primary team in the perioperative setting depending on patients clinical situation.        Jose Manuel Benavidez RPH  July 24, 2020  12:00 PM

## 2020-07-27 ENCOUNTER — OFFICE VISIT (OUTPATIENT)
Dept: SURGERY | Facility: CLINIC | Age: 85
End: 2020-07-27
Payer: MEDICARE

## 2020-07-27 ENCOUNTER — OFFICE VISIT (OUTPATIENT)
Dept: CARDIOLOGY | Facility: CLINIC | Age: 85
DRG: 267 | End: 2020-07-27
Attending: SURGERY
Payer: MEDICARE

## 2020-07-27 ENCOUNTER — ANESTHESIA EVENT (OUTPATIENT)
Dept: SURGERY | Facility: CLINIC | Age: 85
DRG: 267 | End: 2020-07-27
Payer: MEDICARE

## 2020-07-27 VITALS
WEIGHT: 177 LBS | OXYGEN SATURATION: 99 % | DIASTOLIC BLOOD PRESSURE: 91 MMHG | HEART RATE: 88 BPM | SYSTOLIC BLOOD PRESSURE: 155 MMHG | BODY MASS INDEX: 27.78 KG/M2 | HEIGHT: 67 IN

## 2020-07-27 VITALS
WEIGHT: 177 LBS | SYSTOLIC BLOOD PRESSURE: 150 MMHG | DIASTOLIC BLOOD PRESSURE: 88 MMHG | HEIGHT: 67 IN | BODY MASS INDEX: 27.78 KG/M2 | TEMPERATURE: 97.7 F | HEART RATE: 81 BPM | RESPIRATION RATE: 16 BRPM | OXYGEN SATURATION: 98 %

## 2020-07-27 DIAGNOSIS — Z01.818 PREOP EXAMINATION: Primary | ICD-10-CM

## 2020-07-27 DIAGNOSIS — I35.0 SEVERE AORTIC STENOSIS: ICD-10-CM

## 2020-07-27 DIAGNOSIS — I35.0 NONRHEUMATIC AORTIC VALVE STENOSIS: Primary | ICD-10-CM

## 2020-07-27 DIAGNOSIS — Z01.818 PREOP EXAMINATION: ICD-10-CM

## 2020-07-27 DIAGNOSIS — Z11.59 ENCOUNTER FOR SCREENING FOR OTHER VIRAL DISEASES: ICD-10-CM

## 2020-07-27 LAB
ANION GAP SERPL CALCULATED.3IONS-SCNC: 6 MMOL/L (ref 3–14)
APTT PPP: 36 SEC (ref 22–37)
BUN SERPL-MCNC: 22 MG/DL (ref 7–30)
CALCIUM SERPL-MCNC: 9.4 MG/DL (ref 8.5–10.1)
CHLORIDE SERPL-SCNC: 106 MMOL/L (ref 94–109)
CO2 SERPL-SCNC: 26 MMOL/L (ref 20–32)
CREAT SERPL-MCNC: 1.06 MG/DL (ref 0.52–1.04)
ERYTHROCYTE [DISTWIDTH] IN BLOOD BY AUTOMATED COUNT: 13.9 % (ref 10–15)
GFR SERPL CREATININE-BSD FRML MDRD: 48 ML/MIN/{1.73_M2}
GLUCOSE SERPL-MCNC: 100 MG/DL (ref 70–99)
HCT VFR BLD AUTO: 46.6 % (ref 35–47)
HGB BLD-MCNC: 15.2 G/DL (ref 11.7–15.7)
INR PPP: 1.33 (ref 0.86–1.14)
MCH RBC QN AUTO: 28.5 PG (ref 26.5–33)
MCHC RBC AUTO-ENTMCNC: 32.6 G/DL (ref 31.5–36.5)
MCV RBC AUTO: 87 FL (ref 78–100)
PLATELET # BLD AUTO: 183 10E9/L (ref 150–450)
POTASSIUM SERPL-SCNC: 4.4 MMOL/L (ref 3.4–5.3)
RBC # BLD AUTO: 5.33 10E12/L (ref 3.8–5.2)
SODIUM SERPL-SCNC: 138 MMOL/L (ref 133–144)
WBC # BLD AUTO: 6.2 10E9/L (ref 4–11)

## 2020-07-27 PROCEDURE — U0003 INFECTIOUS AGENT DETECTION BY NUCLEIC ACID (DNA OR RNA); SEVERE ACUTE RESPIRATORY SYNDROME CORONAVIRUS 2 (SARS-COV-2) (CORONAVIRUS DISEASE [COVID-19]), AMPLIFIED PROBE TECHNIQUE, MAKING USE OF HIGH THROUGHPUT TECHNOLOGIES AS DESCRIBED BY CMS-2020-01-R: HCPCS | Performed by: INTERNAL MEDICINE

## 2020-07-27 PROCEDURE — G0463 HOSPITAL OUTPT CLINIC VISIT: HCPCS | Mod: ZF

## 2020-07-27 PROCEDURE — 86923 COMPATIBILITY TEST ELECTRIC: CPT | Performed by: PHYSICIAN ASSISTANT

## 2020-07-27 ASSESSMENT — PAIN SCALES - GENERAL
PAINLEVEL: NO PAIN (0)
PAINLEVEL: NO PAIN (0)

## 2020-07-27 ASSESSMENT — MIFFLIN-ST. JEOR
SCORE: 1280.5
SCORE: 1280.5

## 2020-07-27 ASSESSMENT — LIFESTYLE VARIABLES: TOBACCO_USE: 0

## 2020-07-27 ASSESSMENT — ENCOUNTER SYMPTOMS
DYSRHYTHMIAS: 1
SEIZURES: 0

## 2020-07-27 NOTE — ANESTHESIA PREPROCEDURE EVALUATION
Anesthesia Pre-Procedure Evaluation    Patient: Dulce Yeh   MRN:     [id number] Gender:   female   Age:    85 year old :      1935        Preoperative Diagnosis: Severe aortic stenosis [I35.0]   Procedure(s):  Transfemoral (Paez) Transcatheter Aortic Valve Replacement  possible emergency open heart bypass and or balloon pump placement and any indicated procedure     LABS:  CBC:   Lab Results   Component Value Date    WBC 9.4 2020    WBC 7.5 2020    HGB 16.6 (H) 2020    HGB 15.4 2020    HCT 51.7 (H) 2020    HCT 48.0 (H) 2020     2020     2020     BMP:   Lab Results   Component Value Date     2020     2020    POTASSIUM 4.3 2020    POTASSIUM 4.1 2020    CHLORIDE 103 2020    CHLORIDE 100 2020    CO2 28 2020    CO2 26 2020    BUN 22 2020    BUN 24 2020    CR 1.02 2020    CR 1.05 (H) 2020     (H) 2020     (H) 2020     COAGS: No results found for: PTT, INR, FIBR  POC:   Lab Results   Component Value Date     (H) 2020     OTHER:   Lab Results   Component Value Date    LACT 1.3 2020    A1C 6.0 10/10/2012    CR 9.4 2020    PHOS 3.7 2012    MAG 2.5 (H) 2019    ALBUMIN 3.6 2020    PROTTOTAL 7.8 2020    ALT 26 2020    AST 34 2020    ALKPHOS 62 2020    BILITOTAL 0.7 2020    LIPASE 276 2020    TSH 0.56 2020    T4 1.16 2011    SED 11 2009        Preop Vitals    BP Readings from Last 3 Encounters:   20 (!) 150/88   20 (!) 155/91   20 (!) 134/92    Pulse Readings from Last 3 Encounters:   20 81   20 88   20 85      Resp Readings from Last 3 Encounters:   20 16   20 16   18 16    SpO2 Readings from Last 3 Encounters:   20 98%   20 99%   20 92%      Temp Readings from Last 1  "Encounters:   07/27/20 97.7  F (36.5  C) (Oral)    Ht Readings from Last 1 Encounters:   07/27/20 1.702 m (5' 7\")      Wt Readings from Last 1 Encounters:   07/27/20 80.3 kg (177 lb)    Estimated body mass index is 27.72 kg/m  as calculated from the following:    Height as of this encounter: 1.702 m (5' 7\").    Weight as of this encounter: 80.3 kg (177 lb).     LDA:  Right Groin Interventional Procedure Access (Active)   Number of days: 38       Right Groin Interventional Procedure Access (Active)   Number of days: 38        Past Medical History:   Diagnosis Date     Allergy      Aortic stenosis 10/11/2011     C. 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 RHC, possible LHC for TV gradient;  Surgeon: Tu Hedrick MD;  [facility]     CV PCI STENT DRUG ELUTING N/A 6/19/2020    Procedure: Percutaneous Coronary Intervention Stent Drug Eluting;  Surgeon: Tu Hedrick MD;  Location:  [facility]     ORTHOPEDIC SURGERY        Allergies   Allergen Reactions     Sulfa Drugs Hives     Clonidine Rash     Diltiazem Rash        Anesthesia Evaluation     . Pt has had prior anesthetic.     History of anesthetic complications   - PONV        ROS/MED HX    ENT/Pulmonary:     (+)allergic rhinitis, , . .   (-) tobacco use, asthma and sleep apnea   Neurologic:  - neg neurologic ROS    (-) seizures, CVA and Neuropathy   Cardiovascular: Comment: HFrEF    NSTEMI    (+) hypertension--CAD, --stent,6/19/20  3 Drug " Eluting Stent .. Taking blood thinners Pt has received instructions: Instructions Given to patient: hold eliquis beginning 7/27, continue plavix. . . :. dysrhythmias a-fib, valvular problems/murmurs type: AS . Previous cardiac testing Echodate:6/18/20results:date: results:ECG reviewed date:6/19/20 results:Cath date: 6/30/20 results:          METS/Exercise Tolerance: Comment: Gardens regularly 3 - Able to walk 1-2 blocks without stopping   Hematologic:  - neg hematologic  ROS      (-) history of blood clots and History of Transfusion   Musculoskeletal: Comment: S/p left knee arthroplasty    Gout    Diffuse arthritis, mild  (+) arthritis,  -       GI/Hepatic: Comment: Steatohepatitis       (-) GERD   Renal/Genitourinary:  - ROS Renal section negative       Endo:     (+) thyroid problem hypothyroidism, .   (-) Type II DM   Psychiatric:     (+) psychiatric history anxiety      Infectious Disease: Comment: Hx C.Diff following knee arthroplasty        Malignancy:      - no malignancy   Other: Comment: Lichen sclerosus   (+) no H/O Chronic Pain,                       PHYSICAL EXAM:   Mental Status/Neuro: A/A/O; Age Appropriate   Airway: Facies: Feasible  Mallampati: II  Mouth/Opening: Full  TM distance: > 6 cm  Neck ROM: Full   Respiratory: Auscultation: CTAB     Resp. Rate: Normal     Resp. Effort: Normal      CV: Rhythm: Regular; Afib  Rate: Age appropriate  Heart: Normal Sounds  Edema: None   Comments:      Dental: Normal Dentition                Assessment:   ASA SCORE: 3      Smoking Status:  Non-Smoker/Unknown   NPO Status: NPO Appropriate     Plan:   Anes. Type:  MAC   Pre-Medication: None   Induction:  IV (Standard)   Airway: Native Airway   Access/Monitoring: PIV; 2nd PIV   Maintenance: Balanced     Postop Plan:   Postop Pain: Opioids  Postop Sedation/Airway: Not planned     PONV Management:   Adult Risk Factors: Female, Non-Smoker, Postop Opioids     CONSENT: Direct conversation   Plan and risks discussed with:  Patient   Blood Products: Consented (ALL Blood Products)                PAC Discussion and Assessment    ASA Classification: 3  Case is suitable for: [place]  Anesthetic techniques and relevant risks discussed: MAC with GA as backup  Invasive monitoring and risk discussed: No  Types:   Possibility and Risk of blood transfusion discussed: No  NPO instructions given:   Additional anesthetic preparation and risks discussed:   Needs early admission to pre-op area:   Other:     PAC Resident/NP Anesthesia Assessment:  Dulce Yeh is a 85 year old female scheduled to undergo Transfemoral (Paez) Transcatheter Aortic Valve Replacement with Tu Hedrick MD on 7/29/20 at [facility] for treatment of Severe aortic stenosis.    Pt has had prior anesthetic.     History of anesthetic complications: + PONV    She has the following specific operative considerations:   # EFRAIN 2/8 = low risk  # VTE risk: 0.5%  # Risk of PONV score = 3.  If > 2, anti-emetic intervention recommended.  # Anesthesia considerations:  Refer to PAC assessment in anesthesia records    # Increased risk of postoperative nausea/vomiting: Recommend use of antiemetic agents in the perioperative period.        CARDIAC: METS 3-4,  Gardens regularly 3 - Able to walk 1-2 blocks without stopping     # RCRI : High risk surgery, Coronary Artery Disease.  6.6% risk of major adverse cardiac event.     #  A-fib, [id number] score 5.   On Eliquis & Plavix.     #  CAD, NSTEMI, HRrEF. S/P coronary stenting x3 on 6/19/20     #  Severe AORTIC STENOSIS     #  Recent HTN urgency w/ flash pulmonary edema in June    PULMONARY:     # Never smoked    # No asthma or inhaler use    GI: no GERD     # steatohepatitis     ENDO: BMI 29    # Hypothyroidism    # No DM    HEME:   # Hold Eliquis beginning today, continue Plavix, take  mg evening before & morning of above procedure    ORTHO: mildly limited extension ROM of neck,  no TMJ    #  S/p left knee arthroplasty    MISC:     # Lichen sclerosus    Patient is optimized and is acceptable candidate for the proposed procedure. No further diagnostic evaluation is needed.      Reviewed and Signed by PAC Mid-Level Provider/Resident  Mid-Level Provider/Resident: Jihan Segura PA-C  Date: 7/27/20  Time:     Attending Anesthesiologist Anesthesia Assessment:        Anesthesiologist:   Date:   Time:   Pass/Fail:   Disposition:     PAC Pharmacist Assessment:        Pharmacist:   Date:   Time:    Jihan Segura PA-C

## 2020-07-27 NOTE — H&P
Pre-Operative H & P     CC:  Preoperative exam to assess for increased cardiopulmonary risk while undergoing surgery and anesthesia.    Date of Encounter: 7/27/2020  Primary Care Physician:  Gilda Hogan  Reason for Visit: Severe aortic stenosis     HPI  Dulce Yeh is a 86 y/o female who presents for pre-operative H&P in preparation for Transfemoral (Paez) Transcatheter Aortic Valve Replacement with Tu Hedrick MD on 7/29/20 at [facility] for treatment of Severe aortic stenosis.     Ms. Yeh presented to the ED on 6/18/20 after waking up with acute shortness of breath, found to be in hypertensive urgency with flash pulmonary edema in the setting of aortic stenosis. Echo shows worsening trans-valvular gradient. Further studies showed severe LAD diseases s/p PCI x3.  She now presents for the above procedure.     PMH is also significant for HLD, HTN, A-fib, anxiety, hypothyroidism, steatohepatitis, s/p left knee arthroplasty, lichen sclerosus.     History was obtained from patient & chart review.     Past Medical History  Past Medical History:   Diagnosis Date     Allergy      Aortic stenosis 10/11/2011     C. 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    after TKA surgery     S/P cholecystectomy 10/11/2011     Unspecified hypothyroidism 10/11/2011       Past Surgical History  Past Surgical History:   Procedure Laterality Date     ANKLE SURGERY       APPENDECTOMY       bilateral tubal ligation       CHOLECYSTECTOMY       CV CORONARY ANGIOGRAM N/A 6/19/2020    Procedure: Coronary Angiogram with possible PCI. RHC, possible LHC for TV gradient;  Surgeon: Tu Hedrick MD;  Location:  HEART [facility]ARD[facility] CATH LAB     CV PCI STENT DRUG ELUTING N/A 6/19/2020    Procedure: Percutaneous Coronary Intervention  Stent Drug Eluting;  Surgeon: Tu Hedrick MD;  Location:  HEART [facility]ARD[facility] CATH LAB     ORTHOPEDIC SURGERY         Hx of Blood transfusions/reactions: no     Hx of abnormal bleeding or anti-platelet use: on Eliquis & Plavix    Menstrual history: No LMP recorded (lmp unknown). Patient has had a hysterectomy.:      Steroid use in the last year: no    Personal or FH with difficulty with Anesthesia:  +PONV    Prior to Admission Medications  Current Outpatient Medications   Medication Sig Dispense Refill     apixaban ANTICOAGULANT (ELIQUIS) 5 MG tablet Take 1 tablet (5 mg) by mouth 2 times daily 180 tablet 1     ascorbic acid (VITAMIN C) 500 MG tablet Take 500 mg by mouth daily.       aspirin (ASA) 325 MG EC tablet Take 325 mg by mouth       BIOTIN PO Take by mouth daily       calcium carb 1250 mg, 500 mg [place],/vitamin D 200 units (OSCAL WITH D) 500-200 MG-UNIT per tablet Take 1 tablet by mouth 2 times daily (with meals).       Cetirizine HCl (ZYRTEC PO) Take 10 mg by mouth daily as needed        clobetasol (CLOBETASOL PROPIONATE EMULSION) 0.05 % CREA Apply topically as needed 15 g 0     clopidogrel (PLAVIX) 75 MG tablet Take 1 tablet (75 mg) by mouth daily 90 tablet 3     coenzyme Q-10 200 MG CAPS Take 200 mg by mouth daily       cyanocolbalamin (VITAMIN B-12) 1000 MCG tablet Take 1,000 mcg by mouth daily.       fluocinonide (LIDEX) 0.05 % solution Apply topically 2 times daily       Glucosamine-Chondroit-Vit C-Mn (GLUCOSAMINE CHONDR 1500 COMPLX) CAPS Take 1 tablet by mouth daily        levothyroxine (SYNTHROID/LEVOTHROID) 88 MCG tablet Take 1 tablet (88 mcg) by mouth daily 90 tablet 3     Magnesium 300 MG CAPS Take  by mouth.       metoprolol succinate ER (TOPROL-XL) 100 MG 24 hr tablet Take 1 tablet (100 mg) by mouth daily 90 tablet 3     valsartan (DIOVAN) 320 MG tablet Take 1 tablet (320 mg) by mouth daily 90 tablet 3       Allergies  Allergies   Allergen Reactions     Sulfa Drugs Hives     Clonidine Rash      Diltiazem Rash       Social History  Social History     Socioeconomic History     Marital status:      Spouse name: Not on file     Number of children: 3     Years of education: Not on file     Highest education level: Not on file   Occupational History     Not on file   Social Needs     Financial resource strain: Not on file     Food insecurity     Worry: Not on file     Inability: Not on file     Transportation needs     Medical: Not on file     Non-medical: Not on file   Tobacco Use     Smoking status: Never Smoker     Smokeless tobacco: Never Used   Substance and Sexual Activity     Alcohol use: No     Drug use: No     Sexual activity: Yes     Birth control/protection: Post-menopausal   Lifestyle     Physical activity     Days per week: Not on file     Minutes per session: Not on file     Stress: Not on file   Relationships     Social connections     Talks on phone: Not on file     Gets together: Not on file     Attends Yazdanism service: Not on file     Active member of club or organization: Not on file     Attends meetings of clubs or organizations: Not on file     Relationship status: Not on file     Intimate partner violence     Fear of current or ex partner: Not on file     Emotionally abused: Not on file     Physically abused: Not on file     Forced sexual activity: Not on file   Other Topics Concern      Service Not Asked     Blood Transfusions Not Asked     Caffeine Concern Yes     Comment: coffee, tea     Occupational Exposure Not Asked     Hobby Hazards Not Asked     Sleep Concern Not Asked     Stress Concern Not Asked     Weight Concern Not Asked     Special Diet Not Asked     Back Care Not Asked     Exercise Yes     Comment: swims 3x a week     Bike Helmet Not Asked     Seat Belt Not Asked     Self-Exams Not Asked     Parent/sibling w/ CABG, MI or angioplasty before 65F 55M? 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       Family History  Family History   Problem Relation Age of Onset     Cancer Mother         lung cancer     Testicular cancer Father      Abdominal Aortic Aneurysm Brother      Rheumatic fever Brother      Diabetes Brother      Anesthesia Reaction No family hx of      Deep Vein Thrombosis (DVT) No family hx of          Preop Vitals    BP Readings from Last 3 Encounters:   20 (!) 150/88   20 (!) 155/91   20 (!) 134/92    Pulse Readings from Last 3 Encounters:   20 81   20 88   20 85      Resp Readings from Last 3 Encounters:   20 16   20 16   18 16    SpO2 Readings from Last 3 Encounters:   20 98%   20 99%   20 92%      Temp Readings from Last 1 Encounters:   20 97.7  F (36.5  C) (Oral)    Ht Readings from Last 1 Encounters:   20 1.702 m (5' 7\")      Wt Readings from Last 1 Encounters:   20 80.3 kg (177 lb)    Estimated body mass index is 27.72 kg/m  as calculated from the following:    Height as of this encounter: 1.702 m (5' 7\").    Weight as of this encounter: 80.3 kg (177 lb).         ROS/MED HX  The complete review of systems is negative other than noted in the HPI or here.  Patient denies recent illness, fever and respiratory infection during past month.  Pt denies steroid use during past year.    ENT/Pulmonary:     (+)allergic rhinitis, , . .   (-) tobacco use, asthma and sleep apnea   Neurologic:  - neg neurologic ROS    (-) seizures, CVA and Neuropathy   Cardiovascular: Comment: HFrEF    NSTEMI    (+) hypertension--CAD, --stent,20  3 Drug Eluting Stent .. Taking blood thinners Pt has received instructions: Instructions Given to patient: hold eliquis beginning , continue plavix. . . :. dysrhythmias a-fib, valvular problems/murmurs type: AS . Previous cardiac testing Echodate:20results:date: " "results:ECG reviewed date:6/19/20 results:Cath date: 6/30/20 results:          METS/Exercise Tolerance: Comment: Gardens regularly 3 - Able to walk 1-2 blocks without stopping   Hematologic:  - neg hematologic  ROS      (-) history of blood clots and History of Transfusion   Musculoskeletal: Comment: S/p left knee arthroplasty    Gout    Diffuse arthritis, mild  (+) arthritis,  -       GI/Hepatic: Comment: Steatohepatitis       (-) GERD   Renal/Genitourinary:  - ROS Renal section negative       Endo:     (+) thyroid problem hypothyroidism, .   (-) Type II DM   Psychiatric:     (+) psychiatric history anxiety      Infectious Disease: Comment: Hx C.Diff following knee arthroplasty        Malignancy:      - no malignancy   Other: Comment: Lichen sclerosus   (+) no H/O Chronic Pain,             PHYSICAL EXAM:   Mental Status/Neuro: A/A/O; Age Appropriate   Airway: Facies: Feasible  Mallampati: II  Mouth/Opening: Full  TM distance: > 6 cm  Neck ROM: Full   Respiratory: Auscultation: CTAB     Resp. Rate: Normal     Resp. Effort: Normal      CV: Rhythm: Regular; Afib  Rate: Age appropriate  Heart: Normal Sounds  Edema: None   Comments:      Dental: Normal Dentition              Temp: 97.7  F (36.5  C) Temp src: Oral BP: (!) 150/88 Pulse: 81   Resp: 16 SpO2: 98 %         177 lbs 0 oz  5' 7\"   Body mass index is 27.72 kg/m .    Physical Exam  Constitutional: Awake, alert, cooperative, no apparent distress, and appears stated age.  Eyes: Pupils equal, round and reactive to light, extra ocular muscles intact, sclera clear, conjunctiva normal.  HENT: Normocephalic, oral pharynx with moist mucus membranes, good dentition. No goiter appreciated. No removable dental hardware.  Respiratory: Clear to auscultation bilaterally, no crackles or wheezing. No SOB when supine.  Cardiovascular: A-fib, normal S1 and S2, and 2/6 murmur noted.  Carotids +2, no bruits. No edema. Palpable pulses to radial, DP and PT arteries.   GI: Normal bowel " sounds, soft, non-distended, non-tender, no masses palpated.    Lymph/Hematologic: No cervical lymphadenopathy and no supraclavicular lymphadenopathy.  Genitourinary:  deferred  Skin: Warm and dry.  No rashes.   Musculoskeletal: mildly limited extension ROM of neck. There is no redness, warmth, or swelling of the joints. Gross motor strength is normal.    Neurologic: Awake, alert, oriented to name, place and time. Cranial nerves II-XII are grossly intact. Gait is normal. Ambulates from chair to exam table, seats self, lies supine and sits back up w/o assistance.  Neuropsychiatric: Calm, cooperative. Normal affect. Pleasant. Answers questions appropriately, follows commands w/o difficulty.    PRIOR LABS/DIAGNOSTIC STUDIES:  All labs and imaging personally reviewed     EKG 6/19/20  Atrial flutter with variable A-V block  Left axis deviation  Left bundle branch block  Abnormal ECG  When compared with ECG of 18-JUN-2020 10:23,  Atrial flutter has replaced Atrial fibrillation  Ventricular rate 80 bpm     ECHOCARDIOGRAM 6/18/20  Interpretation Summary  Moderately (EF 35-40%) reduced left ventricular function is present.  Global right ventricular function is moderately reduced.  Probably moderate aortic stenosis, however accurate assessment of the aortic  valve is challenging with atrial fibrillation rhythm. The RCC and NCC appears  partially fused. The Vmax is 2.41 m/s and mean gradient is 13 mmHg. The DANIS is  calculated at 1.1 cm2 with a index of 0.54 cm/m2. The dimensionless index is  0.38.  The inferior vena cava was normal in size with preserved respiratory  variability.  No pericardial effusion is present.     HEART CATH 6/19/20    Severe LAD disease with PCI x3 to the mid-LAD    Severe aortic stenosis (valve area: 0.76 cm2)    Moderately elevated PCWP  Cardiac output is reduced   Successful PCI of the proximal to Mid LAD         CTA 6/30/20    FINDINGS:    1.  Moderately calcified tricuspidaortic valve. Aortic valve  calcium    score is 1396. The LVOT is not calcified. The ascending aorta is    mildly calcified, aortic arch is  mildly calcified, the descending    thoracic aorta is mildly calcified. There is severe mitral annular    calcification.    2.  There are no adverse aortic root features, ie coronary heights are    adequate and there is minimal, non-protruding aortic root    calcification.    3.  There are significant native coronary calcifications.    4.  The arch vessel branching pattern is normal.    5.  The suprarenal abdominal aorta is mildly calcified; infrarenal    abdominal aorta is moderately calcified    6.  Widely patent origins of celiac trunk, superior mesenteric artery    and inferior mesenteric artery.  Single bilateral renal arteries with    widely patent origins.    7.  There is no acute aortic pathology, such as dissection, intramural    hematoma, or contained rupture    LABS:  CBC:   Lab Results   Component Value Date    WBC 9.4 06/20/2020    WBC 7.5 06/19/2020    HGB 16.6 (H) 06/20/2020    HGB 15.4 06/19/2020    HCT 51.7 (H) 06/20/2020    HCT 48.0 (H) 06/19/2020     06/20/2020     06/19/2020     BMP:   Lab Results   Component Value Date     06/30/2020     06/20/2020    POTASSIUM 4.3 06/30/2020    POTASSIUM 4.1 06/20/2020    CHLORIDE 103 06/30/2020    CHLORIDE 100 06/20/2020    CO2 28 06/30/2020    CO2 26 06/20/2020    BUN 22 06/30/2020    BUN 24 06/20/2020    CR 1.02 06/30/2020    CR 1.05 (H) 06/20/2020     (H) 06/30/2020     (H) 06/20/2020     COAGS: No results found for: PTT, INR, FIBR  POC:   Lab Results   Component Value Date     (H) 06/19/2020     OTHER:   Lab Results   Component Value Date    LACT 1.3 06/18/2020    A1C 6.0 10/10/2012    CR 9.4 06/30/2020    PHOS 3.7 02/01/2012    MAG 2.5 (H) 11/04/2019    ALBUMIN 3.6 06/18/2020    PROTTOTAL 7.8 06/18/2020    ALT 26 06/18/2020    AST 34 06/18/2020    ALKPHOS 62 06/18/2020    BILITOTAL 0.7 06/18/2020     LIPASE 276 06/18/2020    TSH 0.56 06/18/2020    T4 1.16 05/18/2011    SED 11 08/05/2009        Labs today: (personally reviewed)  BMP, CBC, INR, PTT, T&S  COVID19 testing completed this morning, results pending    Sodium  133 - 144 mmol/L  138        Potassium  3.4 - 5.3 mmol/L  4.4       Chloride  94 - 109 mmol/L  106       Carbon Dioxide  20 - 32 mmol/L  26       Anion Gap  3 - 14 mmol/L  6       Glucose  70 - 99 mg/dL  100High         Urea Nitrogen  7 - 30 mg/dL  22       Creatinine  0.52 - 1.04 mg/dL  1.06High         GFR Estimate  >60 mL/min/  48Low       Comment: Non  GFR Calc   Starting 12/18/2018, serum creatinine based estimated GFR (eGFR) will be   calculated using the Chronic Kidney Disease Epidemiology Collaboration   (CKD-EPI) equation.      GFR Estimate If Black  >60 mL/min/  55Low       Comment:  GFR Calc   Starting 12/18/2018, serum creatinine based estimated GFR (eGFR) will be   calculated using the Chronic Kidney Disease Epidemiology Collaboration   (CKD-EPI) equation.      Calcium  8.5 - 10.1 mg/dL  9.4        WBC  4.0 - 11.0 10e9/L  6.2        RBC Count  3.8 - 5.2 10e12/L  5.33High         Hemoglobin  11.7 - 15.7 g/dL  15.2       Hematocrit  35.0 - 47.0 %  46.6       MCV  78 - 100 fl  87       MCH  26.5 - 33.0 pg  28.5       MCHC  31.5 - 36.5 g/dL  32.6       RDW  10.0 - 15.0 %  13.9       Platelet Count  150 - 450 10e9/L  183        INR  0.86 - 1.14  1.33High        PTT  22 - 37 sec  36        ASSESSMENT and PLAN  Dulce Yeh is a 85 year old female scheduled to undergo Transfemoral (Paez) Transcatheter Aortic Valve Replacement with Tu Hedrick MD on 7/29/20 at [facility] for treatment of Severe aortic stenosis.    Pt has had prior anesthetic.     History of anesthetic complications: + PONV    She has the following specific operative considerations:   # EFRAIN 2/8 = low risk  # VTE risk: 0.5%  # Risk of  PONV score = 3.  If > 2, anti-emetic intervention recommended.  # Anesthesia considerations:  Refer to PAC assessment in anesthesia records    # Increased risk of postoperative nausea/vomiting: Recommend use of antiemetic agents in the perioperative period.        CARDIAC: METS 3-4,  [place]ard[place] regularly 3 - Able to walk 1-2 blocks without stopping     # RCRI : High risk surgery, Coronary Artery Disease.  6.6% risk of major adverse cardiac event.     #  A-fib, [id number] score 5.   On Eliquis & Plavix.     #  CAD, NSTEMI, HRrEF. S/P coronary stenting x3 on 6/19/20     #  Severe AORTIC STENOSIS     #  Recent HTN urgency w/ flash pulmonary edema in June    PULMONARY:     # Never smoked    # No asthma or inhaler use    GI: no GERD     # steatohepatitis     ENDO: BMI 29    # Hypothyroidism    # No DM    HEME:   # Hold Eliquis beginning today, continue Plavix, take  mg evening before & morning of above procedure    ORTHO: mildly limited extension ROM of neck, no TMJ    #  S/p left knee arthroplasty    MISC:     # Lichen sclerosus    Patient is optimized and is acceptable candidate for the proposed procedure. No further diagnostic evaluation is needed.    Arrival time, NPO, shower and medication instructions provided by nursing staff today.  Preparing For Your Surgery handout given.      Jihan Segura PA-C  Preoperative Assessment Center  [facility]  [facility]  Phone: [phone]  Fax: [phone]

## 2020-07-27 NOTE — LETTER
7/27/2020      RE: Dulce Yeh  [address] [place] [address]       Dear Colleague,    Thank you for the opportunity to participate in the care of your patient, Dulce Yeh, at the [facility]. Please see a copy of my visit note below.    History     Ms. Yeh presented to the ED on 6/18/20 after waking up with acute shortness of breath, found to be in hypertensive urgency with flash pulmonary edema in the setting of aortic stenosis. Echo shows worsening trans-valvular gradient. Further studies showed severe LAD diseases s/p PCI x3.  She now presents for the above procedure.     PMH is also significant for HLD, HTN, A-fib, anxiety, hypothyroidism, steatohepatitis, s/p left knee arthroplasty, lichen sclerosus.     History was obtained from patient & chart review.     Past Medical History  Past Medical History        Past Medical History:   Diagnosis Date     Allergy       Aortic stenosis 10/11/2011     C. difficile colitis August-October 2015     Eosinophilia 10/11/2011     History of chickenpox       Hyperlipidaemia       Hypertension       Recurrent colitis due to Clostridium difficile 08/2016     after TKA surgery     S/P cholecystectomy 10/11/2011     Unspecified hypothyroidism 10/11/2011           Past Surgical History  Past Surgical History         Past Surgical History:   Procedure Laterality Date     ANKLE SURGERY         APPENDECTOMY         bilateral tubal ligation         CHOLECYSTECTOMY         CV CORONARY ANGIOGRAM N/A 6/19/2020     Procedure: Coronary Angiogram with possible PCI. RHC, possible LHC for TV gradient;  Surgeon: Tu Hedrick MD;  Location: [facility] CARDIAC CATH LAB     CV PCI STENT DRUG ELUTING N/A 6/19/2020     Procedure: Percutaneous Coronary Intervention Stent Drug Eluting;  Surgeon: Tu Hedrick MD;  Location:  HEART CARDIAC CATH LAB     ORTHOPEDIC SURGERY          Active Medications           Current Outpatient Medications   Medication Sig Dispense Refill     apixaban ANTICOAGULANT (ELIQUIS) 5 MG tablet Take 1 tablet (5 mg) by mouth 2 times daily 180 tablet 1     ascorbic acid (VITAMIN C) 500 MG tablet Take 500 mg by mouth daily.         aspirin (ASA) 325 MG EC tablet Take 325 mg by mouth         BIOTIN PO Take by mouth daily         calcium carb 1250 mg, 500 mg [place],/vitamin D 200 units (OSCAL WITH D) 500-200 MG-UNIT per tablet Take 1 tablet by mouth 2 times daily (with meals).         Cetirizine HCl (ZYRTEC PO) Take 10 mg by mouth daily as needed          clobetasol (CLOBETASOL PROPIONATE EMULSION) 0.05 % CREA Apply topically as needed 15 g 0     clopidogrel (PLAVIX) 75 MG tablet Take 1 tablet (75 mg) by mouth daily 90 tablet 3     coenzyme Q-10 200 MG CAPS Take 200 mg by mouth daily         cyanocolbalamin (VITAMIN B-12) 1000 MCG tablet Take 1,000 mcg by mouth daily.         fluocinonide (LIDEX) 0.05 % solution Apply topically 2 times daily         Glucosamine-Chondroit-Vit C-Mn (GLUCOSAMINE CHONDR 1500 COMPLX) CAPS Take 1 tablet by mouth daily          levothyroxine (SYNTHROID/LEVOTHROID) 88 MCG tablet Take 1 tablet (88 mcg) by mouth daily 90 tablet 3     Magnesium 300 MG CAPS Take  by mouth.         metoprolol succinate ER (TOPROL-XL) 100 MG 24 hr tablet Take 1 tablet (100 mg) by mouth daily 90 tablet 3     valsartan (DIOVAN) 320 MG tablet Take 1 tablet (320 mg) by mouth daily 90 tablet 3           Allergies       Allergies   Allergen Reactions     Sulfa Drugs Hives     Clonidine Rash     Diltiazem Rash        Social History  Social History   Social History            Socioeconomic History     Marital status:        Spouse name: Not on file     Number of children: 3     Years of education: Not on file     Highest education level: Not on file   Occupational History     Not on file   Social Needs     Financial resource strain: Not on file     Food insecurity       Worry: Not on file        Inability: Not on file     Transportation needs       Medical: Not on file       Non-medical: Not on file   Tobacco Use     Smoking status: Never Smoker     Smokeless tobacco: Never Used   Substance and Sexual Activity     Alcohol use: No     Drug use: No     Sexual activity: Yes       Birth control/protection: Post-menopausal   Lifestyle     Physical activity       Days per week: Not on file       Minutes per session: Not on file     Stress: Not on file   Relationships     Social connections       Talks on phone: Not on file       Gets together: Not on file       Attends Restorationism service: Not on file       Active member of club or organization: Not on file       Attends meetings of clubs or organizations: Not on file       Relationship status: Not on file     Intimate partner violence       Fear of current or ex partner: Not on file       Emotionally abused: Not on file       Physically abused: Not on file       Forced sexual activity: Not on file   Other Topics Concern      Service Not Asked     Blood Transfusions Not Asked     Caffeine Concern Yes       Comment: coffee, tea     Occupational Exposure Not Asked     Hobby Hazards Not Asked     Sleep Concern Not Asked     Stress Concern Not Asked     Weight Concern Not Asked     Special Diet Not Asked     Back Care Not Asked     Exercise Yes       Comment: swims 3x a week     Bike Helmet Not Asked     Seat Belt Not Asked     Self-Exams Not Asked     Parent/sibling w/ CABG, MI or angioplasty before 65F 55M? Not Asked   Social History Narrative     Lives alone, children nearby          2 boys and 1 girl (1 son )      passed away 24 yo     Labs     [employer]     Previoysly worked in [employer]           Family History  Family History         Family History   Problem Relation Age of Onset     Cancer Mother           lung cancer     Testicular cancer Father       Abdominal Aortic Aneurysm Brother       Rheumatic fever Brother    "    Diabetes Brother       Anesthesia Reaction No family hx of       Deep Vein Thrombosis (DVT) No family hx of            ROS/MED HX  The complete review of systems is negative other than noted in the HPI or here.  Temp: 97.7  F (36.5  C) Temp src: Oral BP: (!) 150/88 Pulse: 81 Resp: 16 SpO2: 98 %   177 lbs 0 oz 5' 7\" Body mass index is 27.72 kg/m .   Physical Exam   Constitutional: Awake, alert, cooperative, no apparent distress.  Eyes: Pupils equal, round and reactive to light.  Respiratory: Clear to auscultation bilaterally, no crackles or wheezing.   Cardiovascular: A-fib, normal S1 and S2, and 2/6 murmur noted. Carotids +2, no bruits.   GI: Normal bowel sounds, soft, non-distended, non-tender, no masses palpated.   Lymph/Hematologic: No cervical lymphadenopathy and no supraclavicular lymphadenopathy.   Skin: Warm and dry. No rashes.   Musculoskeletal: mildly limited extension   Neurologic: Awake, alert, oriented to name, place and time. Cranial nerves II-XII are grossly intact.   Neuropsychiatric: Calm, cooperative. Normal affect. Pleasant.   PRIOR LABS/DIAGNOSTIC STUDIES:   All labs and imaging personally reviewed   EKG 6/19/20   Atrial flutter with variable A-V block   Left axis deviation   Left bundle branch block   Abnormal ECG   When compared with ECG of 18-JUN-2020 10:23,   Atrial flutter has replaced Atrial fibrillation   Ventricular rate 80 bpm   ECHOCARDIOGRAM 6/18/20   Interpretation Summary   Moderately (EF 35-40%) reduced left ventricular function is present.   Global right ventricular function is moderately reduced.   Probably moderate aortic stenosis, however accurate assessment of the aortic   valve is challenging with atrial fibrillation rhythm. The RCC and NCC appears   partially fused. The Vmax is 2.41 m/s and mean gradient is 13 mmHg. The DANIS is   calculated at 1.1 cm2 with a index of 0.54 cm/m2. The dimensionless index is   0.38.   The inferior vena cava was normal in size with preserved " respiratory   variability.   No pericardial effusion is present.   HEART CATH 6/19/20   Severe LAD disease with PCI x3 to the mid-LAD   Severe aortic stenosis (valve area: 0.76 cm2)   Moderately elevated PCWP  Cardiac output is reduced  Successful PCI of the proximal to Mid LAD        CTA 6/30/20    FINDINGS:    1. Moderately calcified tricuspidaortic valve. Aortic valve calcium    score is 1396. The LVOT is not calcified. The ascending aorta is    mildly calcified, aortic arch is mildly calcified, the descending    thoracic aorta is mildly calcified. There is severe mitral annular    calcification.    2. There are no adverse aortic root features, ie coronary heights are    adequate and there is minimal, non-protruding aortic root    calcification.    3. There are significant native coronary calcifications.    4. The arch vessel branching pattern is normal.    5. The suprarenal abdominal aorta is mildly calcified; infrarenal    abdominal aorta is moderately calcified    6. Widely patent origins of celiac trunk, superior mesenteric artery    and inferior mesenteric artery. Single bilateral renal arteries with    widely patent origins.    7. There is no acute aortic pathology, such as dissection, intramural    hematoma, or contained rupture   LABS:   CBC:              Lab Results    Component Value Date     WBC 9.4 06/20/2020     WBC 7.5 06/19/2020     HGB 16.6 (H) 06/20/2020     HGB 15.4 06/19/2020     HCT 51.7 (H) 06/20/2020     HCT 48.0 (H) 06/19/2020      06/20/2020      06/19/2020    BMP:              Lab Results    Component Value Date      06/30/2020      06/20/2020     POTASSIUM 4.3 06/30/2020     POTASSIUM 4.1 06/20/2020     CHLORIDE 103 06/30/2020     CHLORIDE 100 06/20/2020     CO2 28 06/30/2020     CO2 26 06/20/2020     BUN 22 06/30/2020     BUN 24 06/20/2020     CR 1.02 06/30/2020     CR 1.05 (H) 06/20/2020      (H) 06/30/2020      (H) 06/20/2020    COAGS: No  results found for: PTT, INR, FIBR   POC:                Lab Results     Component Value Date       (H) 06/19/2020     OTHER:                Lab Results     Component Value Date      LACT 1.3 06/18/2020      A1C 6.0 10/10/2012      CR 9.4 06/30/2020      PHOS 3.7 02/01/2012      MAG 2.5 (H) 11/04/2019      ALBUMIN 3.6 06/18/2020      PROTTOTAL 7.8 06/18/2020      ALT 26 06/18/2020      AST 34 06/18/2020      ALKPHOS 62 06/18/2020      BILITOTAL 0.7 06/18/2020      LIPASE 276 06/18/2020      TSH 0.56 06/18/2020      T4 1.16 05/18/2011      SED 11 08/05/2009     Labs today: (personally reviewed)   BMP, CBC, INR, PTT, T&S   COVID19 testing completed this morning, results pending   Sodium  133 - 144 mmol/L  138      Potassium  3.4 - 5.3 mmol/L  4.4     Chloride  94 - 109 mmol/L  106     Carbon Dioxide  20 - 32 mmol/L  26     Anion Gap  3 - 14 mmol/L  6     Glucose  70 - 99 mg/dL  100High     Urea Nitrogen  7 - 30 mg/dL  22     Creatinine  0.52 - 1.04 mg/dL  1.06High     GFR Estimate  >60 mL/min/  48Low    Comment: Non  GFR Calc   Starting 12/18/2018, serum creatinine based estimated GFR (eGFR) will be   calculated using the Chronic Kidney Disease Epidemiology Collaboration   (CKD-EPI) equation.     GFR Estimate If Black  >60 mL/min/  55Low    Comment:  GFR Calc   Starting 12/18/2018, serum creatinine based estimated GFR (eGFR) will be   calculated using the Chronic Kidney Disease Epidemiology Collaboration   (CKD-EPI) equation.     Calcium  8.5 - 10.1 mg/dL  9.4      WBC  4.0 - 11.0 10e9/L  6.2      RBC Count  3.8 - 5.2 10e12/L  5.33High     Hemoglobin  11.7 - 15.7 g/dL  15.2     Hematocrit  35.0 - 47.0 %  46.6     MCV  78 - 100 fl  87     MCH  26.5 - 33.0 pg  28.5     MCHC  31.5 - 36.5 g/dL  32.6     RDW  10.0 - 15.0 %  13.9     Platelet Count  150 - 450 10e9/L  183      INR  0.86 - 1.14  1.33High      PTT  22 - 37 sec  36    ASSESSMENT and PLAN   Dulce Yeh is a 85 year old  female who is at high risk for adverse outcomes after surgical aortic valve replacement and should be considered for TAVR. She understands and is willing to proceed with this.      Please do not hesitate to contact me if you have any questions/concerns.     Sincerely,     Ricardo Ireland MD

## 2020-07-27 NOTE — PATIENT INSTRUCTIONS
Preparing for Your Surgery      Name:  Dulce Yeh   MRN:  [id number]   :  1935   Today's Date:  2020     Arriving for surgery:  Surgery date:  2020  Arrival time:  5:30 am    Restrictions due to COVID 19:  Patients are allowed one visitor in the pre-op period  All visitors must wear a mask  No visitors under 18  No ill visitors   parking is not available     Please come to:       [facility] [address]  [address] [place]  [address]       -    Please proceed to the Surgery Lounge on the 3rd floor. [phone]?     - ?If you are in need of directions, wheelchair or escort please stop at the Information Desk in the lobby.       What can I eat or drink?  -  You may eat and drink normally for up to 8 hours before your surgery. (Until 20 at 11 pm )  -  You may have clear liquids until 2 hours before surgery. (Until 5:30 am arrival time)  Examples of clear liquids:  Water  Clear broth  Juices (apple, white grape, white cranberry  and cider) without pulp  Noncarbonated, powder based beverages  (lemonade and Petr-Aid)  Sodas (Sprite, 7-Up, ginger ale and seltzer)  Coffee or tea (without milk or cream)  Gatorade    -  No Alcohol for at least 24 hours before surgery     Which medicines can I take?    Hold Multivitamins for 7 days before surgery.  Hold Supplements (Co Q 10, Glucosamine) for 7 days before surgery.  Hold Ibuprofen (Advil, Motrin) for 1 day before surgery--unless otherwise directed by surgeon.  Hold Naproxen (Aleve) for 4 days before surgery.    Plan for Apixaban (Eliquis)--hold starting on 20    -  DO NOT take these medications the day of surgery:  Vitamin C  Biotin  Calcium  Vitamin B12  Magnesium   Valsartan         -  PLEASE TAKE these medications the day of surgery:  Aspirin 325 mg  Cetirizine (Zyrtec)  Clopidogrel (Plavix)  Levothyroxine  Metoprolol      How do I prepare myself?  - Please take 2 showers before surgery using  Scrubcare or Hibiclens soap.    Use this soap only from the neck to your toes.     Leave the soap on your skin for one minute--then rinse thoroughly.      You may use your own shampoo and conditioner; no other hair products.   - Please remove all jewelry and body piercings.  - No lotions, deodorants or fragrance.  - No makeup or fingernail polish.   - Bring your ID and insurance card.    - All patients are required to have a Covid-19 test within 4 days of surgery/procedure.      -Patients will be contacted by the [facility] scheduling team within 1 week of surgery to make an appointment.      - Patients may call the Scheduling team at [phone] if they have not been scheduled within 4 days of  surgery.        Questions or Concerns:    - For any questions regarding the day of surgery or your hospital stay, please contact the Pre Admission Nursing Office at [phone].       - If you have health changes between today and your surgery please call your surgeon.       For questions after surgery please call your surgeons office.

## 2020-07-28 LAB
SARS-COV-2 RNA SPEC QL NAA+PROBE: NOT DETECTED
SPECIMEN SOURCE: NORMAL

## 2020-07-29 ENCOUNTER — ANESTHESIA (OUTPATIENT)
Dept: SURGERY | Facility: CLINIC | Age: 85
DRG: 267 | End: 2020-07-29
Payer: MEDICARE

## 2020-07-29 ENCOUNTER — HOSPITAL ENCOUNTER (INPATIENT)
Facility: CLINIC | Age: 85
Setting detail: SURGERY ADMIT
LOS: 1 days | Discharge: HOME | DRG: 267 | End: 2020-07-30
Attending: INTERNAL MEDICINE | Admitting: INTERNAL MEDICINE
Payer: MEDICARE

## 2020-07-29 ENCOUNTER — HOSPITAL ENCOUNTER (OUTPATIENT)
Dept: CARDIOLOGY | Facility: CLINIC | Age: 85
DRG: 267 | End: 2020-07-29
Attending: INTERNAL MEDICINE | Admitting: INTERNAL MEDICINE
Payer: MEDICARE

## 2020-07-29 DIAGNOSIS — Z95.2 S/P TAVR (TRANSCATHETER AORTIC VALVE REPLACEMENT): ICD-10-CM

## 2020-07-29 DIAGNOSIS — I35.0 SEVERE AORTIC STENOSIS: ICD-10-CM

## 2020-07-29 DIAGNOSIS — Z95.2 S/P TAVR (TRANSCATHETER AORTIC VALVE REPLACEMENT): Primary | ICD-10-CM

## 2020-07-29 DIAGNOSIS — I35.0 SEVERE AORTIC STENOSIS: Primary | ICD-10-CM

## 2020-07-29 LAB
ABO + RH BLD: NORMAL
ABO + RH BLD: NORMAL
ALBUMIN SERPL-MCNC: 3 G/DL (ref 3.4–5)
ALP SERPL-CCNC: 41 U/L (ref 40–150)
ALT SERPL W P-5'-P-CCNC: 18 U/L (ref 0–50)
ANION GAP SERPL CALCULATED.3IONS-SCNC: 7 MMOL/L (ref 3–14)
AST SERPL W P-5'-P-CCNC: 26 U/L (ref 0–45)
BILIRUB SERPL-MCNC: 0.7 MG/DL (ref 0.2–1.3)
BLD GP AB SCN SERPL QL: NORMAL
BLD PROD TYP BPU: NORMAL
BLD UNIT ID BPU: 0
BLD UNIT ID BPU: 0
BLOOD BANK CMNT PATIENT-IMP: NORMAL
BLOOD BANK CMNT PATIENT-IMP: NORMAL
BLOOD PRODUCT CODE: NORMAL
BLOOD PRODUCT CODE: NORMAL
BPU ID: NORMAL
BPU ID: NORMAL
BUN SERPL-MCNC: 22 MG/DL (ref 7–30)
CALCIUM SERPL-MCNC: 8.4 MG/DL (ref 8.5–10.1)
CHLORIDE SERPL-SCNC: 104 MMOL/L (ref 94–109)
CO2 SERPL-SCNC: 26 MMOL/L (ref 20–32)
CREAT SERPL-MCNC: 1.01 MG/DL (ref 0.52–1.04)
CREAT SERPL-MCNC: 1.17 MG/DL (ref 0.52–1.04)
ERYTHROCYTE [DISTWIDTH] IN BLOOD BY AUTOMATED COUNT: 14.1 % (ref 10–15)
ERYTHROCYTE [DISTWIDTH] IN BLOOD BY AUTOMATED COUNT: 14.3 % (ref 10–15)
GFR SERPL CREATININE-BSD FRML MDRD: 42 ML/MIN/{1.73_M2}
GFR SERPL CREATININE-BSD FRML MDRD: 51 ML/MIN/{1.73_M2}
GLUCOSE BLDC GLUCOMTR-MCNC: 140 MG/DL (ref 70–99)
GLUCOSE SERPL-MCNC: 99 MG/DL (ref 70–99)
HCT VFR BLD AUTO: 39.6 % (ref 35–47)
HCT VFR BLD AUTO: 39.6 % (ref 35–47)
HGB BLD-MCNC: 12.5 G/DL (ref 11.7–15.7)
HGB BLD-MCNC: 12.6 G/DL (ref 11.7–15.7)
HGB BLD-MCNC: 14.5 G/DL (ref 11.7–15.7)
INR PPP: 1.15 (ref 0.86–1.14)
KCT BLD-ACNC: 124 SEC (ref 75–150)
KCT BLD-ACNC: 145 SEC (ref 75–150)
KCT BLD-ACNC: 369 SEC (ref 75–150)
MAGNESIUM SERPL-MCNC: 2 MG/DL (ref 1.6–2.3)
MCH RBC QN AUTO: 28.2 PG (ref 26.5–33)
MCH RBC QN AUTO: 28.5 PG (ref 26.5–33)
MCHC RBC AUTO-ENTMCNC: 31.6 G/DL (ref 31.5–36.5)
MCHC RBC AUTO-ENTMCNC: 31.8 G/DL (ref 31.5–36.5)
MCV RBC AUTO: 89 FL (ref 78–100)
MCV RBC AUTO: 90 FL (ref 78–100)
NUM BPU REQUESTED: 2
PHOSPHATE SERPL-MCNC: 4.5 MG/DL (ref 2.5–4.5)
PLATELET # BLD AUTO: 144 10E9/L (ref 150–450)
PLATELET # BLD AUTO: 155 10E9/L (ref 150–450)
POTASSIUM SERPL-SCNC: 3.8 MMOL/L (ref 3.4–5.3)
POTASSIUM SERPL-SCNC: 4.2 MMOL/L (ref 3.4–5.3)
PROT SERPL-MCNC: 6.2 G/DL (ref 6.8–8.8)
RBC # BLD AUTO: 4.42 10E12/L (ref 3.8–5.2)
RBC # BLD AUTO: 4.43 10E12/L (ref 3.8–5.2)
SODIUM SERPL-SCNC: 138 MMOL/L (ref 133–144)
SPECIMEN EXP DATE BLD: NORMAL
TRANSFUSION STATUS PATIENT QL: NORMAL
WBC # BLD AUTO: 7.1 10E9/L (ref 4–11)
WBC # BLD AUTO: 9.7 10E9/L (ref 4–11)

## 2020-07-29 PROCEDURE — 36415 COLL VENOUS BLD VENIPUNCTURE: CPT | Performed by: STUDENT IN AN ORGANIZED HEALTH CARE EDUCATION/TRAINING PROGRAM

## 2020-07-29 PROCEDURE — 40000010 ZZH STATISTIC ANES STAT CODE-CRNA PER MINUTE: Performed by: INTERNAL MEDICINE

## 2020-07-29 PROCEDURE — 84132 ASSAY OF SERUM POTASSIUM: CPT | Performed by: STUDENT IN AN ORGANIZED HEALTH CARE EDUCATION/TRAINING PROGRAM

## 2020-07-29 PROCEDURE — 80053 COMPREHEN METABOLIC PANEL: CPT | Performed by: STUDENT IN AN ORGANIZED HEALTH CARE EDUCATION/TRAINING PROGRAM

## 2020-07-29 PROCEDURE — 99024 POSTOP FOLLOW-UP VISIT: CPT | Performed by: PHYSICIAN ASSISTANT

## 2020-07-29 PROCEDURE — 83735 ASSAY OF MAGNESIUM: CPT | Performed by: STUDENT IN AN ORGANIZED HEALTH CARE EDUCATION/TRAINING PROGRAM

## 2020-07-29 PROCEDURE — 25000132 ZZH RX MED GY IP 250 OP 250 PS 637: Mod: GY | Performed by: STUDENT IN AN ORGANIZED HEALTH CARE EDUCATION/TRAINING PROGRAM

## 2020-07-29 PROCEDURE — 71000016 ZZH RECOVERY PHASE 1 LEVEL 3 FIRST HR: Performed by: INTERNAL MEDICINE

## 2020-07-29 PROCEDURE — 84100 ASSAY OF PHOSPHORUS: CPT | Performed by: STUDENT IN AN ORGANIZED HEALTH CARE EDUCATION/TRAINING PROGRAM

## 2020-07-29 PROCEDURE — 85027 COMPLETE CBC AUTOMATED: CPT | Performed by: STUDENT IN AN ORGANIZED HEALTH CARE EDUCATION/TRAINING PROGRAM

## 2020-07-29 PROCEDURE — 40000170 ZZH STATISTIC PRE-PROCEDURE ASSESSMENT II: Performed by: INTERNAL MEDICINE

## 2020-07-29 PROCEDURE — 25000128 H RX IP 250 OP 636: Performed by: NURSE ANESTHETIST, CERTIFIED REGISTERED

## 2020-07-29 PROCEDURE — 85347 COAGULATION TIME ACTIVATED: CPT

## 2020-07-29 PROCEDURE — C1894 INTRO/SHEATH, NON-LASER: HCPCS | Performed by: INTERNAL MEDICINE

## 2020-07-29 PROCEDURE — 82565 ASSAY OF CREATININE: CPT | Performed by: STUDENT IN AN ORGANIZED HEALTH CARE EDUCATION/TRAINING PROGRAM

## 2020-07-29 PROCEDURE — 02RF38Z REPLACEMENT OF AORTIC VALVE WITH ZOOPLASTIC TISSUE, PERCUTANEOUS APPROACH: ICD-10-PCS | Performed by: INTERNAL MEDICINE

## 2020-07-29 PROCEDURE — 93306 TTE W/DOPPLER COMPLETE: CPT | Mod: 26 | Performed by: INTERNAL MEDICINE

## 2020-07-29 PROCEDURE — 36000074 ZZH SURGERY LEVEL 6 1ST 30 MIN - UMMC: Performed by: INTERNAL MEDICINE

## 2020-07-29 PROCEDURE — 36000076 ZZH SURGERY LEVEL 6 EA 15 ADDTL MIN - UMMC: Performed by: INTERNAL MEDICINE

## 2020-07-29 PROCEDURE — 40000275 ZZH STATISTIC RCP TIME EA 10 MIN

## 2020-07-29 PROCEDURE — 25000125 ZZHC RX 250: Performed by: NURSE ANESTHETIST, CERTIFIED REGISTERED

## 2020-07-29 PROCEDURE — 93306 TTE W/DOPPLER COMPLETE: CPT

## 2020-07-29 PROCEDURE — 25000128 H RX IP 250 OP 636: Performed by: INTERNAL MEDICINE

## 2020-07-29 PROCEDURE — P9016 RBC LEUKOCYTES REDUCED: HCPCS | Performed by: PHYSICIAN ASSISTANT

## 2020-07-29 PROCEDURE — 00000146 ZZHCL STATISTIC GLUCOSE BY METER IP

## 2020-07-29 PROCEDURE — C1730 CATH, EP, 19 OR FEW ELECT: HCPCS | Performed by: INTERNAL MEDICINE

## 2020-07-29 PROCEDURE — 37000009 ZZH ANESTHESIA TECHNICAL FEE, EACH ADDTL 15 MIN: Performed by: INTERNAL MEDICINE

## 2020-07-29 PROCEDURE — C1769 GUIDE WIRE: HCPCS | Performed by: INTERNAL MEDICINE

## 2020-07-29 PROCEDURE — C1760 CLOSURE DEV, VASC: HCPCS | Performed by: INTERNAL MEDICINE

## 2020-07-29 PROCEDURE — 85610 PROTHROMBIN TIME: CPT | Performed by: STUDENT IN AN ORGANIZED HEALTH CARE EDUCATION/TRAINING PROGRAM

## 2020-07-29 PROCEDURE — 93010 ELECTROCARDIOGRAM REPORT: CPT | Mod: 59 | Performed by: INTERNAL MEDICINE

## 2020-07-29 PROCEDURE — 27210794 ZZH OR GENERAL SUPPLY STERILE: Performed by: INTERNAL MEDICINE

## 2020-07-29 PROCEDURE — 33361 REPLACE AORTIC VALVE PERQ: CPT | Mod: 62 | Performed by: INTERNAL MEDICINE

## 2020-07-29 PROCEDURE — 85018 HEMOGLOBIN: CPT | Performed by: STUDENT IN AN ORGANIZED HEALTH CARE EDUCATION/TRAINING PROGRAM

## 2020-07-29 PROCEDURE — 25000125 ZZHC RX 250: Performed by: INTERNAL MEDICINE

## 2020-07-29 PROCEDURE — 25800030 ZZH RX IP 258 OP 636: Performed by: STUDENT IN AN ORGANIZED HEALTH CARE EDUCATION/TRAINING PROGRAM

## 2020-07-29 PROCEDURE — 37000008 ZZH ANESTHESIA TECHNICAL FEE, 1ST 30 MIN: Performed by: INTERNAL MEDICINE

## 2020-07-29 PROCEDURE — 93005 ELECTROCARDIOGRAM TRACING: CPT

## 2020-07-29 PROCEDURE — 71000017 ZZH RECOVERY PHASE 1 LEVEL 3 EA ADDTL HR: Performed by: INTERNAL MEDICINE

## 2020-07-29 PROCEDURE — 25800030 ZZH RX IP 258 OP 636: Performed by: NURSE ANESTHETIST, CERTIFIED REGISTERED

## 2020-07-29 PROCEDURE — 40000014 ZZH STATISTIC ARTERIAL MONITORING DAILY

## 2020-07-29 PROCEDURE — 21400000 ZZH R&B CCU UMMC

## 2020-07-29 DEVICE — IMPLANTABLE DEVICE: Type: IMPLANTABLE DEVICE | Site: CHEST | Status: FUNCTIONAL

## 2020-07-29 RX ORDER — FENTANYL CITRATE 50 UG/ML
INJECTION, SOLUTION INTRAMUSCULAR; INTRAVENOUS PRN
Status: DISCONTINUED | OUTPATIENT
Start: 2020-07-29 | End: 2020-07-29

## 2020-07-29 RX ORDER — ACETAMINOPHEN 325 MG/1
325-650 TABLET ORAL EVERY 4 HOURS PRN
Status: CANCELLED | OUTPATIENT
Start: 2020-07-29

## 2020-07-29 RX ORDER — NALOXONE HYDROCHLORIDE 0.4 MG/ML
.1-.4 INJECTION, SOLUTION INTRAMUSCULAR; INTRAVENOUS; SUBCUTANEOUS
Status: DISCONTINUED | OUTPATIENT
Start: 2020-07-29 | End: 2020-07-30 | Stop reason: HOSPADM

## 2020-07-29 RX ORDER — PROTAMINE SULFATE 10 MG/ML
INJECTION, SOLUTION INTRAVENOUS PRN
Status: DISCONTINUED | OUTPATIENT
Start: 2020-07-29 | End: 2020-07-29

## 2020-07-29 RX ORDER — AMOXICILLIN 250 MG
1-2 CAPSULE ORAL 2 TIMES DAILY
Status: DISCONTINUED | OUTPATIENT
Start: 2020-07-29 | End: 2020-07-30 | Stop reason: HOSPADM

## 2020-07-29 RX ORDER — ACETAMINOPHEN 325 MG/1
975 TABLET ORAL EVERY 8 HOURS PRN
Status: DISCONTINUED | OUTPATIENT
Start: 2020-07-29 | End: 2020-07-30 | Stop reason: HOSPADM

## 2020-07-29 RX ORDER — VALSARTAN 160 MG/1
320 TABLET ORAL DAILY
Status: DISCONTINUED | OUTPATIENT
Start: 2020-07-30 | End: 2020-07-30 | Stop reason: HOSPADM

## 2020-07-29 RX ORDER — HYDRALAZINE HYDROCHLORIDE 20 MG/ML
10 INJECTION INTRAMUSCULAR; INTRAVENOUS
Status: CANCELLED | OUTPATIENT
Start: 2020-07-29

## 2020-07-29 RX ORDER — NOREPINEPHRINE BITARTRATE 0.06 MG/ML
.03-.4 INJECTION, SOLUTION INTRAVENOUS CONTINUOUS
Status: DISCONTINUED | OUTPATIENT
Start: 2020-07-29 | End: 2020-07-29 | Stop reason: HOSPADM

## 2020-07-29 RX ORDER — HEPARIN SODIUM 1000 [USP'U]/ML
INJECTION, SOLUTION INTRAVENOUS; SUBCUTANEOUS PRN
Status: DISCONTINUED | OUTPATIENT
Start: 2020-07-29 | End: 2020-07-29

## 2020-07-29 RX ORDER — CEFAZOLIN SODIUM 2 G/100ML
2 INJECTION, SOLUTION INTRAVENOUS
Status: COMPLETED | OUTPATIENT
Start: 2020-07-29 | End: 2020-07-29

## 2020-07-29 RX ORDER — LIDOCAINE 40 MG/G
CREAM TOPICAL
Status: DISCONTINUED | OUTPATIENT
Start: 2020-07-29 | End: 2020-07-30 | Stop reason: HOSPADM

## 2020-07-29 RX ORDER — SODIUM CHLORIDE, SODIUM GLUCONATE, SODIUM ACETATE, POTASSIUM CHLORIDE AND MAGNESIUM CHLORIDE 526; 502; 368; 37; 30 MG/100ML; MG/100ML; MG/100ML; MG/100ML; MG/100ML
INJECTION, SOLUTION INTRAVENOUS CONTINUOUS PRN
Status: DISCONTINUED | OUTPATIENT
Start: 2020-07-29 | End: 2020-07-29

## 2020-07-29 RX ORDER — LEVOTHYROXINE SODIUM 88 UG/1
88 TABLET ORAL
Status: DISCONTINUED | OUTPATIENT
Start: 2020-07-30 | End: 2020-07-30 | Stop reason: HOSPADM

## 2020-07-29 RX ORDER — ONDANSETRON 4 MG/1
4 TABLET, ORALLY DISINTEGRATING ORAL EVERY 30 MIN PRN
Status: DISCONTINUED | OUTPATIENT
Start: 2020-07-29 | End: 2020-07-29 | Stop reason: HOSPADM

## 2020-07-29 RX ORDER — SODIUM CHLORIDE, SODIUM LACTATE, POTASSIUM CHLORIDE, CALCIUM CHLORIDE 600; 310; 30; 20 MG/100ML; MG/100ML; MG/100ML; MG/100ML
INJECTION, SOLUTION INTRAVENOUS CONTINUOUS
Status: DISCONTINUED | OUTPATIENT
Start: 2020-07-29 | End: 2020-07-29 | Stop reason: HOSPADM

## 2020-07-29 RX ORDER — NALOXONE HYDROCHLORIDE 0.4 MG/ML
.1-.4 INJECTION, SOLUTION INTRAMUSCULAR; INTRAVENOUS; SUBCUTANEOUS
Status: CANCELLED | OUTPATIENT
Start: 2020-07-29

## 2020-07-29 RX ORDER — SODIUM CHLORIDE, SODIUM LACTATE, POTASSIUM CHLORIDE, CALCIUM CHLORIDE 600; 310; 30; 20 MG/100ML; MG/100ML; MG/100ML; MG/100ML
INJECTION, SOLUTION INTRAVENOUS CONTINUOUS PRN
Status: DISCONTINUED | OUTPATIENT
Start: 2020-07-29 | End: 2020-07-29

## 2020-07-29 RX ORDER — SODIUM CHLORIDE 9 MG/ML
INJECTION, SOLUTION INTRAVENOUS CONTINUOUS
Status: DISCONTINUED | OUTPATIENT
Start: 2020-07-29 | End: 2020-07-29 | Stop reason: HOSPADM

## 2020-07-29 RX ORDER — ONDANSETRON 2 MG/ML
4 INJECTION INTRAMUSCULAR; INTRAVENOUS EVERY 30 MIN PRN
Status: DISCONTINUED | OUTPATIENT
Start: 2020-07-29 | End: 2020-07-29 | Stop reason: HOSPADM

## 2020-07-29 RX ORDER — NITROGLYCERIN 0.4 MG/1
0.4 TABLET SUBLINGUAL EVERY 5 MIN PRN
Status: CANCELLED | OUTPATIENT
Start: 2020-07-29

## 2020-07-29 RX ORDER — CLOPIDOGREL BISULFATE 75 MG/1
75 TABLET ORAL DAILY
Status: CANCELLED | OUTPATIENT
Start: 2020-07-30

## 2020-07-29 RX ORDER — IOPAMIDOL 755 MG/ML
INJECTION, SOLUTION INTRAVASCULAR
Status: DISCONTINUED | OUTPATIENT
Start: 2020-07-29 | End: 2020-07-29 | Stop reason: HOSPADM

## 2020-07-29 RX ORDER — METOPROLOL SUCCINATE 100 MG/1
100 TABLET, EXTENDED RELEASE ORAL DAILY
Status: DISCONTINUED | OUTPATIENT
Start: 2020-07-30 | End: 2020-07-30 | Stop reason: HOSPADM

## 2020-07-29 RX ORDER — ONDANSETRON 2 MG/ML
4 INJECTION INTRAMUSCULAR; INTRAVENOUS EVERY 6 HOURS PRN
Status: DISCONTINUED | OUTPATIENT
Start: 2020-07-29 | End: 2020-07-30 | Stop reason: HOSPADM

## 2020-07-29 RX ORDER — NITROGLYCERIN 0.4 MG/1
0.4 TABLET SUBLINGUAL EVERY 5 MIN PRN
Status: DISCONTINUED | OUTPATIENT
Start: 2020-07-29 | End: 2020-07-30 | Stop reason: HOSPADM

## 2020-07-29 RX ORDER — ONDANSETRON 2 MG/ML
INJECTION INTRAMUSCULAR; INTRAVENOUS PRN
Status: DISCONTINUED | OUTPATIENT
Start: 2020-07-29 | End: 2020-07-29

## 2020-07-29 RX ORDER — ONDANSETRON 4 MG/1
4 TABLET, ORALLY DISINTEGRATING ORAL EVERY 6 HOURS PRN
Status: DISCONTINUED | OUTPATIENT
Start: 2020-07-29 | End: 2020-07-30 | Stop reason: HOSPADM

## 2020-07-29 RX ORDER — METOPROLOL SUCCINATE 100 MG/1
100 TABLET, EXTENDED RELEASE ORAL DAILY
Status: DISCONTINUED | OUTPATIENT
Start: 2020-07-29 | End: 2020-07-29

## 2020-07-29 RX ORDER — CLOPIDOGREL BISULFATE 75 MG/1
75 TABLET ORAL DAILY
Status: DISCONTINUED | OUTPATIENT
Start: 2020-07-30 | End: 2020-07-30 | Stop reason: HOSPADM

## 2020-07-29 RX ORDER — NALOXONE HYDROCHLORIDE 0.4 MG/ML
.1-.4 INJECTION, SOLUTION INTRAMUSCULAR; INTRAVENOUS; SUBCUTANEOUS
Status: ACTIVE | OUTPATIENT
Start: 2020-07-29 | End: 2020-07-30

## 2020-07-29 RX ORDER — LIDOCAINE 40 MG/G
CREAM TOPICAL
Status: DISCONTINUED | OUTPATIENT
Start: 2020-07-29 | End: 2020-07-29 | Stop reason: HOSPADM

## 2020-07-29 RX ORDER — ASPIRIN 81 MG/1
81 TABLET ORAL DAILY
Status: DISCONTINUED | OUTPATIENT
Start: 2020-07-30 | End: 2020-07-29

## 2020-07-29 RX ADMIN — HEPARIN SODIUM 12000 UNITS: 1000 INJECTION INTRAVENOUS; SUBCUTANEOUS at 08:45

## 2020-07-29 RX ADMIN — APIXABAN 5 MG: 5 TABLET, FILM COATED ORAL at 20:08

## 2020-07-29 RX ADMIN — FENTANYL CITRATE 25 MCG: 50 INJECTION, SOLUTION INTRAMUSCULAR; INTRAVENOUS at 09:26

## 2020-07-29 RX ADMIN — SODIUM CHLORIDE, POTASSIUM CHLORIDE, SODIUM LACTATE AND CALCIUM CHLORIDE: 600; 310; 30; 20 INJECTION, SOLUTION INTRAVENOUS at 07:58

## 2020-07-29 RX ADMIN — PROTAMINE SULFATE 70 MG: 10 INJECTION, SOLUTION INTRAVENOUS at 09:14

## 2020-07-29 RX ADMIN — ONDANSETRON 4 MG: 2 INJECTION INTRAMUSCULAR; INTRAVENOUS at 09:22

## 2020-07-29 RX ADMIN — MIDAZOLAM 1 MG: 1 INJECTION INTRAMUSCULAR; INTRAVENOUS at 07:37

## 2020-07-29 RX ADMIN — ACETAMINOPHEN 975 MG: 325 TABLET, FILM COATED ORAL at 15:03

## 2020-07-29 RX ADMIN — FENTANYL CITRATE 50 MCG: 50 INJECTION, SOLUTION INTRAMUSCULAR; INTRAVENOUS at 07:43

## 2020-07-29 RX ADMIN — FENTANYL CITRATE 25 MCG: 50 INJECTION, SOLUTION INTRAMUSCULAR; INTRAVENOUS at 08:37

## 2020-07-29 RX ADMIN — PROTAMINE SULFATE 10 MG: 10 INJECTION, SOLUTION INTRAVENOUS at 09:11

## 2020-07-29 RX ADMIN — SODIUM CHLORIDE, POTASSIUM CHLORIDE, SODIUM LACTATE AND CALCIUM CHLORIDE 250 ML: 600; 310; 30; 20 INJECTION, SOLUTION INTRAVENOUS at 18:49

## 2020-07-29 RX ADMIN — FENTANYL CITRATE 25 MCG: 50 INJECTION, SOLUTION INTRAMUSCULAR; INTRAVENOUS at 09:15

## 2020-07-29 RX ADMIN — SODIUM CHLORIDE, SODIUM GLUCONATE, SODIUM ACETATE, POTASSIUM CHLORIDE AND MAGNESIUM CHLORIDE: 526; 502; 368; 37; 30 INJECTION, SOLUTION INTRAVENOUS at 07:26

## 2020-07-29 RX ADMIN — Medication 2 G: at 08:00

## 2020-07-29 RX ADMIN — MIDAZOLAM 1 MG: 1 INJECTION INTRAMUSCULAR; INTRAVENOUS at 08:05

## 2020-07-29 RX ADMIN — FENTANYL CITRATE 25 MCG: 50 INJECTION, SOLUTION INTRAMUSCULAR; INTRAVENOUS at 08:23

## 2020-07-29 ASSESSMENT — ACTIVITIES OF DAILY LIVING (ADL)
ADLS_ACUITY_SCORE: 12
ADLS_ACUITY_SCORE: 13
ADLS_ACUITY_SCORE: 12

## 2020-07-29 ASSESSMENT — PAIN DESCRIPTION - DESCRIPTORS: DESCRIPTORS: TENDER

## 2020-07-29 NOTE — OR NURSING
1020: Dr. Molina notified of low blood pressure, 250 ml crystalloid fluid bolus ordered with a may repeat if necessary

## 2020-07-29 NOTE — ANESTHESIA CARE TRANSFER NOTE
Patient: Dulce Yeh    Procedure(s):  Transfemoral (Paez) Transcatheter Aortic Valve Replacement with Paez Kyra Ultra 23 mm.  balloon pump placement.. Perfusio on stand-by.    Diagnosis: Severe aortic stenosis [I35.0]  Diagnosis Additional Information: No value filed.    Anesthesia Type:   MAC     Note:  Airway :Room Air  Patient transferred to:PACU  Comments: Alert and exchanging well. VSS. No complaints of pain or nausea. Report given to RN.Handoff Report: Identifed the Patient, Identified the Reponsible Provider, Reviewed the pertinent medical history, Discussed the surgical course, Reviewed Intra-OP anesthesia mangement and issues during anesthesia, Set expectations for post-procedure period and Allowed opportunity for questions and acknowledgement of understanding      Vitals: (Last set prior to Anesthesia Care Transfer)    CRNA VITALS  7/29/2020 0916 - 7/29/2020 0953      7/29/2020             NIBP:  100/79    Pulse:  67    NIBP Mean:  81    Ht Rate:  67    SpO2:  95 %                Electronically Signed By: DONAVAN Garcia CRNA  July 29, 2020  9:53 AM

## 2020-07-29 NOTE — H&P
CARDIOLOGY-[facility] HISTORY AND PHYSICAL NOTE  Dulce Yeh MRN:[id number] YOB: 1935  Date of Admission:7/29/2020    ASSESSMENT AND PLAN:   Dulce Yeh is a 85 year old female with PMH notable for HTN, HLD, CAD with recent PCIx3 LAD, NICM/ICM, HFrEF LV EF 35-40%, LFLG severe aortic stenosis (DANIS 0.76), hypothyroidism, LBBB, paroxsymal atrial fibrillation on eliquis who presents for planned TAVR, now status post TAVR with 23 mm ES3 Ultra without complication.     Severe LFLF aortic stenosis (DANIS 0.76 cm2)   Status post TAVR (23 mm Paez Kyra III Ultra)   Notably, patient admitted 6/18/20 - 6/20/20 for acute hypoxic resp failure, HTN urgency with flash pulmonary edema that resolved, and NSTEMI. Workup included a TTE that showed newly reduced LV EF 35-40% with global dysfunction, moderate to severe AORTIC STENOSIS with a low stroke volume concerning for LFLG severe aortic stenosis as she endorsed several months of progressive SOB with exertion. She also mounted a troponin of 0.50 without acute ischemic change on an EKG, but could not exclude underlying coronary artery disease with stable angina. She was subsequently brought for a coronary angiogram that revealed severe stenosis of the LAD status post PCI x 3, and severe LFLG aortic stenosis with DANIS 0.76. She was discharged to follow up with TAVR team, and now presents for her planned TAVR. She has an existing LBBB, no new conduction delays post procedure; patient in aflutter 4:1 with LBBB. Temp pacer wire was removed. Post proc TTE showing well seated valve though gradient could not be assessed, mild to no PVL present. Patient feeling well in PACU, groin site with scant oozing; soft blood pressure responding to small bolus fluid,neurologically intact and no abdominal/flank tenderness. Risk for RP bleed due to high stick 2/2 anatomy.   -Plavix and eliquis for DAPT   -Bedrest, neuro-checks, and groin cares per protocol   -If abdominal pain/flank  tenderness or hemodynamic instability, stat CT abd/pel and IVF bolus r/o RP bleeding  -Antibiotic prophylaxis with all dental procedures going forward   -12-lead EKG stat if change in rhythm   -TTE in AM to confirm valve position, gradient across TAVR, assess for PVL     Coronary artery disease   HFrEF, ICM, LV EF 35%  Status post PCI LAD x 3   HTN, HLD   Recent NSTEMI admission in June 2020 as well as newly reduced LV EF with global hypokinesis prompting coronary angiography that revealed severe LAD stenosis status post PCI to LAD x 3 on plavix and apixaban. TTE intra-procedure showing LV EF 30-35% with anterobasal wall thickening and septal bounce c/w LBBB.   -DAPT: Plavix and apixaban   -Patient refused statin in last admission due to history of myalgia/intolerance   -Continue Metoprolol  mg every day   -Continue valsartan 320 mg every day pending course for afterload reduction  -If additional BP support is needed can consider spironolactone      Paroxysmal atrial fibrillation/aflutter  Rate controlled aflutter   [id number] 4 (age, gender, HTN)   -Continue Metoprolol  mg q 24 w/parameters   -Continue eliquis 5 mg BID     Hypothyroidism  -Continue synthroid 88 mcg every day     CODE: Full   FENA:  Advance as tolerated   DVT ppx: Eliquis   Disposition: Admit to [facility]     HISTORY OF PRESENT ILLNESS:  Dulce Yeh is a 85 year old female with PMH notable for HTN, HLD, CAD with recent PCIx3 LAD, NICM/ICM, HFrEF LV EF 35-40%, LFLG severe aortic stenosis (DANIS 0.76), hypothyroidism, LBBB, paroxsymal atrial fibrillation on eliquis who presents for planned TAVR. She is now status post TAVR with 23 mm ES3 ultra.      Notably, patient admitted 6/18/20 - 6/20/20 for acute hypoxic resp failure felt secondary to hypertensive urgency with flash pulmonary edema that resolved with correction of her BP. Workup included a TTE that showed newly reduced LV EF 35-40% with global dysfunction felt likely hypertensive  cardiomyopathy, moderate to severe AORTIC STENOSIS with a low stroke volume concerning for LFLG severe aortic stenosis. She endorsed several months long history of progressive SOB with exertion. She also mounted a troponin of 0.50 without acute ischemic change on an EKG, but could not exclude underlying coronary artery disease with stable angina and question of ischemic cardiomyopathy. She was subsequently brought for a coronary angiogram that revealed severe stenosis of the LAD status post PCI x 3, and severe LFLG aortic stenosis with DANIS 0.76. She did well, and was referred for outpatient valve clinic and CT TAVR.     Otherwise, at time of interview in PACU patient is feeling comfortable, lying flat on bedrest. She denies headache or change in vision. She denies chest pain, SOB, orthopnea. No abdominal pain, nausea. No diaphoresis or chills, no fever. Groin access site with scant oozing on right but CDI no hematoma or bruit. EKG w/baseline LBBB and rate controlled atrial flutter.     PAST MEDICAL HISTORY:  Reviewed with patient on 07/29/2020   Past Medical History:   Diagnosis Date     Allergy      Aortic stenosis 10/11/2011     C. 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 RHC, possible LHC for TV gradient;  Surgeon: Tu Hedrick MD;  Location:  [facility]     CV PCI STENT DRUG ELUTING N/A 6/19/2020    Procedure: Percutaneous Coronary Intervention Stent Drug Eluting;  Surgeon: Tu Hedrick MD;  Location:   [facility]     ORTHOPEDIC SURGERY          MEDICATIONS:  PTA Meds  Prior to Admission medications    Medication Sig Last Dose Taking? Auth Provider   apixaban ANTICOAGULANT (ELIQUIS) 5 MG tablet Take 1 tablet (5 mg) by mouth 2 times daily Past Week at Unknown time Yes Gilda Hogan MD   ascorbic acid (VITAMIN C) 500 MG tablet Take 500 mg by mouth daily. Past Week at Unknown time Yes Reported, Patient   aspirin (ASA) 325 MG EC tablet Take 325 mg by mouth 7/29/2020 at 0500 Yes Reported, Patient   BIOTIN PO Take by mouth daily Past Week at Unknown time Yes Reported, Patient   calcium carb 1250 mg, 500 mg Koi,/vitamin D 200 units (OSCAL WITH D) 500-200 MG-UNIT per tablet Take 1 tablet by mouth 2 times daily (with meals). Past Week at Unknown time Yes Reported, Patient   Cetirizine HCl (ZYRTEC PO) Take 10 mg by mouth daily as needed  Past Week at Unknown time Yes Reported, Patient   clopidogrel (PLAVIX) 75 MG tablet Take 1 tablet (75 mg) by mouth daily 7/29/2020 at 0500 Yes Tony Bryant MD   coenzyme Q-10 200 MG CAPS Take 200 mg by mouth daily Past Week at Unknown time Yes Unknown, Entered By History   cyanocolbalamin (VITAMIN B-12) 1000 MCG tablet Take 1,000 mcg by mouth daily. Past Week at Unknown time Yes Reported, Patient   fluocinonide (LIDEX) 0.05 % solution Apply topically 2 times daily Past Month at Unknown time Yes Reported, Patient   Glucosamine-Chondroit-Vit C-Mn (GLUCOSAMINE CHONDR 1500 COMPLX) CAPS Take 1 tablet by mouth daily  Past Week at Unknown time Yes Reported, Patient   levothyroxine (SYNTHROID/LEVOTHROID) 88 MCG tablet Take 1 tablet (88 mcg) by mouth daily 7/29/2020 at 0500 Yes Gilda Hogan MD   Magnesium 300 MG CAPS Take  by mouth. Past Week at Unknown time Yes Reported, Patient   metoprolol succinate ER (TOPROL-XL) 100 MG 24 hr tablet Take 1 tablet (100 mg) by mouth daily 7/29/2020 at 0500 Yes Gilda Hogan MD   valsartan (DIOVAN) 320 MG tablet Take 1  tablet (320 mg) by mouth daily 7/28/2020 at 0800 Yes Gilda Hogan MD   clobetasol (CLOBETASOL PROPIONATE EMULSION) 0.05 % CREA Apply topically as needed More than a month at Unknown time  Nathalie Turner MD      Current Meds    aspirin  325 mg Oral Daily     sodium chloride (PF)  10 mL Intravenous Once     sodium chloride (PF)  3 mL Intracatheter Q8H     Infusion Meds    EPINEPHrine IV infusion ADULT       lactated ringers       norepinephrine         ALLERGIES:    Allergies   Allergen Reactions     Sulfa Drugs Hives     Clonidine Rash     Diltiazem Rash       REVIEW OF SYSTEMS:  A 10 point review of systems was negative except as noted above.    SOCIAL HISTORY:   Social History     Socioeconomic History     Marital status:      Spouse name: Not on file     Number of children: 3     Years of education: Not on file     Highest education level: Not on file   Occupational History     Not on file   Social Needs     Financial resource strain: Not on file     Food insecurity     Worry: Not on file     Inability: Not on file     Transportation needs     Medical: Not on file     Non-medical: Not on file   Tobacco Use     Smoking status: Never Smoker     Smokeless tobacco: Never Used   Substance and Sexual Activity     Alcohol use: No     Drug use: No     Sexual activity: Yes     Birth control/protection: Post-menopausal   Lifestyle     Physical activity     Days per week: Not on file     Minutes per session: Not on file     Stress: Not on file   Relationships     Social connections     Talks on phone: Not on file     Gets together: Not on file     Attends Mandaeism service: Not on file     Active member of club or organization: Not on file     Attends meetings of clubs or organizations: Not on file     Relationship status: Not on file     Intimate partner violence     Fear of current or ex partner: Not on file     Emotionally abused: Not on file     Physically abused: Not on file     Forced sexual  activity: Not on file   Other Topics Concern      Service Not Asked     Blood Transfusions Not Asked     Caffeine Concern Yes     Comment: coffee, tea     Occupational Exposure Not Asked     Hobby Hazards Not Asked     Sleep Concern Not Asked     Stress Concern Not Asked     Weight Concern Not Asked     Special Diet Not Asked     Back Care Not Asked     Exercise Yes     Comment: swims 3x a week     Bike Helmet Not Asked     Seat Belt Not Asked     Self-Exams Not Asked     Parent/sibling w/ CABG, MI or angioplasty before 65F 55M? Not Asked   Social History Narrative    Lives alone, children nearby        2 boys and 1 girl (1 son )     passed away 24 yo    Labs    [employer]    Previoysly worked in [employer]         FAMILY MEDICAL HISTORY:   Family History   Problem Relation Age of Onset     Cancer Mother         lung cancer     Testicular cancer Father      Abdominal Aortic Aneurysm Brother      Rheumatic fever Brother      Diabetes Brother      Anesthesia Reaction No family hx of      Deep Vein Thrombosis (DVT) No family hx of          PHYSICAL EXAM:   Temp  Av.6  F (36.4  C)  Min: 97.5  F (36.4  C)  Max: 97.7  F (36.5  C)      Pulse  Av.5  Min: 80  Max: 81 Resp  Av  Min: 16  Max: 16  SpO2  Av.5 %  Min: 98 %  Max: 99 %       BP (!) 145/98   Pulse 80   Temp 97.5  F (36.4  C) (Oral)   Resp 16   LMP  (LMP Unknown)   SpO2 99%    Date 20 0700 - 20 0659   Shift 4761-6442 4767-9941 3593-2188 24 Hour Total   INTAKE   Colloid 200   200   Shift Total 200   200   OUTPUT   Shift Total       Weight (kg)          GENERAL APPEARANCE: Alert and NAD  Head: NC/AT  EYES: PERRL, pupils equal   HENT: Mouth without ulcers or lesions  NECK: Supple, no goiter  Pulmonary: Limited anterior exam unrevealing, CTAB  CV: Irregular, regular rate. Distal pulses intact. Groin wound w/scant oozing otherwise CDI no hematoma or bruit.   GI: Soft, nontender, normal bowel  sounds  SKIN: No rash, warm, dry  NEURO: Mentation intact and speech normal.     LABS:   CMP  Recent Labs   Lab 07/29/20  0621 07/27/20  1412   NA  --  138   POTASSIUM 3.8 4.4   CHLORIDE  --  106   CO2  --  26   ANIONGAP  --  6   GLC  --  100*   BUN  --  22   CR 1.17* 1.06*   GFRESTIMATED 42* 48*   GFRESTBLACK 49* 55*   CR  --  9.4     CBC  Recent Labs   Lab 07/29/20 0621 07/27/20  1412   HGB 14.5 15.2   WBC  --  6.2   RBC  --  5.33*   HCT  --  46.6   MCV  --  87   MCH  --  28.5   MCHC  --  32.6   RDW  --  13.9   PLT  --  183     INR  Recent Labs   Lab 07/29/20  0621 07/27/20  1412   INR 1.15* 1.33*   PTT  --  36     ABGNo lab results found in last 7 days.     IMAGING:    TTE 7/29/20  Intraprocedural TTE for TAVR with 23 mm Paez Kyra 3 Ultra prosthesis.     Preprocedural images show a severely calcified aortic valve with severely  restricted leaflet motion AS and mild AI.  Postprocedural images show well-seated 23 mm Kyra 3 Ultra TAVR  bioprosthesis. There is mild valvular and no paravalular AI. Postprocedural  gradients could not be obtained despite attempts in multiple windows.  There is no pericardial effusion on the pre- or postprocedural images.  This study was compared with the study from 6/18/20: There has been interval  TAVR.    Left Ventricle  Left ventricular size is normal. Thickening of the anterobasal septum is  present. Moderately (EF 30-35%) reduced left ventricular function is present.  Left ventricular diastolic function is not assessable. Abnormal septal motion  consistent with left bundle branch block is present.    Aortic Valve  Preprocedural images show a severely calcified aortic valve with severely  restricted leaflet motion AS and mild AI.  Postprocedural images show well-seated 23 mm Kyra 3 Ultra TAVR  bioprosthesis. There is mild valvular and no paravalular AI. Postprocedural  gradients could not be obtained despite attempts in multiple windows.    Coronary angiogram 6/19/20        Severe LAD disease with PCI x3 to the mid-LAD    Severe aortic stenosis (valve area: 0.76 cm2)    Moderately elevated PCWP    Cardiac output is reduced        DEANDRE Staley  CSI  Pager: [phone]

## 2020-07-29 NOTE — Clinical Note
Potential access sites were evaluated for patency using ultrasound.   The right femoral artery, left femoral artery and left femoral vein were selected. Access was obtained under with Sonosite guidance using a standard 18 guage needle with direct visualization of needle entry.

## 2020-07-29 NOTE — OR NURSING
"Tess Matt reports that for the last month her left leg \"aches\" when she stands on it, not when she is sitting or lying down.    VSS, SBP90's/60's after two 250 ml fluid boluses. Transfer to . Dr. Molina will be placing a \"sign out\"  "

## 2020-07-29 NOTE — OR NURSING
DEANDRE Chang at bedside and viewed post procedure EKG and is aware of lower BP's and R groin that has had a very slow ooze and it stopped just before he arrived.

## 2020-07-29 NOTE — CONSULTS
Social Work Services Progress Note    Hospital Day: 1  Date of Initial Social Work Evaluation:  7/29/2020  Collaborated with:  Chart review    Data:  Pt is an 85 year old female with PMH notable for HTN, HLD, CAD with recent PCIx3 LAD, NICM/ICM, HFrEF LV EF 35-40%, LFLG severe aortic stenosis (DANIS 0.76), hypothyroidism, LBBB, paroxsymal atrial fibrillation on eliquis who presents for planned TAVR, now status post TAVR with 23 mm ES3 Ultra without complication.     Pt has cardiac rehab IP consult. Pt is anticipated to return home.     Intervention: Discharge planning and coordination of care    Assessment:  Pt remains hospitalized s/p TAVR    Plan:    Anticipated Disposition:  Home, no needs    Barriers to d/c plan:  Medical stability    Follow Up:  No discharge needs anticipated from  at this time.    MICHAEL Menon, KAMARI  6C Cardiology Unit   [facility]  Phone: [phone]  Pager: [phone]

## 2020-07-30 ENCOUNTER — PATIENT OUTREACH (OUTPATIENT)
Dept: CARE COORDINATION | Facility: CLINIC | Age: 85
End: 2020-07-30

## 2020-07-30 ENCOUNTER — APPOINTMENT (OUTPATIENT)
Dept: OCCUPATIONAL THERAPY | Facility: CLINIC | Age: 85
DRG: 267 | End: 2020-07-30
Attending: PHYSICIAN ASSISTANT
Payer: MEDICARE

## 2020-07-30 ENCOUNTER — APPOINTMENT (OUTPATIENT)
Dept: GENERAL RADIOLOGY | Facility: CLINIC | Age: 85
DRG: 267 | End: 2020-07-30
Attending: PHYSICIAN ASSISTANT
Payer: MEDICARE

## 2020-07-30 ENCOUNTER — APPOINTMENT (OUTPATIENT)
Dept: CT IMAGING | Facility: CLINIC | Age: 85
DRG: 267 | End: 2020-07-30
Attending: PHYSICIAN ASSISTANT
Payer: MEDICARE

## 2020-07-30 ENCOUNTER — APPOINTMENT (OUTPATIENT)
Dept: CARDIOLOGY | Facility: CLINIC | Age: 85
DRG: 267 | End: 2020-07-30
Attending: PHYSICIAN ASSISTANT
Payer: MEDICARE

## 2020-07-30 VITALS
OXYGEN SATURATION: 95 % | HEART RATE: 90 BPM | DIASTOLIC BLOOD PRESSURE: 54 MMHG | RESPIRATION RATE: 16 BRPM | SYSTOLIC BLOOD PRESSURE: 125 MMHG | TEMPERATURE: 97.9 F

## 2020-07-30 LAB
ANION GAP SERPL CALCULATED.3IONS-SCNC: 4 MMOL/L (ref 3–14)
BUN SERPL-MCNC: 23 MG/DL (ref 7–30)
CALCIUM SERPL-MCNC: 8.3 MG/DL (ref 8.5–10.1)
CHLORIDE SERPL-SCNC: 106 MMOL/L (ref 94–109)
CO2 SERPL-SCNC: 27 MMOL/L (ref 20–32)
CREAT SERPL-MCNC: 1.04 MG/DL (ref 0.52–1.04)
ERYTHROCYTE [DISTWIDTH] IN BLOOD BY AUTOMATED COUNT: 14.5 % (ref 10–15)
GFR SERPL CREATININE-BSD FRML MDRD: 49 ML/MIN/{1.73_M2}
GLUCOSE SERPL-MCNC: 113 MG/DL (ref 70–99)
HCT VFR BLD AUTO: 36.4 % (ref 35–47)
HGB BLD-MCNC: 11.5 G/DL (ref 11.7–15.7)
INTERPRETATION ECG - MUSE: NORMAL
INTERPRETATION ECG - MUSE: NORMAL
MAGNESIUM SERPL-MCNC: 2 MG/DL (ref 1.6–2.3)
MCH RBC QN AUTO: 28.5 PG (ref 26.5–33)
MCHC RBC AUTO-ENTMCNC: 31.6 G/DL (ref 31.5–36.5)
MCV RBC AUTO: 90 FL (ref 78–100)
PHOSPHATE SERPL-MCNC: 3.5 MG/DL (ref 2.5–4.5)
PLATELET # BLD AUTO: 126 10E9/L (ref 150–450)
POTASSIUM SERPL-SCNC: 4.4 MMOL/L (ref 3.4–5.3)
RBC # BLD AUTO: 4.04 10E12/L (ref 3.8–5.2)
SODIUM SERPL-SCNC: 138 MMOL/L (ref 133–144)
WBC # BLD AUTO: 7.1 10E9/L (ref 4–11)

## 2020-07-30 PROCEDURE — 97535 SELF CARE MNGMENT TRAINING: CPT | Mod: GO

## 2020-07-30 PROCEDURE — 99207 ZZC NO BILLABLE SERVICE THIS VISIT: CPT | Performed by: INTERNAL MEDICINE

## 2020-07-30 PROCEDURE — 25000132 ZZH RX MED GY IP 250 OP 250 PS 637: Mod: GY | Performed by: STUDENT IN AN ORGANIZED HEALTH CARE EDUCATION/TRAINING PROGRAM

## 2020-07-30 PROCEDURE — 40000141 ZZH STATISTIC PERIPHERAL IV START W/O US GUIDANCE

## 2020-07-30 PROCEDURE — 25800030 ZZH RX IP 258 OP 636: Performed by: PHYSICIAN ASSISTANT

## 2020-07-30 PROCEDURE — 80048 BASIC METABOLIC PNL TOTAL CA: CPT | Performed by: PHYSICIAN ASSISTANT

## 2020-07-30 PROCEDURE — 71045 X-RAY EXAM CHEST 1 VIEW: CPT

## 2020-07-30 PROCEDURE — 97165 OT EVAL LOW COMPLEX 30 MIN: CPT | Mod: GO

## 2020-07-30 PROCEDURE — 85027 COMPLETE CBC AUTOMATED: CPT | Performed by: PHYSICIAN ASSISTANT

## 2020-07-30 PROCEDURE — 74177 CT ABD & PELVIS W/CONTRAST: CPT

## 2020-07-30 PROCEDURE — 25000128 H RX IP 250 OP 636

## 2020-07-30 PROCEDURE — 99238 HOSP IP/OBS DSCHRG MGMT 30/<: CPT | Mod: 25 | Performed by: PHYSICIAN ASSISTANT

## 2020-07-30 PROCEDURE — 93005 ELECTROCARDIOGRAM TRACING: CPT

## 2020-07-30 PROCEDURE — 85027 COMPLETE CBC AUTOMATED: CPT | Performed by: STUDENT IN AN ORGANIZED HEALTH CARE EDUCATION/TRAINING PROGRAM

## 2020-07-30 PROCEDURE — 84100 ASSAY OF PHOSPHORUS: CPT | Performed by: PHYSICIAN ASSISTANT

## 2020-07-30 PROCEDURE — 25000132 ZZH RX MED GY IP 250 OP 250 PS 637: Mod: GY | Performed by: PHYSICIAN ASSISTANT

## 2020-07-30 PROCEDURE — 93306 TTE W/DOPPLER COMPLETE: CPT | Mod: 26 | Performed by: INTERNAL MEDICINE

## 2020-07-30 PROCEDURE — 25500064 ZZH RX 255 OP 636: Performed by: INTERNAL MEDICINE

## 2020-07-30 PROCEDURE — 36415 COLL VENOUS BLD VENIPUNCTURE: CPT | Performed by: PHYSICIAN ASSISTANT

## 2020-07-30 PROCEDURE — 40000893 ZZH STATISTIC PT IP EVAL DEFER: Performed by: PHYSICAL THERAPIST

## 2020-07-30 PROCEDURE — 93010 ELECTROCARDIOGRAM REPORT: CPT | Performed by: INTERNAL MEDICINE

## 2020-07-30 PROCEDURE — 83735 ASSAY OF MAGNESIUM: CPT | Performed by: PHYSICIAN ASSISTANT

## 2020-07-30 RX ORDER — IOPAMIDOL 755 MG/ML
108 INJECTION, SOLUTION INTRAVASCULAR ONCE
Status: COMPLETED | OUTPATIENT
Start: 2020-07-30 | End: 2020-07-30

## 2020-07-30 RX ORDER — SODIUM CHLORIDE 9 MG/ML
INJECTION, SOLUTION INTRAVENOUS CONTINUOUS
Status: DISCONTINUED | OUTPATIENT
Start: 2020-07-30 | End: 2020-07-30

## 2020-07-30 RX ADMIN — SODIUM CHLORIDE, PRESERVATIVE FREE: 5 INJECTION INTRAVENOUS at 08:50

## 2020-07-30 RX ADMIN — IOPAMIDOL 108 ML: 755 INJECTION, SOLUTION INTRAVENOUS at 09:13

## 2020-07-30 RX ADMIN — LEVOTHYROXINE SODIUM 88 MCG: 88 TABLET ORAL at 08:13

## 2020-07-30 RX ADMIN — HUMAN ALBUMIN MICROSPHERES AND PERFLUTREN 5 ML: 10; .22 INJECTION, SOLUTION INTRAVENOUS at 08:30

## 2020-07-30 RX ADMIN — METOPROLOL SUCCINATE 100 MG: 100 TABLET, EXTENDED RELEASE ORAL at 08:13

## 2020-07-30 RX ADMIN — CLOPIDOGREL BISULFATE 75 MG: 75 TABLET ORAL at 08:13

## 2020-07-30 RX ADMIN — APIXABAN 5 MG: 5 TABLET, FILM COATED ORAL at 10:01

## 2020-07-30 RX ADMIN — VALSARTAN 320 MG: 160 TABLET, FILM COATED ORAL at 08:12

## 2020-07-30 ASSESSMENT — ACTIVITIES OF DAILY LIVING (ADL)
ADLS_ACUITY_SCORE: 12
ADLS_ACUITY_SCORE: 13
ADLS_ACUITY_SCORE: 12
ADLS_ACUITY_SCORE: 12

## 2020-07-30 NOTE — PROGRESS NOTES
Date: 7/30/2020    Time of Call: 8:26 AM     Diagnosis:  S/P TAVR     [ TORB ] Ordering provider: Tu Hedrick MD  Order:  1. CT angiogram heart  2. ECHO  3. Labs     Order received by: Latia Shah RN     Follow-up/additional notes:

## 2020-07-30 NOTE — PLAN OF CARE
BP 97/59 (BP Location: Left arm, Cuff Size: Adult Regular)   Pulse 85   Temp 98.4  F (36.9  C) (Oral)   Resp 17   LMP  (LMP Unknown)   SpO2 92%     D: Patient is here for planned TAVR.  PMHx of HTN, HLD, CAD with recent PCI X3, NICM/ICM, HFrEF LV EF 35-40%, severe aortic stenosis, hypothyroidism, LBBB, pAfib s/p TAVR.  I/A: Patient is AVSS and A&OX4, c/o R-groin discomfort and tender to touch and only using cold packs with comfort, the incision site is bruised and ecchymotic with borders marked with pen and patient to remain bedrest with bathroom privileges   L-groin with dried drainage and remains c/d/i.  Neurologically, patient remains intact and CMS intact.  Up with SBA and voiding spontaneously and also had BM.    P: Will continue to monitor and notify [facility] with status changes.

## 2020-07-30 NOTE — PLAN OF CARE
AOx4, VSS RA, Tele: AFL, Up SBA but now Bedrest w/bathroom privileges only d/t hematoma on R groin site and increased swelling on R Lower abdomen.  CSI was paged x2 regarding new bruising on both R and L groin sites and hematoma formation on R side along w/consistent soft BP's.  Per team, Bedrest w/bathroom privileges only,  bolus for BP's and hold pressure for hematomas.  H&H ordered.  Pt endorses tenderness on R groin site and Lower abdomen, denies back pain.  CMS intact, Neuros intact, denies CP/SOB/Numbness/Tingling/Nausea/Vomiting.  Continue to monitor and notify [facility] w/any changes.  If low Hgb or increased hematoma formation, swelling or bruising - abdominal CT scan would be warranted.

## 2020-07-30 NOTE — DISCHARGE INSTRUCTIONS
Going home after Transcatheter Aortic Valve Replacement (TAVR)  Femoral Access    You have small procedure sites at both groins, the one on the right is slightly bigger. You may have small amounts of bruising or discomfort, this is expected. If you develop worse swelling, redness and warmth that does not go away, fever and chills, Increasing numbness in your legs, worsening pain at the site then you need to contact your nurse coordinator.    You may shower, but do not soak in a bath tub or pool or apply lotions, ointments, powder, etc for 3-5 days or until sites look healed.     Activity: No lifting, pushing, pulling more than 10 pounds (examples to avoid: groceries, vacuuming, gardening, golfing): For one week with a procedure through the groin.    After the initial healing process of the access site, we recommend cardiac rehabilitation for all TAVR patients. Cardiac rehabilitation will help you:  - Rebuild stamina, strength and balance.  - Learn how to participate in activities safely, as well as help you regain confidence to do so.  - Return to activities of daily living and leisure.  Please contact their central scheduling to arrange at [phone]    We prefer you to follow up with your primary care provider within 2 weeks. You will be arranged to see the TAVR clinic in month. At that time you will have a repeat ultrasound of the heart to look at the valve.     Should you have any questions or concerns please contact your assigned TAVR nurse coordinator:  Shannan [phone] (at the first prompt enter #1, second prompt enter #3, then ask the triage nurse if you can speak with Shannan).

## 2020-07-30 NOTE — PLAN OF CARE
PT 6C: Defer, per discussion with OT, pt without acute PT needs. Orders will be completed.    Discharge Planner PT   Patient plan for discharge: home   Current status: pt IND-SBA for mobility, no acute PT needs identified. Pt with some sciatic pain although not impairing discharge planning, may benefit from OP PT to address sciatic pain   Barriers to return to prior living situation: medical status  Recommendations for discharge: home with OP CR  Rationale for recommendations: pt would benefit from continued CR to maximize functional IND.       Entered by: Digna Barajas 07/30/2020 12:00 PM

## 2020-07-30 NOTE — PROGRESS NOTES
AOx4, VSS RA, Tele: AFL, up SBA/Ind - R & L Groin sites WDL but ecchymotic, no hematomas, no bruits, CMS intact, neuros intact.  Abd CT came back neg for bleeding - eliquis was given ok per [facility]. Educated pt to take dose later tonight than usual.  Adequate for discharge

## 2020-07-30 NOTE — PLAN OF CARE
OT 6C  Discharge Planner OT   Patient plan for discharge: home   Current status: Pt IND with bed mobility, LB dressing, and toileting. SBA for functional mobility ~225 feet x2 functional mobility and CGA for completing 3 stairs x 2 sets. Pt appears to be in a-flutter with HR ranging 90s up to 120s with activity. Pt limited most by SOB and feeling slightly unsteady but overall steady on feet. Pre /74 and post /54. SpO2 92%+ on RA.   Barriers to return to prior living situation: medical status   Recommendations for discharge: home with OP CR and possibly OP PT   Rationale for recommendations: Pt mobilizing well. Most limited by SOB. Pt reports good support system and aware of post-surgical precautions. Pt will benefit from OP CR Phase II to promote CV strength and endurance. Pt reports hx of L low back pain that shoots down leg, which she feels is due to sciatica. Not limiting to patient today but if worsens or impacts pt mobility in future recommend OP PT to address.       Entered by: Patricia Vega 07/30/2020 11:18 AM     Occupational Therapy Discharge Summary    Reason for therapy discharge:    All goals and outcomes met, no further needs identified.    Progress towards therapy goal(s). See goals on Care Plan in [facility] electronic health record for goal details.  Goals met    Therapy recommendation(s):    Continued therapy is recommended.  Rationale/Recommendations:  see above.

## 2020-07-30 NOTE — DISCHARGE SUMMARY
[address] [address]  p: [phone]    Discharge Summary: Cardiology Service    Dulce Yeh MRN# [id number]   YOB: 1935 Age: 85 year old       Admission Date: 7/29/2020  Discharge Date: 07/30/20      Discharge Diagnoses:  1. Severe aortic stenosis s/p 23mm ES III TAVR  2. Coronary artery disease s/p PCI to LAD   3. Hypertension  4. Hyperlipidemia   5. HFrEF  6. Paroxysmal atrial fibrillation   7. Hypothyroidism  8. Left bundle branch block  9. Right groin hematoma    Pertinent Procedures:  1. TAVR    Consults:  NA    Imaging with results:  Echocardiogram 7/30/2020:  Interpretation Summary  s/p TAVR with 23 mm Paez Kyra 3 Ultra prosthesis. Valve is well seated;  mean gradient 6mmHg and trace paravalvular AI.     Mildly (EF 45-50%) reduced left ventricular function is present.  Right ventricular function, chamber size, wall motion, and thickness are  normal.  Mild pulmonary hypertension is present. Estimated pulmonary artery systolic  pressure is 42 mmHg plus right atrial pressure.  IVC diameter <2.1 cm collapsing >50% with sniff suggests a normal RA pressure  of 3 mmHg.  No pericardial effusion is present.     Compared with the study from 6/18, there has been interval placement of TAVR  and the LV function has improved.  _______________________________________________________________     Left Ventricle  Mildly (EF 45-50%) reduced left ventricular function is present. Diastolic  function not assessed due to atrial fibrillation. Mild diffuse hypokinesis is  present.     Right Ventricle  Right ventricular function, chamber size, wall motion, and thickness are  normal.     Atria  Severe left atrial enlargement is present. Moderate right atrial enlargement  is present.     Mitral Valve  Moderate mitral annular calcification is present. Trace mitral insufficiency  is present.        Aortic Valve  Valve is well seated; mean  gradient 6mmHg and trace paravalvular AI.     Tricuspid Valve  Mild tricuspid insufficiency is present. Estimated pulmonary artery systolic  pressure is 42 mmHg plus right atrial pressure. Mild pulmonary hypertension is  present.     Vessels  The aorta root cannot be assessed. The thoracic aorta is normal. IVC diameter  <2.1 cm collapsing >50% with sniff suggests a normal RA pressure of 3 mmHg.     Pericardium  No pericardial effusion is present.    CT A/P 7/30/2020  IMPRESSION:  1. Right groin hematoma/edema without retroperitoneal hematoma. No  visible evidence of active arterial bleed in the abdominopelvic  cavity..  2. Left inguinal edema/possible hemorrhage if applicable.  3. TAVR.  4. Prominent hiatal hernia.  5. Unchanged splenic cyst or hemangioma.  6. Unchanged small right renal cyst.  7. Minimal right adrenal thickening, unchanged.  8. Extensive coronary artery disease.    Brief HPI:  Dulce Yeh is a 85 year old female with PMH notable for HTN, HLD, CAD with recent PCIx3 LAD, NICM/ICM, HFrEF LV EF 35-40%, LFLG severe aortic stenosis (DANIS 0.76), hypothyroidism, LBBB, paroxsymal atrial fibrillation on eliquis who presents for planned TAVR, now status post TAVR with 23 mm ES3 Ultra without complication.     Hospital Course by Diagnosis:  Severe LFLF aortic stenosis (DANIS 0.76 cm2)   Status post TAVR (23 mm Paez Kyra III Ultra) c/b right groin hematoma  Notably, patient admitted 6/18/20 - 6/20/20 for acute hypoxic resp failure, HTN urgency with flash pulmonary edema that resolved, and NSTEMI. Workup included a TTE that showed newly reduced LV EF 35-40% with global dysfunction, moderate to severe AORTIC STENOSIS with a low stroke volume concerning for LFLG severe aortic stenosis as she endorsed several months of progressive SOB with exertion. She also mounted a troponin of 0.50 without acute ischemic change on an EKG. She was subsequently brought for a coronary angiogram that revealed severe stenosis  of the LAD status post PCI x 3, and severe LFLG aortic stenosis with DANIS 0.76. She was discharged to follow up with TAVR team, and now presents for her planned TAVR. She has an existing LBBB, no new conduction delays post procedure; patient in aflutter 4:1 with LBBB. POD 1 TTE with trace PVL, MG of 6mmHg. Due to complaints of abdominal discomfort and tenderness to palpation over right groin access site, a CT A/P was performed which did not demonstrate RP bleed or pseudoaneurysm. Per PT, rafaela for home with outpatient CR.   -Plavix and eliquis for DAPT   -Bedrest, neuro-checks, and groin cares per protocol   -Antibiotic prophylaxis with all dental procedures going forward   - outpatient cardiac rehab referral provided     Coronary artery disease   HFrEF, ICM, LV EF 45-50% (previously 35%)  Status post PCI LAD x 3   HTN, HLD   Recent NSTEMI admission in June 2020 as well as newly reduced LV EF with global hypokinesis prompting coronary angiography that revealed severe LAD stenosis status post PCI to LAD x 3 on plavix and apixaban. TTE with improved LVEF post-TAVR.   -DAPT: Plavix and apixaban   -Patient refuses statin due to history of myalgia/intolerance   -Continue Metoprolol  mg every day   -Continue valsartan 320 mg every day pending course for afterload reduction    Paroxysmal atrial fibrillation/aflutter  Rate controlled aflutter   [id number] 4 (age, gender, HTN)   -Continue Metoprolol  mg q 24 w/parameters   -Continue eliquis 5 mg BID      Hypothyroidism  -Continue synthroid 88 mcg every day       Condition on discharge  Temp:  [97.6  F (36.4  C)-98.4  F (36.9  C)] 98  F (36.7  C)  Pulse:  [] 85  Heart Rate:  [70-90] 80  Resp:  [8-20] 16  BP: ()/(42-86) 119/63  SpO2:  [90 %-99 %] 95 %  General: Alert, interactive, NAD  Eyes: sclera anicteric, EOMI  Neck: JVP not appreciably elevated, carotid 2+ bilaterally  Cardiovascular: irregular, normal rate,  no murmurs, gallops, or rubs  Resp: clear  to auscultation bilaterally, no rales, wheezes, or rhonchi  GI: Soft, nontender, nondistended. +BS.  No HSM or masses, no rebound or guarding.  Extremities: no edema, no cyanosis or clubbing, dorsalis pedis and posterior tibialis pulses 2+ bilaterally. Right groin with bruise spanning from pubis to lateral hip. Diffusely tender to palpation. No bruit  Skin: Warm and dry, no jaundice or rash  Neuro: CN 2-12 intact, moves all extremities equally  Psych: Alert & oriented x 3    Medication Changes:    Discharge medications:   Current Discharge Medication List      CONTINUE these medications which have NOT CHANGED    Details   apixaban ANTICOAGULANT (ELIQUIS) 5 MG tablet Take 1 tablet (5 mg) by mouth 2 times daily  Qty: 180 tablet, Refills: 1    Associated Diagnoses: NSTEMI (non-ST elevated myocardial infarction) (H)      ascorbic acid (VITAMIN C) 500 MG tablet Take 500 mg by mouth daily.      BIOTIN PO Take by mouth daily      calcium carb 1250 mg, 500 mg [place],/vitamin D 200 units (OSCAL WITH D) 500-200 MG-UNIT per tablet Take 1 tablet by mouth 2 times daily (with meals).      Cetirizine HCl (ZYRTEC PO) Take 10 mg by mouth daily as needed       clopidogrel (PLAVIX) 75 MG tablet Take 1 tablet (75 mg) by mouth daily  Qty: 90 tablet, Refills: 3    Associated Diagnoses: Status post insertion of drug eluting coronary artery stent      coenzyme Q-10 200 MG CAPS Take 200 mg by mouth daily      cyanocolbalamin (VITAMIN B-12) 1000 MCG tablet Take 1,000 mcg by mouth daily.      fluocinonide (LIDEX) 0.05 % solution Apply topically 2 times daily      Glucosamine-Chondroit-Vit C-Mn (GLUCOSAMINE CHONDR 1500 COMPLX) CAPS Take 1 tablet by mouth daily       levothyroxine (SYNTHROID/LEVOTHROID) 88 MCG tablet Take 1 tablet (88 mcg) by mouth daily  Qty: 90 tablet, Refills: 3    Associated Diagnoses: Hypothyroidism due to acquired atrophy of thyroid      Magnesium 300 MG CAPS Take  by mouth.      metoprolol succinate ER (TOPROL-XL) 100 MG  24 hr tablet Take 1 tablet (100 mg) by mouth daily  Qty: 90 tablet, Refills: 3    Associated Diagnoses: Essential hypertension      valsartan (DIOVAN) 320 MG tablet Take 1 tablet (320 mg) by mouth daily  Qty: 90 tablet, Refills: 3    Associated Diagnoses: Benign essential hypertension      clobetasol (CLOBETASOL PROPIONATE EMULSION) 0.05 % CREA Apply topically as needed  Qty: 15 g, Refills: 0    Associated Diagnoses: Lichen sclerosus et atrophicus of the vulva         STOP taking these medications       aspirin (ASA) 325 MG EC tablet Comments:   Reason for Stopping:               Labs or imaging requiring follow-up after discharge:  Per valve clinic      Follow-up:  Valve clinic as recommended. Usually in one week, then again in one month    Code status:  FULL      Patient discussed with Dr. Urias, who agrees with the above plan.  Hardik Vega PA-C  [facility] Cardiology Team

## 2020-07-30 NOTE — PROGRESS NOTES
"   07/30/20 1054   Quick Adds   Type of Visit Initial Occupational Therapy Evaluation   Living Environment   Lives With alone   Living Arrangements house   Home Accessibility stairs within home   Stair Railings, Within Home, Primary railings on both sides of stairs   Transportation Anticipated car, drives self;family or friend will provide   Living Environment Comment Pt lives alone with all needs met on main floor with exception of hopkins in the basement.    Self-Care   Usual Activity Tolerance good   Current Activity Tolerance moderate   Regular Exercise No   Equipment Currently Used at Home none   Activity/Exercise/Self-Care Comment Pt reports being relatively active with gardening and home management. Prior to COVID, pt was going to [employer] regularly and doing her own grocery shopping but now daugther is completing for her. Pt reports having a cane that she has used in the past s/p TKA.    Functional Level   Ambulation 0-->independent   Transferring 0-->independent   Toileting 0-->independent   Bathing 0-->independent   Dressing 0-->independent   Eating 0-->independent   Communication 0-->understands/communicates without difficulty   Swallowing 0-->swallows foods/liquids without difficulty   Cognition 0 - no cognition issues reported   Fall history within last six months no   Which of the above functional risks had a recent onset or change? none   Prior Functional Level Comment Pt IND with I/ADLs at baseline   General Information   Onset of Illness/Injury or Date of Surgery - Date 07/29/20   Referring Physician Tu Hedrick MD    Patient/Family Goals Statement return home   Additional Occupational Profile Info/Pertinent History of Current Problem \"85 year old female with PMH notable for HTN, HLD, CAD with recent PCIx3 LAD, NICM/ICM, HFrEF LV EF 35-40%, LFLG severe aortic stenosis (DANIS 0.76), hypothyroidism, LBBB, paroxsymal atrial fibrillation on eliquis who presents for planned TAVR, now status post TAVR\" "   Precautions/Limitations fall precautions  (s/p TAVR precautions)   Weight-Bearing Status - LUE partial weight-bearing (% in comments)  (<10#)   Weight-Bearing Status - RUE partial weight-bearing (% in comments)  (<10#)   Weight-Bearing Status - LLE full weight-bearing   Weight-Bearing Status - RLE full weight-bearing   General Info Comments Activity: ambulate with assist   Cognitive Status Examination   Orientation orientation to person, place and time   Cognitive Comment Pt mildly impulsive but overall appropriate with cognition   Visual Perception   Visual Perception Wears glasses   Integumentary/Edema   Integumentary/Edema no deficits were identifed   Posture   Posture not impaired   Range of Motion (ROM)   ROM Comment BUE ROM WFL   Strength   Strength Comments MMT: not formally tested 2/2 TAVR precautions but appears WFL with activity   Mobility   Bed Mobility Comments IND   Transfer Skill: Sit to Stand   Level of [place]: Sit/Stand stand-by assist   Balance   Balance Comments Pt steady on feet   Lower Body Dressing   Level of [place]: Dress Lower Body stand-by assist   Physical Assist/Nonphysical Assist: Dress Lower Body verbal cues   Toileting   Level of [place]: Toilet independent   Instrumental Activities of Daily Living (IADL)   Previous Responsibilities meal prep;housekeeping;laundry;medication management;yardwork;finances;driving   IADL Comments Hires help for lawn mowing   Activities of Daily Living Analysis   Impairments Contributing to Impaired Activities of Daily Living pain;post surgical precautions  (SOB)   General Therapy Interventions   Planned Therapy Interventions ADL retraining;progressive activity/exercise;risk factor education;home program guidelines   Clinical Impression   Criteria for Skilled Therapeutic Interventions Met yes, treatment indicated   OT Diagnosis decreased IND with I/ADLs    Influenced by the following impairments surgical precautions, pain, deconditioning  "and SOB   Assessment of Occupational Performance 1-3 Performance Deficits   Identified Performance Deficits community mobility, home management, meal prep   Clinical Decision Making (Complexity) Low complexity   Therapy Frequency Daily   Predicted Duration of Therapy Intervention (days/wks) 1 day   Anticipated Discharge Disposition Home with Outpatient Therapy   Risks and Benefits of Treatment have been explained. Yes   Patient, Family & other staff in agreement with plan of care Yes   Clinical Impression Comments Pt s/p TAVR and will benefit from skilled OT services for safe progression of activity and education on post-surgical precautions.    [facility] theScore TM \"6 Clicks\"   2016, Trustees of [facility], under license to [employer].  All rights reserved.   6 Clicks Short Forms Daily Activity Inpatient Short Form   [facility] AM-PAC  \"6 Clicks\" Daily Activity Inpatient Short Form   1. Putting on and taking off regular lower body clothing? 4 - None   2. Bathing (including washing, rinsing, drying)? 3 - A Little   3. Toileting, which includes using toilet, bedpan or urinal? 4 - None   4. Putting on and taking off regular upper body clothing? 4 - None   5. Taking care of personal grooming such as brushing teeth? 4 - None   6. Eating meals? 4 - None   Daily Activity Raw Score (Score out of 24.Lower scores equate to lower levels of function) 23   Total Evaluation Time   Total Evaluation Time (Minutes) 10     "

## 2020-07-30 NOTE — PROVIDER NOTIFICATION
MD Notification    Notified Person: MD    Notified Person Name: CSI    Notification Date/Time: 7/30/2020 0804    Notification Interaction: Page    Purpose of Notification: Hgb dropped 1.1  - Down to 11.5 - Do you want me to give the Eliquis?    Orders Received: Hold eliquis until CT scan results are in    Comments:

## 2020-07-31 NOTE — ANESTHESIA POSTPROCEDURE EVALUATION
Anesthesia POST Procedure Evaluation    Patient: Dulce Yeh   MRN:     [id number] Gender:   female   Age:    85 year old :      1935        Preoperative Diagnosis: Severe aortic stenosis [I35.0]   Procedure(s):  Transfemoral (Paez) Transcatheter Aortic Valve Replacement with Paez Kyra Ultra 23 mm. Intra-operative transthoracic echocardiogram  Cardiopulmonary on standby.   Postop Comments: No value filed.     Anesthesia Type: MAC       Disposition: Admission   Postop Pain Control: Uneventful            Sign Out: Well controlled pain   PONV: No   Neuro/Psych: Uneventful            Sign Out: Acceptable/Baseline neuro status   Airway/Respiratory: Uneventful            Sign Out: Acceptable/Baseline resp. status   CV/Hemodynamics: Uneventful            Sign Out: Acceptable CV status   Other NRE: NONE   DID A NON-ROUTINE EVENT OCCUR? No         Last Anesthesia Record Vitals:  CRNA VITALS  2020 0916 - 2020 1016      2020             NIBP:  100/79    Pulse:  67    NIBP Mean:  81    Ht Rate:  67    SpO2:  95 %          Last PACU Vitals:  Vitals Value Taken Time   /54 2020 11:08 AM   Temp 36.6  C (97.9  F) 2020 11:08 AM   Pulse 87 2020 11:08 AM   Resp 16 2020 11:08 AM   SpO2 94 % 2020 11:21 AM   Temp src     NIBP     Pulse     SpO2     Resp     Temp     Ht Rate     Temp 2     Vitals shown include unvalidated device data.      Electronically Signed By: Kurt Molina MD, 2020, 4:33 PM

## 2020-07-31 NOTE — PROGRESS NOTES
[facility]: Post-Discharge Note  SITUATION                                                      Admission:    Admission Date: 07/29/20   Reason for Admission: Severe aortic stenosis s/p 23mm ES III TAVR  Discharge:   Discharge Date: 07/30/20  Discharge Diagnosis: Severe aortic stenosis s/p 23mm ES III TAVR    BACKGROUND                                                      Dulce Yeh is a 85 year old female with PMH notable for HTN, HLD, CAD with recent PCIx3 LAD, NICM/ICM, HFrEF LV EF 35-40%, LFLG severe aortic stenosis (DANIS 0.76), hypothyroidism, LBBB, paroxsymal atrial fibrillation on eliquis who presents for planned TAVR, now status post TAVR with 23 mm ES3 Ultra without complication.     ASSESSMENT      Discharge Assessment  Patient reports symptoms are: Unchanged  Does the patient have all of their medications?: Yes  Does patient know what their new medications are for?: Yes  Does patient have a follow-up appointment scheduled?: Yes  Does patient have any other questions or concerns?: No    Post-op  Did the patient have surgery or a procedure: Yes  Incision: healing  Drainage: No  Bleeding: none  Fever: No  Chills: No  Redness: No  Warmth: No  Swelling: Yes  Incision site pain: Yes  Eating & Drinking: eating and drinking without complaints/concerns  PO Intake: regular diet  Bowel Function: normal  Urinary Status: voiding without complaint/concerns        PLAN                                                      Outpatient Plan:  Labs or imaging requiring follow-up after discharge:  Per valve clinic        Follow-up:  Valve clinic as recommended. Usually in one week, then again in one month    Future Appointments   Date Time Provider Department Center   8/26/2020  3:00 PM Ginger Davis MD [facility]           Steffi Love CMA

## 2020-08-03 ENCOUNTER — CARE COORDINATION (OUTPATIENT)
Dept: CARDIOLOGY | Facility: CLINIC | Age: 85
End: 2020-08-03

## 2020-08-03 NOTE — PROGRESS NOTES
Spoke with Dulce about follow up needs.    I encouraged her to get up and move around more now that she is home. Either a light walk around the block or walk to the mailbox. I explained that moving ment is good for healing.    We talked that it will take time for her to recover from her procedure. That it will take time for the brusing to absorb.    We talked about what can she take for pain. I explained that she can take 325 mg X2 Tylenol. That she should not take ASA or ibuprofen.    She was instructed to follow up with her PCP for her 1 week follow. This appointment is need to help insure that she is doing well.    Dulce stated understanding of our conversation.

## 2020-08-04 NOTE — PROGRESS NOTES
History     Ms. eYh presented to the ED on 6/18/20 after waking up with acute shortness of breath, found to be in hypertensive urgency with flash pulmonary edema in the setting of aortic stenosis. Echo shows worsening trans-valvular gradient. Further studies showed severe LAD diseases s/p PCI x3.  She now presents for the above procedure.     PMH is also significant for HLD, HTN, A-fib, anxiety, hypothyroidism, steatohepatitis, s/p left knee arthroplasty, lichen sclerosus.     History was obtained from patient & chart review.     Past Medical History  Past Medical History        Past Medical History:   Diagnosis Date     Allergy       Aortic stenosis 10/11/2011     C. difficile colitis August-October 2015     Eosinophilia 10/11/2011     History of chickenpox       Hyperlipidaemia       Hypertension       Recurrent colitis due to Clostridium difficile 08/2016     after TKA surgery     S/P cholecystectomy 10/11/2011     Unspecified hypothyroidism 10/11/2011           Past Surgical History  Past Surgical History         Past Surgical History:   Procedure Laterality Date     ANKLE SURGERY         APPENDECTOMY         bilateral tubal ligation         CHOLECYSTECTOMY         CV CORONARY ANGIOGRAM N/A 6/19/2020     Procedure: Coronary Angiogram with possible PCI. RHC, possible LHC for TV gradient;  Surgeon: Tu Hedrick MD;  Location:  [facility]     CV PCI STENT DRUG ELUTING N/A 6/19/2020     Procedure: Percutaneous Coronary Intervention Stent Drug Eluting;  Surgeon: Tu Hedrick MD;  Location:  [facility]     ORTHOPEDIC SURGERY          Active Medications          Current Outpatient Medications   Medication Sig Dispense Refill     apixaban ANTICOAGULANT (ELIQUIS) 5 MG tablet Take 1 tablet (5 mg) by mouth 2 times daily 180 tablet 1     ascorbic acid (VITAMIN C) 500 MG tablet Take 500 mg by mouth daily.         aspirin (ASA) 325 MG EC tablet Take 325 mg by mouth         BIOTIN  PO Take by mouth daily         calcium carb 1250 mg, 500 mg [place],/vitamin D 200 units (OSCAL WITH D) 500-200 MG-UNIT per tablet Take 1 tablet by mouth 2 times daily (with meals).         Cetirizine HCl (ZYRTEC PO) Take 10 mg by mouth daily as needed          clobetasol (CLOBETASOL PROPIONATE EMULSION) 0.05 % CREA Apply topically as needed 15 g 0     clopidogrel (PLAVIX) 75 MG tablet Take 1 tablet (75 mg) by mouth daily 90 tablet 3     coenzyme Q-10 200 MG CAPS Take 200 mg by mouth daily         cyanocolbalamin (VITAMIN B-12) 1000 MCG tablet Take 1,000 mcg by mouth daily.         fluocinonide (LIDEX) 0.05 % solution Apply topically 2 times daily         Glucosamine-Chondroit-Vit C-Mn (GLUCOSAMINE CHONDR 1500 COMPLX) CAPS Take 1 tablet by mouth daily          levothyroxine (SYNTHROID/LEVOTHROID) 88 MCG tablet Take 1 tablet (88 mcg) by mouth daily 90 tablet 3     Magnesium 300 MG CAPS Take  by mouth.         metoprolol succinate ER (TOPROL-XL) 100 MG 24 hr tablet Take 1 tablet (100 mg) by mouth daily 90 tablet 3     valsartan (DIOVAN) 320 MG tablet Take 1 tablet (320 mg) by mouth daily 90 tablet 3           Allergies       Allergies   Allergen Reactions     Sulfa Drugs Hives     Clonidine Rash     Diltiazem Rash        Social History  Social History   Social History            Socioeconomic History     Marital status:        Spouse name: Not on file     Number of children: 3     Years of education: Not on file     Highest education level: Not on file   Occupational History     Not on file   Social Needs     Financial resource strain: Not on file     Food insecurity       Worry: Not on file       Inability: Not on file     Transportation needs       Medical: Not on file       Non-medical: Not on file   Tobacco Use     Smoking status: Never Smoker     Smokeless tobacco: Never Used   Substance and Sexual Activity     Alcohol use: No     Drug use: No     Sexual activity: Yes       Birth control/protection:  Post-menopausal   Lifestyle     Physical activity       Days per week: Not on file       Minutes per session: Not on file     Stress: Not on file   Relationships     Social connections       Talks on phone: Not on file       Gets together: Not on file       Attends Ca[employer] service: Not on file       Active member of club or organization: Not on file       Attends meetings of clubs or organizations: Not on file       Relationship status: Not on file     Intimate partner violence       Fear of current or ex partner: Not on file       Emotionally abused: Not on file       Physically abused: Not on file       Forced sexual activity: Not on file   Other Topics Concern      Service Not Asked     Blood Transfusions Not Asked     Caffeine Concern Yes       Comment: coffee, tea     Occupational Exposure Not Asked     Hobby Hazards Not Asked     Sleep Concern Not Asked     Stress Concern Not Asked     Weight Concern Not Asked     Special Diet Not Asked     Back Care Not Asked     Exercise Yes       Comment: swims 3x a week     Bike Helmet Not Asked     Seat Belt Not Asked     Self-Exams Not Asked     Parent/sibling w/ CABG, MI or angioplasty before 65F 55M? Not Asked   Social History Narrative     Lives alone, children nearby          2 boys and 1 girl (1 son )      passed away 24 yo     Labs     [employer]     Previoysly worked in [employer]           Family History  Family History         Family History   Problem Relation Age of Onset     Cancer Mother           lung cancer     Testicular cancer Father       Abdominal Aortic Aneurysm Brother       Rheumatic fever Brother       Diabetes Brother       Anesthesia Reaction No family hx of       Deep Vein Thrombosis (DVT) No family hx of            ROS/MED HX  The complete review of systems is negative other than noted in the HPI or here.  Temp: 97.7  F (36.5  C) Temp src: Oral BP: (!) 150/88 Pulse: 81 Resp: 16 SpO2: 98 %   177 lbs 0 oz  "5' 7\" Body mass index is 27.72 kg/m .   Physical Exam   Constitutional: Awake, alert, cooperative, no apparent distress.  Eyes: Pupils equal, round and reactive to light.  Respiratory: Clear to auscultation bilaterally, no crackles or wheezing.   Cardiovascular: A-fib, normal S1 and S2, and 2/6 murmur noted. Carotids +2, no bruits.   GI: Normal bowel sounds, soft, non-distended, non-tender, no masses palpated.   Lymph/Hematologic: No cervical lymphadenopathy and no supraclavicular lymphadenopathy.   Skin: Warm and dry. No rashes.   Musculoskeletal: mildly limited extension   Neurologic: Awake, alert, oriented to name, place and time. Cranial nerves II-XII are grossly intact.   Neuropsychiatric: Calm, cooperative. Normal affect. Pleasant.   PRIOR LABS/DIAGNOSTIC STUDIES:   All labs and imaging personally reviewed   EKG 6/19/20   Atrial flutter with variable A-V block   Left axis deviation   Left bundle branch block   Abnormal ECG   When compared with ECG of 18-JUN-2020 10:23,   Atrial flutter has replaced Atrial fibrillation   Ventricular rate 80 bpm   ECHOCARDIOGRAM 6/18/20   Interpretation Summary   Moderately (EF 35-40%) reduced left ventricular function is present.   Global right ventricular function is moderately reduced.   Probably moderate aortic stenosis, however accurate assessment of the aortic   valve is challenging with atrial fibrillation rhythm. The RCC and NCC appears   partially fused. The Vmax is 2.41 m/s and mean gradient is 13 mmHg. The DANIS is   calculated at 1.1 cm2 with a index of 0.54 cm/m2. The dimensionless index is   0.38.   The inferior vena cava was normal in size with preserved respiratory   variability.   No pericardial effusion is present.   HEART CATH 6/19/20   Severe LAD disease with PCI x3 to the mid-LAD   Severe aortic stenosis (valve area: 0.76 cm2)   Moderately elevated PCWP  Cardiac output is reduced  Successful PCI of the proximal to Mid LAD        CTA 6/30/20    FINDINGS:    1. " Moderately calcified tricuspidaortic valve. Aortic valve calcium    score is 1396. The LVOT is not calcified. The ascending aorta is    mildly calcified, aortic arch is mildly calcified, the descending    thoracic aorta is mildly calcified. There is severe mitral annular    calcification.    2. There are no adverse aortic root features, ie coronary heights are    adequate and there is minimal, non-protruding aortic root    calcification.    3. There are significant native coronary calcifications.    4. The arch vessel branching pattern is normal.    5. The suprarenal abdominal aorta is mildly calcified; infrarenal    abdominal aorta is moderately calcified    6. Widely patent origins of celiac trunk, superior mesenteric artery    and inferior mesenteric artery. Single bilateral renal arteries with    widely patent origins.    7. There is no acute aortic pathology, such as dissection, intramural    hematoma, or contained rupture   LABS:   CBC:              Lab Results    Component Value Date     WBC 9.4 06/20/2020     WBC 7.5 06/19/2020     HGB 16.6 (H) 06/20/2020     HGB 15.4 06/19/2020     HCT 51.7 (H) 06/20/2020     HCT 48.0 (H) 06/19/2020      06/20/2020      06/19/2020    BMP:              Lab Results    Component Value Date      06/30/2020      06/20/2020     POTASSIUM 4.3 06/30/2020     POTASSIUM 4.1 06/20/2020     CHLORIDE 103 06/30/2020     CHLORIDE 100 06/20/2020     CO2 28 06/30/2020     CO2 26 06/20/2020     BUN 22 06/30/2020     BUN 24 06/20/2020     CR 1.02 06/30/2020     CR 1.05 (H) 06/20/2020      (H) 06/30/2020      (H) 06/20/2020    COAGS: No results found for: PTT, INR, FIBR   POC:                Lab Results     Component Value Date       (H) 06/19/2020     OTHER:                Lab Results     Component Value Date      LACT 1.3 06/18/2020      A1C 6.0 10/10/2012      CR 9.4 06/30/2020      PHOS 3.7 02/01/2012      MAG 2.5 (H) 11/04/2019      ALBUMIN  3.6 06/18/2020      PROTTOTAL 7.8 06/18/2020      ALT 26 06/18/2020      AST 34 06/18/2020      ALKPHOS 62 06/18/2020      BILITOTAL 0.7 06/18/2020      LIPASE 276 06/18/2020      TSH 0.56 06/18/2020      T4 1.16 05/18/2011      SED 11 08/05/2009     Labs today: (personally reviewed)   BMP, CBC, INR, PTT, T&S   COVID19 testing completed this morning, results pending   Sodium  133 - 144 mmol/L  138      Potassium  3.4 - 5.3 mmol/L  4.4     Chloride  94 - 109 mmol/L  106     Carbon Dioxide  20 - 32 mmol/L  26     Anion Gap  3 - 14 mmol/L  6     Glucose  70 - 99 mg/dL  100High     Urea Nitrogen  7 - 30 mg/dL  22     Creatinine  0.52 - 1.04 mg/dL  1.06High     GFR Estimate  >60 mL/min/  48Low    Comment: Non  GFR Calc   Starting 12/18/2018, serum creatinine based estimated GFR (eGFR) will be   calculated using the Chronic Kidney Disease Epidemiology Collaboration   (CKD-EPI) equation.     GFR Estimate If Black  >60 mL/min/  55Low    Comment:  GFR Calc   Starting 12/18/2018, serum creatinine based estimated GFR (eGFR) will be   calculated using the Chronic Kidney Disease Epidemiology Collaboration   (CKD-EPI) equation.     Calcium  8.5 - 10.1 mg/dL  9.4      WBC  4.0 - 11.0 10e9/L  6.2      RBC Count  3.8 - 5.2 10e12/L  5.33High     Hemoglobin  11.7 - 15.7 g/dL  15.2     Hematocrit  35.0 - 47.0 %  46.6     MCV  78 - 100 fl  87     MCH  26.5 - 33.0 pg  28.5     MCHC  31.5 - 36.5 g/dL  32.6     RDW  10.0 - 15.0 %  13.9     Platelet Count  150 - 450 10e9/L  183      INR  0.86 - 1.14  1.33High      PTT  22 - 37 sec  36    ASSESSMENT and PLAN   Dulce Yeh is a 85 year old female who is at high risk for adverse outcomes after surgical aortic valve replacement and should be considered for TAVR. She understands and is willing to proceed with this.

## 2020-08-11 NOTE — OP NOTE
Procedure Date: 2020      PREOPERATIVE DIAGNOSIS:  Severe aortic stenosis.      POSTOPERATIVE DIAGNOSIS:  Severe aortic stenosis.      PROCEDURE:  Transcatheter transfemoral aortic valve replacement, 23 mm Paez Kyra valve.      CARDIOLOGY TEAM:  Tu Hedrick MD and Ryan Alcantara MD      CARDIAC SURGEON:  Ricardo Avila MD      DESCRIPTION OF PROCEDURE:  The patient was brought to operating room in stable condition for the emergent anesthesia, the patient's chest, abdomen and lower extremities were prepped and draped in the usual manner.  Percutaneous access of the right and left common femoral artery and the left common femoral vein was obtained by the Seldinger technique.  Transvenous pacemaker was placed in the right ventricle and optimal electrical thresholds obtained.  Pigtail catheter was placed in the aortic root and optimal implantation angle obtained.  Access into the left ventricle was obtained using the AL1 catheter and a straight wire.  The AL1 catheter was used to exchange the straight wire for a J wire and a pigtail catheter was then positioned in the left ventricle.  The pigtail catheter was then used to advance a Safari wire into the left ventricle and the pigtail was removed.  A 23 mm Paez Kyra 3 valve was placed in the level of the native aortic valve and deployed under rapid ventricular pacing using fluoroscopic guidance.  There was excellent present valve with no paravalvular leak.  Protamine was given and sheaths were removed.  The patient transferred to recovery room in stable condition.         RICARDO AVILA MD             D: 08/10/2020   T: 08/10/2020   MT:       Name:     ESTEPHANIE HERNANDEZ   MRN:      [id number]        Account:        [id number]   :      1935           Procedure Date: 2020      Document: [id number]       cc: [facility] Billing

## 2020-08-13 ENCOUNTER — ANCILLARY PROCEDURE (OUTPATIENT)
Dept: GENERAL RADIOLOGY | Facility: CLINIC | Age: 85
End: 2020-08-13
Attending: INTERNAL MEDICINE
Payer: MEDICARE

## 2020-08-13 ENCOUNTER — OFFICE VISIT (OUTPATIENT)
Dept: INTERNAL MEDICINE | Facility: CLINIC | Age: 85
End: 2020-08-13
Payer: MEDICARE

## 2020-08-13 VITALS
WEIGHT: 176.2 LBS | BODY MASS INDEX: 27.6 KG/M2 | HEART RATE: 79 BPM | SYSTOLIC BLOOD PRESSURE: 117 MMHG | DIASTOLIC BLOOD PRESSURE: 79 MMHG

## 2020-08-13 DIAGNOSIS — R79.9 ABNORMAL FINDING OF BLOOD CHEMISTRY, UNSPECIFIED: ICD-10-CM

## 2020-08-13 DIAGNOSIS — E03.4 HYPOTHYROIDISM DUE TO ACQUIRED ATROPHY OF THYROID: ICD-10-CM

## 2020-08-13 DIAGNOSIS — I35.0 AORTIC VALVE STENOSIS, ETIOLOGY OF CARDIAC VALVE DISEASE UNSPECIFIED: ICD-10-CM

## 2020-08-13 DIAGNOSIS — I25.10 CORONARY ARTERY DISEASE INVOLVING NATIVE CORONARY ARTERY OF NATIVE HEART WITHOUT ANGINA PECTORIS: ICD-10-CM

## 2020-08-13 DIAGNOSIS — I48.0 PAROXYSMAL ATRIAL FIBRILLATION (H): ICD-10-CM

## 2020-08-13 DIAGNOSIS — I10 HYPERTENSION, UNSPECIFIED TYPE: ICD-10-CM

## 2020-08-13 DIAGNOSIS — I35.0 AORTIC VALVE STENOSIS, ETIOLOGY OF CARDIAC VALVE DISEASE UNSPECIFIED: Primary | ICD-10-CM

## 2020-08-13 LAB
ANION GAP SERPL CALCULATED.3IONS-SCNC: 5 MMOL/L (ref 3–14)
BUN SERPL-MCNC: 22 MG/DL (ref 7–30)
CALCIUM SERPL-MCNC: 9.3 MG/DL (ref 8.5–10.1)
CHLORIDE SERPL-SCNC: 103 MMOL/L (ref 94–109)
CO2 SERPL-SCNC: 27 MMOL/L (ref 20–32)
CREAT SERPL-MCNC: 1.02 MG/DL (ref 0.52–1.04)
ERYTHROCYTE [DISTWIDTH] IN BLOOD BY AUTOMATED COUNT: 14.5 % (ref 10–15)
FERRITIN SERPL-MCNC: 112 NG/ML (ref 8–252)
GFR SERPL CREATININE-BSD FRML MDRD: 50 ML/MIN/{1.73_M2}
GLUCOSE SERPL-MCNC: 122 MG/DL (ref 70–99)
HCT VFR BLD AUTO: 44.1 % (ref 35–47)
HGB BLD-MCNC: 14.4 G/DL (ref 11.7–15.7)
INTERPRETATION ECG - MUSE: NORMAL
MCH RBC QN AUTO: 28.8 PG (ref 26.5–33)
MCHC RBC AUTO-ENTMCNC: 32.7 G/DL (ref 31.5–36.5)
MCV RBC AUTO: 88 FL (ref 78–100)
PLATELET # BLD AUTO: 279 10E9/L (ref 150–450)
POTASSIUM SERPL-SCNC: 4.1 MMOL/L (ref 3.4–5.3)
RBC # BLD AUTO: 5 10E12/L (ref 3.8–5.2)
SODIUM SERPL-SCNC: 136 MMOL/L (ref 133–144)
TSH SERPL DL<=0.005 MIU/L-ACNC: 1.22 MU/L (ref 0.4–4)
WBC # BLD AUTO: 6.8 10E9/L (ref 4–11)

## 2020-08-13 ASSESSMENT — PAIN SCALES - GENERAL: PAINLEVEL: MODERATE PAIN (5)

## 2020-08-13 NOTE — NURSING NOTE
Chief Complaint   Patient presents with     Recheck Medication     pt would like to discuss medications     Fatigue     pt states she has been tired and having slight sob     Pain     pt states she is having pain on left side from toe to back        Joleen Veras CMA at 11:14 AM on 8/13/2020.

## 2020-08-13 NOTE — PATIENT INSTRUCTIONS
[facility] Medication Refill Request Information:  * Please contact your pharmacy regarding ANY request for medication refills.  ** [facility] Prescription Fax = [phone]  * Please allow 3 business days for routine medication refills.  * Please allow 5 business days for controlled substance medication refills.     [facility] Test Result notification information:  *You will be notified with in 7-10 days of your appointment day regarding the results of your test.  If you are on MyChart you will be notified as soon as the provider has reviewed the results and signed off on them.    [facility]: [phone]

## 2020-08-13 NOTE — PROGRESS NOTES
History of Present Illness:  86 yo F with history of TAVR for severe aortic stenosis 7/29, HTN, HLD, HFrEF, CAD, afib and PCI x 3 to LAD this summer this summer when admitted for HTN urgency and flash pulmonary edema and new afib.  She was started on eliquis and plavix.  She reports significant post op complications of bruising.    Dulce is feeling fatigued. She can wash a floor but then has to rest.  Has some heaviness in her chest, not a pain. She has mild dyspnea.  She is feeling lightheaded at times. Has mild headache.  She is having some back pain, a flare of chronic LBP with sciatica radiating down L leg.  No major bruising, bleeding, no abd pain, no fevers. No orthopnea.  She believes her eliquis may be to blame for her symptoms.      A full 10-pt Review of Systems was performed, verified and is negative except as documented in the HPI.  All health questionnaires were reviewed, verified and relevant information documented above.      Past Medical History:  Past Medical History:   Diagnosis Date     Allergy      Aortic stenosis 10/11/2011     C. 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    after TKA surgery     S/P cholecystectomy 10/11/2011     Unspecified hypothyroidism 10/11/2011       Active Meds:  Current Outpatient Medications   Medication     apixaban ANTICOAGULANT (ELIQUIS) 5 MG tablet     ascorbic acid (VITAMIN C) 500 MG tablet     BIOTIN PO     calcium carb 1250 mg, 500 mg [place],/vitamin D 200 units (OSCAL WITH D) 500-200 MG-UNIT per tablet     Cetirizine HCl (ZYRTEC PO)     clobetasol (CLOBETASOL PROPIONATE EMULSION) 0.05 % CREA     clopidogrel (PLAVIX) 75 MG tablet     coenzyme Q-10 200 MG CAPS     cyanocolbalamin (VITAMIN B-12) 1000 MCG tablet     fluocinonide (LIDEX) 0.05 % solution     Glucosamine-Chondroit-Vit C-Mn (GLUCOSAMINE CHONDR 1500  COMPLX) CAPS     levothyroxine (SYNTHROID/LEVOTHROID) 88 MCG tablet     Magnesium 300 MG CAPS     metoprolol succinate ER (TOPROL-XL) 100 MG 24 hr tablet     valsartan (DIOVAN) 320 MG tablet     No current facility-administered medications for this visit.         Allergies:  Reviewed, refer to EMR    Relevant Social History:  Social History     Tobacco Use     Smoking status: Never Smoker     Smokeless tobacco: Never Used   Substance Use Topics     Alcohol use: No     Drug use: No         Physical Exam:  Vitals: /79 (BP Location: Right arm, Patient Position: Sitting, Cuff Size: Adult Regular)   Pulse 79   Wt 79.9 kg (176 lb 3.2 oz)   LMP  (LMP Unknown)   BMI 27.60 kg/m    Constitutional: Alert, oriented, pleasant, no acute distress  Head: Normocephalic, atraumatic  Eyes: Extra-ocular movements intact, pupils equally round and reactive bilaterally, no scleral icterus  ENT: Oropharynx clear, moist mucus membranes  Neck: Supple, no lymphadenopathy, no thyromegaly  Cardiovascular: Irregularly irregular, no rubs or gallops, peripheral pulses full/symmetric  Respiratory: Good air movement bilaterally, lungs clear, no wheezes/rales/rhonchi  GI: Abdomen soft, bowel sounds present, nondistended, mildly tender over large ecchymoses along bilat lower quadrants, no organomegaly or masses, no rebound/guarding  Musculoskeletal: No edema, normal muscle tone, normal gait  Neurologic: Alert and oriented, cranial nerves 2-12 intact  Skin: large abdominal ecchymoses as described above  Psychiatric: normal mentation, affect and mood      EKG: afib, LBBB    Assessment and Plan:  Dulce was seen today for recheck medication, fatigue and pain. Dulce looks well in the office today and her exercise intolerance if fairly good, though she is concerned she feels worse after surgery than before.  Denies CP, dyspnea, orthopnea.  Her fatigue may be related to post-op anemia versus afib versus post-TAVR recovery. She is scheduled for  echo in 2 weeks. Will check screening labs today for anemia. I don't suspect ACS or effusion or CHF exacerbation based on history/exam.     Diagnoses and all orders for this visit:    Aortic valve stenosis, etiology of cardiac valve disease unspecified  -     CBC with platelets; Future  -     Basic metabolic panel; Future  -     Ferritin; Future  -     XR Chest 2 Views; Future    Paroxysmal atrial fibrillation (H)  -     EKG Performed in Clinic w/ Provider Reading Fee  -     XR Chest 2 Views; Future    Hypertension, unspecified type    Hypothyroidism due to acquired atrophy of thyroid  -     TSH with free T4 reflex; Future    Coronary artery disease involving native coronary artery of native heart without angina pectoris  -     XR Chest 2 Views; Future    Abnormal finding of blood chemistry, unspecified   -     Ferritin; Future            Return to clinic: tyler Hogan MD  Internal Medicine

## 2020-08-18 ENCOUNTER — AMBULATORY - HEALTHEAST (OUTPATIENT)
Dept: CARDIAC REHAB | Facility: CLINIC | Age: 85
End: 2020-08-18

## 2020-08-18 DIAGNOSIS — Z95.2 S/P TAVR (TRANSCATHETER AORTIC VALVE REPLACEMENT): ICD-10-CM

## 2020-08-21 ENCOUNTER — AMBULATORY - HEALTHEAST (OUTPATIENT)
Dept: CARDIAC REHAB | Facility: CLINIC | Age: 85
End: 2020-08-21

## 2020-08-21 DIAGNOSIS — Z95.2 S/P TAVR (TRANSCATHETER AORTIC VALVE REPLACEMENT): ICD-10-CM

## 2020-08-24 ENCOUNTER — AMBULATORY - HEALTHEAST (OUTPATIENT)
Dept: CARDIAC REHAB | Facility: CLINIC | Age: 85
End: 2020-08-24

## 2020-08-24 DIAGNOSIS — Z95.2 S/P TAVR (TRANSCATHETER AORTIC VALVE REPLACEMENT): ICD-10-CM

## 2020-08-24 DIAGNOSIS — Z95.2 S/P TAVR (TRANSCATHETER AORTIC VALVE REPLACEMENT): Primary | ICD-10-CM

## 2020-08-26 ENCOUNTER — VIRTUAL VISIT (OUTPATIENT)
Dept: CARDIOLOGY | Facility: CLINIC | Age: 85
End: 2020-08-26
Attending: INTERNAL MEDICINE
Payer: MEDICARE

## 2020-08-26 DIAGNOSIS — I48.19 PERSISTENT ATRIAL FIBRILLATION (H): ICD-10-CM

## 2020-08-26 DIAGNOSIS — I10 BENIGN ESSENTIAL HYPERTENSION: ICD-10-CM

## 2020-08-26 DIAGNOSIS — I25.10 CORONARY ARTERY DISEASE INVOLVING NATIVE CORONARY ARTERY OF NATIVE HEART WITHOUT ANGINA PECTORIS: ICD-10-CM

## 2020-08-26 DIAGNOSIS — I44.7 LBBB (LEFT BUNDLE BRANCH BLOCK): ICD-10-CM

## 2020-08-26 DIAGNOSIS — Z95.2 S/P TAVR (TRANSCATHETER AORTIC VALVE REPLACEMENT): Primary | ICD-10-CM

## 2020-08-26 PROCEDURE — 99443 ZZC PHYSICIAN TELEPHONE EVALUATION 21-30 MIN: CPT | Mod: 95 | Performed by: INTERNAL MEDICINE

## 2020-08-26 ASSESSMENT — PAIN SCALES - GENERAL: PAINLEVEL: NO PAIN (0)

## 2020-08-26 NOTE — LETTER
"8/26/2020      RE: Dulce Yeh  [address] [place] [address]       Dear Colleague,    Thank you for the opportunity to participate in the care of your patient, Dulce Yeh, at the [facility]. Please see a copy of my visit note below.    Dulce Yeh is a 85 year old female who is being evaluated via a billable telephone visit.        Vitals - Patient Reported  Systolic (Patient Reported): 115  Diastolic (Patient Reported): 72  Weight (Patient Reported): 80.7 kg (178 lb)  Height (Patient Reported): 170.2 cm (5' 7\")  BMI (Based on Pt Reported Ht/Wt): 27.88  Pulse (Patient Reported): 75  Pain Score: No Pain (0)      History:  85 year old female with severe aortic stenosis, HFrEF, persistent atrial fibrillation, CAD and chronic LBBB.     She was recently admitted to the hospital from 6/18/20 to 6/20/20 for     1. Acute hypoxic resp failure  2. HTN urgency with flash pulm edema  3. Severe aortic stenosis (new diagnosis by cardiac cath on this admission)  4. NSTEMI, severe LAD disease, post PCI x3  5. Presented with acute HFrEF 35-40% (new diagnosis) with flash acute pulmonary edema; pulmonary edema resolved.  6. Paroxysmal Afib     She subsequently underwent transcatheter transfemoral aortic valve replacement, 23 mm Paez Kyra valve on 7/29/20. Has exertional dyspnea and fatigue and cannt climb a flight of stairs without having to stop. So overall feeling down that she does not feel much improved. No orthopnea or PND, chest pain at rest, syncope      Current Outpatient Medications   Medication Sig Dispense Refill     apixaban ANTICOAGULANT (ELIQUIS) 5 MG tablet Take 1 tablet (5 mg) by mouth 2 times daily 180 tablet 1     ascorbic acid (VITAMIN C) 500 MG tablet Take 500 mg by mouth daily.       BIOTIN PO Take by mouth daily       calcium carb 1250 mg, 500 mg [place],/vitamin D 200 units (OSCAL WITH D) 500-200 MG-UNIT per tablet Take " 1 tablet by mouth 2 times daily (with meals).       Cetirizine HCl (ZYRTEC PO) Take 10 mg by mouth daily as needed        clobetasol (CLOBETASOL PROPIONATE EMULSION) 0.05 % CREA Apply topically as needed 15 g 0     clopidogrel (PLAVIX) 75 MG tablet Take 1 tablet (75 mg) by mouth daily 90 tablet 3     coenzyme Q-10 200 MG CAPS Take 200 mg by mouth daily       cyanocolbalamin (VITAMIN B-12) 1000 MCG tablet Take 1,000 mcg by mouth daily.       fluocinonide (LIDEX) 0.05 % solution Apply topically 2 times daily       levothyroxine (SYNTHROID/LEVOTHROID) 88 MCG tablet Take 1 tablet (88 mcg) by mouth daily 90 tablet 3     Magnesium 300 MG CAPS Take  by mouth.       metoprolol succinate ER (TOPROL-XL) 100 MG 24 hr tablet Take 1 tablet (100 mg) by mouth daily 90 tablet 3     valsartan (DIOVAN) 320 MG tablet Take 1 tablet (320 mg) by mouth daily 90 tablet 3     Glucosamine-Chondroit-Vit C-Mn (GLUCOSAMINE CHONDR 1500 COMPLX) CAPS Take 1 tablet by mouth daily          Allergies - reviewed     Allergies   Allergen Reactions     Sulfa Drugs Hives     Clonidine Rash     Diltiazem Rash     Past history  Active Ambulatory Problems     Diagnosis Date Noted     Hypertension 10/11/2011     Aortic stenosis 10/11/2011     Hypothyroid 10/11/2011     S/P cholecystectomy 10/11/2011     Elevated LFTs 10/11/2011     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     Recurrent vulvar infection 10/18/2011     Steatohepatitis 02/01/2012     Hemorrhoids 03/11/2013     Near syncope 03/11/2013     Lichen sclerosus et atrophicus of the vulva 10/07/2015     Hypothyroidism due to acquired atrophy of thyroid 10/08/2015     Recurrent colitis due to Clostridium difficile 08/01/2016     Dermatitis, seborrheic 02/13/2017     Cherry angioma 02/13/2017     Seborrheic keratosis 02/13/2017     Tinea pedis of both feet 02/13/2017     Anxiety 06/10/2015     Atrial fibrillation (H) 09/03/2015     Hx of fracture of fibula 07/29/2015     Hypokalemia 09/02/2015     Hyponatremia  09/02/2015     Nausea & vomiting 09/02/2015     Osteoarthritis of left ankle 07/21/2015     Postoperative anemia due to acute blood loss 06/08/2015     Primary osteoarthritis of left knee 05/21/2015     S/P total knee arthroplasty 06/08/2015     Vaginal dryness 06/10/2015     Shortness of breath 06/18/2020     NSTEMI (non-ST elevated myocardial infarction) (H) 06/18/2020     Severe aortic stenosis 07/21/2020     Aortic stenosis, severe 07/29/2020     Resolved Ambulatory Problems     Diagnosis Date Noted     Hyperlipidemia 10/11/2011     Eosinophilia 10/11/2011     Fatigue 03/11/2013     Weakness 03/11/2013     Diaphoresis 03/11/2013     Past Medical History:   Diagnosis Date     Allergy      C. difficile colitis August-October 2015     History of chickenpox      Hyperlipidaemia      Unspecified hypothyroidism 10/11/2011      Social history - reviewed  Social History     Socioeconomic History     Marital status:      Spouse name: Not on file     Number of children: 3     Years of education: Not on file     Highest education level: Not on file   Occupational History     Not on file   Social Needs     Financial resource strain: Not on file     Food insecurity     Worry: Not on file     Inability: Not on file     Transportation needs     Medical: Not on file     Non-medical: Not on file   Tobacco Use     Smoking status: Never Smoker     Smokeless tobacco: Never Used   Substance and Sexual Activity     Alcohol use: No     Drug use: No     Sexual activity: Yes     Birth control/protection: Post-menopausal   Lifestyle     Physical activity     Days per week: Not on file     Minutes per session: Not on file     Stress: Not on file   Relationships     Social connections     Talks on phone: Not on file     Gets together: Not on file     Attends [employer] service: Not on file     Active member of club or organization: Not on file     Attends meetings of clubs or organizations: Not on file     Relationship status: Not on  file     Intimate partner violence     Fear of current or ex partner: Not on file     Emotionally abused: Not on file     Physically abused: Not on file     Forced sexual activity: Not on file   Other Topics Concern      Service Not Asked     Blood Transfusions Not Asked     Caffeine Concern Yes     Comment: coffee, tea     Occupational Exposure Not Asked     Hobby Hazards Not Asked     Sleep Concern Not Asked     Stress Concern Not Asked     Weight Concern Not Asked     Special Diet Not Asked     Back Care Not Asked     Exercise Yes     Comment: swims 3x a week     Bike Helmet Not Asked     Seat Belt Not Asked     Self-Exams Not Asked     Parent/sibling w/ CABG, MI or angioplasty before 65F 55M? Not Asked   Social History Narrative    Lives alone, children nearby        2 boys and 1 girl (1 son )     passed away 24 yo    Labs    [employer]    Previoysly worked in [employer]     Family history -reviewed  Family History   Problem Relation Age of Onset     Cancer Mother         lung cancer     Testicular cancer Father      Abdominal Aortic Aneurysm Brother      Rheumatic fever Brother      Diabetes Brother      Anesthesia Reaction No family hx of      Deep Vein Thrombosis (DVT) No family hx of      ROS: non contributory on the 10-point review of system    Exam:   In general, the patient is in no apparent distress.    LMP  (LMP Unknown)      121/77 mmHg, 68 pulse    Neurologic: Alert and oriented to person/place/time, normal speech and affect    Data:  Recent cardiac investigations - reviewed    ECG 20 - atrial fibrillation      Echocardiogram 2020:  Interpretation Summary  s/p TAVR with 23 mm Paez Kyra 3 Ultra prosthesis. Valve is well seated;  mean gradient 6mmHg and trace paravalvular AI.  Mildly (EF 45-50%) reduced left ventricular function is present.  Right ventricular function, chamber size, wall motion, and thickness are normal.  Mild pulmonary  hypertension is present. Estimated pulmonary artery systolic pressure is 42 mmHg plus right atrial pressure.  IVC diameter <2.1 cm collapsing >50% with sniff suggests a normal RA pressure of 3 mmHg.  No pericardial effusion is present.     Compared with the study from 6/18, there has been interval placement of TAVR  and the LV function has improved.    Echocardiogram 6/19/2020:  Moderately (EF 35-40%) reduced left ventricular function is present.  Global right ventricular function is moderately reduced.  Probably moderate aortic stenosis, however accurate assessment of the aortic valve is challenging with atrial fibrillation rhythm. The RCC and NCC appears  partially fused. The Vmax is 2.41 m/s and mean gradient is 13 mmHg. The DANIS is calculated at 1.1 cm2 with a index of 0.54 cm/m2. The dimensionless index is 0.38.  The inferior vena cava was normal in size with preserved respiratory variability.  No pericardial effusion is present.     Coronary Angiogram 6/19/2020:  Left Anterior Descending    Prox LAD to Mid LAD lesion is 90% stenosed. The lesion is segmental. The lesion is calcified.    First Diagonal Branch    The vessel is small. There is mild diffuse disease throughout the vessel.    Second Diagonal Branch    The vessel is small. There is mild diffuse disease throughout the vessel.    Left Circumflex    Prox Cx to Mid Cx lesion is 50% stenosed.    First Obtuse Marginal Branch    The vessel is moderate in size. There is mild diffuse disease throughout the vessel.    Second Obtuse Marginal Branch    The vessel is small. There is moderate diffuse disease throughout the vessel.    Right Coronary Artery    The vessel was visualized by selective angiography. There was 30% diffuse vessel disease.    Intervention      Prox LAD to Mid LAD lesion    Stent    A stent was successfully placed.    Stent    A stent was successfully placed.    Stent    A stent was successfully placed.    There is a 10% residual stenosis post  intervention.      Labs - reviewed  Chemistry panel:   Recent Labs   Lab Test 06/30/20  1114 06/20/20  0620  06/18/20  0055 11/04/19  1600  10/18/17  1154  08/07/15  1741    134   < > 136 137   < > 136   < > 132*   POTASSIUM 4.3 4.1   < > 3.5 4.2   < > 3.9   < > 2.9*   CHLORIDE 103 100   < > 104 104   < > 101   < > 98   CO2 28 26   < > 24 28   < > 27   < > 23   ANIONGAP 4 9   < > 8 6   < > 8   < > 11   * 127*   < > 168* 114*   < > 109*   < > 132*   BUN 22 24   < > 18 24   < > 24   < > 28   CR 1.02 1.05*   < > 0.99 1.01   < > 0.87   < > 1.31*   CR 9.4 9.5   < > 8.8 9.2   < > 9.3   < > 8.6   MAG  --   --   --   --  2.5*  --   --   --  1.7   GFRESTIMATED 50* 48*   < > 52* 51*   < > 62   < > 39*   AST  --   --   --  34  --   --  33  --  11   ALT  --   --   --  26  --   --  39  --  20    < > = values in this interval not displayed.       CBC:   Recent Labs   Lab Test 06/20/20  0620 06/19/20  0531   WBC 9.4 7.5   RBC 5.93* 5.53*   HGB 16.6* 15.4   HCT 51.7* 48.0*   MCV 87 87   MCH 28.0 27.8   MCHC 32.1 32.1   RDW 14.3 14.0    202       Lipid Panel:  Recent Labs   Lab Test 06/19/20  2142 11/04/19  1600 10/10/12  1208   CHOL 177 189 205*   HDL 33* 35* 34*   * 111* 128   TRIG 72 218* 216*   CHOLHDLRATIO  --   --  6.1*       Thyroid:   TSH   Date Value Ref Range Status   08/13/2020 1.22 0.40 - 4.00 mU/L Final     T4 Free   Date Value Ref Range Status   05/18/2011 1.16 0.70 - 1.85 ng/dL Final     Comment:     Ordered by lab     Hemoglobin A1C   Date Value Ref Range Status   10/10/2012 6.0 4.3 - 6.0 % Final     INR   Date Value Ref Range Status   07/29/2020 1.15 (H) 0.86 - 1.14 Final       Assessment and Plan:  85 year old female with    Coronary artery disease, post PCI to LAD 6/2020  Severe aortic stenosis, post ARNIE Kyra valve 7/2020  Persistent atrial fibrillation  Mild to moderately reduced LV function, 35-40% --> 45-50%  Dyslipidemia  Essential hypertension  Heart failure with reduced  ejection fraction, Stage C     Dulce has dyspnea and fatigue and this could be due to atrial fibrillation. On her last ECG 8/13/20, ventricular response was higher, DCCV may be helpful to restore SR. She has been on Eliquis since June 2020, she only reports stopping for ARNIE. Patient not ready to proceed with DCCV as she wants to gain her strength back first. Her BP at home has been running on the lower side. Valsartan was increased from 160 -> 320 mg and she has been on metop  mg/d for years.     Recommendations:   Reduce Valsartan to 160 mg/d  Outpatient DCCV when ready, f/u with clinic nurse next week  ARNIE follow up  COVID 19 precautions reinforced  Follow up with me 1 month    Phone call and chart review duration: 25 minutes    Ginger Davis MD, MS  Staff Cardiologist, [facility]   Pager: [phone]      Please do not hesitate to contact me if you have any questions/concerns.     Sincerely,     Ginger Davis MD

## 2020-08-26 NOTE — PROGRESS NOTES
"Dulce Yeh is a 85 year old female who is being evaluated via a billable telephone visit.      The patient has been notified of following:     \"This telephone visit will be conducted via a call between you and your physician/provider. We have found that certain health care needs can be provided without the need for a physical exam.  This service lets us provide the care you need with a short phone conversation.  If a prescription is necessary we can send it directly to your pharmacy.  If lab work is needed we can place an order for that and you can then stop by our lab to have the test done at a later time.    Telephone visits are billed at different rates depending on your insurance coverage. During this emergency period, for some insurers they may be billed the same as an in-person visit.  Please reach out to your insurance provider with any questions.    If during the course of the call the physician/provider feels a telephone visit is not appropriate, you will not be charged for this service.\"    Patient has given verbal consent for Telephone visit?  Yes    What phone number would you like to be contacted at? [phone]    How would you like to obtain your AVS? Mail a copy    Vitals - Patient Reported  Systolic (Patient Reported): 115  Diastolic (Patient Reported): 72  Weight (Patient Reported): 80.7 kg (178 lb)  Height (Patient Reported): 170.2 cm (5' 7\")  BMI (Based on Pt Reported Ht/Wt): 27.88  Pulse (Patient Reported): 75  Pain Score: No Pain (0)      History:  85 year old female with severe aortic stenosis, HFrEF, persistent atrial fibrillation, CAD and chronic LBBB.     She was recently admitted to the hospital from 6/18/20 to 6/20/20 for     1. Acute hypoxic resp failure  2. HTN urgency with flash pulm edema  3. Severe aortic stenosis (new diagnosis by cardiac cath on this admission)  4. NSTEMI, severe LAD disease, post PCI x3  5. Presented with acute HFrEF 35-40% (new diagnosis) with flash acute " pulmonary edema; pulmonary edema resolved.  6. Paroxysmal Afib     She subsequently underwent transcatheter transfemoral aortic valve replacement, 23 mm Paez Kyra valve on 7/29/20. Has exertional dyspnea and fatigue and cannt climb a flight of stairs without having to stop. So overall feeling down that she does not feel much improved. No orthopnea or PND, chest pain at rest, syncope      Current Outpatient Medications   Medication Sig Dispense Refill     apixaban ANTICOAGULANT (ELIQUIS) 5 MG tablet Take 1 tablet (5 mg) by mouth 2 times daily 180 tablet 1     ascorbic acid (VITAMIN C) 500 MG tablet Take 500 mg by mouth daily.       BIOTIN PO Take by mouth daily       calcium carb 1250 mg, 500 mg [place],/vitamin D 200 units (OSCAL WITH D) 500-200 MG-UNIT per tablet Take 1 tablet by mouth 2 times daily (with meals).       Cetirizine HCl (ZYRTEC PO) Take 10 mg by mouth daily as needed        clobetasol (CLOBETASOL PROPIONATE EMULSION) 0.05 % CREA Apply topically as needed 15 g 0     clopidogrel (PLAVIX) 75 MG tablet Take 1 tablet (75 mg) by mouth daily 90 tablet 3     coenzyme Q-10 200 MG CAPS Take 200 mg by mouth daily       cyanocolbalamin (VITAMIN B-12) 1000 MCG tablet Take 1,000 mcg by mouth daily.       fluocinonide (LIDEX) 0.05 % solution Apply topically 2 times daily       levothyroxine (SYNTHROID/LEVOTHROID) 88 MCG tablet Take 1 tablet (88 mcg) by mouth daily 90 tablet 3     Magnesium 300 MG CAPS Take  by mouth.       metoprolol succinate ER (TOPROL-XL) 100 MG 24 hr tablet Take 1 tablet (100 mg) by mouth daily 90 tablet 3     valsartan (DIOVAN) 320 MG tablet Take 1 tablet (320 mg) by mouth daily 90 tablet 3     Glucosamine-Chondroit-Vit C-Mn (GLUCOSAMINE CHONDR 1500 COMPLX) CAPS Take 1 tablet by mouth daily          Allergies - reviewed     Allergies   Allergen Reactions     Sulfa Drugs Hives     Clonidine Rash     Diltiazem Rash     Past history  Active Ambulatory Problems     Diagnosis Date Noted      Hypertension 10/11/2011     Aortic stenosis 10/11/2011     Hypothyroid 10/11/2011     S/P cholecystectomy 10/11/2011     Elevated LFTs 10/11/2011     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     Recurrent vulvar infection 10/18/2011     Steatohepatitis 02/01/2012     Hemorrhoids 03/11/2013     Near syncope 03/11/2013     Lichen sclerosus et atrophicus of the vulva 10/07/2015     Hypothyroidism due to acquired atrophy of thyroid 10/08/2015     Recurrent colitis due to Clostridium difficile 08/01/2016     Dermatitis, seborrheic 02/13/2017     Cherry angioma 02/13/2017     Seborrheic keratosis 02/13/2017     Tinea pedis of both feet 02/13/2017     Anxiety 06/10/2015     Atrial fibrillation (H) 09/03/2015     Hx of fracture of fibula 07/29/2015     Hypokalemia 09/02/2015     Hyponatremia 09/02/2015     Nausea & vomiting 09/02/2015     Osteoarthritis of left ankle 07/21/2015     Postoperative anemia due to acute blood loss 06/08/2015     Primary osteoarthritis of left knee 05/21/2015     S/P total knee arthroplasty 06/08/2015     Vaginal dryness 06/10/2015     Shortness of breath 06/18/2020     NSTEMI (non-ST elevated myocardial infarction) (H) 06/18/2020     Severe aortic stenosis 07/21/2020     Aortic stenosis, severe 07/29/2020     Resolved Ambulatory Problems     Diagnosis Date Noted     Hyperlipidemia 10/11/2011     Eosinophilia 10/11/2011     Fatigue 03/11/2013     Weakness 03/11/2013     Diaphoresis 03/11/2013     Past Medical History:   Diagnosis Date     Allergy      C. difficile colitis August-October 2015     History of chickenpox      Hyperlipidaemia      Unspecified hypothyroidism 10/11/2011      Social history - reviewed  Social History     Socioeconomic History     Marital status:      Spouse name: Not on file     Number of children: 3     Years of education: Not on file     Highest education level: Not on file   Occupational History     Not on file   Social Needs     Financial resource strain: Not on file      Food insecurity     Worry: Not on file     Inability: Not on file     Transportation needs     Medical: Not on file     Non-medical: Not on file   Tobacco Use     Smoking status: Never Smoker     Smokeless tobacco: Never Used   Substance and Sexual Activity     Alcohol use: No     Drug use: No     Sexual activity: Yes     Birth control/protection: Post-menopausal   Lifestyle     Physical activity     Days per week: Not on file     Minutes per session: Not on file     Stress: Not on file   Relationships     Social connections     Talks on phone: Not on file     Gets together: Not on file     Attends Zoroastrian service: Not on file     Active member of club or organization: Not on file     Attends meetings of clubs or organizations: Not on file     Relationship status: Not on file     Intimate partner violence     Fear of current or ex partner: Not on file     Emotionally abused: Not on file     Physically abused: Not on file     Forced sexual activity: Not on file   Other Topics Concern      Service Not Asked     Blood Transfusions Not Asked     Caffeine Concern Yes     Comment: coffee, tea     Occupational Exposure Not Asked     Hobby Hazards Not Asked     Sleep Concern Not Asked     Stress Concern Not Asked     Weight Concern Not Asked     Special Diet Not Asked     Back Care Not Asked     Exercise Yes     Comment: swims 3x a week     Bike Helmet Not Asked     Seat Belt Not Asked     Self-Exams Not Asked     Parent/sibling w/ CABG, MI or angioplasty before 65F 55M? Not Asked   Social History Narrative    Lives alone, children nearby        2 boys and 1 girl (1 son )     passed away 26 yo    Labs    [employer]    Previoysly worked in [employer]     Family history -reviewed  Family History   Problem Relation Age of Onset     Cancer Mother         lung cancer     Testicular cancer Father      Abdominal Aortic Aneurysm Brother      Rheumatic fever Brother      Diabetes  Brother      Anesthesia Reaction No family hx of      Deep Vein Thrombosis (DVT) No family hx of      ROS: non contributory on the 10-point review of system    Exam:   In general, the patient is in no apparent distress.    LMP  (LMP Unknown)      121/77 mmHg, 68 pulse    Neurologic: Alert and oriented to person/place/time, normal speech and affect    Data:  Recent cardiac investigations - reviewed    ECG 8/13/20 - atrial fibrillation      Echocardiogram 7/30/2020:  Interpretation Summary  s/p TAVR with 23 mm Paez Kyra 3 Ultra prosthesis. Valve is well seated;  mean gradient 6mmHg and trace paravalvular AI.  Mildly (EF 45-50%) reduced left ventricular function is present.  Right ventricular function, chamber size, wall motion, and thickness are normal.  Mild pulmonary hypertension is present. Estimated pulmonary artery systolic pressure is 42 mmHg plus right atrial pressure.  IVC diameter <2.1 cm collapsing >50% with sniff suggests a normal RA pressure of 3 mmHg.  No pericardial effusion is present.     Compared with the study from 6/18, there has been interval placement of TAVR  and the LV function has improved.    Echocardiogram 6/19/2020:  Moderately (EF 35-40%) reduced left ventricular function is present.  Global right ventricular function is moderately reduced.  Probably moderate aortic stenosis, however accurate assessment of the aortic valve is challenging with atrial fibrillation rhythm. The RCC and NCC appears  partially fused. The Vmax is 2.41 m/s and mean gradient is 13 mmHg. The DANIS is calculated at 1.1 cm2 with a index of 0.54 cm/m2. The dimensionless index is 0.38.  The inferior vena cava was normal in size with preserved respiratory variability.  No pericardial effusion is present.     Coronary Angiogram 6/19/2020:  Left Anterior Descending    Prox LAD to Mid LAD lesion is 90% stenosed. The lesion is segmental. The lesion is calcified.    First Diagonal Branch    The vessel is small. There is  mild diffuse disease throughout the vessel.    Second Diagonal Branch    The vessel is small. There is mild diffuse disease throughout the vessel.    Left Circumflex    Prox Cx to Mid Cx lesion is 50% stenosed.    First Obtuse Marginal Branch    The vessel is moderate in size. There is mild diffuse disease throughout the vessel.    Second Obtuse Marginal Branch    The vessel is small. There is moderate diffuse disease throughout the vessel.    Right Coronary Artery    The vessel was visualized by selective angiography. There was 30% diffuse vessel disease.    Intervention      Prox LAD to Mid LAD lesion    Stent    A stent was successfully placed.    Stent    A stent was successfully placed.    Stent    A stent was successfully placed.    There is a 10% residual stenosis post intervention.      Labs - reviewed  Chemistry panel:   Recent Labs   Lab Test 06/30/20  1114 06/20/20  0620  06/18/20  0055 11/04/19  1600  10/18/17  1154  08/07/15  1741    134   < > 136 137   < > 136   < > 132*   POTASSIUM 4.3 4.1   < > 3.5 4.2   < > 3.9   < > 2.9*   CHLORIDE 103 100   < > 104 104   < > 101   < > 98   CO2 28 26   < > 24 28   < > 27   < > 23   ANIONGAP 4 9   < > 8 6   < > 8   < > 11   * 127*   < > 168* 114*   < > 109*   < > 132*   BUN 22 24   < > 18 24   < > 24   < > 28   CR 1.02 1.05*   < > 0.99 1.01   < > 0.87   < > 1.31*   CR 9.4 9.5   < > 8.8 9.2   < > 9.3   < > 8.6   MAG  --   --   --   --  2.5*  --   --   --  1.7   GFRESTIMATED 50* 48*   < > 52* 51*   < > 62   < > 39*   AST  --   --   --  34  --   --  33  --  11   ALT  --   --   --  26  --   --  39  --  20    < > = values in this interval not displayed.       CBC:   Recent Labs   Lab Test 06/20/20  0620 06/19/20  0531   WBC 9.4 7.5   RBC 5.93* 5.53*   HGB 16.6* 15.4   HCT 51.7* 48.0*   MCV 87 87   MCH 28.0 27.8   MCHC 32.1 32.1   RDW 14.3 14.0    202       Lipid Panel:  Recent Labs   Lab Test 06/19/20  2142 11/04/19  1600 10/10/12  1208   CHOL 177  189 205*   HDL 33* 35* 34*   * 111* 128   TRIG 72 218* 216*   CHOLHDLRATIO  --   --  6.1*       Thyroid:   TSH   Date Value Ref Range Status   08/13/2020 1.22 0.40 - 4.00 mU/L Final     T4 Free   Date Value Ref Range Status   05/18/2011 1.16 0.70 - 1.85 ng/dL Final     Comment:     Ordered by lab     Hemoglobin A1C   Date Value Ref Range Status   10/10/2012 6.0 4.3 - 6.0 % Final     INR   Date Value Ref Range Status   07/29/2020 1.15 (H) 0.86 - 1.14 Final       Assessment and Plan:  85 year old female with    Coronary artery disease, post PCI to LAD 6/2020  Severe aortic stenosis, post ARNIE Kyra valve 7/2020  Persistent atrial fibrillation  Mild to moderately reduced LV function, 35-40% --> 45-50%  Dyslipidemia  Essential hypertension  Heart failure with reduced ejection fraction, Stage C     Dulce has dyspnea and fatigue and this could be due to atrial fibrillation. On her last ECG 8/13/20, ventricular response was higher, DCCV may be helpful to restore SR. She has been on Eliquis since June 2020, she only reports stopping for ARNIE. Patient not ready to proceed with DCCV as she wants to gain her strength back first. Her BP at home has been running on the lower side. Valsartan was increased from 160 -> 320 mg and she has been on metop  mg/d for years.     Recommendations:   Reduce Valsartan to 160 mg/d  Outpatient DCCV when ready, f/u with clinic nurse next week  ARNIE follow up  COVID 19 precautions reinforced  Follow up with me 1 month    Phone call and chart review duration: 25 minutes    Ginger Davis MD, MS  Staff Cardiologist, [facility]   Pager: [phone]

## 2020-08-27 ENCOUNTER — AMBULATORY - HEALTHEAST (OUTPATIENT)
Dept: CARDIAC REHAB | Facility: CLINIC | Age: 85
End: 2020-08-27

## 2020-08-27 DIAGNOSIS — Z95.2 S/P TAVR (TRANSCATHETER AORTIC VALVE REPLACEMENT): ICD-10-CM

## 2020-08-27 RX ORDER — VALSARTAN 160 MG/1
160 TABLET ORAL DAILY
Qty: 90 TABLET | Refills: 3 | Status: SHIPPED | OUTPATIENT
Start: 2020-08-27 | End: 2021-08-11

## 2020-08-27 NOTE — PATIENT INSTRUCTIONS
Recommendations:   Reduce Valsartan to 160 mg/d  Outpatient DCCV when ready, f/u with clinic nurse next week  ARNIE follow up  COVID 19 precautions reinforced  Follow up with me 1 month  Patient Instructions:  It was a pleasure to see you in the cardiology clinic today.      If you have any questions, call Garfield Cox RN, at [phone].  Press Option #1 for the [facility], and then press Option #4  We are encouraging the use of [employer] to communicate with your HealthCare Provider    Note the new medications: Decrease the Valsartan from 320 mg to 160 mg daily, script sent to [facility]  Stop the following medications: none    The results from today include: none  Please follow up with Dr. Davis in one month      If you have an urgent need after hours (8:00 am to 4:30 pm) please call [phone] and ask for the cardiology fellow on call.

## 2020-08-27 NOTE — PROGRESS NOTES
"  Dulce Yeh is a 85 year old year old who is being evaluated via a billable video visit.      The patient has been notified of following:     \"This video visit will be conducted via a call between you and your physician/provider. We have found that certain health care needs can be provided without the need for an in-person physical exam.  This service lets us provide the care you need with a video conversation.  If a prescription is necessary we can send it directly to your pharmacy.  If lab work is needed we can place an order for that and you can then stop by our lab to have the test done at a later time.    Video visits are billed at different rates depending on your insurance coverage.  Please reach out to your insurance provider with any questions.    If during the course of the call the physician/provider feels a video visit is not appropriate, you will not be charged for this service.\"    Patient has given verbal consent for Video visit? Yes    How would you like to obtain your AVS? Mail a copy    Patient would like the video invitation sent by: Send to e-mail at: [email or web address]    Will anyone else be joining your video visit? No           Video-Visit Details    Type of service:  Video Visit    Video Start Time: 1:45    Video End Time (time video stopped): 2:05    Originating Location (pt. Location[place] Other [facility] [place]    Distant Location (provider location):  [facility]     Mode of Communication:  Video Conference via [facility]  CARDIOVASCULAR DIVISION    VALVE CLINIC VIDEO RETURN VISIT    Dulce Yeh is being evaluated for 30 day TAVR follow-up. Patient has a history of low-flow low-gradient severe aortic stenosis that was treated with transfemoral transcatheter aortic valve replacement (TAVR) with a 23 mm Paez Kyra 3 Ultra on 7/29/20 by Quinton Greco and Beka. Additional medical history notable for CAD w/NSTEMI 6/18/20 s/p PCI of the LAD, HFrEF " with EF 35-40% and paroxysmal AF on Eliquis. The TAVR and post-procedural course were notable for no major complications. Her post TAVR ECG showed a-flutter with LBBB which was unchanged when compared to prior. Her post TAVR echo showed mean gradient of 6 mmHg with trace paravalvular AI. She was discharged home on Plavix and Eliquis.     Today Dulce reports feeling fine. She states that the TAVR recovery was much longer than expected but she finally turned the corner a few days ago. She is participating in cardiac rehab twice a week and is able to exercise for about 1 hour total. She denies any chest pain or shortness of breath with exertion. She denies orthopnea, leg swelling, weight gain. Her weight has been stable at 174 lbs. She denies lightheadedness or syncope. She occasionally feels a little fluttering in her chest but it is not bothersome. She continues to feel tired which has not really improved. Her groin sites have healed up for the most part. The right side is still a little tender but she denies drainage, swelling. Home blood pressures run 120-130's systolic.       PAST MEDICAL HISTORY:     Past Medical History:   Diagnosis Date     Allergy      Aortic stenosis 10/11/2011     C. 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 RHC, possible LHC for TV gradient;  Surgeon: Tu Hedrick MD;  Location:  HEART CARDIAC CATH LAB     CV PCI STENT DRUG ELUTING N/A 6/19/2020    Procedure: Percutaneous  Coronary Intervention Stent Drug Eluting;  Surgeon: Tu Hedrick MD;  Location:  HEART CARDIAC CATH LAB     CV TRANSCATHETER AORTIC VALVE REPLACEMENT Right 7/29/2020    Procedure: Transfemoral (Paez) Transcatheter Aortic Valve Replacement with Paez Kyra Ultra 23 mm. Intra-operative transthoracic echocardiogram;  Surgeon: Tu Hedrick MD;  Location:  OR     HEART CATH FEMORAL CANNULIZATION WITH OPEN STANDBY REPAIR AORTIC VALVE N/A 7/29/2020    Procedure: Cardiopulmonary on standby.;  Surgeon: Ricardo Ireland MD;  Location: [facility] OR     ORTHOPEDIC SURGERY            CURRENT MEDICATIONS:     Current Outpatient Medications   Medication     apixaban ANTICOAGULANT (ELIQUIS) 5 MG tablet     ascorbic acid (VITAMIN C) 500 MG tablet     BIOTIN PO     calcium carb 1250 mg, 500 mg [place],/vitamin D 200 units (OSCAL WITH D) 500-200 MG-UNIT per tablet     Cetirizine HCl (ZYRTEC PO)     clobetasol (CLOBETASOL PROPIONATE EMULSION) 0.05 % CREA     clopidogrel (PLAVIX) 75 MG tablet     coenzyme Q-10 200 MG CAPS     cyanocolbalamin (VITAMIN B-12) 1000 MCG tablet     fluocinonide (LIDEX) 0.05 % solution     Glucosamine-Chondroit-Vit C-Mn (GLUCOSAMINE CHONDR 1500 COMPLX) CAPS     levothyroxine (SYNTHROID/LEVOTHROID) 88 MCG tablet     Magnesium 300 MG CAPS     metoprolol succinate ER (TOPROL-XL) 100 MG 24 hr tablet     valsartan (DIOVAN) 160 MG tablet     No current facility-administered medications for this visit.         ALLERGIES:      Allergies   Allergen Reactions     Sulfa Drugs Hives     Clonidine Rash     Diltiazem Rash        FAMILY HISTORY:       Family History   Problem Relation Age of Onset     Cancer Mother         lung cancer     Testicular cancer Father      Abdominal Aortic Aneurysm Brother      Rheumatic fever Brother      Diabetes Brother      Anesthesia Reaction No family hx of      Deep Vein Thrombosis (DVT) No family hx of           SOCIAL HISTORY:     Social History     Socioeconomic History      Marital status:      Spouse name: Not on file     Number of children: 3     Years of education: Not on file     Highest education level: Not on file   Occupational History     Not on file   Social Needs     Financial resource strain: Not on file     Food insecurity     Worry: Not on file     Inability: Not on file     Transportation needs     Medical: Not on file     Non-medical: Not on file   Tobacco Use     Smoking status: Never Smoker     Smokeless tobacco: Never Used   Substance and Sexual Activity     Alcohol use: No     Drug use: No     Sexual activity: Yes     Birth control/protection: Post-menopausal   Lifestyle     Physical activity     Days per week: Not on file     Minutes per session: Not on file     Stress: Not on file   Relationships     Social connections     Talks on phone: Not on file     Gets together: Not on file     Attends Taoism service: Not on file     Active member of club or organization: Not on file     Attends meetings of clubs or organizations: Not on file     Relationship status: Not on file     Intimate partner violence     Fear of current or ex partner: Not on file     Emotionally abused: Not on file     Physically abused: Not on file     Forced sexual activity: Not on file   Other Topics Concern      Service Not Asked     Blood Transfusions Not Asked     Caffeine Concern Yes     Comment: coffee, tea     Occupational Exposure Not Asked     Hobby Hazards Not Asked     Sleep Concern Not Asked     Stress Concern Not Asked     Weight Concern Not Asked     Special Diet Not Asked     Back Care Not Asked     Exercise Yes     Comment: swims 3x a week     Bike Helmet Not Asked     Seat Belt Not Asked     Self-Exams Not Asked     Parent/sibling w/ CABG, MI or angioplasty before 65F 55M? 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          REVIEW OF SYSTEMS:     Constitutional: No fevers or chills  Skin: No new rash or itching  Eyes: No acute change in vision  Ears/Nose/Throat: No purulent rhinorrhea, new hearing loss, or new vertigo  Respiratory: No cough or hemoptysis  Cardiovascular: See HPI  Gastrointestinal: No change in appetite, vomiting, hematemesis or diarrhea  Genitourinary: No dysuria or hematuria  Musculoskeletal: No new back pain, neck pain or muscle pain  Neurologic: No new headaches, focal weakness or behavior changes  Psychiatric: No hallucinations, excessive alcohol consumption or illegal drug usage  Hematologic/Lymphatic/Immunologic: No bleeding, chills, fever, night sweats or weight loss  Endocrine: No new cold intolerance, heat intolerance, polyphagia, polydipsia or polyuria      PHYSICAL EXAMINATION:     BP (!) 147/86 (BP Location: Right arm, Patient Position: Chair, Cuff Size: Adult Regular)   Pulse 92   Ht 1.702 m (5' 7\")   Wt 78.9 kg (174 lb)   LMP  (LMP Unknown)   SpO2 98%   BMI 27.25 kg/m      GENERAL: No acute distress.  HEENT: EOMI. Sclerae white, not injected. Nares clear.   Heart: No JVD or peripheral edema.  Lungs: Non-labored breathing, no audible wheezing   Abdomen: Non distended  Extremities: No clubbing, cyanosis, or edema.   Neurologic: Alert and oriented to person/place/time, normal speech and affect. No focal deficits.  Skin: No petechiae, purpura or rash.     LABORATORY DATA:     Last Comprehensive Metabolic Panel:  Sodium   Date Value Ref Range Status   08/28/2020 134 133 - 144 mmol/L Final     Potassium   Date Value Ref Range Status   08/28/2020 4.6 3.4 - 5.3 mmol/L Final     Chloride   Date Value Ref Range Status   08/28/2020 104 94 - 109 mmol/L Final     Carbon Dioxide   Date Value Ref Range Status   08/28/2020 25 20 - 32 mmol/L Final     Anion Gap   Date Value Ref Range Status   08/28/2020 5 3 - 14 mmol/L Final     Glucose   Date Value Ref Range Status   08/28/2020 121 " (H) 70 - 99 mg/dL Final     Urea Nitrogen   Date Value Ref Range Status   08/28/2020 22 7 - 30 mg/dL Final     Creatinine   Date Value Ref Range Status   08/28/2020 0.93 0.52 - 1.04 mg/dL Final     GFR Estimate   Date Value Ref Range Status   08/28/2020 56 (L) >60 mL/min/[1.73_m2] Final     Comment:     Non  GFR Calc  Starting 12/18/2018, serum creatinine based estimated GFR (eGFR) will be   calculated using the Chronic Kidney Disease Epidemiology Collaboration   (CKD-EPI) equation.       Calcium   Date Value Ref Range Status   08/28/2020 9.4 8.5 - 10.1 mg/dL Final     Lab Results   Component Value Date    WBC 6.2 08/28/2020     Lab Results   Component Value Date    RBC 5.13 08/28/2020     Lab Results   Component Value Date    HGB 14.6 08/28/2020     Lab Results   Component Value Date    HCT 45.1 08/28/2020     No components found for: MCT  Lab Results   Component Value Date    MCV 88 08/28/2020     Lab Results   Component Value Date    MCH 28.5 08/28/2020     Lab Results   Component Value Date    MCHC 32.4 08/28/2020     Lab Results   Component Value Date    RDW 14.3 08/28/2020     Lab Results   Component Value Date     08/28/2020        PROCEDURES & FURTHER ASSESSMENTS:     ECG dated 8/28/20: A-flutter with variable conduction, HR 88 with LBBB, unchanged when compared to prior    Echocardiogram dated 8/28/20:  Interpretation Summary  S/P TAVR with 23 mm Paez Kyra 3 Ultra prosthesis. Mean gradient 5 mmHg.  Trivial paravalvular AI.  No pericardial effusion is present.    Left Ventricle  Left ventricular wall thickness is normal. Left ventricular size is normal.  The Ejection Fraction is estimated at 50-55%. Diastolic function not assessed  due to atrial fibrillation. No regional wall motion abnormalities are seen.     Right Ventricle  Right ventricular function, chamber size, wall motion, and thickness are  normal.     Atria  The atria cannot be assessed.     Mitral Valve  Moderate mitral  annular calcification is present. Trace to mild mitral  insufficiency is present. The mean mitral valve gradient is 4.0 mmHg.        Aortic Valve  S/P TAVR with 23 mm Paez Kyra 3 Ultra prosthesis. Mean gradient 5 mmHg.  Trivial paravalvular AI.     Tricuspid Valve  The tricuspid valve is normal. Trace to mild tricuspid insufficiency is  present. The right ventricular systolic pressure is approximated at 32.6 mmHg  plus the right atrial pressure. Pulmonary artery systolic pressure is normal.     Pulmonic Valve  The pulmonic valve is normal. Trace pulmonic insufficiency is present.     Vessels  The thoracic aorta is normal. The pulmonary artery and bifurcation cannot be  assessed. The inferior vena cava is normal.     Pericardium  No pericardial effusion is present.     Compared to Previous Study  There has been no change    CT cardiac 8/28/20:  :     1.  There is a Paez Kyra III 23 mm transcatheter valve in the  aortic position.   2.  There is no evidence of hypoattenuating leaflet thrombosis.  3.  Leaflet opening could not be evaluated.   4.  Please review Radiology report for incidental noncardiac findings  that will follow separately.     FINDINGS:      1.  There is a Paez Kyra III 23 mm transcatheter valve in the  aortic position. It is well-seated.  2.  There is no evidence of hypoattenuating leaflet thrombosis.  Leaflet opening could not be evaluated due to the technical issues  described above.  3.  The aortic arch is left sided. The visualized portions of the  aortic root and ascending aorta are normal in size. There is mild  calcification of the ascending aorta.   4.  The systemic venous connections are normal.   5.  The cardiac chambers demonstrate normal atrioventricular and  ventriculoarterial concordance.  6.  Coronary arteries originate from their respective cusps.   7.  There are moderate coronary artery calcifications.  8.  The pericardium is unremarkable.  There is no  pericardial  effusion.   9.  There is no intracardiac thrombus.  10.  There is severe biatrial enlargement.    NYHA Class: I     CLINICAL IMPRESSION:     Dulce Yeh is being evaluated for 30 day TAVR follow-up. She has a history of low-flow low-gradient severe aortic stenosis that was treated with transfemoral transcatheter aortic valve replacement (TAVR) with a 23 mm Paez Kyra 3 Ultra on 7/29/20 by Quinton Greco and Beka. Additional medical history notable for CAD w/NSTEMI 6/18/20 s/p PCI of the LAD, HFrEF with EF 35-40% and paroxysmal AF on Eliquis. The TAVR and post-procedural course were notable for no major complications. Her post TAVR ECG showed a-flutter with LBBB which was unchanged when compared to prior. Her post TAVR echo showed mean gradient of 6 mmHg with trace paravalvular AI. She was discharged home on Plavix and Eliquis.     Today Dulce reports feeling well and has no cardiovascular concerns. She is active participating in cardiac rehab twice weekly without cardiac symptoms. Her labs today demonstrate normal BMP and CBC. EKG shows a-flutter HR 88 with LBBB, unchanged when compared to prior. ECHO shows normal TAVR function w/mean PG of 5mmHg and trace paravalvular AI. Cardiac CT confirms well seated valve with no evidence of HALT. Overall no concerning findings.    Plan is to continue current medication regimen w/ plans to follow-up with valve clinic in 1 year but can follow with her primary cardiologist, Dr. Davis, for management of her chronic cardiovascular conditions in the meantime.    Plan Summary:  1) Continue Plavix 75 mg daily through 6/2021 then convert to daily ASA 81 mg   2) Lifelong antibiotic prophylaxis prior to all dental procedures.  3) Continue Eliquis for a-fib stroke prophylaxis  4) Discussed DCCV which may improve her fatigue, she will consider and get back to us  5) Follow-up with Dr. Davis in 3 months  6) Follow-up in Valve Clinic 1 year with echo, ecg and  labs prior     Stacey Sonja Whalen NP      CC  Patient Care Team:  Gilda Hogan MD as PCP - General (Internal Medicine)  Gian Khanna MD as MD (Orthopedics)  Gilda Hogan MD as Assigned PCP

## 2020-08-28 ENCOUNTER — HOSPITAL ENCOUNTER (OUTPATIENT)
Dept: CARDIOLOGY | Facility: CLINIC | Age: 85
End: 2020-08-28
Attending: NURSE PRACTITIONER
Payer: MEDICARE

## 2020-08-28 ENCOUNTER — HOSPITAL ENCOUNTER (OUTPATIENT)
Dept: CARDIOLOGY | Facility: CLINIC | Age: 85
Discharge: HOME | End: 2020-08-28
Attending: INTERNAL MEDICINE | Admitting: INTERNAL MEDICINE
Payer: MEDICARE

## 2020-08-28 ENCOUNTER — HOSPITAL ENCOUNTER (OUTPATIENT)
Dept: CT IMAGING | Facility: CLINIC | Age: 85
End: 2020-08-28
Attending: INTERNAL MEDICINE
Payer: MEDICARE

## 2020-08-28 VITALS
DIASTOLIC BLOOD PRESSURE: 86 MMHG | WEIGHT: 174 LBS | HEIGHT: 67 IN | HEART RATE: 92 BPM | OXYGEN SATURATION: 98 % | BODY MASS INDEX: 27.31 KG/M2 | SYSTOLIC BLOOD PRESSURE: 147 MMHG

## 2020-08-28 DIAGNOSIS — I10 BENIGN ESSENTIAL HYPERTENSION: ICD-10-CM

## 2020-08-28 DIAGNOSIS — I48.19 PERSISTENT ATRIAL FIBRILLATION (H): ICD-10-CM

## 2020-08-28 DIAGNOSIS — I25.10 CORONARY ARTERY DISEASE INVOLVING NATIVE CORONARY ARTERY OF NATIVE HEART WITHOUT ANGINA PECTORIS: ICD-10-CM

## 2020-08-28 DIAGNOSIS — Z95.2 S/P TAVR (TRANSCATHETER AORTIC VALVE REPLACEMENT): ICD-10-CM

## 2020-08-28 DIAGNOSIS — Z95.2 S/P TAVR (TRANSCATHETER AORTIC VALVE REPLACEMENT): Primary | ICD-10-CM

## 2020-08-28 DIAGNOSIS — I35.0 SEVERE AORTIC STENOSIS: ICD-10-CM

## 2020-08-28 LAB
ANION GAP SERPL CALCULATED.3IONS-SCNC: 5 MMOL/L (ref 3–14)
BUN SERPL-MCNC: 22 MG/DL (ref 7–30)
CALCIUM SERPL-MCNC: 9.4 MG/DL (ref 8.5–10.1)
CHLORIDE SERPL-SCNC: 104 MMOL/L (ref 94–109)
CO2 SERPL-SCNC: 25 MMOL/L (ref 20–32)
CREAT SERPL-MCNC: 0.93 MG/DL (ref 0.52–1.04)
ERYTHROCYTE [DISTWIDTH] IN BLOOD BY AUTOMATED COUNT: 14.3 % (ref 10–15)
GFR SERPL CREATININE-BSD FRML MDRD: 56 ML/MIN/{1.73_M2}
GLUCOSE SERPL-MCNC: 121 MG/DL (ref 70–99)
HCT VFR BLD AUTO: 45.1 % (ref 35–47)
HGB BLD-MCNC: 14.6 G/DL (ref 11.7–15.7)
MCH RBC QN AUTO: 28.5 PG (ref 26.5–33)
MCHC RBC AUTO-ENTMCNC: 32.4 G/DL (ref 31.5–36.5)
MCV RBC AUTO: 88 FL (ref 78–100)
PLATELET # BLD AUTO: 178 10E9/L (ref 150–450)
POTASSIUM SERPL-SCNC: 4.6 MMOL/L (ref 3.4–5.3)
RBC # BLD AUTO: 5.13 10E12/L (ref 3.8–5.2)
SODIUM SERPL-SCNC: 134 MMOL/L (ref 133–144)
WBC # BLD AUTO: 6.2 10E9/L (ref 4–11)

## 2020-08-28 PROCEDURE — 75572 CT HRT W/3D IMAGE: CPT | Mod: 26 | Performed by: STUDENT IN AN ORGANIZED HEALTH CARE EDUCATION/TRAINING PROGRAM

## 2020-08-28 PROCEDURE — 36415 COLL VENOUS BLD VENIPUNCTURE: CPT | Performed by: INTERNAL MEDICINE

## 2020-08-28 PROCEDURE — 80048 BASIC METABOLIC PNL TOTAL CA: CPT | Performed by: INTERNAL MEDICINE

## 2020-08-28 PROCEDURE — 25000128 H RX IP 250 OP 636: Performed by: STUDENT IN AN ORGANIZED HEALTH CARE EDUCATION/TRAINING PROGRAM

## 2020-08-28 PROCEDURE — 93010 ELECTROCARDIOGRAM REPORT: CPT | Performed by: INTERNAL MEDICINE

## 2020-08-28 PROCEDURE — 93306 TTE W/DOPPLER COMPLETE: CPT | Mod: 26 | Performed by: INTERNAL MEDICINE

## 2020-08-28 PROCEDURE — 25500064 ZZH RX 255 OP 636: Performed by: INTERNAL MEDICINE

## 2020-08-28 PROCEDURE — 93306 TTE W/DOPPLER COMPLETE: CPT

## 2020-08-28 PROCEDURE — 75572 CT HRT W/3D IMAGE: CPT

## 2020-08-28 PROCEDURE — 99214 OFFICE O/P EST MOD 30 MIN: CPT | Mod: 25 | Performed by: NURSE PRACTITIONER

## 2020-08-28 PROCEDURE — 85027 COMPLETE CBC AUTOMATED: CPT | Performed by: INTERNAL MEDICINE

## 2020-08-28 RX ORDER — IOPAMIDOL 755 MG/ML
120 INJECTION, SOLUTION INTRAVASCULAR ONCE
Status: COMPLETED | OUTPATIENT
Start: 2020-08-28 | End: 2020-08-28

## 2020-08-28 RX ADMIN — HUMAN ALBUMIN MICROSPHERES AND PERFLUTREN 6 ML: 10; .22 INJECTION, SOLUTION INTRAVENOUS at 13:00

## 2020-08-28 RX ADMIN — IOPAMIDOL 120 ML: 755 INJECTION, SOLUTION INTRAVENOUS at 10:32

## 2020-08-28 ASSESSMENT — PAIN SCALES - GENERAL: PAINLEVEL: NO PAIN (0)

## 2020-08-28 ASSESSMENT — MIFFLIN-ST. JEOR: SCORE: 1266.89

## 2020-08-28 NOTE — LETTER
"8/28/2020      RE: Dulce Yeh  [address] [place] [address]       Dear Colleague,    Thank you for the opportunity to participate in the care of your patient, Dulce Yeh, at the [facility] at [facility]. Please see a copy of my visit note below.      Dulce Yeh is a 85 year old year old who is being evaluated via a billable video visit.      The patient has been notified of following:     \"This video visit will be conducted via a call between you and your physician/provider. We have found that certain health care needs can be provided without the need for an in-person physical exam.  This service lets us provide the care you need with a video conversation.  If a prescription is necessary we can send it directly to your pharmacy.  If lab work is needed we can place an order for that and you can then stop by our lab to have the test done at a later time.    Video visits are billed at different rates depending on your insurance coverage.  Please reach out to your insurance provider with any questions.    If during the course of the call the physician/provider feels a video visit is not appropriate, you will not be charged for this service.\"    Patient has given verbal consent for Video visit? Yes    How would you like to obtain your AVS? Mail a copy    Patient would like the video invitation sent by: Send to e-mail at: [email or web address]    Will anyone else be joining your video visit? No           Video-Visit Details    Type of service:  Video Visit    Video Start Time: 1:45    Video End Time (time video stopped): 2:05    Originating Location (pt. Location): Other [facility]    Distant Location (provider location):  [facility]     Mode of Communication:  Video Conference via [facility]  CARDIOVASCULAR DIVISION    VALVE CLINIC VIDEO RETURN VISIT    Dulce Yeh is being evaluated for 30 day TAVR " follow-up. Patient has a history of low-flow low-gradient severe aortic stenosis that was treated with transfemoral transcatheter aortic valve replacement (TAVR) with a 23 mm Paez Kyra 3 Ultra on 7/29/20 by Quinton Greco and Beka. Additional medical history notable for CAD w/NSTEMI 6/18/20 s/p PCI of the LAD, HFrEF with EF 35-40% and paroxysmal AF on Eliquis. The TAVR and post-procedural course were notable for no major complications. Her post TAVR ECG showed a-flutter with LBBB which was unchanged when compared to prior. Her post TAVR echo showed mean gradient of 6 mmHg with trace paravalvular AI. She was discharged home on Plavix and Eliquis.     Today Dulce reports feeling fine. She states that the TAVR recovery was much longer than expected but she finally turned the corner a few days ago. She is participating in cardiac rehab twice a week and is able to exercise for about 1 hour total. She denies any chest pain or shortness of breath with exertion. She denies orthopnea, leg swelling, weight gain. Her weight has been stable at 174 lbs. She denies lightheadedness or syncope. She occasionally feels a little fluttering in her chest but it is not bothersome. She continues to feel tired which has not really improved. Her groin sites have healed up for the most part. The right side is still a little tender but she denies drainage, swelling. Home blood pressures run 120-130's systolic.       PAST MEDICAL HISTORY:     Past Medical History:   Diagnosis Date     Allergy      Aortic stenosis 10/11/2011     C. 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    after TKA surgery     S/P cholecystectomy 10/11/2011     Unspecified hypothyroidism 10/11/2011          PAST SURGICAL HISTORY:     Past Surgical History:   Procedure Laterality Date     ANKLE SURGERY        APPENDECTOMY       bilateral tubal ligation       CHOLECYSTECTOMY       CV CORONARY ANGIOGRAM N/A 6/19/2020    Procedure: Coronary Angiogram with possible PCI. RHC, possible LHC for TV gradient;  Surgeon: Tu Hedrick MD;  [facility]     CV PCI STENT DRUG ELUTING N/A 6/19/2020    Procedure: Percutaneous Coronary Intervention Stent Drug Eluting;  Surgeon: Tu Hedrick MD;  Location:  [facility]     CV TRANSCATHETER AORTIC VALVE REPLACEMENT Right 7/29/2020    Procedure: Transfemoral (Paez) Transcatheter Aortic Valve Replacement with Paez Kyra Ultra 23 mm. Intra-operative transthoracic echocardiogram;  Surgeon: Tu Hedrick MD;  Location: [facility] OR     HEART CATH FEMORAL CANNULIZATION WITH OPEN STANDBY REPAIR AORTIC VALVE N/A 7/29/2020    Procedure: Cardiopulmonary on standby.;  Surgeon: Ricardo Ireland MD;  Location: [facility] OR     ORTHOPEDIC SURGERY            CURRENT MEDICATIONS:     Current Outpatient Medications   Medication     apixaban ANTICOAGULANT (ELIQUIS) 5 MG tablet     ascorbic acid (VITAMIN C) 500 MG tablet     BIOTIN PO     calcium carb 1250 mg, 500 mg [place],/vitamin D 200 units (OSCAL WITH D) 500-200 MG-UNIT per tablet     Cetirizine HCl (ZYRTEC PO)     clobetasol (CLOBETASOL PROPIONATE EMULSION) 0.05 % CREA     clopidogrel (PLAVIX) 75 MG tablet     coenzyme Q-10 200 MG CAPS     cyanocolbalamin (VITAMIN B-12) 1000 MCG tablet     fluocinonide (LIDEX) 0.05 % solution     Glucosamine-Chondroit-Vit C-Mn (GLUCOSAMINE CHONDR 1500 COMPLX) CAPS     levothyroxine (SYNTHROID/LEVOTHROID) 88 MCG tablet     Magnesium 300 MG CAPS     metoprolol succinate ER (TOPROL-XL) 100 MG 24 hr tablet     valsartan (DIOVAN) 160 MG tablet     No current facility-administered medications for this visit.         ALLERGIES:      Allergies   Allergen Reactions     Sulfa Drugs Hives     Clonidine Rash     Diltiazem Rash        FAMILY HISTORY:       Family History   Problem  Relation Age of Onset     Cancer Mother         lung cancer     Testicular cancer Father      Abdominal Aortic Aneurysm Brother      Rheumatic fever Brother      Diabetes Brother      Anesthesia Reaction No family hx of      Deep Vein Thrombosis (DVT) No family hx of           SOCIAL HISTORY:     Social History     Socioeconomic History     Marital status:      Spouse name: Not on file     Number of children: 3     Years of education: Not on file     Highest education level: Not on file   Occupational History     Not on file   Social Needs     Financial resource strain: Not on file     Food insecurity     Worry: Not on file     Inability: Not on file     Transportation needs     Medical: Not on file     Non-medical: Not on file   Tobacco Use     Smoking status: Never Smoker     Smokeless tobacco: Never Used   Substance and Sexual Activity     Alcohol use: No     Drug use: No     Sexual activity: Yes     Birth control/protection: Post-menopausal   Lifestyle     Physical activity     Days per week: Not on file     Minutes per session: Not on file     Stress: Not on file   Relationships     Social connections     Talks on phone: Not on file     Gets together: Not on file     Attends Yarsanism service: Not on file     Active member of club or organization: Not on file     Attends meetings of clubs or organizations: Not on file     Relationship status: Not on file     Intimate partner violence     Fear of current or ex partner: Not on file     Emotionally abused: Not on file     Physically abused: Not on file     Forced sexual activity: Not on file   Other Topics Concern      Service Not Asked     Blood Transfusions Not Asked     Caffeine Concern Yes     Comment: coffee, tea     Occupational Exposure Not Asked     Hobby Hazards Not Asked     Sleep Concern Not Asked     Stress Concern Not Asked     Weight Concern Not Asked     Special Diet Not Asked     Back Care Not Asked     Exercise Yes     Comment:  "swims 3x a week     Bike Helmet Not Asked     Seat Belt Not Asked     Self-Exams Not Asked     Parent/sibling w/ CABG, MI or angioplasty before 65F 55M? Not Asked   Social History Narrative    Lives alone, children nearby        2 boys and 1 girl (1 son )     passed away 26 yo    Labs    [employer]    Previoysly worked in [employer] and [employer]          REVIEW OF SYSTEMS:     Constitutional: No fevers or chills  Skin: No new rash or itching  Eyes: No acute change in vision  Ears/Nose/Throat: No purulent rhinorrhea, new hearing loss, or new vertigo  Respiratory: No cough or hemoptysis  Cardiovascular: See HPI  Gastrointestinal: No change in appetite, vomiting, hematemesis or diarrhea  Genitourinary: No dysuria or hematuria  Musculoskeletal: No new back pain, neck pain or muscle pain  Neurologic: No new headaches, focal weakness or behavior changes  Psychiatric: No hallucinations, excessive alcohol consumption or illegal drug usage  Hematologic/Lymphatic/Immunologic: No bleeding, chills, fever, night sweats or weight loss  Endocrine: No new cold intolerance, heat intolerance, polyphagia, polydipsia or polyuria      PHYSICAL EXAMINATION:     BP (!) 147/86 (BP Location: Right arm, Patient Position: Chair, Cuff Size: Adult Regular)   Pulse 92   Ht 1.702 m (5' 7\")   Wt 78.9 kg (174 lb)   LMP  (LMP Unknown)   SpO2 98%   BMI 27.25 kg/m      GENERAL: No acute distress.  HEENT: EOMI. Sclerae white, not injected. Nares clear.   Heart: No JVD or peripheral edema.  Lungs: Non-labored breathing, no audible wheezing   Abdomen: Non distended  Extremities: No clubbing, cyanosis, or edema.   Neurologic: Alert and oriented to person/place/time, normal speech and affect. No focal deficits.  Skin: No petechiae, purpura or rash.     LABORATORY DATA:     Last Comprehensive Metabolic Panel:  Sodium   Date Value Ref Range Status   2020 134 133 - 144 mmol/L Final     Potassium   Date Value Ref Range Status "   08/28/2020 4.6 3.4 - 5.3 mmol/L Final     Chloride   Date Value Ref Range Status   08/28/2020 104 94 - 109 mmol/L Final     Carbon Dioxide   Date Value Ref Range Status   08/28/2020 25 20 - 32 mmol/L Final     Anion Gap   Date Value Ref Range Status   08/28/2020 5 3 - 14 mmol/L Final     Glucose   Date Value Ref Range Status   08/28/2020 121 (H) 70 - 99 mg/dL Final     Urea Nitrogen   Date Value Ref Range Status   08/28/2020 22 7 - 30 mg/dL Final     Creatinine   Date Value Ref Range Status   08/28/2020 0.93 0.52 - 1.04 mg/dL Final     GFR Estimate   Date Value Ref Range Status   08/28/2020 56 (L) >60 mL/min/[1.73_m2] Final     Comment:     Non  GFR Calc  Starting 12/18/2018, serum creatinine based estimated GFR (eGFR) will be   calculated using the Chronic Kidney Disease Epidemiology Collaboration   (CKD-EPI) equation.       Calcium   Date Value Ref Range Status   08/28/2020 9.4 8.5 - 10.1 mg/dL Final     Lab Results   Component Value Date    WBC 6.2 08/28/2020     Lab Results   Component Value Date    RBC 5.13 08/28/2020     Lab Results   Component Value Date    HGB 14.6 08/28/2020     Lab Results   Component Value Date    HCT 45.1 08/28/2020     No components found for: MCT  Lab Results   Component Value Date    MCV 88 08/28/2020     Lab Results   Component Value Date    MCH 28.5 08/28/2020     Lab Results   Component Value Date    MCHC 32.4 08/28/2020     Lab Results   Component Value Date    RDW 14.3 08/28/2020     Lab Results   Component Value Date     08/28/2020        PROCEDURES & FURTHER ASSESSMENTS:     ECG dated 8/28/20: A-flutter with variable conduction, HR 88 with LBBB, unchanged when compared to prior    Echocardiogram dated 8/28/20:  Interpretation Summary  S/P TAVR with 23 mm Paez Kyra 3 Ultra prosthesis. Mean gradient 5 mmHg.  Trivial paravalvular AI.  No pericardial effusion is present.    Left Ventricle  Left ventricular wall thickness is normal. Left ventricular  size is normal.  The Ejection Fraction is estimated at 50-55%. Diastolic function not assessed  due to atrial fibrillation. No regional wall motion abnormalities are seen.     Right Ventricle  Right ventricular function, chamber size, wall motion, and thickness are  normal.     Atria  The atria cannot be assessed.     Mitral Valve  Moderate mitral annular calcification is present. Trace to mild mitral  insufficiency is present. The mean mitral valve gradient is 4.0 mmHg.        Aortic Valve  S/P TAVR with 23 mm Paez Kyra 3 Ultra prosthesis. Mean gradient 5 mmHg.  Trivial paravalvular AI.     Tricuspid Valve  The tricuspid valve is normal. Trace to mild tricuspid insufficiency is  present. The right ventricular systolic pressure is approximated at 32.6 mmHg  plus the right atrial pressure. Pulmonary artery systolic pressure is normal.     Pulmonic Valve  The pulmonic valve is normal. Trace pulmonic insufficiency is present.     Vessels  The thoracic aorta is normal. The pulmonary artery and bifurcation cannot be  assessed. The inferior vena cava is normal.     Pericardium  No pericardial effusion is present.     Compared to Previous Study  There has been no change    CT cardiac 8/28/20:  :     1.  There is a Paez Kyra III 23 mm transcatheter valve in the  aortic position.   2.  There is no evidence of hypoattenuating leaflet thrombosis.  3.  Leaflet opening could not be evaluated.   4.  Please review Radiology report for incidental noncardiac findings  that will follow separately.     FINDINGS:      1.  There is a Paez Kyra III 23 mm transcatheter valve in the  aortic position. It is well-seated.  2.  There is no evidence of hypoattenuating leaflet thrombosis.  Leaflet opening could not be evaluated due to the technical issues  described above.  3.  The aortic arch is left sided. The visualized portions of the  aortic root and ascending aorta are normal in size. There is mild  calcification of the  ascending aorta.   4.  The systemic venous connections are normal.   5.  The cardiac chambers demonstrate normal atrioventricular and  ventriculoarterial concordance.  6.  Coronary arteries originate from their respective cusps.   7.  There are moderate coronary artery calcifications.  8.  The pericardium is unremarkable.  There is no pericardial  effusion.   9.  There is no intracardiac thrombus.  10.  There is severe biatrial enlargement.    NYHA Class: I     CLINICAL IMPRESSION:     Dulce Yeh is being evaluated for 30 day TAVR follow-up. She has a history of low-flow low-gradient severe aortic stenosis that was treated with transfemoral transcatheter aortic valve replacement (TAVR) with a 23 mm Paez Kyra 3 Ultra on 7/29/20 by Quinton Greco and Beka. Additional medical history notable for CAD w/NSTEMI 6/18/20 s/p PCI of the LAD, HFrEF with EF 35-40% and paroxysmal AF on Eliquis. The TAVR and post-procedural course were notable for no major complications. Her post TAVR ECG showed a-flutter with LBBB which was unchanged when compared to prior. Her post TAVR echo showed mean gradient of 6 mmHg with trace paravalvular AI. She was discharged home on Plavix and Eliquis.     Today Dulce reports feeling well and has no cardiovascular concerns. She is active participating in cardiac rehab twice weekly without cardiac symptoms. Her labs today demonstrate normal BMP and CBC. EKG shows a-flutter HR 88 with LBBB, unchanged when compared to prior. ECHO shows normal TAVR function w/mean PG of 5mmHg and trace paravalvular AI. Cardiac CT confirms well seated valve with no evidence of HALT. Overall no concerning findings.    Plan is to continue current medication regimen w/ plans to follow-up with valve clinic in 1 year but can follow with her primary cardiologist, Dr. Davis, for management of her chronic cardiovascular conditions in the meantime.    Plan Summary:  1) Continue Plavix 75 mg daily through  6/2021 then convert to daily ASA 81 mg   2) Lifelong antibiotic prophylaxis prior to all dental procedures.  3) Continue Eliquis for a-fib stroke prophylaxis  4) Discussed DCCV which may improve her fatigue, she will consider and get back to us  5) Follow-up with Dr. Davis in 3 months  6) Follow-up in Valve Clinic 1 year with echo, ecg and labs prior     Stacey Sonja Whalen NP      CC  Patient Care Team:  Gilda Hogan MD as PCP - General (Internal Medicine)  Gian Khanna MD as MD (Orthopedics)  Gilda Hogan MD as Assigned PCP

## 2020-08-28 NOTE — PATIENT INSTRUCTIONS
You were seen today in the Cardiovascular Clinic at the [facility].    Cardiology provider you saw during your visit Stacey Whalen NP.    TAVR post-procedure:     1. Your ECHO and CT show normal TAVR function  2. Continue Plavix 75 mg daily for your stents and TAVR  3. Continue Eliquis for stroke prophylaxis related to a-fib  4. Continue valsartan 160 mg daily and Metoprolol  mg daily   5. Consider cardioversion and call us if you would like this arranged  6. Monitor home BP and call if > 130/80 consistently   7. Delay any nonurgent dental procedures for the first 3 month post TAVR  8. For all future dental cleanings and procedures you will need to take antibiotics prior - see         instructions below.   9.         Follow-up with Dr. Davis in 3 months  10.       Follow-up in valve clinic in 1 year with echo, labs and ecg prior     Prevention of Infective (Bacterial) Endocarditis:  You are at increased risk for developing adverse outcomes from infective endocarditis (IE), also known as bacterial endocarditis (BE) because of the new device in your heart. The guidelines for prevention of IE are to give patients antibiotics prior to any dental procedures that involve manipulation of gingival tissue or the periapical region of teeth, or perforation of the oral mucosa:      It is recommended to take Amoxicillin 2 gm by mouth as a single dose 30 to 60 minutes before procedure.     OR if allergic to Penicillin or Ampicillin:     Cephalexin 2 gm by mouth, or  Clindamycin 600 mg by mouth, or  Azithromycin or Clarithromycin 500 mg PO       Questions and [phone].   First press #1 for the [email or web address] and then press #3 for Medical Questions to reach the Cardiology triage nurse.     On Call Cardiologist for after hours or on weekends: [phone], press option #4 and ask to speak to the on-call Cardiologist.

## 2020-08-30 LAB — INTERPRETATION ECG - MUSE: NORMAL

## 2020-09-08 ENCOUNTER — AMBULATORY - HEALTHEAST (OUTPATIENT)
Dept: CARDIAC REHAB | Facility: CLINIC | Age: 85
End: 2020-09-08

## 2020-09-08 DIAGNOSIS — Z95.2 S/P TAVR (TRANSCATHETER AORTIC VALVE REPLACEMENT): ICD-10-CM

## 2020-09-10 ENCOUNTER — AMBULATORY - HEALTHEAST (OUTPATIENT)
Dept: CARDIAC REHAB | Facility: CLINIC | Age: 85
End: 2020-09-10

## 2020-09-10 DIAGNOSIS — Z95.2 S/P TAVR (TRANSCATHETER AORTIC VALVE REPLACEMENT): ICD-10-CM

## 2020-09-15 ENCOUNTER — AMBULATORY - HEALTHEAST (OUTPATIENT)
Dept: CARDIAC REHAB | Facility: CLINIC | Age: 85
End: 2020-09-15

## 2020-09-15 DIAGNOSIS — Z95.2 S/P TAVR (TRANSCATHETER AORTIC VALVE REPLACEMENT): ICD-10-CM

## 2020-09-17 ENCOUNTER — AMBULATORY - HEALTHEAST (OUTPATIENT)
Dept: CARDIAC REHAB | Facility: CLINIC | Age: 85
End: 2020-09-17

## 2020-09-17 DIAGNOSIS — Z95.2 S/P TAVR (TRANSCATHETER AORTIC VALVE REPLACEMENT): ICD-10-CM

## 2020-09-22 ENCOUNTER — AMBULATORY - HEALTHEAST (OUTPATIENT)
Dept: CARDIAC REHAB | Facility: CLINIC | Age: 85
End: 2020-09-22

## 2020-09-22 DIAGNOSIS — Z95.2 S/P TAVR (TRANSCATHETER AORTIC VALVE REPLACEMENT): ICD-10-CM

## 2020-09-24 ENCOUNTER — AMBULATORY - HEALTHEAST (OUTPATIENT)
Dept: CARDIAC REHAB | Facility: CLINIC | Age: 85
End: 2020-09-24

## 2020-09-24 DIAGNOSIS — Z95.2 S/P TAVR (TRANSCATHETER AORTIC VALVE REPLACEMENT): ICD-10-CM

## 2020-09-29 ENCOUNTER — AMBULATORY - HEALTHEAST (OUTPATIENT)
Dept: CARDIAC REHAB | Facility: CLINIC | Age: 85
End: 2020-09-29

## 2020-09-29 DIAGNOSIS — Z95.2 S/P TAVR (TRANSCATHETER AORTIC VALVE REPLACEMENT): ICD-10-CM

## 2020-10-01 ENCOUNTER — AMBULATORY - HEALTHEAST (OUTPATIENT)
Dept: CARDIAC REHAB | Facility: CLINIC | Age: 85
End: 2020-10-01

## 2020-10-01 DIAGNOSIS — Z95.2 S/P TAVR (TRANSCATHETER AORTIC VALVE REPLACEMENT): ICD-10-CM

## 2020-10-15 ENCOUNTER — AMBULATORY - HEALTHEAST (OUTPATIENT)
Dept: CARDIAC REHAB | Facility: CLINIC | Age: 85
End: 2020-10-15

## 2020-10-15 DIAGNOSIS — Z95.2 S/P TAVR (TRANSCATHETER AORTIC VALVE REPLACEMENT): ICD-10-CM

## 2020-10-20 ENCOUNTER — AMBULATORY - HEALTHEAST (OUTPATIENT)
Dept: CARDIAC REHAB | Facility: CLINIC | Age: 85
End: 2020-10-20

## 2020-10-20 DIAGNOSIS — Z95.2 S/P TAVR (TRANSCATHETER AORTIC VALVE REPLACEMENT): ICD-10-CM

## 2020-10-22 ENCOUNTER — AMBULATORY - HEALTHEAST (OUTPATIENT)
Dept: CARDIAC REHAB | Facility: CLINIC | Age: 85
End: 2020-10-22

## 2020-10-22 DIAGNOSIS — Z95.2 S/P TAVR (TRANSCATHETER AORTIC VALVE REPLACEMENT): ICD-10-CM

## 2020-10-26 ENCOUNTER — OFFICE VISIT (OUTPATIENT)
Dept: CARDIOLOGY | Facility: CLINIC | Age: 85
End: 2020-10-26
Attending: INTERNAL MEDICINE
Payer: MEDICARE

## 2020-10-26 VITALS
DIASTOLIC BLOOD PRESSURE: 83 MMHG | WEIGHT: 177 LBS | OXYGEN SATURATION: 98 % | HEART RATE: 71 BPM | BODY MASS INDEX: 27.78 KG/M2 | SYSTOLIC BLOOD PRESSURE: 154 MMHG | HEIGHT: 67 IN

## 2020-10-26 DIAGNOSIS — I25.10 CORONARY ARTERY DISEASE INVOLVING NATIVE CORONARY ARTERY OF NATIVE HEART WITHOUT ANGINA PECTORIS: Primary | ICD-10-CM

## 2020-10-26 DIAGNOSIS — I48.0 PAROXYSMAL ATRIAL FIBRILLATION (H): ICD-10-CM

## 2020-10-26 DIAGNOSIS — Z95.2 S/P TAVR (TRANSCATHETER AORTIC VALVE REPLACEMENT): ICD-10-CM

## 2020-10-26 DIAGNOSIS — Z23 NEED FOR PROPHYLACTIC VACCINATION AND INOCULATION AGAINST INFLUENZA: ICD-10-CM

## 2020-10-26 PROCEDURE — 250N000011 HC RX IP 250 OP 636: Performed by: INTERNAL MEDICINE

## 2020-10-26 PROCEDURE — 93010 ELECTROCARDIOGRAM REPORT: CPT | Performed by: INTERNAL MEDICINE

## 2020-10-26 PROCEDURE — 93005 ELECTROCARDIOGRAM TRACING: CPT

## 2020-10-26 PROCEDURE — 99214 OFFICE O/P EST MOD 30 MIN: CPT | Mod: 25 | Performed by: INTERNAL MEDICINE

## 2020-10-26 PROCEDURE — 90662 IIV NO PRSV INCREASED AG IM: CPT | Performed by: INTERNAL MEDICINE

## 2020-10-26 PROCEDURE — G0008 ADMIN INFLUENZA VIRUS VAC: HCPCS | Performed by: INTERNAL MEDICINE

## 2020-10-26 PROCEDURE — G0463 HOSPITAL OUTPT CLINIC VISIT: HCPCS | Mod: 25

## 2020-10-26 RX ADMIN — INFLUENZA A VIRUS A/MICHIGAN/45/2015 X-275 (H1N1) ANTIGEN (FORMALDEHYDE INACTIVATED), INFLUENZA A VIRUS A/SINGAPORE/INFIMH-16-0019/2016 IVR-186 (H3N2) ANTIGEN (FORMALDEHYDE INACTIVATED), INFLUENZA B VIRUS B/PHUKET/3073/2013 ANTIGEN (FORMALDEHYDE INACTIVATED), AND INFLUENZA B VIRUS B/MARYLAND/15/2016 BX-69A ANTIGEN (FORMALDEHYDE INACTIVATED) 0.7 ML: 60; 60; 60; 60 INJECTION, SUSPENSION INTRAMUSCULAR at 14:34

## 2020-10-26 ASSESSMENT — MIFFLIN-ST. JEOR: SCORE: 1280.5

## 2020-10-26 ASSESSMENT — PAIN SCALES - GENERAL: PAINLEVEL: NO PAIN (0)

## 2020-10-26 NOTE — LETTER
10/26/2020      RE: Dulce Yeh  [address] [place] [address]       Dear Colleague,    Thank you for the opportunity to participate in the care of your patient, Dulce Yeh, at the [facility] HEART [facility] at [facility]. Please see a copy of my visit note below.    History:    Ms. Yeh is a 85 year old woman with severe aortic stenosis that was treated with transfemoral transcatheter aortic valve replacement (TAVR) with a 23 mm Paez Kyra 3 Ultra on 7/29/20. She has CAD w/NSTEMI 6/18/20 s/p PCI of the LAD, HFrEF with EF 35-40% and paroxysmal AF on Eliquis. The TAVR and post-procedural course were notable for no major complications. Her post TAVR ECG showed a-flutter with LBBB. Her post TAVR echo showed mean gradient of 6 mmHg with trace paravalvular AI. She was discharged home on Plavix and Eliquis.     Today, overall, she feels much improved now, not on diuretics. She is continuing cardiac rehab. She is tolerating Eliquis. Is requesting a flu shot. She denies orthopnea or PND, chest pain at rest, syncope      Current Outpatient Medications   Medication Sig Dispense Refill     apixaban ANTICOAGULANT (ELIQUIS) 5 MG tablet Take 1 tablet (5 mg) by mouth 2 times daily 180 tablet 1     ascorbic acid (VITAMIN C) 500 MG tablet Take 500 mg by mouth daily.       BIOTIN PO Take by mouth daily       calcium carb 1250 mg, 500 mg [place],/vitamin D 200 units (OSCAL WITH D) 500-200 MG-UNIT per tablet Take 1 tablet by mouth 2 times daily (with meals).       Cetirizine HCl (ZYRTEC PO) Take 10 mg by mouth daily as needed        clobetasol (CLOBETASOL PROPIONATE EMULSION) 0.05 % CREA Apply topically as needed 15 g 0     clopidogrel (PLAVIX) 75 MG tablet Take 1 tablet (75 mg) by mouth daily 90 tablet 3     coenzyme Q-10 200 MG CAPS Take 200 mg by mouth daily       cyanocolbalamin (VITAMIN B-12) 1000 MCG tablet Take 1,000 mcg by mouth daily.        fluocinonide (LIDEX) 0.05 % solution Apply topically 2 times daily       Glucosamine-Chondroit-Vit C-Mn (GLUCOSAMINE CHONDR 1500 COMPLX) CAPS Take 1 tablet by mouth daily        levothyroxine (SYNTHROID/LEVOTHROID) 88 MCG tablet Take 1 tablet (88 mcg) by mouth daily 90 tablet 3     Magnesium 300 MG CAPS Take  by mouth.       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     Recurrent vulvar infection 10/18/2011     Steatohepatitis 02/01/2012     Hemorrhoids 03/11/2013     Near syncope 03/11/2013     Lichen sclerosus et atrophicus of the vulva 10/07/2015     Hypothyroidism due to acquired atrophy of thyroid 10/08/2015     Recurrent colitis due to Clostridium difficile 08/01/2016     Dermatitis, seborrheic 02/13/2017     Cherry angioma 02/13/2017     Seborrheic keratosis 02/13/2017     Tinea pedis of both feet 02/13/2017     Anxiety 06/10/2015     Atrial fibrillation (H) 09/03/2015     Hx of fracture of fibula 07/29/2015     Hypokalemia 09/02/2015     Hyponatremia 09/02/2015     Nausea & vomiting 09/02/2015     Osteoarthritis of left ankle 07/21/2015     Postoperative anemia due to acute blood loss 06/08/2015     Primary osteoarthritis of left knee 05/21/2015     S/P total knee arthroplasty 06/08/2015     Vaginal dryness 06/10/2015     Shortness of breath 06/18/2020     NSTEMI (non-ST elevated myocardial infarction) (H) 06/18/2020     Severe aortic stenosis 07/21/2020     Aortic stenosis, severe  07/29/2020     Resolved Ambulatory Problems     Diagnosis Date Noted     Hyperlipidemia 10/11/2011     Eosinophilia 10/11/2011     Fatigue 03/11/2013     Weakness 03/11/2013     Diaphoresis 03/11/2013     Past Medical History:   Diagnosis Date     Allergy      C. difficile colitis August-October 2015     History of chickenpox      Hyperlipidaemia      Unspecified hypothyroidism 10/11/2011      Social history - reviewed  Social History     Socioeconomic History     Marital status:      Spouse name: Not on file     Number of children: 3     Years of education: Not on file     Highest education level: Not on file   Occupational History     Not on file   Social Needs     Financial resource strain: Not on file     Food insecurity     Worry: Not on file     Inability: Not on file     Transportation needs     Medical: Not on file     Non-medical: Not on file   Tobacco Use     Smoking status: Never Smoker     Smokeless tobacco: Never Used   Substance and Sexual Activity     Alcohol use: No     Drug use: No     Sexual activity: Yes     Birth control/protection: Post-menopausal   Lifestyle     Physical activity     Days per week: Not on file     Minutes per session: Not on file     Stress: Not on file   Relationships     Social connections     Talks on phone: Not on file     Gets together: Not on file     Attends [employer] service: Not on file     Active member of club or organization: Not on file     Attends meetings of clubs or organizations: Not on file     Relationship status: Not on file     Intimate partner violence     Fear of current or ex partner: Not on file     Emotionally abused: Not on file     Physically abused: Not on file     Forced sexual activity: Not on file   Other Topics Concern      Service Not Asked     Blood Transfusions Not Asked     Caffeine Concern Yes     Comment: coffee, tea     Occupational Exposure Not Asked     Hobby Hazards Not Asked     Sleep Concern Not Asked     Stress  "Concern Not Asked     Weight Concern Not Asked     Special Diet Not Asked     Back Care Not Asked     Exercise Yes     Comment: swims 3x a week     Bike Helmet Not Asked     Seat Belt Not Asked     Self-Exams Not Asked     Parent/sibling w/ CABG, MI or angioplasty before 65F 55M? 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    BP (!) 154/83 (BP Location: Right arm, Patient Position: Sitting, Cuff Size: Adult Regular)   Pulse 71   Ht 1.702 m (5' 7\")   Wt 80.3 kg (177 lb)   LMP  (LMP Unknown)   SpO2 98%   BMI 27.72 kg/m    In general, the patient is a pleasant female in no apparent distress.      HEENT: NC/AT.  PERRLA.  EOMI.  Sclerae white, not injected.    Neck: Carotids 2+ bilaterally without bruits.  No jugular venous distension.   Lymph: No cervical adenopathy. No thyromegaly.   Heart: RRR. Normal S1, S2. No murmur, rub, click, or gallop. There is no heave.    Lungs: Clear bilaterally.  No rhonchi, wheezes, rales.   GI: Soft, nontender, nondistended.   Extremities: No edema.  The pulses are 2+at the radial and DP bilaterally.  Neuro: grossly non focal.   Skin: no rashes.  Endocrine: no thyromegaly  Musculoskeletal: no joint swelling or tenderness, gait normal.  Psych: pleasant and conversant      Data:  Recent cardiac investigations - reviewed     ECG today notes sinus rhythm with occasional PVCs.     "     Echocardiogram 8/28/2020    Normal biventricular function  S/P TAVR with 23 mm Paez Kyra 3 Ultra prosthesis. Mean gradient 5 mmHg.  Trivial paravalvular AI.  No pericardial effusion is present.    Echocardiogram 6/19/2020:  Moderately (EF 35-40%) reduced left ventricular function is present.  Global right ventricular function is moderately reduced.  Probably moderate aortic stenosis, however accurate assessment of the aortic valve is challenging with atrial fibrillation rhythm. The RCC and NCC appears  partially fused. The Vmax is 2.41 m/s and mean gradient is 13 mmHg. The DANIS is calculated at 1.1 cm2 with a index of 0.54 cm/m2. The dimensionless index is 0.38.  The inferior vena cava was normal in size with preserved respiratory variability.  No pericardial effusion is present.     Coronary Angiogram 6/19/2020:  Left Anterior Descending    Prox LAD to Mid LAD lesion is 90% stenosed. The lesion is segmental. The lesion is calcified.    First Diagonal Branch    The vessel is small. There is mild diffuse disease throughout the vessel.    Second Diagonal Branch    The vessel is small. There is mild diffuse disease throughout the vessel.    Left Circumflex    Prox Cx to Mid Cx lesion is 50% stenosed.    First Obtuse Marginal Branch    The vessel is moderate in size. There is mild diffuse disease throughout the vessel.    Second Obtuse Marginal Branch    The vessel is small. There is moderate diffuse disease throughout the vessel.    Right Coronary Artery    The vessel was visualized by selective angiography. There was 30% diffuse vessel disease.    Intervention      Prox LAD to Mid LAD lesion    Stent    A stent was successfully placed.    Stent    A stent was successfully placed.    Stent    A stent was successfully placed.    There is a 10% residual stenosis post intervention.      Labs - reviewed  Chemistry panel:   Recent Labs   Lab Test 06/30/20  1114 06/20/20  0620  06/18/20  0055 11/04/19  1600   10/18/17  1154  08/07/15  1741    134   < > 136 137   < > 136   < > 132*   POTASSIUM 4.3 4.1   < > 3.5 4.2   < > 3.9   < > 2.9*   CHLORIDE 103 100   < > 104 104   < > 101   < > 98   CO2 28 26   < > 24 28   < > 27   < > 23   ANIONGAP 4 9   < > 8 6   < > 8   < > 11   * 127*   < > 168* 114*   < > 109*   < > 132*   BUN 22 24   < > 18 24   < > 24   < > 28   CR 1.02 1.05*   < > 0.99 1.01   < > 0.87   < > 1.31*   CR 9.4 9.5   < > 8.8 9.2   < > 9.3   < > 8.6   MAG  --   --   --   --  2.5*  --   --   --  1.7   GFRESTIMATED 50* 48*   < > 52* 51*   < > 62   < > 39*   AST  --   --   --  34  --   --  33  --  11   ALT  --   --   --  26  --   --  39  --  20    < > = values in this interval not displayed.       CBC:   Recent Labs   Lab Test 06/20/20  0620 06/19/20  0531   WBC 9.4 7.5   RBC 5.93* 5.53*   HGB 16.6* 15.4   HCT 51.7* 48.0*   MCV 87 87   MCH 28.0 27.8   MCHC 32.1 32.1   RDW 14.3 14.0    202       Lipid Panel:  Recent Labs   Lab Test 06/19/20  2142 11/04/19  1600 10/10/12  1208   CHOL 177 189 205*   HDL 33* 35* 34*   * 111* 128   TRIG 72 218* 216*   CHOLHDLRATIO  --   --  6.1*       Thyroid:   TSH   Date Value Ref Range Status   08/13/2020 1.22 0.40 - 4.00 mU/L Final     T4 Free   Date Value Ref Range Status   05/18/2011 1.16 0.70 - 1.85 ng/dL Final     Comment:     Ordered by lab     Hemoglobin A1C   Date Value Ref Range Status   10/10/2012 6.0 4.3 - 6.0 % Final     INR   Date Value Ref Range Status   07/29/2020 1.15 (H) 0.86 - 1.14 Final       Assessment and Plan:  85 year old female with    Coronary artery disease, post PCI to LAD 6/2020  TAVR July 2020  Paroxysmal atrial fibrillation on DOAC  Dyslipidemia  Essential hypertension  Mildly reduced LV function,50-55%  Heart failure with reduced ejection fraction, Stage C ->B HF    Dulce Yeh is stable clinically and is without any active cardiac issues today. We reviewed all her recent cardiac diagnostics and I do not recommend any  changes to the cardiac medical regimen at this point. Blood pressure is well controlled at home and at rehab. More importantly, she is sinus rhythm today and she is eager to come off of anticoagulation. My plan is to reassess her AF burden in 2months with a 7-day Ziopatch and then make a decision on continuing DOAC.     Recommendations:   Flu shot today  Continue current cardiac medications  Ziopatch in 2 months  Follow up with me 3 months - video visit    Ginger Davis MD, MS  Professor of Medicine  Cardiovascular division          Please do not hesitate to contact me if you have any questions/concerns.     Sincerely,     Ginger Davis MD

## 2020-10-26 NOTE — PROGRESS NOTES
History:    Ms. Yeh is a 85 year old woman with severe aortic stenosis that was treated with transfemoral transcatheter aortic valve replacement (TAVR) with a 23 mm Paez Kyra 3 Ultra on 7/29/20. She has CAD w/NSTEMI 6/18/20 s/p PCI of the LAD, HFrEF with EF 35-40% and paroxysmal AF on Eliquis. The TAVR and post-procedural course were notable for no major complications. Her post TAVR ECG showed a-flutter with LBBB. Her post TAVR echo showed mean gradient of 6 mmHg with trace paravalvular AI. She was discharged home on Plavix and Eliquis.     Today, overall, she feels much improved now, not on diuretics. She is continuing cardiac rehab. She is tolerating Eliquis. Is requesting a flu shot. She denies orthopnea or PND, chest pain at rest, syncope      Current Outpatient Medications   Medication Sig Dispense Refill     apixaban ANTICOAGULANT (ELIQUIS) 5 MG tablet Take 1 tablet (5 mg) by mouth 2 times daily 180 tablet 1     ascorbic acid (VITAMIN C) 500 MG tablet Take 500 mg by mouth daily.       BIOTIN PO Take by mouth daily       calcium carb 1250 mg, 500 mg [place],/vitamin D 200 units (OSCAL WITH D) 500-200 MG-UNIT per tablet Take 1 tablet by mouth 2 times daily (with meals).       Cetirizine HCl (ZYRTEC PO) Take 10 mg by mouth daily as needed        clobetasol (CLOBETASOL PROPIONATE EMULSION) 0.05 % CREA Apply topically as needed 15 g 0     clopidogrel (PLAVIX) 75 MG tablet Take 1 tablet (75 mg) by mouth daily 90 tablet 3     coenzyme Q-10 200 MG CAPS Take 200 mg by mouth daily       cyanocolbalamin (VITAMIN B-12) 1000 MCG tablet Take 1,000 mcg by mouth daily.       fluocinonide (LIDEX) 0.05 % solution Apply topically 2 times daily       Glucosamine-Chondroit-Vit C-Mn (GLUCOSAMINE CHONDR 1500 COMPLX) CAPS Take 1 tablet by mouth daily        levothyroxine (SYNTHROID/LEVOTHROID) 88 MCG tablet Take 1 tablet (88 mcg) by mouth daily 90 tablet 3     Magnesium 300 MG CAPS Take  by mouth.       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     Recurrent vulvar infection 10/18/2011     Steatohepatitis 02/01/2012     Hemorrhoids 03/11/2013     Near syncope 03/11/2013     Lichen sclerosus et atrophicus of the vulva 10/07/2015     Hypothyroidism due to acquired atrophy of thyroid 10/08/2015     Recurrent colitis due to Clostridium difficile 08/01/2016     Dermatitis, seborrheic 02/13/2017     Cherry angioma 02/13/2017     Seborrheic keratosis 02/13/2017     Tinea pedis of both feet 02/13/2017     Anxiety 06/10/2015     Atrial fibrillation (H) 09/03/2015     Hx of fracture of fibula 07/29/2015     Hypokalemia 09/02/2015     Hyponatremia 09/02/2015     Nausea & vomiting 09/02/2015     Osteoarthritis of left ankle 07/21/2015     Postoperative anemia due to acute blood loss 06/08/2015     Primary osteoarthritis of left knee 05/21/2015     S/P total knee arthroplasty 06/08/2015     Vaginal dryness 06/10/2015     Shortness of breath 06/18/2020     NSTEMI (non-ST elevated myocardial infarction) (H) 06/18/2020     Severe aortic stenosis 07/21/2020     Aortic stenosis, severe 07/29/2020     Resolved Ambulatory Problems     Diagnosis Date Noted     Hyperlipidemia 10/11/2011     Eosinophilia 10/11/2011     Fatigue 03/11/2013     Weakness 03/11/2013     Diaphoresis 03/11/2013     Past Medical History:   Diagnosis Date     Allergy      C. difficile colitis August-October 2015     History of chickenpox      Hyperlipidaemia       Unspecified hypothyroidism 10/11/2011      Social history - reviewed  Social History     Socioeconomic History     Marital status:      Spouse name: Not on file     Number of children: 3     Years of education: Not on file     Highest education level: Not on file   Occupational History     Not on file   Social Needs     Financial resource strain: Not on file     Food insecurity     Worry: Not on file     Inability: Not on file     Transportation needs     Medical: Not on file     Non-medical: Not on file   Tobacco Use     Smoking status: Never Smoker     Smokeless tobacco: Never Used   Substance and Sexual Activity     Alcohol use: No     Drug use: No     Sexual activity: Yes     Birth control/protection: Post-menopausal   Lifestyle     Physical activity     Days per week: Not on file     Minutes per session: Not on file     Stress: Not on file   Relationships     Social connections     Talks on phone: Not on file     Gets together: Not on file     Attends Jain service: Not on file     Active member of club or organization: Not on file     Attends meetings of clubs or organizations: Not on file     Relationship status: Not on file     Intimate partner violence     Fear of current or ex partner: Not on file     Emotionally abused: Not on file     Physically abused: Not on file     Forced sexual activity: Not on file   Other Topics Concern      Service Not Asked     Blood Transfusions Not Asked     Caffeine Concern Yes     Comment: coffee, tea     Occupational Exposure Not Asked     Hobby Hazards Not Asked     Sleep Concern Not Asked     Stress Concern Not Asked     Weight Concern Not Asked     Special Diet Not Asked     Back Care Not Asked     Exercise Yes     Comment: swims 3x a week     Bike Helmet Not Asked     Seat Belt Not Asked     Self-Exams Not Asked     Parent/sibling w/ CABG, MI or angioplasty before 65F 55M? 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    BP (!) 154/83 (BP Location: Right arm, Patient Position: Sitting, Cuff Size: Adult Regular)   Pulse 71   Ht 1.702 m (5' 7\")   Wt 80.3 kg (177 lb)   LMP  (LMP Unknown)   SpO2 98%   BMI 27.72 kg/m    In general, the patient is a pleasant female in no apparent distress.      HEENT: NC/AT.  PERRLA.  EOMI.  Sclerae white, not injected.    Neck: Carotids 2+ bilaterally without bruits.  No jugular venous distension.   Lymph: No cervical adenopathy. No thyromegaly.   Heart: RRR. Normal S1, S2. No murmur, rub, click, or gallop. There is no heave.    Lungs: Clear bilaterally.  No rhonchi, wheezes, rales.   GI: Soft, nontender, nondistended.   Extremities: No edema.  The pulses are 2+at the radial and DP bilaterally.  Neuro: grossly non focal.   Skin: no rashes.  Endocrine: no thyromegaly  Musculoskeletal: no joint swelling or tenderness, gait normal.  Psych: pleasant and conversant      Data:  Recent cardiac investigations - reviewed     ECG today notes sinus rhythm with occasional PVCs.         Echocardiogram 2020    Normal biventricular function  S/P TAVR with 23 mm Paez Kyra 3 Ultra prosthesis. Mean gradient 5 mmHg.  Trivial paravalvular AI.  No pericardial effusion is present.    Echocardiogram 2020:  Moderately (EF 35-40%) reduced left ventricular function is present.  Global right ventricular function is moderately " reduced.  Probably moderate aortic stenosis, however accurate assessment of the aortic valve is challenging with atrial fibrillation rhythm. The RCC and NCC appears  partially fused. The Vmax is 2.41 m/s and mean gradient is 13 mmHg. The DANIS is calculated at 1.1 cm2 with a index of 0.54 cm/m2. The dimensionless index is 0.38.  The inferior vena cava was normal in size with preserved respiratory variability.  No pericardial effusion is present.     Coronary Angiogram 6/19/2020:  Left Anterior Descending    Prox LAD to Mid LAD lesion is 90% stenosed. The lesion is segmental. The lesion is calcified.    First Diagonal Branch    The vessel is small. There is mild diffuse disease throughout the vessel.    Second Diagonal Branch    The vessel is small. There is mild diffuse disease throughout the vessel.    Left Circumflex    Prox Cx to Mid Cx lesion is 50% stenosed.    First Obtuse Marginal Branch    The vessel is moderate in size. There is mild diffuse disease throughout the vessel.    Second Obtuse Marginal Branch    The vessel is small. There is moderate diffuse disease throughout the vessel.    Right Coronary Artery    The vessel was visualized by selective angiography. There was 30% diffuse vessel disease.    Intervention      Prox LAD to Mid LAD lesion    Stent    A stent was successfully placed.    Stent    A stent was successfully placed.    Stent    A stent was successfully placed.    There is a 10% residual stenosis post intervention.      Labs - reviewed  Chemistry panel:   Recent Labs   Lab Test 06/30/20  1114 06/20/20  0620  06/18/20  0055 11/04/19  1600  10/18/17  1154  08/07/15  1741    134   < > 136 137   < > 136   < > 132*   POTASSIUM 4.3 4.1   < > 3.5 4.2   < > 3.9   < > 2.9*   CHLORIDE 103 100   < > 104 104   < > 101   < > 98   CO2 28 26   < > 24 28   < > 27   < > 23   ANIONGAP 4 9   < > 8 6   < > 8   < > 11   * 127*   < > 168* 114*   < > 109*   < > 132*   BUN 22 24   < > 18 24   < > 24    < > 28   CR 1.02 1.05*   < > 0.99 1.01   < > 0.87   < > 1.31*   CR 9.4 9.5   < > 8.8 9.2   < > 9.3   < > 8.6   MAG  --   --   --   --  2.5*  --   --   --  1.7   GFRESTIMATED 50* 48*   < > 52* 51*   < > 62   < > 39*   AST  --   --   --  34  --   --  33  --  11   ALT  --   --   --  26  --   --  39  --  20    < > = values in this interval not displayed.       CBC:   Recent Labs   Lab Test 06/20/20  0620 06/19/20  0531   WBC 9.4 7.5   RBC 5.93* 5.53*   HGB 16.6* 15.4   HCT 51.7* 48.0*   MCV 87 87   MCH 28.0 27.8   MCHC 32.1 32.1   RDW 14.3 14.0    202       Lipid Panel:  Recent Labs   Lab Test 06/19/20  2142 11/04/19  1600 10/10/12  1208   CHOL 177 189 205*   HDL 33* 35* 34*   * 111* 128   TRIG 72 218* 216*   CHOLHDLRATIO  --   --  6.1*       Thyroid:   TSH   Date Value Ref Range Status   08/13/2020 1.22 0.40 - 4.00 mU/L Final     T4 Free   Date Value Ref Range Status   05/18/2011 1.16 0.70 - 1.85 ng/dL Final     Comment:     Ordered by lab     Hemoglobin A1C   Date Value Ref Range Status   10/10/2012 6.0 4.3 - 6.0 % Final     INR   Date Value Ref Range Status   07/29/2020 1.15 (H) 0.86 - 1.14 Final       Assessment and Plan:  85 year old female with    Coronary artery disease, post PCI to LAD 6/2020  TAVR July 2020  Paroxysmal atrial fibrillation on DOAC  Dyslipidemia  Essential hypertension  Mildly reduced LV function,50-55%  Heart failure with reduced ejection fraction, Stage C ->B HF    Dulce Yeh is stable clinically and is without any active cardiac issues today. We reviewed all her recent cardiac diagnostics and I do not recommend any changes to the cardiac medical regimen at this point. Blood pressure is well controlled at home and at rehab. More importantly, she is sinus rhythm today and she is eager to come off of anticoagulation. My plan is to reassess her AF burden in 2months with a 7-day Ziopatch and then make a decision on continuing DOAC.     Recommendations:   Flu shot  today  Continue current cardiac medications  Ziopatch in 2 months  Follow up with me 3 months - video visit    Ginger Davis MD, MS  Professor of Medicine  Cardiovascular division

## 2020-10-26 NOTE — NURSING NOTE
Chief Complaint   Patient presents with     Follow Up     One Month Follow Up        Vitals were taken and medications were reconciled. EKG was performed.    Digna Guerrero  1:26 PM

## 2020-10-27 ENCOUNTER — AMBULATORY - HEALTHEAST (OUTPATIENT)
Dept: CARDIAC REHAB | Facility: CLINIC | Age: 85
End: 2020-10-27

## 2020-10-27 DIAGNOSIS — Z95.2 S/P TAVR (TRANSCATHETER AORTIC VALVE REPLACEMENT): ICD-10-CM

## 2020-10-27 LAB — INTERPRETATION ECG - MUSE: NORMAL

## 2020-10-29 ENCOUNTER — AMBULATORY - HEALTHEAST (OUTPATIENT)
Dept: CARDIAC REHAB | Facility: CLINIC | Age: 85
End: 2020-10-29

## 2020-10-29 DIAGNOSIS — Z95.2 S/P TAVR (TRANSCATHETER AORTIC VALVE REPLACEMENT): ICD-10-CM

## 2020-11-07 DIAGNOSIS — E03.4 HYPOTHYROIDISM DUE TO ACQUIRED ATROPHY OF THYROID: ICD-10-CM

## 2020-11-10 ENCOUNTER — AMBULATORY - HEALTHEAST (OUTPATIENT)
Dept: CARDIAC REHAB | Facility: CLINIC | Age: 85
End: 2020-11-10

## 2020-11-10 DIAGNOSIS — Z95.2 S/P TAVR (TRANSCATHETER AORTIC VALVE REPLACEMENT): ICD-10-CM

## 2020-11-11 ENCOUNTER — TELEPHONE (OUTPATIENT)
Dept: INTERNAL MEDICINE | Facility: CLINIC | Age: 85
End: 2020-11-11

## 2020-11-11 DIAGNOSIS — I10 ESSENTIAL HYPERTENSION: ICD-10-CM

## 2020-11-11 DIAGNOSIS — Z29.89 NEED FOR SUBACUTE BACTERIAL ENDOCARDITIS PROPHYLAXIS: Primary | ICD-10-CM

## 2020-11-11 RX ORDER — METOPROLOL SUCCINATE 100 MG/1
100 TABLET, EXTENDED RELEASE ORAL DAILY
Qty: 90 TABLET | Refills: 1 | Status: SHIPPED | OUTPATIENT
Start: 2020-11-11 | End: 2021-05-21

## 2020-11-11 RX ORDER — LEVOTHYROXINE SODIUM 88 UG/1
88 TABLET ORAL DAILY
Qty: 90 TABLET | Refills: 2 | Status: SHIPPED | OUTPATIENT
Start: 2020-11-11 | End: 2021-08-12

## 2020-11-11 NOTE — TELEPHONE ENCOUNTER
[facility] Call Center    Phone Message    May a detailed message be left on voicemail: yes     Reason for Call: Order(s): Other:   Reason for requested: amoxicillin 2 gram  Date needed: asap  Provider name: Dr. Hogan    The patient wants this filled before she has her dental appointment please call to address concerns.       Action Taken: Message routed to:  [facility] pcc    Travel Screening: Not Applicable

## 2020-11-11 NOTE — TELEPHONE ENCOUNTER
[facility] Call Center    Phone Message    May a detailed message be left on voicemail: yes     Reason for Call: Medication Refill Request    Has the patient contacted the pharmacy for the refill? Yes   Name of medication being requested: metoprolol succinate ER (TOPROL-XL) 100 MG 24 hr tablet   Provider who prescribed the medication: Gilda Hogan MD  Pharmacy: [id number] - [place], [place] - [address] AT [facility] [address]  Date medication is needed: asap   Please call patient once order has been sent       Action Taken: Message routed to:  [facility] pcc    Travel Screening: Not Applicable

## 2020-11-11 NOTE — TELEPHONE ENCOUNTER
Last Clinic Visit: 8/13/2020 [facility]    TSH   Date Value Ref Range Status   08/13/2020 1.22 0.40 - 4.00 mU/L Final

## 2020-11-12 NOTE — TELEPHONE ENCOUNTER
" Centralized Medication Refill Team note: Medication sent 11/11/20 10 PM. Will notify patient in AM.  Left message 11/12/20 0800  metoprolol succinate ER (TOPROL-XL) 100 MG 24 hr tablet       Last Written Prescription Date:  11/4/2019  Last Fill Quantity: 90,   # refills: 3  Last Office Visit : 8/13/20  Future Office visit:  None          10/26/20 (!) 154/83 Cardiology appt    08/28/20 (!) 147/86 angiogram   08/13/20 117/79     10/26/20 Dr Davis Cardiology\"We reviewed all her recent cardiac diagnostics and I do not recommend any changes to the cardiac medical regimen at this point. Blood pressure is well controlled at home and at rehab. \"   May refill x 3 months if BP greater than or equal to 140/90, and refer to PCP for follow up.        "

## 2020-11-12 NOTE — TELEPHONE ENCOUNTER
" From note on 8/28/20 with Stacey Whalen NP  Interventional Cardiology       \"Plan Summary:  1) Continue Plavix 75 mg daily through 6/2021 then convert to daily ASA 81 mg   2) Lifelong antibiotic prophylaxis prior to all dental procedures.\"    Patient was reached by phone, and patient relayed that dental appt is on Mon 11/16/20, so ABX will be needed by 8AM Mon morning.  [facility] pharmacy [facility] and Miller Aguilera RN on 11/12/2020 at 10:14 AM    "

## 2020-11-13 RX ORDER — AMOXICILLIN 500 MG/1
2000 CAPSULE ORAL ONCE
Qty: 4 CAPSULE | Refills: 2 | Status: SHIPPED | OUTPATIENT
Start: 2020-11-13 | End: 2020-11-13

## 2020-11-13 NOTE — TELEPHONE ENCOUNTER
Patient was reached by phone, and RN let her know Dr. Hogan sent Rx to preferred pharmacy.    Penny Aguilera RN on 11/13/2020 at 4:31 PM

## 2020-11-17 ENCOUNTER — AMBULATORY - HEALTHEAST (OUTPATIENT)
Dept: CARDIAC REHAB | Facility: CLINIC | Age: 85
End: 2020-11-17

## 2020-11-17 DIAGNOSIS — Z95.2 S/P TAVR (TRANSCATHETER AORTIC VALVE REPLACEMENT): ICD-10-CM

## 2020-11-19 ENCOUNTER — AMBULATORY - HEALTHEAST (OUTPATIENT)
Dept: CARDIAC REHAB | Facility: CLINIC | Age: 85
End: 2020-11-19

## 2020-11-19 DIAGNOSIS — Z95.2 S/P TAVR (TRANSCATHETER AORTIC VALVE REPLACEMENT): ICD-10-CM

## 2020-12-01 ENCOUNTER — NURSE TRIAGE (OUTPATIENT)
Dept: NURSING | Facility: CLINIC | Age: 85
End: 2020-12-01

## 2020-12-01 NOTE — TELEPHONE ENCOUNTER
Call from Dulce, calling about 3 things today.  Declined visit.  1.  Headache top of head and bilateral temples for the past 2-3 weeks every day all day, mild-moderate in intensity.  Has been taking tylenol 2 500 mg tabs every 6 hours while awake without relief.  Knows cannot take ASA or ibuprofen.  2.  Rash on scalp and moving to neck and shoulders. Agrees to contact derm, history of scalp rash.  3.  Loose stools, today formed.  History of C Diff, very watchful regarding bowel status.  Dulce declines visit with provider.  Is asking if there is something other than tylenol she can be using for headaches.  Asking if from one of her new medications:  Plavix, Eliquis  Knows she has allergies and Zyrtec does not seem to be working as effectively as it has in the past.  Could she try something else?  Best call back [phone]  Additional Information    Negative: Difficult to awaken or acting confused (e.g., disoriented, slurred speech)    Negative: Weakness of the face, arm or leg on one side of the body and new onset    Negative: Numbness of the face, arm or leg on one side of the body and new onset    Negative: Loss of speech or garbled speech and new onset    Negative: Passed out (i.e., fainted, collapsed and was not responding)    Negative: Sounds like a life-threatening emergency to the triager    Negative: Followed a head injury within last 3 days    Negative: Traumatic Brain Injury (TBI) is suspected    Negative: Sinus pain of forehead and yellow or green nasal discharge    Negative: Pregnant    Negative: Unable to walk without falling    Negative: Stiff neck (can't touch chin to chest)    Negative: Possibility of carbon monoxide exposure    Negative: SEVERE headache, states 'worst headache' of life    Negative: SEVERE headache, sudden onset (i.e., reaching maximum intensity within 30 seconds)    Negative: Severe pain in one eye    Negative: Loss of vision or double vision    Negative: Patient sounds very  "sick or weak to the triager    Negative: Fever > 103 F (39.4 C)    Negative: Fever > 100.0 F (37.8 C) and has diabetes mellitus or a weak immune system (e.g., HIV positive, cancer chemotherapy, organ transplant, splenectomy, chronic steroids)    Negative: SEVERE headache (e.g., excruciating) and has had severe headaches before    Negative: SEVERE headache and not relieved by pain meds    Negative: SEVERE headache and vomiting    Negative: SEVERE headache and fever    Negative: New headache and weak immune system (e.g., HIV positive, cancer chemotherapy, chronic steroid treatment)    Negative: Fever present > 3 days (72 hours)    Negative: Patient wants to be seen    Unexplained headache that is present > 24 hours    Answer Assessment - Initial Assessment Questions  1. LOCATION: \"Where does it hurt?\"       Top of head and temples bilaterally  2. ONSET: \"When did the headache start?\" (Minutes, hours or days)       2-3 weeks ago  3. PATTERN: \"Does the pain come and go, or has it been constant since it started?\"      Pain has been constant since started weeks ago  4. SEVERITY: \"How bad is the pain?\" and \"What does it keep you from doing?\"  (e.g., Scale 1-10; mild, moderate, or severe)    - MILD (1-3): doesn't interfere with normal activities     - MODERATE (4-7): interferes with normal activities or awakens from sleep     - SEVERE (8-10): excruciating pain, unable to do any normal activities         Mild-moderate  5. RECURRENT SYMPTOM: \"Have you ever had headaches before?\" If so, ask: \"When was the last time?\" and \"What happened that time?\"       No history of headaches  6. CAUSE: \"What do you think is causing the headache?\"      Thinks may be allergy related or medication related  7. MIGRAINE: \"Have you been diagnosed with migraine headaches?\" If so, ask: \"Is this headache similar?\"       No  8. HEAD INJURY: \"Has there been any recent injury to the head?\"       Denies  9. OTHER SYMPTOMS: \"Do you have any other " "symptoms?\" (fever, stiff neck, eye pain, sore throat, cold symptoms)      Denies vision hearing changes, does admit to \"a dizzy like feeling\" denies nausea/vomiting.  Has a rash on scalp which seems to be moving to neck and shoulders.  History of scalp rash, has seen derm in the past, will schedule with derm  10. PREGNANCY: \"Is there any chance you are pregnant?\" \"When was your last menstrual period?\"        N/A    Protocols used: HEADACHE-A-[place]      "

## 2020-12-03 ENCOUNTER — AMBULATORY - HEALTHEAST (OUTPATIENT)
Dept: CARDIAC REHAB | Facility: CLINIC | Age: 85
End: 2020-12-03

## 2020-12-03 DIAGNOSIS — Z95.2 S/P TAVR (TRANSCATHETER AORTIC VALVE REPLACEMENT): ICD-10-CM

## 2020-12-08 ENCOUNTER — AMBULATORY - HEALTHEAST (OUTPATIENT)
Dept: CARDIAC REHAB | Facility: CLINIC | Age: 85
End: 2020-12-08

## 2020-12-08 ENCOUNTER — VIRTUAL VISIT (OUTPATIENT)
Dept: INTERNAL MEDICINE | Facility: CLINIC | Age: 85
End: 2020-12-08
Payer: MEDICARE

## 2020-12-08 DIAGNOSIS — R51.9 NONINTRACTABLE EPISODIC HEADACHE, UNSPECIFIED HEADACHE TYPE: Primary | ICD-10-CM

## 2020-12-08 DIAGNOSIS — Z95.2 S/P TAVR (TRANSCATHETER AORTIC VALVE REPLACEMENT): ICD-10-CM

## 2020-12-08 PROCEDURE — 99443 PR PHYSICIAN TELEPHONE EVALUATION 21-30 MIN: CPT | Mod: 95 | Performed by: STUDENT IN AN ORGANIZED HEALTH CARE EDUCATION/TRAINING PROGRAM

## 2020-12-08 NOTE — NURSING NOTE
Chief Complaint   Patient presents with     Headache     Pt reports headaches and medication questions      Tiesha Velazquez EMT at 12:41 PM sign on 12/8/2020

## 2020-12-08 NOTE — PROGRESS NOTES
"                     PRIMARY [facility]ARE CENTER     Resident Telephone Encounter  This patient is being evaluated via a billable telephone visit; THIS VISIT WAS INITIATED BY THE PT, as AN ALTERNATIVE TO IN PERSON VISIT .       The patient has has been notified of following:      \"This billable telephone visit will be conducted via a call between you and your physician/provider. We have found that certain health care needs can be provided without the need for a physical exam.  This service lets us provide the care you need with a short phone conversation.  If a prescription is necessary we can send it directly to your pharmacy.  If lab work is needed we can place an order for that and you can then stop by our lab to have the test done at a later time. We can also place orders for a limited number of imaging tests if they are deemed urgently necessary.     If during the course of the call the physician/provider feels a telephone visit is not appropriate, you will not be charged for this service.\"     Due to efforts to reduce the spread of COVID-19 in the clinic, state, nation, virtual visits are encouraged currently. Patient understands that diagnose and advice is limited by the inability to exam him/her/them face-to-face.    Person(s) spoken to: Dulce Yeh    Time call initiated: 13:30  Time call ended:  14:00   Total length of call: 30 min    Staffed with: Dr. Hogan     SUBJECTIVE:     Dulce Yeh is a 85 year old female with a PMHx of HFrEF secondary, CADstatus post PCI x3 to LAD in June 2020, severe AS status post TAVR with 23 mm Paez Kyra 3 Ultra on 7/29/20, hypertension, and hyperlipidemia who comes in for evaluation of several weeks headache.    She reports headache in the top and side of head for the past several weeks that she has attempted to treat with Tylenol without any benefit and with Benadryl with minimal relief.  She initially reports that this headache is persistent but notes that she does " have improvement of headache at night such that she is able to sleep without any particular disturbances.  Headache comes on in the morning and is made worse to some degree by standing and is not worse with lying flat.  She does not have any neurological symptoms and denies photophobia.  She denies any changes in vision, no auditory symptoms including tinnitus or stuffiness.  She denies any sinus drainage or discharge, stuffy nose, cough.  She does have chronic issues with allergies including sneezing and watery/itchy eyes for which she occasionally takes Zyrtec.  She has previously had a couple of migraines and notes this headache is different from prior headaches.  She monitors blood pressure both at home and at cardiac rehab and denies any changes in blood pressure with SBP's ranging from 110s to 130s with DBP's in 70s.  She does endorse some lightheadedness associated with headaches but is quite certain that she does not have any orthostatic hypotension.  Her primary concerns today are whether this headache is related to potentially chronic sinus issues versus medication side effect related to Plavix.  She notes that she would like to come off of Plavix but has recently undergone both stenting to the LAD in June and TAVR in July of this year.  She is not particularly concerned about alternative etiologies of headache.    In terms of prior allergic rhinosinusitis, she has previously been prescribed fluticasone and intranasal saline irrigation which she has not yet tried.     MEDICATIONS/ALLERGIES:     Medications and allergies reviewed by me today.      ROS:     Constitutional, neuro, ENT, endocrine, pulmonary, cardiac, gastrointestinal, genitourinary, musculoskeletal, integument and psychiatric systems are negative, except as otherwise noted.     OBJECTIVE:     LMP  (LMP Unknown)    Wt Readings from Last 1 Encounters:   10/26/20 80.3 kg (177 lb)     Exam limited as encounter completed via telephone in setting of  ongoing COVID-19 endemic  General: Awake, alert, conversant  Neuro: No dysarthria or aphasia  Psych: Affect appropriate      ASSESSMENT/PLAN:     Dulce was seen today for headache.    Diagnoses and all orders for this visit:    Nonintractable episodic headache, unspecified headache type  Episodic headache experienced daily for the past several weeks.  No red flag symptoms including weakness, dizziness, photophobia, changes in speech, or signs of increased intracranial pressure.  Possible that this is associated with chronic allergic rhinosinusitis and will begin with conservative therapies including fluticasone and nasal saline irrigation.  Patient is on both Eliquis and Plavix.  No red flag symptoms concerning for intracranial bleed at this point, however, patient instructed to present to ED with new or progressive symptoms.  She is primarily concerned about association of headache as side effect of Plavix and hopes to discuss transition to alternate therapy versus discontinuation.  Discussed that this medication cannot be discontinued and at best would need to transition to alternate antiplatelet following discussion with cardiology.  She has previously been on Brilinta which was discontinued because of medication side effect.  She is not interested in transitioning back to this is an alternative.  Discussed risks of NSAIDs while on both Eliquis and Plavix and counseled on continued avoidance of NSAIDs.  -Start fluticasone daily for the next 2 weeks.  Patient opted to obtain this over-the-counter, however, will send in a prescription if requested.  -Start topical medications as needed including lidocaine, menthol-containing topicals.  Patient opted to obtain this over-the-counter, however, will send in a prescription for lidocaine 4% patch versus menthol patch per patient request.  -Start Daily nasal saline irrigation.  -Follow-up if symptoms worsen or fail to improve.  -Patient plans to discuss alternate  antiplatelet therapy with cardiology on next visit.       Follow-up with PCP if symptoms worsen or fail to improve.    Options for treatment and follow-up care were reviewed with the patient. Dulce Yeh engaged in the decision making process and verbalized understanding of the options discussed and agreed with the final plan.    Ally Hamilton MD  PGY-3, Internal Medicine  Dec 8, 2020    Pt was seen and plan of care discussed with Dr. Hogan.     I personally reviewed the pertinent medical history and results.  I discussed the patient s diagnosis and treatment plan with the resident and the resident relayed our joint plan to the patient in synchrony. I agree with the information as documented with the following exceptions: none.  Gilda Hogan MD  Internal Medicine

## 2020-12-08 NOTE — PATIENT INSTRUCTIONS
"Dulce,    You were seen via telephone encounter for evaluation of several weeks of headaches.  At this point, headache cause remains somewhat unclear.  It is possible that this is associated with allergic rhinosinusitis and we will begin by treating with fluticasone and nasal saline irrigation (Neti pot).  You were also concerned about the possibility of Plavix contributing to your headaches.  It is very important that you continue to take your Plavix because of risk of life-threatening clot formation in the stents that were placed in your coronary artery.  Because of your heart history and because you are on multiple medications that increase your risk for bleeding, you should continue to avoid over-the-counter medications in a class called \"NSAIDs\".  If you begin to have any alarming symptoms including weakness, numbness, dizziness, sensitivity to light, changes in speech, changes in level of consciousness, or neck stiffness please present to the emergency room for further evaluation.    It will likely take up to 2 weeks to see the maximum benefit from the above treatments.  If your headache continues to worsen or fails to improve you should follow-up in clinic for further evaluation.    Thank you for allowing me to participate in your care.  It was a pleasure meeting you today.  Please feel free to contact the clinic with any questions.    Thank you,    Ally Hamilton MD  Internal Medicine Resident  "

## 2020-12-10 ENCOUNTER — AMBULATORY - HEALTHEAST (OUTPATIENT)
Dept: CARDIAC REHAB | Facility: CLINIC | Age: 85
End: 2020-12-10

## 2020-12-10 DIAGNOSIS — Z95.2 S/P TAVR (TRANSCATHETER AORTIC VALVE REPLACEMENT): ICD-10-CM

## 2020-12-11 DIAGNOSIS — H35.63 RETINAL HEMORRHAGE, BILATERAL: Primary | ICD-10-CM

## 2020-12-11 LAB
CRP SERPL-MCNC: 9.1 MG/L (ref 0–8)
ERYTHROCYTE [DISTWIDTH] IN BLOOD BY AUTOMATED COUNT: 15.5 % (ref 10–15)
ERYTHROCYTE [SEDIMENTATION RATE] IN BLOOD BY WESTERGREN METHOD: 10 MM/H (ref 0–30)
HCT VFR BLD AUTO: 42.1 % (ref 35–47)
HGB BLD-MCNC: 13.8 G/DL (ref 11.7–15.7)
MCH RBC QN AUTO: 28.3 PG (ref 26.5–33)
MCHC RBC AUTO-ENTMCNC: 32.8 G/DL (ref 31.5–36.5)
MCV RBC AUTO: 86 FL (ref 78–100)
PLATELET # BLD AUTO: 161 10E9/L (ref 150–450)
RBC # BLD AUTO: 4.88 10E12/L (ref 3.8–5.2)
WBC # BLD AUTO: 7.3 10E9/L (ref 4–11)

## 2020-12-11 PROCEDURE — 85027 COMPLETE CBC AUTOMATED: CPT | Performed by: OPHTHALMOLOGY

## 2020-12-11 PROCEDURE — 36415 COLL VENOUS BLD VENIPUNCTURE: CPT | Performed by: OPHTHALMOLOGY

## 2020-12-11 PROCEDURE — 86140 C-REACTIVE PROTEIN: CPT | Performed by: OPHTHALMOLOGY

## 2020-12-11 PROCEDURE — 85652 RBC SED RATE AUTOMATED: CPT | Performed by: OPHTHALMOLOGY

## 2020-12-15 ENCOUNTER — AMBULATORY - HEALTHEAST (OUTPATIENT)
Dept: CARDIAC REHAB | Facility: CLINIC | Age: 85
End: 2020-12-15

## 2020-12-15 DIAGNOSIS — Z95.2 S/P TAVR (TRANSCATHETER AORTIC VALVE REPLACEMENT): ICD-10-CM

## 2020-12-17 ENCOUNTER — AMBULATORY - HEALTHEAST (OUTPATIENT)
Dept: CARDIAC REHAB | Facility: CLINIC | Age: 85
End: 2020-12-17

## 2020-12-17 DIAGNOSIS — Z95.2 S/P TAVR (TRANSCATHETER AORTIC VALVE REPLACEMENT): ICD-10-CM

## 2020-12-22 ENCOUNTER — AMBULATORY - HEALTHEAST (OUTPATIENT)
Dept: CARDIAC REHAB | Facility: CLINIC | Age: 85
End: 2020-12-22

## 2020-12-22 DIAGNOSIS — Z95.2 S/P TAVR (TRANSCATHETER AORTIC VALVE REPLACEMENT): ICD-10-CM

## 2020-12-29 ENCOUNTER — AMBULATORY - HEALTHEAST (OUTPATIENT)
Dept: CARDIAC REHAB | Facility: CLINIC | Age: 85
End: 2020-12-29

## 2020-12-29 DIAGNOSIS — Z95.2 S/P TAVR (TRANSCATHETER AORTIC VALVE REPLACEMENT): ICD-10-CM

## 2021-01-04 ENCOUNTER — ANCILLARY PROCEDURE (OUTPATIENT)
Dept: CARDIOLOGY | Facility: CLINIC | Age: 86
End: 2021-01-04
Attending: INTERNAL MEDICINE
Payer: MEDICARE

## 2021-01-04 DIAGNOSIS — Z95.2 S/P TAVR (TRANSCATHETER AORTIC VALVE REPLACEMENT): ICD-10-CM

## 2021-01-04 DIAGNOSIS — I25.10 CORONARY ARTERY DISEASE INVOLVING NATIVE CORONARY ARTERY OF NATIVE HEART WITHOUT ANGINA PECTORIS: ICD-10-CM

## 2021-01-04 DIAGNOSIS — Z23 NEED FOR PROPHYLACTIC VACCINATION AND INOCULATION AGAINST INFLUENZA: ICD-10-CM

## 2021-01-04 DIAGNOSIS — I48.0 PAROXYSMAL ATRIAL FIBRILLATION (H): ICD-10-CM

## 2021-01-04 PROCEDURE — 93242 EXT ECG>48HR<7D RECORDING: CPT

## 2021-01-04 PROCEDURE — 93244 EXT ECG>48HR<7D REV&INTERPJ: CPT | Performed by: INTERNAL MEDICINE

## 2021-01-04 NOTE — PROGRESS NOTES
Per Dr. Ginger Davis, patient to have 7 day Zio Patch monitor placed.  Diagnosis: S/P TAVR, (Z95.2), Paroxysmal atrial fibrillation (I48.0),   Monitor placed: Yes  Patient Instructed: Yes  Patient verbalized understanding: Yes  Holter # [id number]  Placed by Sofy Jean MA

## 2021-01-15 ENCOUNTER — TELEPHONE (OUTPATIENT)
Dept: CARDIOLOGY | Facility: CLINIC | Age: 86
End: 2021-01-15

## 2021-01-15 NOTE — TELEPHONE ENCOUNTER
[facility] Call Center    Phone Message    May a detailed message be left on voicemail: yes     Reason for Call: Other: Pt called looking to speak to someone about some medication concerns shes been having. Pt has been having symptoms and feels she may be allergic to her medicine. Pt stated she spoke to 2 people at her primary care office but nothing has come of that. Please follow up with pt       Action Taken: Message routed to:  [facility] Heart    Travel Screening: Not Applicable

## 2021-01-19 NOTE — TELEPHONE ENCOUNTER
"Returned call to pt. She has concerns re: eliquis and Plavix. Pt has an itch head, neck and arms along with bruising easily and minor blood from nose when blowing her nose. Pt is also having a headache for months and tylenol doesn't help. Pt taking zyrtec and other allergy meds to try to get rid of her headache. Pt reports have finished \"afib test.\" Pt reports left leg gets weak at end of day since procedure. Not swollen. And has concerns of loosing function in her leg. Pt is wondering if she can stop taking eliquis or plavix. Routed to provider for review. Meghna Cox RN CORE Care Coordinator     "

## 2021-01-21 NOTE — TELEPHONE ENCOUNTER
"Returned call to pt. Reviewed with her that Dr. Davis said she could stop taking the Eliquis but it would increase pt's risk of stroke. Pt stated she was not interested in stopping Eliquis but wanted to stop Plavix. Pt advised she needs to continue Plavix for one year after her PCI. Pt stated she had \"many friends\" who were not on the same dose of medication as she is and wanted to take a lower dose. Advised pt that not all medical situations are the same and I was unable to speak to her friends dosages as I am unaware of their medical history and situation. Pt verbalized frustration and stated she will discuss with Dr. Davis at her follow-up appt that she has scheduled. Meghna Cox RN CORE Care Coordinator     "

## 2021-01-22 DIAGNOSIS — I21.4 NSTEMI (NON-ST ELEVATED MYOCARDIAL INFARCTION) (H): ICD-10-CM

## 2021-01-25 NOTE — TELEPHONE ENCOUNTER
[facility] Call Center    Phone Message    May a detailed message be left on voicemail: yes     Reason for Call: Other: Pt calling again to report that she is out of medication, would like a refill as soon as possible.     Action Taken: Message routed to:  [facility] refill team    Travel Screening: Not Applicable

## 2021-01-26 NOTE — TELEPHONE ENCOUNTER
Centralized Medication Refill Team note:   apixaban ANTICOAGULANT (ELIQUIS) 5 MG tablet  Take 1 tablet (5 mg) by mouth 2 times daily   Last Written Prescription Date:  7/10/20 [place]  Last Fill Quantity: 180,   # refills: 1  Last Office Visit : 12/8/20   Future Office visit:  None( has Cardiology appt 2/8/20)    Routing refill request to provider for review/approval because:  Age greater than 79 fails protocol

## 2021-02-08 ENCOUNTER — TELEPHONE (OUTPATIENT)
Dept: CARDIOLOGY | Facility: CLINIC | Age: 86
End: 2021-02-08

## 2021-02-08 ENCOUNTER — VIRTUAL VISIT (OUTPATIENT)
Dept: CARDIOLOGY | Facility: CLINIC | Age: 86
End: 2021-02-08
Attending: INTERNAL MEDICINE
Payer: MEDICARE

## 2021-02-08 DIAGNOSIS — Z95.2 S/P TAVR (TRANSCATHETER AORTIC VALVE REPLACEMENT): ICD-10-CM

## 2021-02-08 DIAGNOSIS — I48.0 PAROXYSMAL ATRIAL FIBRILLATION (H): ICD-10-CM

## 2021-02-08 DIAGNOSIS — I25.10 CORONARY ARTERY DISEASE INVOLVING NATIVE CORONARY ARTERY OF NATIVE HEART WITHOUT ANGINA PECTORIS: ICD-10-CM

## 2021-02-08 DIAGNOSIS — I10 BENIGN ESSENTIAL HYPERTENSION: Primary | ICD-10-CM

## 2021-02-08 PROCEDURE — 99443 PR PHYSICIAN TELEPHONE EVALUATION 21-30 MIN: CPT | Mod: 95 | Performed by: INTERNAL MEDICINE

## 2021-02-08 RX ORDER — DIPHENHYDRAMINE HCL 25 MG
25 TABLET ORAL PRN
COMMUNITY
End: 2021-06-21

## 2021-02-08 NOTE — PATIENT INSTRUCTIONS
Patient Instructions:  It was a pleasure to see you in the cardiology clinic today.      If you have any questions, call  Meghna Cox RN, at [phone].  Press Option #1 for the [facility], and then press Option #4  We are encouraging the use of [employer] to communicate with your HealthCare Provider      Please follow up with Dr. Davis end June 2021 to review antiplatelet therapy. Please have zio patch done prior to that appt along with blood work.       If you have an urgent need after hours (8:00 am to 4:30 pm) please call [phone] and ask for the cardiology fellow on call.

## 2021-02-08 NOTE — TELEPHONE ENCOUNTER
[facility] Call Center    Phone Message    May a detailed message be left on voicemail: yes     Reason for Call: Other: pt does not have video capabilities and would like her 11am appt to be done via phone call only, thank you     Action Taken: Message routed to:  [facility] heart    Travel Screening: Not Applicable

## 2021-02-08 NOTE — PROGRESS NOTES
Dulce is a 86 year old who is being evaluated via a billable telephone visit.      What phone number would you like to be contacted at? [phone]  How would you like to obtain your AVS? Betito    History:    Ms. Yeh is a 88 year old woman with severe aortic stenosis treated with transfemoral transcatheter aortic valve replacement (TAVR) with a 23 mm Paez Kyra 3 Ultra on 7/29/20. She has CAD w/NSTEMI 6/18/20 s/p PCI of the LAD, HFrEF with EF 35-40% and paroxysmal AF on Eliquis. The TAVR and post-procedural course were notable for no major complications. Her post TAVR ECG showed a-flutter with LBBB. Her post TAVR echo showed mean gradient of 6 mmHg with trace paravalvular AI. She was discharged home on Plavix and Eliquis.     Today, overall, she has many minor issues. She has completed cardiac rehab. She has been experiencing minor nose and gum bleeds and easy bruisability.  She also has dizziness and headache which she ascribes to Plavix.  Overall unhappy that she has not felt well despite all the cardiac interventions.  She denies orthopnea or PND, chest pain at rest, syncope      Current Outpatient Medications   Medication Sig Dispense Refill     apixaban ANTICOAGULANT (ELIQUIS) 5 MG tablet Take 1 tablet (5 mg) by mouth 2 times daily 180 tablet 1     ascorbic acid (VITAMIN C) 500 MG tablet Take 500 mg by mouth daily.       BIOTIN PO Take by mouth daily       calcium carb 1250 mg, 500 mg [place],/vitamin D 200 units (OSCAL WITH D) 500-200 MG-UNIT per tablet Take 1 tablet by mouth 2 times daily (with meals).       Cetirizine HCl (ZYRTEC PO) Take 10 mg by mouth daily as needed        clobetasol (CLOBETASOL PROPIONATE EMULSION) 0.05 % CREA Apply topically as needed 15 g 0     clopidogrel (PLAVIX) 75 MG tablet Take 1 tablet (75 mg) by mouth daily 90 tablet 3     coenzyme Q-10 200 MG CAPS Take 200 mg by mouth daily       cyanocolbalamin (VITAMIN B-12) 1000 MCG tablet Take 1,000 mcg by mouth daily.        diphenhydrAMINE (BENADRYL) 25 MG tablet Take 25 mg by mouth as needed for itching or allergies 1/2 tab as needed       fluocinonide (LIDEX) 0.05 % solution Apply topically 2 times daily       Glucosamine-Chondroit-Vit C-Mn (GLUCOSAMINE CHONDR 1500 COMPLX) CAPS Take 1 tablet by mouth daily        levothyroxine (SYNTHROID/LEVOTHROID) 88 MCG tablet Take 1 tablet (88 mcg) by mouth daily 90 tablet 2     Magnesium 300 MG CAPS Take  by mouth.       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     Recurrent vulvar infection 10/18/2011     Steatohepatitis 02/01/2012     Hemorrhoids 03/11/2013     Near syncope 03/11/2013     Lichen sclerosus et atrophicus of the vulva 10/07/2015     Hypothyroidism due to acquired atrophy of thyroid 10/08/2015     Recurrent colitis due to Clostridium difficile 08/01/2016     Dermatitis, seborrheic 02/13/2017     Cherry angioma 02/13/2017     Seborrheic keratosis 02/13/2017     Tinea pedis of both feet 02/13/2017     Anxiety 06/10/2015     Atrial fibrillation (H) 09/03/2015     Hx of fracture of fibula 07/29/2015     Hypokalemia 09/02/2015     Hyponatremia 09/02/2015     Nausea & vomiting 09/02/2015     Osteoarthritis of left ankle 07/21/2015     Postoperative anemia due to acute blood loss 06/08/2015     Primary osteoarthritis of left knee 05/21/2015     S/P total knee arthroplasty 06/08/2015     Vaginal dryness 06/10/2015     Shortness of breath 06/18/2020     NSTEMI (non-ST  elevated myocardial infarction) (H) 06/18/2020     Severe aortic stenosis 07/21/2020     Aortic stenosis, severe 07/29/2020     Resolved Ambulatory Problems     Diagnosis Date Noted     Hyperlipidemia 10/11/2011     Eosinophilia 10/11/2011     Fatigue 03/11/2013     Weakness 03/11/2013     Diaphoresis 03/11/2013     Past Medical History:   Diagnosis Date     Allergy      C. difficile colitis August-October 2015     History of chickenpox      Hyperlipidaemia      Need for subacute bacterial endocarditis prophylaxis      Unspecified hypothyroidism 10/11/2011      Social history - reviewed  Social History     Socioeconomic History     Marital status:      Spouse name: Not on file     Number of children: 3     Years of education: Not on file     Highest education level: Not on file   Occupational History     Not on file   Social Needs     Financial resource strain: Not on file     Food insecurity     Worry: Not on file     Inability: Not on file     Transportation needs     Medical: Not on file     Non-medical: Not on file   Tobacco Use     Smoking status: Never Smoker     Smokeless tobacco: Never Used   Substance and Sexual Activity     Alcohol use: No     Drug use: No     Sexual activity: Yes     Birth control/protection: Post-menopausal   Lifestyle     Physical activity     Days per week: Not on file     Minutes per session: Not on file     Stress: Not on file   Relationships     Social connections     Talks on phone: Not on file     Gets together: Not on file     Attends Jewish service: Not on file     Active member of club or organization: Not on file     Attends meetings of clubs or organizations: Not on file     Relationship status: Not on file     Intimate partner violence     Fear of current or ex partner: Not on file     Emotionally abused: Not on file     Physically abused: Not on file     Forced sexual activity: Not on file   Other Topics Concern      Service Not Asked     Blood  Transfusions Not Asked     Caffeine Concern Yes     Comment: coffee, tea     Occupational Exposure Not Asked     Hobby Hazards Not Asked     Sleep Concern Not Asked     Stress Concern Not Asked     Weight Concern Not Asked     Special Diet Not Asked     Back Care Not Asked     Exercise Yes     Comment: swims 3x a week     Bike Helmet Not Asked     Seat Belt Not Asked     Self-Exams Not Asked     Parent/sibling w/ CABG, MI or angioplasty before 65F 55M? Not Asked   Social History Narrative    Lives alone, children nearby        2 boys and 1 girl (1 son )     passed away 26 yo    Labs    [employer]    Previoysly worked in [employer]     Family history -reviewed  Family History   Problem Relation Age of Onset     Cancer Mother         lung cancer     Testicular cancer Father      Abdominal Aortic Aneurysm Brother      Rheumatic fever Brother      Diabetes Brother      Anesthesia Reaction No family hx of      Deep Vein Thrombosis (DVT) No family hx of      ROS: non contributory on the 10-point review of system    Exam:   In general, the patient is in no apparent distress.    Breathing is unlabored.   Neurologic: Alert and oriented to person/place/time, normal speech and affect  Skin: No rash on exposed skin.     Data:  Recent cardiac investigations - reviewed     Anujopafran 2021  Sinus rhythm with 1 run of SVT (136 bpm)   No atrial flutter or fibrillation    ECG Oct 2020 sinus rhythm with occasional PVCs.       Echocardiogram 2020    Normal biventricular function  S/P TAVR with 23 mm Paez Kyra 3 Ultra prosthesis. Mean gradient 5 mmHg.  Trivial paravalvular AI.  No pericardial effusion is present.    Echocardiogram 2020:  Moderately (EF 35-40%) reduced left ventricular function is present.  Global right ventricular function is moderately reduced.  Probably moderate aortic stenosis, however accurate assessment of the aortic valve is challenging with atrial fibrillation  rhythm. The RCC and NCC appears  partially fused. The Vmax is 2.41 m/s and mean gradient is 13 mmHg. The DANIS is calculated at 1.1 cm2 with a index of 0.54 cm/m2. The dimensionless index is 0.38.  The inferior vena cava was normal in size with preserved respiratory variability.  No pericardial effusion is present.     Coronary Angiogram 6/19/2020:  Left Anterior Descending    Prox LAD to Mid LAD lesion is 90% stenosed. The lesion is segmental. The lesion is calcified.    First Diagonal Branch    The vessel is small. There is mild diffuse disease throughout the vessel.    Second Diagonal Branch    The vessel is small. There is mild diffuse disease throughout the vessel.    Left Circumflex    Prox Cx to Mid Cx lesion is 50% stenosed.    First Obtuse Marginal Branch    The vessel is moderate in size. There is mild diffuse disease throughout the vessel.    Second Obtuse Marginal Branch    The vessel is small. There is moderate diffuse disease throughout the vessel.    Right Coronary Artery    The vessel was visualized by selective angiography. There was 30% diffuse vessel disease.    Intervention      Prox LAD to Mid LAD lesion    Stent    A stent was successfully placed.    Stent    A stent was successfully placed.    Stent    A stent was successfully placed.    There is a 10% residual stenosis post intervention.      Labs - reviewed  Chemistry panel:   Recent Labs   Lab Test 06/30/20  1114 06/20/20  0620  06/18/20  0055 11/04/19  1600  10/18/17  1154  08/07/15  1741    134   < > 136 137   < > 136   < > 132*   POTASSIUM 4.3 4.1   < > 3.5 4.2   < > 3.9   < > 2.9*   CHLORIDE 103 100   < > 104 104   < > 101   < > 98   CO2 28 26   < > 24 28   < > 27   < > 23   ANIONGAP 4 9   < > 8 6   < > 8   < > 11   * 127*   < > 168* 114*   < > 109*   < > 132*   BUN 22 24   < > 18 24   < > 24   < > 28   CR 1.02 1.05*   < > 0.99 1.01   < > 0.87   < > 1.31*   CR 9.4 9.5   < > 8.8 9.2   < > 9.3   < > 8.6   MAG  --   --   --    --  2.5*  --   --   --  1.7   GFRESTIMATED 50* 48*   < > 52* 51*   < > 62   < > 39*   AST  --   --   --  34  --   --  33  --  11   ALT  --   --   --  26  --   --  39  --  20    < > = values in this interval not displayed.       CBC:   Recent Labs   Lab Test 06/20/20  0620 06/19/20  0531   WBC 9.4 7.5   RBC 5.93* 5.53*   HGB 16.6* 15.4   HCT 51.7* 48.0*   MCV 87 87   MCH 28.0 27.8   MCHC 32.1 32.1   RDW 14.3 14.0    202       Lipid Panel:  Recent Labs   Lab Test 06/19/20  2142 11/04/19  1600 10/10/12  1208   CHOL 177 189 205*   HDL 33* 35* 34*   * 111* 128   TRIG 72 218* 216*   CHOLHDLRATIO  --   --  6.1*       Thyroid:   TSH   Date Value Ref Range Status   08/13/2020 1.22 0.40 - 4.00 mU/L Final     T4 Free   Date Value Ref Range Status   05/18/2011 1.16 0.70 - 1.85 ng/dL Final     Comment:     Ordered by lab     Hemoglobin A1C   Date Value Ref Range Status   10/10/2012 6.0 4.3 - 6.0 % Final     INR   Date Value Ref Range Status   07/29/2020 1.15 (H) 0.86 - 1.14 Final       Assessment and Plan:  85 year old female with    Coronary artery disease, no angina  Post PCI to LAD 6/2020  Post TAVR July 2020  Paroxysmal atrial fibrillation on DOAC  Dyslipidemia  Essential hypertension  Mildly reduced LV function,50-55%  Heart failure with reduced ejection fraction, Stage C ->B HF    Dulce Yeh is without any active cardiac issues today. We reviewed her medical regimen in detail today and explained to her the need for both Plavix and Eliquis in her case.  Bleeding has not been minor at this point not requiring blood transfusion or hospitalization.  Blood pressure and heart rate are well controlled at home. Recent Ziopatch monitoring in January indicates that she is in sinus rhythm with one episode of supraventricular tachycardia.     I have advised Dulce to continue Plavix for another 4 months to complete a full course for the PCI she received in June 2020.  We discussed about reassessing BHARGAV alan  in June to make a determination on continuing the DOAC. She will also need a lipid panel at that time.  I have encouraged her to stay positive and to focus on the fact that she has not required hospitalization for heart failure, angina or atrial fibrillation.  She is agreeable to the plan and will see me in June.    Recommendations:   Continue current cardiac medications  Return to clinic in end of June 2021 - to review antiplatelet therapy   Ziopatch in June 2021  Lipids with AST and ALT in June 2021    Ginger Davis MD, MS  Professor of Medicine  Cardiovascular division

## 2021-02-08 NOTE — LETTER
2/8/2021      RE: Dulce Yeh  [address] [place] [address]       Dear Colleague,    Thank you for the opportunity to participate in the care of your patient, Dulce Yeh, at the [facility] [place] at [facility]. Please see a copy of my visit note below.    Dulce is a 86 year old who is being evaluated via a billable telephone visit.      What phone number would you like to be contacted at? [phone]  How would you like to obtain your AVS? MyChart    History:    Ms. Yeh is a 88 year old woman with severe aortic stenosis treated with transfemoral transcatheter aortic valve replacement (TAVR) with a 23 mm Paez Kyra 3 Ultra on 7/29/20. She has CAD w/NSTEMI 6/18/20 s/p PCI of the LAD, HFrEF with EF 35-40% and paroxysmal AF on Eliquis. The TAVR and post-procedural course were notable for no major complications. Her post TAVR ECG showed a-flutter with LBBB. Her post TAVR echo showed mean gradient of 6 mmHg with trace paravalvular AI. She was discharged home on Plavix and Eliquis.     Today, overall, she has many minor issues. She has completed cardiac rehab. She has been experiencing minor nose and gum bleeds and easy bruisability.  She also has dizziness and headache which she ascribes to Plavix.  Overall unhappy that she has not felt well despite all the cardiac interventions.  She denies orthopnea or PND, chest pain at rest, syncope      Current Outpatient Medications   Medication Sig Dispense Refill     apixaban ANTICOAGULANT (ELIQUIS) 5 MG tablet Take 1 tablet (5 mg) by mouth 2 times daily 180 tablet 1     ascorbic acid (VITAMIN C) 500 MG tablet Take 500 mg by mouth daily.       BIOTIN PO Take by mouth daily       calcium carb 1250 mg, 500 mg [place],/vitamin D 200 units (OSCAL WITH D) 500-200 MG-UNIT per tablet Take 1 tablet by mouth 2 times daily (with meals).       Cetirizine HCl (ZYRTEC PO) Take 10 mg by mouth  daily as needed        clobetasol (CLOBETASOL PROPIONATE EMULSION) 0.05 % CREA Apply topically as needed 15 g 0     clopidogrel (PLAVIX) 75 MG tablet Take 1 tablet (75 mg) by mouth daily 90 tablet 3     coenzyme Q-10 200 MG CAPS Take 200 mg by mouth daily       cyanocolbalamin (VITAMIN B-12) 1000 MCG tablet Take 1,000 mcg by mouth daily.       diphenhydrAMINE (BENADRYL) 25 MG tablet Take 25 mg by mouth as needed for itching or allergies 1/2 tab as needed       fluocinonide (LIDEX) 0.05 % solution Apply topically 2 times daily       Glucosamine-Chondroit-Vit C-Mn (GLUCOSAMINE CHONDR 1500 COMPLX) CAPS Take 1 tablet by mouth daily        levothyroxine (SYNTHROID/LEVOTHROID) 88 MCG tablet Take 1 tablet (88 mcg) by mouth daily 90 tablet 2     Magnesium 300 MG CAPS Take  by mouth.       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     Recurrent vulvar infection 10/18/2011     Steatohepatitis 02/01/2012     Hemorrhoids 03/11/2013     Near syncope 03/11/2013     Lichen sclerosus et atrophicus of the vulva 10/07/2015     Hypothyroidism due to acquired atrophy of thyroid 10/08/2015     Recurrent colitis due to Clostridium difficile 08/01/2016     Dermatitis, seborrheic 02/13/2017     Cherry angioma 02/13/2017     Seborrheic keratosis 02/13/2017     Tinea pedis of both feet 02/13/2017     Anxiety 06/10/2015     Atrial fibrillation (H) 09/03/2015     Hx of fracture of fibula 07/29/2015     Hypokalemia  09/02/2015     Hyponatremia 09/02/2015     Nausea & vomiting 09/02/2015     Osteoarthritis of left ankle 07/21/2015     Postoperative anemia due to acute blood loss 06/08/2015     Primary osteoarthritis of left knee 05/21/2015     S/P total knee arthroplasty 06/08/2015     Vaginal dryness 06/10/2015     Shortness of breath 06/18/2020     NSTEMI (non-ST elevated myocardial infarction) (H) 06/18/2020     Severe aortic stenosis 07/21/2020     Aortic stenosis, severe 07/29/2020     Resolved Ambulatory Problems     Diagnosis Date Noted     Hyperlipidemia 10/11/2011     Eosinophilia 10/11/2011     Fatigue 03/11/2013     Weakness 03/11/2013     Diaphoresis 03/11/2013     Past Medical History:   Diagnosis Date     Allergy      C. difficile colitis August-October 2015     History of chickenpox      Hyperlipidaemia      Need for subacute bacterial endocarditis prophylaxis      Unspecified hypothyroidism 10/11/2011      Social history - reviewed  Social History     Socioeconomic History     Marital status:      Spouse name: Not on file     Number of children: 3     Years of education: Not on file     Highest education level: Not on file   Occupational History     Not on file   Social Needs     Financial resource strain: Not on file     Food insecurity     Worry: Not on file     Inability: Not on file     Transportation needs     Medical: Not on file     Non-medical: Not on file   Tobacco Use     Smoking status: Never Smoker     Smokeless tobacco: Never Used   Substance and Sexual Activity     Alcohol use: No     Drug use: No     Sexual activity: Yes     Birth control/protection: Post-menopausal   Lifestyle     Physical activity     Days per week: Not on file     Minutes per session: Not on file     Stress: Not on file   Relationships     Social connections     Talks on phone: Not on file     Gets together: Not on file     Attends Caodaism service: Not on file     Active member of club or organization: Not on file      Attends meetings of clubs or organizations: Not on file     Relationship status: Not on file     Intimate partner violence     Fear of current or ex partner: Not on file     Emotionally abused: Not on file     Physically abused: Not on file     Forced sexual activity: Not on file   Other Topics Concern      Service Not Asked     Blood Transfusions Not Asked     Caffeine Concern Yes     Comment: coffee, tea     Occupational Exposure Not Asked     Hobby Hazards Not Asked     Sleep Concern Not Asked     Stress Concern Not Asked     Weight Concern Not Asked     Special Diet Not Asked     Back Care Not Asked     Exercise Yes     Comment: swims 3x a week     Bike Helmet Not Asked     Seat Belt Not Asked     Self-Exams Not Asked     Parent/sibling w/ CABG, MI or angioplasty before 65F 55M? Not Asked   Social History Narrative    Lives alone, children nearby        2 boys and 1 girl (1 son )     passed away 24 yo    Labs    [employer]    Previoysly worked in [employer]     Family history -reviewed  Family History   Problem Relation Age of Onset     Cancer Mother         lung cancer     Testicular cancer Father      Abdominal Aortic Aneurysm Brother      Rheumatic fever Brother      Diabetes Brother      Anesthesia Reaction No family hx of      Deep Vein Thrombosis (DVT) No family hx of      ROS: non contributory on the 10-point review of system    Exam:   In general, the patient is in no apparent distress.    Breathing is unlabored.   Neurologic: Alert and oriented to person/place/time, normal speech and affect  Skin: No rash on exposed skin.     Data:  Recent cardiac investigations - reviewed     Paulino 2021  Sinus rhythm with 1 run of SVT (136 bpm)   No atrial flutter or fibrillation    ECG Oct 2020 sinus rhythm with occasional PVCs.       Echocardiogram 2020    Normal biventricular function  S/P TAVR with 23 mm Paez Kyra 3 Ultra prosthesis. Mean gradient 5  mmHg.  Trivial paravalvular AI.  No pericardial effusion is present.    Echocardiogram 6/19/2020:  Moderately (EF 35-40%) reduced left ventricular function is present.  Global right ventricular function is moderately reduced.  Probably moderate aortic stenosis, however accurate assessment of the aortic valve is challenging with atrial fibrillation rhythm. The RCC and NCC appears  partially fused. The Vmax is 2.41 m/s and mean gradient is 13 mmHg. The DANIS is calculated at 1.1 cm2 with a index of 0.54 cm/m2. The dimensionless index is 0.38.  The inferior vena cava was normal in size with preserved respiratory variability.  No pericardial effusion is present.     Coronary Angiogram 6/19/2020:  Left Anterior Descending    Prox LAD to Mid LAD lesion is 90% stenosed. The lesion is segmental. The lesion is calcified.    First Diagonal Branch    The vessel is small. There is mild diffuse disease throughout the vessel.    Second Diagonal Branch    The vessel is small. There is mild diffuse disease throughout the vessel.    Left Circumflex    Prox Cx to Mid Cx lesion is 50% stenosed.    First Obtuse Marginal Branch    The vessel is moderate in size. There is mild diffuse disease throughout the vessel.    Second Obtuse Marginal Branch    The vessel is small. There is moderate diffuse disease throughout the vessel.    Right Coronary Artery    The vessel was visualized by selective angiography. There was 30% diffuse vessel disease.    Intervention      Prox LAD to Mid LAD lesion    Stent    A stent was successfully placed.    Stent    A stent was successfully placed.    Stent    A stent was successfully placed.    There is a 10% residual stenosis post intervention.      Labs - reviewed  Chemistry panel:   Recent Labs   Lab Test 06/30/20  1114 06/20/20  0620  06/18/20  0055 11/04/19  1600  10/18/17  1154  08/07/15  1741    134   < > 136 137   < > 136   < > 132*   POTASSIUM 4.3 4.1   < > 3.5 4.2   < > 3.9   < > 2.9*    CHLORIDE 103 100   < > 104 104   < > 101   < > 98   CO2 28 26   < > 24 28   < > 27   < > 23   ANIONGAP 4 9   < > 8 6   < > 8   < > 11   * 127*   < > 168* 114*   < > 109*   < > 132*   BUN 22 24   < > 18 24   < > 24   < > 28   CR 1.02 1.05*   < > 0.99 1.01   < > 0.87   < > 1.31*   CR 9.4 9.5   < > 8.8 9.2   < > 9.3   < > 8.6   MAG  --   --   --   --  2.5*  --   --   --  1.7   GFRESTIMATED 50* 48*   < > 52* 51*   < > 62   < > 39*   AST  --   --   --  34  --   --  33  --  11   ALT  --   --   --  26  --   --  39  --  20    < > = values in this interval not displayed.       CBC:   Recent Labs   Lab Test 06/20/20  0620 06/19/20  0531   WBC 9.4 7.5   RBC 5.93* 5.53*   HGB 16.6* 15.4   HCT 51.7* 48.0*   MCV 87 87   MCH 28.0 27.8   MCHC 32.1 32.1   RDW 14.3 14.0    202       Lipid Panel:  Recent Labs   Lab Test 06/19/20  2142 11/04/19  1600 10/10/12  1208   CHOL 177 189 205*   HDL 33* 35* 34*   * 111* 128   TRIG 72 218* 216*   CHOLHDLRATIO  --   --  6.1*       Thyroid:   TSH   Date Value Ref Range Status   08/13/2020 1.22 0.40 - 4.00 mU/L Final     T4 Free   Date Value Ref Range Status   05/18/2011 1.16 0.70 - 1.85 ng/dL Final     Comment:     Ordered by lab     Hemoglobin A1C   Date Value Ref Range Status   10/10/2012 6.0 4.3 - 6.0 % Final     INR   Date Value Ref Range Status   07/29/2020 1.15 (H) 0.86 - 1.14 Final       Assessment and Plan:  85 year old female with    Coronary artery disease, no angina  Post PCI to LAD 6/2020  Post TAVR July 2020  Paroxysmal atrial fibrillation on DOAC  Dyslipidemia  Essential hypertension  Mildly reduced LV function,50-55%  Heart failure with reduced ejection fraction, Stage C ->B HF    Dluce Yeh is without any active cardiac issues today. We reviewed her medical regimen in detail today and explained to her the need for both Plavix and Eliquis in her case.  Bleeding has not been minor at this point not requiring blood transfusion or hospitalization.  Blood  pressure and heart rate are well controlled at home. Recent Ziopatch monitoring in January indicates that she is in sinus rhythm with one episode of supraventricular tachycardia.     I have advised Maggy to continue Plavix for another 4 months to complete a full course for the PCI she received in June 2020.  We discussed about reassessing A. fib burden in June to make a determination on continuing the DOAC. She will also need a lipid panel at that time.  I have encouraged her to stay positive and to focus on the fact that she has not required hospitalization for heart failure, angina or atrial fibrillation.  She is agreeable to the plan and will see me in June.    Recommendations:   Continue current cardiac medications  Return to clinic in end of June 2021 - to review antiplatelet therapy   Ziopatch in June 2021  Lipids with AST and ALT in June 2021    Ginger Davis MD, MS  Professor of Medicine  Cardiovascular division        Please do not hesitate to contact me if you have any questions/concerns.     Sincerely,     Ginger Davis MD

## 2021-02-15 ENCOUNTER — AMBULATORY - HEALTHEAST (OUTPATIENT)
Dept: NURSING | Facility: CLINIC | Age: 86
End: 2021-02-15

## 2021-03-08 ENCOUNTER — AMBULATORY - HEALTHEAST (OUTPATIENT)
Dept: NURSING | Facility: CLINIC | Age: 86
End: 2021-03-08

## 2021-03-21 ENCOUNTER — HEALTH MAINTENANCE LETTER (OUTPATIENT)
Age: 86
End: 2021-03-21

## 2021-04-13 ENCOUNTER — DOCUMENTATION ONLY (OUTPATIENT)
Dept: CARE COORDINATION | Facility: CLINIC | Age: 86
End: 2021-04-13

## 2021-04-16 ENCOUNTER — OFFICE VISIT (OUTPATIENT)
Dept: DERMATOLOGY | Facility: CLINIC | Age: 86
End: 2021-04-16
Payer: MEDICARE

## 2021-04-16 DIAGNOSIS — L21.9 DERMATITIS, SEBORRHEIC: ICD-10-CM

## 2021-04-16 DIAGNOSIS — L30.9 DERMATITIS: Primary | ICD-10-CM

## 2021-04-16 DIAGNOSIS — L29.9 PRURITUS: ICD-10-CM

## 2021-04-16 DIAGNOSIS — L85.3 XEROSIS CUTIS: ICD-10-CM

## 2021-04-16 PROCEDURE — 99214 OFFICE O/P EST MOD 30 MIN: CPT | Performed by: DERMATOLOGY

## 2021-04-16 RX ORDER — FLUOCINONIDE TOPICAL SOLUTION USP, 0.05% 0.5 MG/ML
SOLUTION TOPICAL
Qty: 60 ML | Refills: 3 | Status: SHIPPED | OUTPATIENT
Start: 2021-04-16 | End: 2021-06-21

## 2021-04-16 RX ORDER — TRIAMCINOLONE ACETONIDE 1 MG/G
OINTMENT TOPICAL
Qty: 80 G | Refills: 5 | Status: SHIPPED | OUTPATIENT
Start: 2021-04-16 | End: 2021-06-21

## 2021-04-16 RX ORDER — KETOCONAZOLE 20 MG/ML
SHAMPOO TOPICAL
Qty: 120 ML | Refills: 11 | Status: SHIPPED | OUTPATIENT
Start: 2021-04-16 | End: 2021-06-21

## 2021-04-16 ASSESSMENT — PAIN SCALES - GENERAL: PAINLEVEL: MILD PAIN (2)

## 2021-04-16 NOTE — LETTER
4/16/2021       RE: Dulce Yeh  [address] [place] [address]     Dear Colleague,    Thank you for referring your patient, Dulce Yeh, to the [facility] DERMATOLOGY [facility] [place] at [facility]. Please see a copy of my visit note below.    DERMATOLOGY CLINIC VISIT NOTE       CHIEF COMPLAINT:  Itch and skin check.      DERMATOLOGY PROBLEM LIST:   1.  Seborrheic keratoses.   2.  Seborrheic dermatitis.   3.  Onychomycosis.   4.  Tinea pedis.   5.  Xerosis.   6. Pruritus  7. Dermatitis     ASSESSMENT AND PLAN:   1.  Seborrheic keratoses:  Benign hyperkeratotic papules.  No treatment advised.   2.  Solar lentigo on nasal tip:  Benign.  Sun protection advised.   3.  Diffuse pruritus with dermatitis on the scalp, dorsal arms and sacrum.  Differential diagnosis would be due to a medication versus in the setting of nerve senescence versus secondary to metabolic issues, including hypothyroidism.  I recommended gentle skin care using a mild soap and thick emolliate daily.  Lidex solution to the scalp nightly as needed with ketoconazole shampoo twice weekly and triamcinolone 0.1% ointment twice daily to pruritic areas on the trunk and extremities.  Discussed the possibility of drug allergy testing, which the patient declines for now.  We will consider this in the future.   4.  Cherry angiomas:  Benign vascular papules.  No treatment advised.      The patient to return in 2-3 months if ongoing symptoms, but otherwise yearly for skin check.     Aaliyah Cordero MD   of Dermatology  [facility]    _______________________________________  _______________________________________       HISTORY OF PRESENT ILLNESS:  Ms. Yeh is an 86-year-old female returning to Dermatology for pruritus.  She was last seen in clinic by Dr. Mitchell in 2018.  She notes that since that time, she has developed body-wide pruritus that is  worse on the back of her neck, her shoulders, her forearms and her buttocks.  She attributes this to her Plavix versus Eliquis.  She was told, however, it would not be likely that she could stop these medications due to her cardiac disease.  She uses Ivory or Dove soap.  She has tried a variety of moisturizers.  Not currently using any topical medications.  In the past, she was given Lidex solution, which helped significantly with itch of the scalp.      Patient Active Problem List   Diagnosis     Hypertension     Aortic stenosis     Hypothyroid     S/P cholecystectomy     Elevated LFTs     Vulvar dystrophy -- LS     Recurrent vulvar infection     Steatohepatitis     Hemorrhoids     Near syncope     Lichen sclerosus et atrophicus of the vulva     Hypothyroidism due to acquired atrophy of thyroid     Recurrent colitis due to Clostridium difficile     Dermatitis, seborrheic     Cherry angioma     Seborrheic keratosis     Tinea pedis of both feet     Anxiety     Atrial fibrillation (H)     Hx of fracture of fibula     Hypokalemia     Hyponatremia     Nausea & vomiting     Osteoarthritis of left ankle     Postoperative anemia due to acute blood loss     Primary osteoarthritis of left knee     S/P total knee arthroplasty     Vaginal dryness     Shortness of breath     NSTEMI (non-ST elevated myocardial infarction) (H)     Severe aortic stenosis     Aortic stenosis, severe       Allergies   Allergen Reactions     Sulfa Drugs Hives     Clonidine Rash     Diltiazem Rash         Current Outpatient Medications   Medication     apixaban ANTICOAGULANT (ELIQUIS) 5 MG tablet     ascorbic acid (VITAMIN C) 500 MG tablet     BIOTIN PO     calcium carb 1250 mg, 500 mg [place],/vitamin D 200 units (OSCAL WITH D) 500-200 MG-UNIT per tablet     Cetirizine HCl (ZYRTEC PO)     clobetasol (CLOBETASOL PROPIONATE EMULSION) 0.05 % CREA     clopidogrel (PLAVIX) 75 MG tablet     coenzyme Q-10 200 MG CAPS     cyanocolbalamin (VITAMIN B-12) 1000 MCG  tablet     fluocinonide (LIDEX) 0.05 % external solution     Glucosamine-Chondroit-Vit C-Mn (GLUCOSAMINE CHONDR 1500 COMPLX) CAPS     ketoconazole (NIZORAL) 2 % external shampoo     levothyroxine (SYNTHROID/LEVOTHROID) 88 MCG tablet     Magnesium 300 MG CAPS     metoprolol succinate ER (TOPROL-XL) 100 MG 24 hr tablet     triamcinolone (KENALOG) 0.1 % external ointment     valsartan (DIOVAN) 160 MG tablet     diphenhydrAMINE (BENADRYL) 25 MG tablet     No current facility-administered medications for this visit.         SOCIAL HISTORY:  The patient is retired.  She lives in [place].      PHYSICAL EXAMINATION:   GENERAL:  The patient is a healthy-appearing, 86-year-old female in no distress.   HEENT:  Conjunctivae clear.  Glasses in place.   PULMONARY:  Breathing comfortably on room air.   SKIN:  Exam today included the scalp, face, neck, chest, abdomen, back, arms, legs, hands, feet, buttocks.  Skin exam normal except for as follows:   - Xerosis and scale throughout the bilateral lateral scalp and occipital scalp.   - Examination of the back, arms and legs shows scattered, waxy, hyperkeratotic papules and cherry red papules.   - Ill-defined, pink plaques on the superior shoulders, the upper back, the bilateral extensor forearms and the sacrum.   - Varicosities on the bilateral lower extremities.   - Onychorrhexis of fingernail and toenail plates.   - Hyperkeratotic plaque on left plantar foot.   - Light-brown macule on nasal tip.

## 2021-04-16 NOTE — PATIENT INSTRUCTIONS
Gentle Skin Care    Below is a list of products our providers recommend for gentle skin care.  Moisturizers:    Lighter; Exederm Intensive Moisture Cream, Cetaphil Cream, CeraVe, Aveeno Positively radiant and Vanicream Light     Thicker; Aquaphor Ointment, Vaseline, Petroleum Jelly, Eucerin Original Healing Cream and Vanicream, CeraVe Healing Ointment, Aquaphor Body Spray    Avoid Lotions (too thin)  Mild Cleansers:    Dove- Fragrance Free bar or wash    CeraVe     Vanicream Cleansing bar    Cetaphil Cleanser     Aquaphor 2 in1 Gentle Wash and Shampoo    Dove Baby wash    Exederm Body wash       Laundry Products:      All Free and Clear    Cheer Free    Generic Brands are okay as long as they are  Fragrance Free      Avoid fabric softeners  and dryer sheets   Sunscreens: SPF 30 or greater       Sunscreens that contain Zinc Oxide and/or Titanium Dioxide should be applied, these are physical blockers. One or both of these should be listed in the  Active Ingredients     Any other listed ingredients under the active ingredients would be a chemically based sunscreen which might be irritating.    Spray sunscreens should be avoided because these are typically chemical sunscreens.      Shampoo and Conditioners:    Free and Clear by Vanicream    Aquaphor 2 in 1 Gentle Wash and Shampoo   Oils:    Mineral Oil     Emu Oil     For some patients: Coconut (raw, unrefined, organic) and Sunflower seed oil              Generic Products are an okay substitute, but make sure they are fragrance free.  *Reading the product ingredients list is very important  *Avoid product that have fragrance added to them.   *Organic does not mean  fragrance free.  In fact patients with sensitive skin can become quite irritated by some organic products.     1. Daily bathing is recommended. Make sure you are applying a good moisturizer after bathing every time.  2. Use Moisturizing creams at least twice daily to the whole body. Your provider may  recommend a lighter or heavier moisturizer based on your child s severity and that time of year it is.  3. Creams are more moisturizing than lotions.       Care Plan:  1. Keep bathing and showering short, less than 15 minutes   2. Always use lukewarm warm when possible. AVOID HOT or COLD water  3. DO NOT use bubble bath  4. Limit the use of soaps. Focus on the skin folds, face, armpits, groin and feet towards the end of the bath  5. Do NOT vigorously scrub when you cleanse the skin  6. After bathing, PAT your skin lightly with a towel. DO NOT rub or scrub when drying  7. ALWAYS apply a moisturizer immediately after bathing. This helps to  lock in  the moisture. * IF YOU WERE PRESCRIBED A TOPICAL MEDICATION, APPLY YOUR MEDICATION FIRST THEN COVER WITH YOUR DAILY MOISTURIZER  8. Reapply moisturizing agents at least twice daily to your whole body    Other helpful tips:    Do not use products such as powders, perfumes, or colognes on your skin    Diffusers can be harsh on sensitive skin, use with caution if you or your child has sensitive skin     Avoid saunas and steam baths. This temperature is too HOT    Avoid tight or  scratchy  clothing such as wool    Always wash new clothing before wearing them for the first time    Sometimes a humidifier or vaporizer can be used at night can help the dry skin. Remember to keep these items clean to avoid mold growth.

## 2021-04-16 NOTE — NURSING NOTE
Chief Complaint   Patient presents with     Derm Problem     Patient is here today for itching on upper body and a skin exam.      Jazlyn OLVERA CMA

## 2021-04-16 NOTE — PROGRESS NOTES
DERMATOLOGY CLINIC VISIT NOTE       CHIEF COMPLAINT:  Itch and skin check.      DERMATOLOGY PROBLEM LIST:   1.  Seborrheic keratoses.   2.  Seborrheic dermatitis.   3.  Onychomycosis.   4.  Tinea pedis.   5.  Xerosis.   6. Pruritus  7. Dermatitis     ASSESSMENT AND PLAN:   1.  Seborrheic keratoses:  Benign hyperkeratotic papules.  No treatment advised.   2.  Solar lentigo on nasal tip:  Benign.  Sun protection advised.   3.  Diffuse pruritus with dermatitis on the scalp, dorsal arms and sacrum.  Differential diagnosis would be due to a medication versus in the setting of nerve senescence versus secondary to metabolic issues, including hypothyroidism.  I recommended gentle skin care using a mild soap and thick emolliate daily.  Lidex solution to the scalp nightly as needed with ketoconazole shampoo twice weekly and triamcinolone 0.1% ointment twice daily to pruritic areas on the trunk and extremities.  Discussed the possibility of drug allergy testing, which the patient declines for now.  We will consider this in the future.   4.  Cherry angiomas:  Benign vascular papules.  No treatment advised.      The patient to return in 2-3 months if ongoing symptoms, but otherwise yearly for skin check.     Aaliyah Cordero MD   of Dermatology  [facility]    _______________________________________  _______________________________________       HISTORY OF PRESENT ILLNESS:  Ms. Yeh is an 86-year-old female returning to Dermatology for pruritus.  She was last seen in clinic by Dr. Mitchell in 2018.  She notes that since that time, she has developed body-wide pruritus that is worse on the back of her neck, her shoulders, her forearms and her buttocks.  She attributes this to her Plavix versus Eliquis.  She was told, however, it would not be likely that she could stop these medications due to her cardiac disease.  She uses Ivory or Dove soap.  She has tried a variety of moisturizers.  Not  currently using any topical medications.  In the past, she was given Lidex solution, which helped significantly with itch of the scalp.      Patient Active Problem List   Diagnosis     Hypertension     Aortic stenosis     Hypothyroid     S/P cholecystectomy     Elevated LFTs     Vulvar dystrophy -- LS     Recurrent vulvar infection     Steatohepatitis     Hemorrhoids     Near syncope     Lichen sclerosus et atrophicus of the vulva     Hypothyroidism due to acquired atrophy of thyroid     Recurrent colitis due to Clostridium difficile     Dermatitis, seborrheic     Cherry angioma     Seborrheic keratosis     Tinea pedis of both feet     Anxiety     Atrial fibrillation (H)     Hx of fracture of fibula     Hypokalemia     Hyponatremia     Nausea & vomiting     Osteoarthritis of left ankle     Postoperative anemia due to acute blood loss     Primary osteoarthritis of left knee     S/P total knee arthroplasty     Vaginal dryness     Shortness of breath     NSTEMI (non-ST elevated myocardial infarction) (H)     Severe aortic stenosis     Aortic stenosis, severe       Allergies   Allergen Reactions     Sulfa Drugs Hives     Clonidine Rash     Diltiazem Rash         Current Outpatient Medications   Medication     apixaban ANTICOAGULANT (ELIQUIS) 5 MG tablet     ascorbic acid (VITAMIN C) 500 MG tablet     BIOTIN PO     calcium carb 1250 mg, 500 mg [place],/vitamin D 200 units (OSCAL WITH D) 500-200 MG-UNIT per tablet     Cetirizine HCl (ZYRTEC PO)     clobetasol (CLOBETASOL PROPIONATE EMULSION) 0.05 % CREA     clopidogrel (PLAVIX) 75 MG tablet     coenzyme Q-10 200 MG CAPS     cyanocolbalamin (VITAMIN B-12) 1000 MCG tablet     fluocinonide (LIDEX) 0.05 % external solution     Glucosamine-Chondroit-Vit C-Mn (GLUCOSAMINE CHONDR 1500 COMPLX) CAPS     ketoconazole (NIZORAL) 2 % external shampoo     levothyroxine (SYNTHROID/LEVOTHROID) 88 MCG tablet     Magnesium 300 MG CAPS     metoprolol succinate ER (TOPROL-XL) 100 MG 24 hr  tablet     triamcinolone (KENALOG) 0.1 % external ointment     valsartan (DIOVAN) 160 MG tablet     diphenhydrAMINE (BENADRYL) 25 MG tablet     No current facility-administered medications for this visit.         SOCIAL HISTORY:  The patient is retired.  She lives in [place].      PHYSICAL EXAMINATION:   GENERAL:  The patient is a healthy-appearing, 86-year-old female in no distress.   HEENT:  Conjunctivae clear.  Glasses in place.   PULMONARY:  Breathing comfortably on room air.   SKIN:  Exam today included the scalp, face, neck, chest, abdomen, back, arms, legs, hands, feet, buttocks.  Skin exam normal except for as follows:   - Xerosis and scale throughout the bilateral lateral scalp and occipital scalp.   - Examination of the back, arms and legs shows scattered, waxy, hyperkeratotic papules and cherry red papules.   - Ill-defined, pink plaques on the superior shoulders, the upper back, the bilateral extensor forearms and the sacrum.   - Varicosities on the bilateral lower extremities.   - Onychorrhexis of fingernail and toenail plates.   - Hyperkeratotic plaque on left plantar foot.   - Light-brown macule on nasal tip.

## 2021-05-04 ENCOUNTER — ANCILLARY PROCEDURE (OUTPATIENT)
Dept: CARDIOLOGY | Facility: CLINIC | Age: 86
End: 2021-05-04
Attending: INTERNAL MEDICINE
Payer: MEDICARE

## 2021-05-04 DIAGNOSIS — I25.10 CORONARY ARTERY DISEASE INVOLVING NATIVE CORONARY ARTERY OF NATIVE HEART WITHOUT ANGINA PECTORIS: ICD-10-CM

## 2021-05-04 DIAGNOSIS — Z95.2 S/P TAVR (TRANSCATHETER AORTIC VALVE REPLACEMENT): ICD-10-CM

## 2021-05-04 DIAGNOSIS — I48.0 PAROXYSMAL ATRIAL FIBRILLATION (H): ICD-10-CM

## 2021-05-04 LAB
ALT SERPL W P-5'-P-CCNC: 143 U/L (ref 0–50)
AST SERPL W P-5'-P-CCNC: 260 U/L (ref 0–45)
CHOLEST SERPL-MCNC: 160 MG/DL
HDLC SERPL-MCNC: 30 MG/DL
LDLC SERPL CALC-MCNC: 104 MG/DL
NONHDLC SERPL-MCNC: 130 MG/DL
TRIGL SERPL-MCNC: 131 MG/DL

## 2021-05-04 PROCEDURE — 84460 ALANINE AMINO (ALT) (SGPT): CPT | Performed by: PATHOLOGY

## 2021-05-04 PROCEDURE — 84450 TRANSFERASE (AST) (SGOT): CPT | Performed by: PATHOLOGY

## 2021-05-04 PROCEDURE — 80061 LIPID PANEL: CPT | Performed by: PATHOLOGY

## 2021-05-04 PROCEDURE — 93244 EXT ECG>48HR<7D REV&INTERPJ: CPT | Performed by: INTERNAL MEDICINE

## 2021-05-04 PROCEDURE — 93242 EXT ECG>48HR<7D RECORDING: CPT

## 2021-05-04 PROCEDURE — 36415 COLL VENOUS BLD VENIPUNCTURE: CPT | Performed by: PATHOLOGY

## 2021-05-04 NOTE — PROGRESS NOTES
Per Dr. Davis, patient to have Zio monitor placed.  Diagnosis: paroxysmal atrial fibrillation   Monitor placed: Yes  Patient Instructed: Yes  Patient verbalized understanding: Yes  Holter # [id number]

## 2021-05-20 DIAGNOSIS — I10 ESSENTIAL HYPERTENSION: ICD-10-CM

## 2021-05-21 RX ORDER — METOPROLOL SUCCINATE 100 MG/1
100 TABLET, EXTENDED RELEASE ORAL DAILY
Qty: 90 TABLET | Refills: 0 | Status: SHIPPED | OUTPATIENT
Start: 2021-05-21 | End: 2021-08-12

## 2021-05-21 NOTE — TELEPHONE ENCOUNTER
metoprolol succinate ER (TOPROL-XL) 100 MG 24 hr tablet      Last Written Prescription Date:  11/11/2020  Last Fill Quantity: 90,   # refills: 1  Last Office Visit : 12/8/2020  Future Office visit:  none  90 Tabs sent to pharm 5/21/2021      Dora Marks RN  Central Triage Red Flags/Med Refills

## 2021-06-04 VITALS — BODY MASS INDEX: 27.72 KG/M2 | WEIGHT: 177 LBS

## 2021-06-04 VITALS — WEIGHT: 176.8 LBS | BODY MASS INDEX: 27.69 KG/M2

## 2021-06-04 VITALS — BODY MASS INDEX: 27.57 KG/M2 | WEIGHT: 176 LBS

## 2021-06-04 VITALS — WEIGHT: 176 LBS | BODY MASS INDEX: 27.57 KG/M2

## 2021-06-04 VITALS — BODY MASS INDEX: 27.25 KG/M2 | WEIGHT: 174 LBS

## 2021-06-04 VITALS — BODY MASS INDEX: 27.88 KG/M2 | WEIGHT: 178 LBS

## 2021-06-04 VITALS — BODY MASS INDEX: 27.53 KG/M2 | WEIGHT: 175.8 LBS

## 2021-06-05 VITALS — BODY MASS INDEX: 27.63 KG/M2 | WEIGHT: 176.4 LBS

## 2021-06-05 VITALS — BODY MASS INDEX: 27.86 KG/M2 | WEIGHT: 177.9 LBS

## 2021-06-05 VITALS — BODY MASS INDEX: 27.82 KG/M2 | WEIGHT: 177.6 LBS

## 2021-06-05 VITALS — WEIGHT: 176.8 LBS | BODY MASS INDEX: 27.69 KG/M2

## 2021-06-05 VITALS — BODY MASS INDEX: 27.72 KG/M2 | WEIGHT: 177 LBS

## 2021-06-05 VITALS — WEIGHT: 178 LBS | BODY MASS INDEX: 27.88 KG/M2

## 2021-06-05 VITALS — WEIGHT: 177 LBS | BODY MASS INDEX: 27.72 KG/M2

## 2021-06-05 VITALS — WEIGHT: 176 LBS | BODY MASS INDEX: 27.57 KG/M2

## 2021-06-05 VITALS — BODY MASS INDEX: 27.57 KG/M2 | WEIGHT: 176 LBS

## 2021-06-05 VITALS — WEIGHT: 179 LBS | BODY MASS INDEX: 28.04 KG/M2

## 2021-06-05 VITALS — BODY MASS INDEX: 25.06 KG/M2 | WEIGHT: 160 LBS

## 2021-06-10 ENCOUNTER — TELEPHONE (OUTPATIENT)
Dept: CARDIOLOGY | Facility: CLINIC | Age: 86
End: 2021-06-10

## 2021-06-10 DIAGNOSIS — R74.8 ELEVATED LIVER ENZYMES: Primary | ICD-10-CM

## 2021-06-10 NOTE — PROGRESS NOTES
ITP ASSESSMENT   Assessment Day: Initial    Session Number: 1 & 2  Precautions: cardiac sx    Diagnosis: Valve;MI;Stent    Risk Stratification: High    Referring Provider: Tu Hedrick MD  EXERCISE  Exercise Assessment: Initial       6 Minute Walk Test   Pre   Pre Exercise HR: 71                    Pre Exercise BP: 124/72      Peak  Peak HR: 115                   Peak BP: 144/70    Peak feet: 800    Peak O2 SAT: 99    Peak RPE: 4    Peak MPH: 1.52      Symptoms:  Peak Symptoms: mildly winded      5 mins. Post  5 Min Post HR: 78    5 Min Post BP: 130/64                           Exercise Plan  Goals Next 30 days  STG:  Pt. will tolerate 40-50 min. of interval exercise at a 3-4 met level on 2 different modalities . So pt. can resume light cleaning and yard work.    LTG: Pt.wishes to return to going to her cabin alone. ( includes carrying groceries  etc. up a hill to the cabin) Return to [facility] to senior exercise classes, care for her large gargen and to all cleaning tasks. (5-6 met level)    Education Goals: All goals in this section met    Education Goals Met: Patient can state cardiac s/s and appropriate emergency response.;Has system for taking medication.;Medication review.      Exercise Prescription  Exercise Mode: Treadmill;Nustep;Bike;Arm Erg.;Elliptical    Frequency: 2-3x/week    Duration: 40-50 min    Intensity / THR: 20-30 beats above resting heart rate    RPE 11-14  Progression / Met level: 4-5    Resistive Training?: Yes      No data recorded    Interventions  Home Exercise:  Mode: walking    Frequency: daily    Duration: 20-30 min      Education Material : Educational videos;Provide written material;Offer educational classes;Individual education and counseling      Education Completed  Exercise Education Completed: Signs and Symptoms;Medication review;Emergency Plan;Home Exercise;Warm up/cool down;Benefits of Exercise;End point of exercise              Exercise Follow-up/Discharge  Follow  "up/Discharge: Skilled therapy is needed to monitor CV response to exercise. Observe for arrythmias- hx of afib =- flutter. support and educate pt. on safe exercise progression and to identify and understand risk factors.   NUTRITION  Nutrition Assessment: Initial      Nutrition Risk Factors:  Nutrition Risk Factors: Dyslipidemia;Overweight  Cholesterol: 177  LDL: 130  HDL: 33  Triglycerides: 72      Nutrition Plan  Interventions  Other Nutrition Intervention: Diet Class;Therapist/Pt Discussion;Educational Videos;Provide with Written Material      Goals  Nutrition Goals (Next 30 days): Patient can identify their risk factors for CAD;Review Dietitian schedule;Patient will follow a low sodium diet;Patient will follow a low saturated fat diet;Patient will lose weight      Goals Met  Nutrition Goals Met: Completed Nutritional Risk Screen;Provided Rate your Plate Survey      Height, Weight, and  BMI  Weight: 176 lb (79.8 kg)  Height: 5' 7\" (1.702 m)  BMI: 27.56           Other Risk Factors  Other Risk Factor Assessment: Initial      HTN Risk Factor: Hypertension    Hypertension Plan  Goals  HTN Goals: Follow low sodium diet;Exercises regularly    Goals Met  HTN Goals Met: Take medication as prescribed    HTN Interventions  HTN Interventions: Diet consult;Therapist/patient discussion;Provide written material;Offer educational videos;Offer educational classes      Tobacco Risk Factor: NA         PSYCHOSOCIAL  Psychosocial Assessment: Initial       [facility] Q of L Summary Score: 28      PHQ-9 Total Score: 2      Psychosocial Risk Factor: Stress      Psychosocial Plan  Interventions  If PHQ-9 is >9, send letter to MD  Interventions: Provide written material;Individual education and counseling       Education Completed  No data recorded    Goals  Goals (Next 30 days): Identify stressors;Practicing stress management skills      Goals Met  Goals Met: Identified Support system               Signature: " _____________________________________________________________    Date: __________________    Time: __________________

## 2021-06-10 NOTE — TELEPHONE ENCOUNTER
Returned call to pt. Pt reports almost black stools today. She reports darker than normal urine. Urine does get lighter with increase in water intake. Pt will increase water intake. Pt is taking Eliquis and Plavix and is concerned these meds are causing the dark almost black stools. Pt has not taken pepto at all. Routed to provider for review. Meghna Cox RN CORE Care Coordinator

## 2021-06-10 NOTE — TELEPHONE ENCOUNTER
[facility] Call Center    Phone Message    May a detailed message be left on voicemail: yes     Reason for Call: Symptoms or Concerns     If patient has red-flag symptoms, warm transfer to triage line    Current symptom or concern: Pt called in reporting black stools and very yellow urine, even when she drinks a lot of water.     Symptoms have been present for: shortly after she started taking her new medications(did not give names of medications, just that they were Rx'd by Dr. Davis)    Has patient previously been seen for this? No    By :     Date:     Are there any new or worsening symptoms? No      Action Taken: Message routed to:  [facility] ([facility]): Cardio    Travel Screening: Not Applicable

## 2021-06-10 NOTE — PROGRESS NOTES
Cardiac Rehab  Phase II Assessment    Assessment Date: 8/18/20    Diagnosis: TAVR Date of Onset: 8/29/20                     NSTEMI/LAD STENT x 3   Date: 6/18/20  ICD/Pacemaker: No   Post-op Complications: none  ECG History:SR, AFB/FLUTTER, L BBB EF%: 45-50  Past Medical History: HTN.Hyperlipidemia, mild pulm. Htn., afib/flutter, L TKA, L ankle pinning, athritis-all jts. hands matt., sciatic nerve pain L L/E    Physical Assessment  Precautions/ Physical Limitations: arthritis  Oxygen: No  O2 Sats: 99 Lung Sounds: clear Edema: no  Incisions: good  Sleeping Pattern: good   Appetite: good   Nutrition Risk Screen: WNL    Pain  Location: L L/E  Characteristics:Sciatic pain down buttocks and thigh  Intensity: (0-10 scale) 2  Current Pain Management: tylenol  Intervention: prn  Response: fair-poor relief    Psychosocial/ Emotional Health  1. In the past 12 months, have you been in a relationship where you have been abused physically, emotionally, sexually or financially? No  notified: NA  2. Who do you turn to for emotional support?: supportive family and friends nearby  3. Do you have cultural or spiritual needs? No  4. Have there been any major life changes in the past 12 months? No    Referral Information  Primary Physician: Gilda Hogan MD  Cardiologist: Dr. 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 Living Accommodations: Home Steps: Yes      Support people at home: lives alone, son staying with her short term   2. Marital Status:   3. Family is able to assist with cares      Zoroastrianism/Community involvement: yes  4. Recreation/Hobbies: gardening, [place]

## 2021-06-10 NOTE — TELEPHONE ENCOUNTER
Per Dr. Davis, She has had 2 negative Zio for atrial fib - so we can stop eliquis. Pt also needs liver US and repeat liver lab work. Orders placed and message sent to clinic coordinators requesting they call pt and help her schedule. Pt is aware and awaiting phone call. Meghna Cox RN CORE Care Coordinator

## 2021-06-11 NOTE — PROGRESS NOTES
ITP ASSESSMENT   Assessment Day: 30 Day    Session Number: 6  Precautions: cardiac sx    Diagnosis: Valve;MI;Stent    Risk Stratification: High    Referring Provider: Tu Hedrick MD  EXERCISE  Exercise Assessment: Reassessment                           Exercise Plan  Goals Next 30 days   Pt. will tolerate 40-50 min. of interval exercise at a 3-4 MET level on 2 different modalities . So pt. can resume moderate cleaning and yard work.     LTG: Pt.wishes to return to going to her cabin alone. ( includes carrying groceries  etc. up a hill to the cabin) Return to [facility] to senior exercise classes, care for her large garden and to all cleaning tasks. (5-6 met level)    Education Goals: All goals in this section met    Education Goals Met: Patient can state cardiac s/s and appropriate emergency response.;Has system for taking medication.;Medication review.                          Goals Met  Initial goals met: Goal partially met. Patient has resumed light cleaning without any issues.    Initial Progression: Patient has achieved a MET level of 2.2 on the Nustep, 1.8 on the arm ergometer, and 1.6 on the treadmill for total time of 25-30 minutes. Patient has returned to light cleaning.  Will leave goal the same except modify cleaning. Patient missed one week of Cardiac Rehab due to going to her cabin with her son. Patient's son did all the lifting. Patient has started to walk 20 minutes 3-4 days a week. Patient stated that she has felt stronger the last 2 days.      Exercise Prescription  Exercise Mode: Treadmill;Nustep;Bike;Arm Erg.;Elliptical    Frequency: 2-3x/week    Duration: 40-50 minutes    Intensity / THR: 20-30 beats above resting heart rate    RPE 11-14  Progression / Met level: 3-4    Resistive Training?: Yes      Current Exercise (mins/week): 90      Interventions  Home Exercise:  Mode: walking    Frequency: 3-4 days per week    Duration: 20 minutes      Education Material : Educational videos;Provide  "written material;Offer educational classes;Individual education and counseling      Education Completed  Exercise Education Completed: Signs and Symptoms;Medication review;Emergency Plan;Home Exercise;Warm up/cool down;Benefits of Exercise;End point of exercise              Exercise Follow-up/Discharge  Follow up/Discharge: Skilled therapy is needed to monitor CV response to exercise. Observe for arrythmias- hx of afib =- flutter. support and educate pt. on safe exercise progression and to identify and understand risk factors.           NUTRITION  Nutrition Assessment: Reassessment      Nutrition Risk Factors:  Nutrition Risk Factors: Dyslipidemia;Overweight  Cholesterol: 177  LDL: 130  HDL: 33  Triglycerides: 72      Nutrition Plan  Interventions  Other Nutrition Intervention: Diet Class;Therapist/Pt Discussion;Educational Videos;Provide with Written Material        Goals  Nutrition Goals (Next 30 days): Patient can identify their risk factors for CAD;Patient will follow a low saturated fat diet;Patient will lose weight      Goals Met  Nutrition Goals Met: Completed Nutritional Risk Screen;Provided Rate your Plate Survey;Patient follows a low sodium diet;Reviewed Dietitian schedule      Height, Weight, and  BMI  Weight: 175 lb 12.8 oz (79.7 kg)  Height: 5' 7\" (1.702 m)  BMI: 27.53      Nutrition Follow-up  Follow-up/Discharge: RD appt 9/15/20       Other Risk Factors  Other Risk Factor Assessment: Reassessment      HTN Risk Factor: Hypertension      Pre Exercise BP: 118/64  Post Exercise BP: 120/62      Hypertension Plan  Goals  HTN Goals: Follow low sodium diet;Exercises regularly      Goals Met  HTN Goals Met: Take medication as prescribed;Follow low sodium diet      HTN Interventions  HTN Interventions: Diet consult;Therapist/patient discussion;Provide written material;Offer educational videos;Offer educational classes      HTN Education Completed  HTN Education Completed: Low sodium diet      Tobacco Risk " Factor: NA             PSYCHOSOCIAL  Psychosocial Assessment: Reassessment       [facility] Q of L Summary Score: 28      PHQ-9 Total Score: 2      Psychosocial Risk Factor: Stress      Psychosocial Plan  Interventions  Interventions: Provide written material;Individual education and counseling        Goals  Goals (Next 30 days): Practicing stress management skills      Goals Met  Goals Met: Identified Support system;Identify stressors      Psychosocial Follow-up  Follow-up/Discharge: Patient's biggest stress is that her friend's daughter is dying. Patient has a good support system but COVID has made it harder to socialize.           Patient involved in Goal setting?: Yes      Signature: _____________________________________________________________    Date: __________________    Time: __________________

## 2021-06-11 NOTE — PROGRESS NOTES
ITP ASSESSMENT   Assessment Day: 60 Day    Session Number: 13  Precautions: Cardiac SX    Diagnosis: Valve;MI;Stent    Risk Stratification: High    Referring Provider: Tu Hedrick MD  EXERCISE  Exercise Assessment: Reassessment                                  Exercise Plan  Goals Next 30 days  ADL'S: Pt. will tolerate 40-50 min. of interval exercise at a 3-4 MET level on 2 different modalities . So pt. can resume moderate cleaning and yard work.    Leisure: LTG: Pt.wishes to return to going to her cabin alone. ( includes carrying groceries  etc. up a hill to the cabin) Return to [facility] to senior exercise classes, care for her large garden and to all cleaning tasks. (5-6 met level)        Education Goals: All goals in this section met    Education Goals Met: Patient can state cardiac s/s and appropriate emergency response.;Has system for taking medication.;Medication review.                          Goals Met    Initial ADL's goals met: Goal partially met pt. is tolerating 50 min. at a 2.4-2.7 met level.    No data recorded  No data recorded  Initial Progression: Pt. has progressed from 2.2-2.7 mets. She continues to go in/out of afib, which is concerning for pt.  She has resumed light housework and yard work, takes breaks when needed. Will continue with same goals. Pt. states she has more energy and confidence, Will continue to provide support and encouragement. Pt. is able to identify her personal risk factors of HTN, Hyperlipidemia  and being slightly overwt.      Exercise Prescription  Exercise Mode: Treadmill;Nustep;Bike;Arm Erg.;Elliptical    Frequency: 2-3x week    Duration: 45-55 minutes    Intensity / THR: 20-30 beats above resting heart rate    RPE 11-14  Progression / Met level: 3-4    Resistive Training?: Yes      Current Exercise (mins/week): 240      Interventions  Home Exercise:  Mode: walking    Frequency: daily    Duration: 20-40 min.      Education Material : Educational videos;Provide  "written material;Offer educational classes;Individual education and counseling      Education Completed  Exercise Education Completed: Signs and Symptoms;Medication review;Emergency Plan;Home Exercise;Warm up/cool down;Benefits of Exercise;End point of exercise;RPE;FITT Principles              Exercise Follow-up/Discharge  Follow up/Discharge: Skilled therapy is needed to monitor CV response to exercise. Observe for arrythmias- hx of afib / flutter, she currently is going in/out of afib/flutter with exercise, feed back has been given to MAmmonD. Provide support and educate pt. on safe exercise progression . Will provide feedback on vitals and rhythm as she increases her work loads to build more confidence. Will also provide education on  identified risk factors. By setting up a dietary consult.   NUTRITION  Nutrition Assessment: Reassessment      Nutrition Risk Factors:  Nutrition Risk Factors: Dyslipidemia;Overweight  Cholesterol: 177  LDL: 130  HDL: 33  Triglycerides: 72      Nutrition Plan  Interventions    Other Nutrition Intervention: Diet Class;Therapist/Pt Discussion;Educational Videos;Provide with Written Material    Initial Rate Your Plate Score: 49      Education Completed  Nutrition Education Completed: Risk factor overview      Goals  Nutrition Goals (Next 30 days): Patient will follow a low saturated fat diet;Patient will lose weight      Goals Met  Nutrition Goals Met: Completed Nutritional Risk Screen;Provided Rate your Plate Survey;Patient follows a low sodium diet;Reviewed Dietitian schedule;Patient can identify their risk factors for CAD      Height, Weight, and  BMI  Weight: 176 lb (79.8 kg)  Height: 5' 7\" (1.702 m)  BMI: 27.56      Nutrition Follow-up  Follow-up/Discharge: Pt. latisha RANGEL appt. for 10/8/20         Other Risk Factors  Other Risk Factor Assessment: Reassessment      HTN Risk Factor: Hypertension      Pre Exercise BP: 136/70  Post Exercise BP: 122/70      Hypertension Plan  Goals  HTN Goals: " Patient demonstrates understanding of HTN, no goals identified for the next 30 days      Goals Met  HTN Goals Met: Take medication as prescribed;Follow low sodium diet;Exercises regularly      HTN Interventions  HTN Interventions: Diet consult;Therapist/patient discussion;Provide written material;Offer educational videos;Offer educational classes      HTN Education Completed  HTN Education Completed: Low sodium diet      Tobacco Risk Factor: NA       PSYCHOSOCIAL  Psychosocial Assessment: Reassessment       [facility] COOP Q of L Summary Score: 28      PHQ-9 Total Score: 2      Psychosocial Risk Factor: Stress      Psychosocial Plan  Interventions  Interventions: Provide written material;Offer educational videos and classes;Individual education and counseling      Education Completed  Education Completed: Relaxation/Coping Techniques      Goals  Goals (Next 30 days): Oriented to stress management classes;Improvement in [employer] COOP score      Goals Met  Goals Met: Identified Support system;Identify stressors;Practicing stress management skills      Psychosocial Follow-up  Follow-up/Discharge: Discussed exercise as a stress reliever. Pt. is thinking about returning to the [facility] .             Patient involved in Goal setting?: Yes      Signature: _____________________________________________________________    Date: __________________    Time: D__________________

## 2021-06-12 NOTE — PROGRESS NOTES
ITP ASSESSMENT   Assessment Day: 90 Day    Session Number: 17  Precautions: Cardiac Symptoms    Diagnosis: Valve;MI;Stent    Risk Stratification: High    Referring Provider: Tu Hedrick MD  EXERCISE  Exercise Assessment: Reassessment                              Exercise Plan  Goals Next 30 days  ADL'S: Pt. will tolerate 40-50 min. of interval exercise at a 3-4 MET level on 2 different modalities . So pt. can resume moderate cleaning and yard work.    Leisure: LTG: Pt.wishes to return to going to her cabin alone. ( includes carrying groceries  etc. up a hill to the cabin) Return to [facility] to senior exercise classes, care for her large garden and to all cleaning tasks. (5-6 met level)    No data recorded    Education Goals: All goals in this section met    Education Goals Met: Patient can state cardiac s/s and appropriate emergency response.;Has system for taking medication.;Medication review.                          Goals Met  60 Day Progression: Pt. has partially met goal . Has exercised at up to a 2.8 met level for up to 40 minutes. Continue with same goals to achieve a3-4 met level for up to 50 minutes.        Initial ADL's goals met: Goal partially met pt. is tolerating 50 min. at a 2.4-2.7 met level.    Initial Progression: Pt. has progressed from 2.2-2.7 mets. She continues to go in/out of afib, which is concerning for pt.  She has resumed light housework and yard work, takes breaks when needed. Will continue with same goals. Pt. states she has more energy and confidence, Will continue to provide support and encouragement. Pt. is able to identify her personal risk factors of HTN, Hyperlipidemia  and being slightly overwt.      Exercise Prescription  Exercise Mode: Treadmill;Nustep;Bike;Arm Erg.;Elliptical    Frequency: 2-3 x/week    Duration: 45-55 minutes    Intensity / THR: 20-30 beats above resting heart rate    RPE 11-14  Progression / Met level: 3-4     Resistive Training?: Yes      Current  "Exercise (mins/week): 240      Interventions  Home Exercise:  Mode: walking    Frequency: daily    Duration: 20-40      Education Material : Educational videos;Provide written material;Offer educational classes;Individual education and counseling      Education Completed  Exercise Education Completed: Signs and Symptoms;Medication review;Emergency Plan;Home Exercise;Warm up/cool down;Benefits of Exercise;End point of exercise;RPE;FITT Principles              Exercise Follow-up/Discharge  Follow up/Discharge: Skilled therapy is needed to monitor CV response to exercise. Observe for arrythmias- hx of afib / flutter, she currently is going in/out of afib/flutter with exercise, feed back has been given to M.D. Provide support and educate pt. on safe exercise progression . Will provide feedback on vitals and rhythm as she increases her work loads to build more confidence. Will also provide education on  identified risk factors. By setting up a dietary consult.   NUTRITION  Nutrition Assessment: Reassessment      Nutrition Risk Factors:  Nutrition Risk Factors: Dyslipidemia;Overweight  Cholesterol: 177  LDL: 130  HDL: 33  Triglycerides: 72      Nutrition Plan  Interventions  No data recorded  Other Nutrition Intervention: Diet Class;Therapist/Pt Discussion;Educational Videos;Provide with Written Material    Initial Rate Your Plate Score: 49        Education Completed  Nutrition Education Completed: Risk factor overview      Goals  Nutrition Goals (Next 30 days): Patient will lose weight      Goals Met  Nutrition Goals Met: Patient can identify their risk factors for CAD;Patient follows a low sodium diet;Completed Nutritional Risk Screen;Provided Rate your Plate Survey;Reviewed Dietitian schedule;Patient states following a low saturated fat diet      Height, Weight, and  BMI  Weight: 178 lb (80.7 kg)  Height: 5' 7\" (1.702 m)  BMI: 27.87      Nutrition Follow-up  Follow-up/Discharge: Pt. latisha RANGEL appt. for 10/8/20     "     Other Risk Factors  Other Risk Factor Assessment: Reassessment      HTN Risk Factor: Hypertension      Pre Exercise BP: 128/80  Post Exercise BP: 104/52      Hypertension Plan  Goals  HTN Goals: Patient demonstrates understanding of HTN, no goals identified for the next 30 days      Goals Met  HTN Goals Met: Take medication as prescribed;Follow low sodium diet;Exercises regularly      HTN Interventions  HTN Interventions: Diet consult;Therapist/patient discussion;Provide written material;Offer educational videos;Offer educational classes      HTN Education Completed  HTN Education Completed: Low sodium diet;Medication review;Risk factor overview      Tobacco Risk Factor: NA       PSYCHOSOCIAL  Psychosocial Assessment: Reassessment       [facility] Q of L Summary Score: 28      PHQ-9 Total Score: 2      Psychosocial Risk Factor: Stress      Psychosocial Plan  Interventions  Interventions: Provide written material;Offer educational videos and classes;Individual education and counseling      Education Completed  Education Completed: Relaxation/Coping Techniques      Goals  Goals (Next 30 days): Oriented to stress management classes;[facility]      Goals Met  Goals Met: Identified Support system;Identify stressors;Practicing stress management skills      Psychosocial Follow-up  Follow-up/Discharge: Discussed exercise as a stress reliever. Pt. is thinking about returning to the [facility] .             Patient involved in Goal setting?: Yes      Signature: _____________________________________________________________    Date: __________________    Time: __________________

## 2021-06-13 NOTE — PROGRESS NOTES
.ITP ASSESSMENT   Assessment Day: 120 Day    Session Number: 17  Precautions: Cardiac Symptoms    Diagnosis: Valve;MI;Stent    Risk Stratification: High    Referring Provider: Tu Hedrick MD  EXERCISE  Exercise Assessment: Reassessment                              Exercise Plan  Goals Next 30 days  ADL'S: Pt. will tolerate 40-50 min. of interval exercise at a 3-4 MET level on 2 different modalities . So pt. can resume moderate cleaning and yard work. and have the confidence to return to the [facility] when Covid is uner better controll    Leisure: LTG: Pt.wishes to return to going to her cabin alone. ( includes carrying groceries  etc. up a hill to the cabin) Return to [facility] to senior exercise classes, care for her large garden and to all cleaning tasks. (5-6 met level)        Education Goals: All goals in this section met    Education Goals Met: Patient can state cardiac s/s and appropriate emergency response.;Has system for taking medication.;Medication review.                          Goals Met  90 Day Progress: Pt. has partially met goal . Has exercised at up to a 2.8 met level for up to 40 minutes. Continue with same goals to achieve a3-4 met level for up to 50 minutes. Will introduce interval exercise into her exercise program.      60 Day Progression: Pt. has partially met goal . Has exercised at up to a 2.8 met level for up to 40 minutes. Continue with same goals to achieve a3-4 met level for up to 50 minutes.      30 day ADL'S goals met: Goal partially met pt. is tolerating 50 min. at a 2.4-2.7 met level.    30 Day Progression: Pt. has progressed from 2.2-2.7 mets. She continues to go in/out of afib, which is concerning for pt.  She has resumed light housework and yard work, takes breaks when needed. Will continue with same goals. Pt. states she has more energy and confidence, Will continue to provide support and encouragement. Pt. is able to identify her personal risk factors of HTN, Hyperlipidemia  and  being slightly overwt.      Initial ADL's goals met: Goal partially met. Patient has resumed light cleaning without any issues.    Initial Progression: Pt. has progressed from 2.2-2.7 mets. She continues to go in/out of afib, which is concerning for pt.  She has resumed light housework and yard work, takes breaks when needed. Will continue with same goals. Pt. states she has more energy and confidence, Will continue to provide support and encouragement. Pt. is able to identify her personal risk factors of HTN, Hyperlipidemia  and being slightly overwt.      Exercise Prescription  Exercise Mode: Treadmill;Nustep;Bike;Arm Erg.;Elliptical    Frequency: 2-3 x/week    Duration: 45-55 minutes    Intensity / THR: 20-30 beats above resting heart rate    RPE 11-14  Progression / Met level: 3-4    Resistive Training?: Yes      Current Exercise (mins/week): 250      Interventions  Home Exercise:  Mode: walking    Frequency: daily    Duration: 30-60 min      Education Material : Educational videos;Provide written material;Offer educational classes;Individual education and counseling      Education Completed  Exercise Education Completed: Signs and Symptoms;Medication review;Emergency Plan;Home Exercise;Warm up/cool down;Benefits of Exercise;End point of exercise;RPE;FITT Principles              Exercise Follow-up/Discharge  Follow up/Discharge: Skilled therapy is needed to monitor CV response to exercise. Observe for arrythmias- hx of afib / flutter, she currently is going in/out of afib/flutter with exercise, feed back has been given to M.D. Provide support and educate pt. on safe exercise progression and introduce interval exercise, to increase to higher met levels.  Will provide feedback on vitals and rhythm to increase confidence.    NUTRITION  Nutrition Assessment: Reassessment      Nutrition Risk Factors:  Nutrition Risk Factors: Dyslipidemia;Overweight  Cholesterol: 177  LDL: 130  HDL: 33  Triglycerides:  "72      Nutrition Plan  Interventions    Other Nutrition Intervention: Diet Class;Therapist/Pt Discussion;Educational Videos;Provide with Written Material    Initial Rate Your Plate Score: 49        Education Completed  Nutrition Education Completed: Risk factor overview      Goals  Nutrition Goals (Next 30 days): Patient will lose weight      Goals Met  Nutrition Goals Met: Patient can identify their risk factors for CAD;Patient follows a low sodium diet;Completed Nutritional Risk Screen;Provided Rate your Plate Survey;Reviewed Dietitian schedule;Patient states following a low saturated fat diet      Height, Weight, and  BMI  Weight: 177 lb (80.3 kg)  Height: 5' 7\" (1.702 m)  BMI: 27.72      Nutrition Follow-up  Follow-up/Discharge: Pt. is no longer interested in meeting with a dietitian as she feel she has appropriate knowledge.          Other Risk Factors  Other Risk Factor Assessment: Reassessment      HTN Risk Factor: Hypertension      Pre Exercise BP: 122/70  Post Exercise BP: 118/60      Hypertension Plan  Goals  HTN Goals: Patient demonstrates understanding of HTN, no goals identified for the next 30 days      Goals Met  HTN Goals Met: Take medication as prescribed;Follow low sodium diet;Exercises regularly      HTN Interventions  HTN Interventions: Diet consult;Therapist/patient discussion;Provide written material;Offer educational videos;Offer educational classes      HTN Education Completed  HTN Education Completed: Low sodium diet;Medication review;Risk factor overview      Tobacco Risk Factor: NA       PSYCHOSOCIAL  Psychosocial Assessment: Reassessment       [place] LEE of L Summary Score: 28      PHQ-9 Total Score: 2      Psychosocial Risk Factor: Stress      Psychosocial Plan  Interventions  Interventions: Provide written material;Offer educational videos and classes;Individual education and counseling      Education Completed  Education Completed: Relaxation/Coping Techniques      Goals  Goals " (Next 30 days): Oriented to stress management classes;Improvement in [facility] COOP score      Goals Met  Goals Met: Identified Support system;Identify stressors;Practicing stress management skills      Psychosocial Follow-up  Follow-up/Discharge: Pt. is trying to exercise daily.             Patient involved in Goal setting?: Yes      Signature: _____________________________________________________________    Date: __________________    Time: __________________

## 2021-06-13 NOTE — PROGRESS NOTES
ITP ASSESSMENT   Assessment Day: 150 Day    Session Number: 24  Precautions: Cardiac Symptoms    Diagnosis: Valve;MI;Stent    Risk Stratification: High    Referring Provider: Tu Hedrick MD  EXERCISE  Exercise Assessment: Reassessment                              Exercise Plan  Goals Next 30 days  ADL'S: Pt. will tolerate 40-50 min. of interval exercise at a 3-4 MET level on 2 different modalities, so pt. can resume moderate cleaning and yard work. and have the confidence to return to the [facility] when Covid is under better control    Leisure: LTG: Pt.wishes to return to going to her cabin alone. ( includes carrying groceries  etc. up a hill to the cabin) Return to [facility] to senior exercise classes, care for her large garden and to all cleaning tasks. (5-6 met level)        Education Goals: All goals in this section met    Education Goals Met: Patient can state cardiac s/s and appropriate emergency response.;Has system for taking medication.;Medication review.                          Goals Met                                    120 Day Progress: Pt. has partially met her goal and has exercised up to a 3.5 met level for up to 40 minutes. Pt.'s. progress has been hindered by frequent headaches which are now under better control.      90 Day Progress: Pt. has partially met goal . Has exercised at up to a 2.8 met level for up to 40 minutes. Continue with same goals to achieve a3-4 met level for up to 50 minutes. Will introduce interval exercise into her exercise program.      60 Day Progression: Pt. has partially met goal . Has exercised at up to a 2.8 met level for up to 40 minutes. Continue with same goals to achieve a3-4 met level for up to 50 minutes.      30 day ADL'S goals met: Goal partially met pt. is tolerating 50 min. at a 2.4-2.7 met level.    30 Day Progression: Pt. has progressed from 2.2-2.7 mets. She continues to go in/out of afib, which is concerning for pt.  She has resumed light housework and yard  work, takes breaks when needed. Will continue with same goals. Pt. states she has more energy and confidence, Will continue to provide support and encouragement. Pt. is able to identify her personal risk factors of HTN, Hyperlipidemia  and being slightly overwt.      Initial ADL's goals met: Goal partially met. Patient has resumed light cleaning without any issues.    Initial Progression: Pt. has progressed from 2.2-2.7 mets. She continues to go in/out of afib, which is concerning for pt.  She has resumed light housework and yard work, takes breaks when needed. Will continue with same goals. Pt. states she has more energy and confidence, Will continue to provide support and encouragement. Pt. is able to identify her personal risk factors of HTN, Hyperlipidemia  and being slightly overwt.      Exercise Prescription  Exercise Mode: Treadmill;Nustep;Bike;Arm Erg.;Elliptical    Frequency: 2-3x/week    Duration: 45-55 minutes    Intensity / THR: 20-30 beats above resting heart rate    RPE 11-14  Progression / Met level: 3-4    Resistive Training?: Yes      Current Exercise (mins/week): 250      Interventions  Home Exercise:  Mode: walking    Frequency: daily    Duration: 30-60 mintes      Education Material : Educational videos;Provide written material;Offer educational classes;Individual education and counseling      Education Completed  Exercise Education Completed: Signs and Symptoms;Medication review;Emergency Plan;Home Exercise;Warm up/cool down;Benefits of Exercise;End point of exercise;RPE;FITT Principles              Exercise Follow-up/Discharge  Follow up/Discharge: Skilled therapy is needed to monitor CV response to exercise. Observe for arrythmias- hx of afib / flutter, she currently is going in/out of afib/flutter with exercise, feed back has been given to M.D. Provide support and educate pt. on safe exercise progression and introduce interval exercise, to increase to higher met levels.  Will provide feedback  "on vitals and rhythm to increase confidence.    NUTRITION  Nutrition Assessment: Reassessment      Nutrition Risk Factors:  Nutrition Risk Factors: Dyslipidemia;Overweight  Cholesterol: 177  LDL: 130  HDL: 33  Triglycerides: 72      Nutrition Plan  Interventions  No data recorded  Other Nutrition Intervention: Diet Class;Therapist/Pt Discussion;Educational Videos;Provide with Written Material      Education Completed  Nutrition Education Completed: Low Saturated fat diet;Risk factor overview;Low sodium diet      Goals  Nutrition Goals (Next 30 days): Patient will lose weight;Patient demonstrated understanding of cardiac nutrition, no goals identified for the next 30 days      Goals Met  Nutrition Goals Met: Patient can identify their risk factors for CAD;Patient follows a low sodium diet;Completed Nutritional Risk Screen;Provided Rate your Plate Survey;Patient states following a low saturated fat diet      Height, Weight, and  BMI  Weight: 177 lb (80.3 kg)  Height: 5' 7\" (1.702 m)  BMI: 27.72    Pre BMI: 27.72     Nutrition Follow-up  Follow-up/Discharge: Pt. is no longer interested in meeting with a dietitian as she feel she has appropriate knowledge.          Other Risk Factors  Other Risk Factor Assessment: Reassessment      HTN Risk Factor: Hypertension      Pre Exercise BP: 122/56  Post Exercise BP: 102/60      Hypertension Plan  Goals  HTN Goals: Patient demonstrates understanding of HTN, no goals identified for the next 30 days      Goals Met  HTN Goals Met: Follow low sodium diet;Take medication as prescribed;Exercises regularly      HTN Interventions  HTN Interventions: Diet consult;Therapist/patient discussion;Provide written material;Offer educational videos;Offer educational classes      HTN Education Completed  HTN Education Completed: Low sodium diet;Medication review;Risk factor overview      Tobacco Risk Factor: NA       PSYCHOSOCIAL  Psychosocial Assessment: Reassessment       [place] COOP Q of L " Summary Score: 28    Pre [facility] Q of L Summary Score: 28     PHQ-9 Total Score: 2    Pre PHQ-9 Total Score: 2     Psychosocial Risk Factor: Stress      Psychosocial Plan  Interventions  If PHQ-9 is >9, send letter to MD  Interventions: Provide written material;Offer educational videos and classes;Individual education and counseling      Education Completed  Education Completed: Relaxation/Coping Techniques      Goals  Goals (Next 30 days): Improvement in [facility] CO score      Goals Met  Goals Met: Identified Support system;Identify stressors;Practicing stress management skills;Oriented to stress management classes      Psychosocial Follow-up  Follow-up/Discharge: Pt.is tryingto establish a regular exercise routin, now that th [facility] is closed again.             Patient involved in Goal setting?: Yes      Signature: _____________________________________________________________    Date: __________________    Time: __________________

## 2021-06-14 ENCOUNTER — TELEPHONE (OUTPATIENT)
Dept: INTERNAL MEDICINE | Facility: CLINIC | Age: 86
End: 2021-06-14

## 2021-06-14 DIAGNOSIS — Z95.5 STATUS POST INSERTION OF DRUG ELUTING CORONARY ARTERY STENT: ICD-10-CM

## 2021-06-14 RX ORDER — CLOPIDOGREL BISULFATE 75 MG/1
75 TABLET ORAL DAILY
Qty: 90 TABLET | Refills: 1 | Status: SHIPPED | OUTPATIENT
Start: 2021-06-14 | End: 2021-06-30

## 2021-06-14 NOTE — PATIENT INSTRUCTIONS - HE
[facility] Home Exercise Program    Exercise Goal:  30-60 minutes of aerobic exercise 5-6x/week and 2-3x/week of strength training  Modality Duration Intensity   Warm-up 5 minutes slow   Classes at [facility] 45 minutes 4-7   Nustep  45 minutes Profile 3 level 4  spm 2.5-2.8 METs   swimming 60 minutes 4-7   Yoga class 45 minutes    Cool Down 5 minutes slow     Target Heart Rate: 20-30 bpm>RHR (Resting Heart Rate)    Range of Perceived Exertion: 4-7    Special Comments/ Recommendations:  -Lipid Profile with Physician  -Continue to follow low fat, low salt, heart healthy diet  -A well rounded exercise program will include aerobic/cardiovascular exercise (e.g. walking, biking, or swimming) strength training (e.g. free weights, exercise bands, or weight machines) and stretching program.    Stop Exercise!!! If any of the following occur:    Angina/chest pain    Dizziness    Excessive perspiration/cold sweats    Abnormal shortness of breath    Changes in heart rate (slow, fast, irregular)    Sudden fatigue or numbness    Nausea      Also...    Avoid extreme temperatures - exercise indoors if necessary    Wait at least 1 hour after a meal before strenuous activity    Do not exercise if you have a fever or are ill    Wear comfortable, supportive athletic clothing

## 2021-06-14 NOTE — PROGRESS NOTES
Maggy WADE Alhyacinth has participated in 28 sessions of Phase II Cardiac Rehab.    Progress Report:   Cardiac Rehab Treatment Progress Report 12/10/2020 12/15/2020 12/17/2020 12/22/2020 12/29/2020   Weight 177 lbs 177 lbs 176 lbs 13 oz 177 lbs 179 lbs   Pre Exercise  HR 68 67 62 69 633   Pre Exercise /56 112/52 116/52 120/52 116/68   Treadmill Peak HR 85 78 84 - -   Treadmill Peak Blood Pressure 138/66 146/68 136/74 - -   Nustep Peak Heart Rate 73 - 78 - -   Heart Rate 66 68 64 70 -   Post Exercise /60 108/54 118/56 114/60 -   ECG SR SR SR SR rare PVC's -   Total Exercise Minutes 40 41 40 40 40         Current Status:  Currently exercising without complaints or symptoms.    If Physician recommends change in treatment plan, please place orders.        __________________________________________________      _____________  Signature                                                                                                  Date

## 2021-06-14 NOTE — TELEPHONE ENCOUNTER
[facility] Call Center    Phone Message    May a detailed message be left on voicemail: yes     Reason for Call: Medication Refill Request    Has the patient contacted the pharmacy for the refill? Yes   Name of medication being requested: clopidogrel (PLAVIX) 75 MG tablet     Provider who prescribed the medication: Dr. Hogan  Pharmacy: [facility]  Date medication is needed: 6/14/21         Action Taken: Message routed to:  [facility] ([facility]): jennifer

## 2021-06-14 NOTE — TELEPHONE ENCOUNTER
[facility] Call Center    Phone Message    May a detailed message be left on voicemail: yes     Reason for Call: Other: Pt requesting call back. Pt stated she has a mammogram scheduled for 6/15 and an Ultrasound for 6/17, pt wondering if that is ok, if they interfere with eachother at all     Action Taken: Message routed to:  [facility] ([facility]): jennifer

## 2021-06-14 NOTE — PROGRESS NOTES
ITP ASSESSMENT   Assessment Day: 180 Day Discharge    Session Number: 28  Precautions: Cardiac Symptoms    Diagnosis: Valve;MI;Stent    Risk Stratification: High    Referring Provider: Tu Hedrick MD  EXERCISE  Exercise Assessment: Discharge                                            Goals Met  150 day  goals met: LTG partially met. Patient has returned to all cleaning tasks.         150 Day Progress: Patient has achieved a MET level of 3.5 on the Nustep and 2.8 on the Treadmill for a total time of 40 minutes. Patient has returned to all cleaning tasks and plans to return to the [facility] in January.     150 day goals met: LTG partially met. Patient has returned to all cleaning tasks.          150 Day Progress: Patient has achieved a MET level of 3.5 on the Nustep and 2.8 on the Treadmill for a total time of 40 minutes. Patient has returned to all cleaning tasks and plans to return to the [facility] in January.     120 Day Progress: Pt. has partially met her goal and has exercised up to a 3.5 met level for up to 40 minutes. Pt.'s. progress has been hindered by frequent headaches which are now under better control.      90 Day Progress: Pt. has partially met goal . Has exercised at up to a 2.8 met level for up to 40 minutes. Continue with same goals to achieve a3-4 met level for up to 50 minutes. Will introduce interval exercise into her exercise program.      60 Day Progression: Pt. has partially met goal . Has exercised at up to a 2.8 met level for up to 40 minutes. Continue with same goals to achieve a3-4 met level for up to 50 minutes.      30 day  goals met: Goal partially met pt. is tolerating 50 min. at a 2.4-2.7 met level.    30 Day Progression: Pt. has progressed from 2.2-2.7 mets. She continues to go in/out of afib, which is concerning for pt.  She has resumed light housework and yard work, takes breaks when needed. Will continue with same goals. Pt. states she has more energy and confidence, Will continue to  provide support and encouragement. Pt. is able to identify her personal risk factors of HTN, Hyperlipidemia  and being slightly overwt.      Initial Progression: Pt. has progressed from 2.2-2.7 mets. She continues to go in/out of afib, which is concerning for pt.  She has resumed light housework and yard work, takes breaks when needed. Will continue with same goals. Pt. states she has more energy and confidence, Will continue to provide support and encouragement. Pt. is able to identify her personal risk factors of HTN, Hyperlipidemia  and being slightly overwt.        Interventions  Home Exercise:  Mode: walking    Frequency: daily    Duration: 30-60 minutes      Education Material : Educational videos;Provide written material;Offer educational classes;Individual education and counseling      Education Completed  Exercise Education Completed: Signs and Symptoms;Medication review;Emergency Plan;Home Exercise;Warm up/cool down;Benefits of Exercise;End point of exercise;RPE;FITT Principles              Exercise Follow-up/Discharge  Follow up/Discharge: Skilled therapy was needed to monitor CV response to exercise. Observed for arrythmias- hx of afib / flutter, she currently is going in/out of afib/flutter with exercise, feed back has been given to M.D. Provided support and educate pt. on safe exercise progression and introduce interval exercise, to increase to higher met levels.  Provided feedback on vitals and rhythm to increase confidence.    NUTRITION  Nutrition Assessment: Discharge      Nutrition Risk Factors:  Nutrition Risk Factors: Dyslipidemia;Overweight  Cholesterol: 177  LDL: 130  HDL: 33  Triglycerides: 72      Nutrition Plan  Interventions  Other Nutrition Intervention: Diet Class;Therapist/Pt Discussion;Educational Videos;Provide with Written Material      Education Completed  Nutrition Education Completed: Low Saturated fat diet;Risk factor overview;Low sodium diet          Goals Met  Nutrition Goals  "Met: Patient can identify their risk factors for CAD;Patient follows a low sodium diet;Completed Nutritional Risk Screen;Provided Rate your Plate Survey;Patient states following a low saturated fat diet      Height, Weight, and  BMI  Weight: 179 lb (81.2 kg)  Height: 5' 7\" (1.702 m)  BMI: 28.03    Pre BMI: 28.03     Nutrition Follow-up  Follow-up/Discharge: Pt. is no longer interested in meeting with a dietitian as she feel she has appropriate knowledge.          Other Risk Factors  Other Risk Factor Assessment: Discharge      HTN Risk Factor: Hypertension      Pre Exercise BP: 116/68  Post Exercise BP: 114/60      Hypertension Plan        Goals Met  HTN Goals Met: Follow low sodium diet;Take medication as prescribed;Exercises regularly      HTN Interventions  HTN Interventions: Diet consult;Therapist/patient discussion;Provide written material;Offer educational videos;Offer educational classes      HTN Education Completed  HTN Education Completed: Low sodium diet;Medication review;Risk factor overview      Tobacco Risk Factor: NA           PSYCHOSOCIAL  Psychosocial Assessment: Discharge       [facility] Q of L Summary Score: 28        PHQ-9 Total Score: 2        Psychosocial Risk Factor: Stress      Psychosocial Plan  Interventions  If PHQ-9 is >9, send letter to MD  Interventions: Provide written material;Offer educational videos and classes;Individual education and counseling      Education Completed  Education Completed: Relaxation/Coping Techniques          Goals Met  Goals Met: Identified Support system;Identify stressors;Practicing stress management skills;Oriented to stress management classes      Psychosocial Follow-up  Follow-up/Discharge: Pt.is tryingto establish a regular exercise routin, now that th [facility] is closed again.             Patient involved in Goal setting?: Yes      Signature: _____________________________________________________________    Date: __________________    Time: " __________________

## 2021-06-15 ENCOUNTER — ANCILLARY PROCEDURE (OUTPATIENT)
Dept: MAMMOGRAPHY | Facility: CLINIC | Age: 86
End: 2021-06-15
Attending: INTERNAL MEDICINE
Payer: MEDICARE

## 2021-06-15 DIAGNOSIS — Z12.31 VISIT FOR SCREENING MAMMOGRAM: ICD-10-CM

## 2021-06-15 DIAGNOSIS — R74.8 ELEVATED LIVER ENZYMES: ICD-10-CM

## 2021-06-15 LAB
ALT SERPL W P-5'-P-CCNC: 117 U/L (ref 0–50)
AST SERPL W P-5'-P-CCNC: 237 U/L (ref 0–45)

## 2021-06-15 PROCEDURE — 77067 SCR MAMMO BI INCL CAD: CPT | Mod: GC | Performed by: RADIOLOGY

## 2021-06-15 PROCEDURE — 84450 TRANSFERASE (AST) (SGOT): CPT | Performed by: PATHOLOGY

## 2021-06-15 PROCEDURE — 36415 COLL VENOUS BLD VENIPUNCTURE: CPT | Performed by: PATHOLOGY

## 2021-06-15 PROCEDURE — 84460 ALANINE AMINO (ALT) (SGPT): CPT | Performed by: PATHOLOGY

## 2021-06-15 NOTE — TELEPHONE ENCOUNTER
Patient was reached by phone, and RN relayed that patient can have the mammogram today and the U/S on 6/17/21 without any issues or concerns.    Penny Aguilera RN on 6/15/2021 at 11:25 AM

## 2021-06-16 ENCOUNTER — TELEPHONE (OUTPATIENT)
Dept: CARDIOLOGY | Facility: CLINIC | Age: 86
End: 2021-06-16

## 2021-06-16 DIAGNOSIS — R74.8 ABNORMAL AST AND ALT: Primary | ICD-10-CM

## 2021-06-16 NOTE — TELEPHONE ENCOUNTER
Called pt to let her know of abnormal ALT/AST, per Dr. Davis GI referral requested. Order placed. Called pt and reached MARLEE ARZOLA requesting a return call. Meghna Cox RN CORE Care Coordinator

## 2021-06-17 ENCOUNTER — ANCILLARY PROCEDURE (OUTPATIENT)
Dept: ULTRASOUND IMAGING | Facility: CLINIC | Age: 86
End: 2021-06-17
Attending: INTERNAL MEDICINE
Payer: MEDICARE

## 2021-06-17 DIAGNOSIS — R74.8 ELEVATED LIVER ENZYMES: ICD-10-CM

## 2021-06-17 PROCEDURE — 76705 ECHO EXAM OF ABDOMEN: CPT | Mod: GC | Performed by: RADIOLOGY

## 2021-06-30 ENCOUNTER — TELEPHONE (OUTPATIENT)
Dept: CARDIOLOGY | Facility: CLINIC | Age: 86
End: 2021-06-30

## 2021-06-30 DIAGNOSIS — I25.10 CORONARY ARTERY DISEASE INVOLVING NATIVE CORONARY ARTERY OF NATIVE HEART WITHOUT ANGINA PECTORIS: Primary | ICD-10-CM

## 2021-06-30 NOTE — TELEPHONE ENCOUNTER
----- Message from Sue Royal RN sent at 6/21/2021  3:38 PM CDT -----  Regarding: ASA  Pt to stop taking Plavix and start ASA.

## 2021-06-30 NOTE — TELEPHONE ENCOUNTER
Per last visit with Dr. Davis, pt to stop taking Plavix and start take Aspirin 81 mg Daily.     Called pt to remind her about this change. She was aware. She states she has already bought OTC asa and does not need a prescription sent to pharmacy.     Changes have been updated in pt's chart and she denies any further questions.     Omero Royal RN   Cardiology Nurse Coordinator

## 2021-07-07 ENCOUNTER — TELEPHONE (OUTPATIENT)
Dept: CARDIOLOGY | Facility: CLINIC | Age: 86
End: 2021-07-07

## 2021-07-07 NOTE — TELEPHONE ENCOUNTER
"Pt needs to schedule a follow up with Dr. Davis for September 2021.    FOLLOW UP REQUEST HAS BEEN CANCELLED; can be found in the \"finalized requests\" tab of the pt's appointment desk. PLEASE REINSTATE REQUEST (right click on request and select \"reinstate\") AND LINK TO APPOINTMENT WHEN SCHEDULING.   "

## 2021-07-28 ENCOUNTER — TELEPHONE (OUTPATIENT)
Dept: CARDIOLOGY | Facility: CLINIC | Age: 86
End: 2021-07-28

## 2021-07-28 DIAGNOSIS — R79.89 ELEVATED LFTS: Primary | ICD-10-CM

## 2021-07-28 DIAGNOSIS — R79.89 LFT ELEVATION: ICD-10-CM

## 2021-07-28 DIAGNOSIS — Z11.59 ENCOUNTER FOR SCREENING FOR OTHER VIRAL DISEASES: ICD-10-CM

## 2021-07-28 DIAGNOSIS — R74.8 ABNORMAL LEVELS OF OTHER SERUM ENZYMES: ICD-10-CM

## 2021-07-28 NOTE — TELEPHONE ENCOUNTER
D/t elevated liver enzymes Dr. Davis recommending hepatic consult. Called pt to update her with this referral.     Pt states she does not want to consult with liver specialist at this time. Liver enzymes were down last time she had them drawn and she would like them to be drawn again before moving forward with referral.     Dr. Davis updated with this information. Recommending pt have liver function tests, ggtp, hep b and hep c labs draw.      Pt updated with plan. States she will go in and have labs checked this week. Pt denies any further questions at this time.     Omero Royal, RN   Cardiology Nurse Coordinator

## 2021-08-09 DIAGNOSIS — E03.4 HYPOTHYROIDISM DUE TO ACQUIRED ATROPHY OF THYROID: ICD-10-CM

## 2021-08-09 DIAGNOSIS — I10 ESSENTIAL HYPERTENSION: ICD-10-CM

## 2021-08-11 DIAGNOSIS — I10 BENIGN ESSENTIAL HYPERTENSION: ICD-10-CM

## 2021-08-11 RX ORDER — VALSARTAN 160 MG/1
160 TABLET ORAL DAILY
Qty: 90 TABLET | Refills: 0 | Status: SHIPPED | OUTPATIENT
Start: 2021-08-11 | End: 2021-11-03

## 2021-08-11 NOTE — TELEPHONE ENCOUNTER
Last Clinic Visit: 6/21/2021  [facility]  BP above protocol parameter ( noted in 10-26-20 clinic note)   RF 90 day

## 2021-08-12 RX ORDER — LEVOTHYROXINE SODIUM 88 UG/1
88 TABLET ORAL DAILY
Qty: 90 TABLET | Refills: 1 | Status: SHIPPED | OUTPATIENT
Start: 2021-08-12 | End: 2021-12-20

## 2021-08-12 RX ORDER — METOPROLOL SUCCINATE 100 MG/1
100 TABLET, EXTENDED RELEASE ORAL DAILY
Qty: 90 TABLET | Refills: 1 | Status: SHIPPED | OUTPATIENT
Start: 2021-08-12 | End: 2021-12-20

## 2021-08-12 NOTE — TELEPHONE ENCOUNTER
METOPROLOL ER SUCCINATE 100MG TABS  Last Written Prescription Date:  5/21/2021  Last Fill Quantity: 90,   # refills: 0  Last Office Visit : 12/8/2020  Future Office visit:  None  90 Tabs, 1 Refill sent to pharm 8/12/2021      LEVOTHYROXINE 0.088MG (88MCG) TAB  Last Written Prescription Date:  11/11/2020  Last Fill Quantity: 90,   # refills: 2  Last Office Visit : 12/8/2020  Future Office visit:  None  90 Tabs, 1 Refill sent to pharm 8/12/2021    Dora Marks RN  Central Triage Red Flags/Med Refills

## 2021-08-13 ENCOUNTER — LAB (OUTPATIENT)
Dept: LAB | Facility: CLINIC | Age: 86
End: 2021-08-13
Payer: MEDICARE

## 2021-08-13 DIAGNOSIS — R79.89 ELEVATED LFTS: ICD-10-CM

## 2021-08-13 DIAGNOSIS — R74.8 ABNORMAL LEVELS OF OTHER SERUM ENZYMES: ICD-10-CM

## 2021-08-13 PROCEDURE — 82977 ASSAY OF GGT: CPT

## 2021-08-13 PROCEDURE — 80076 HEPATIC FUNCTION PANEL: CPT

## 2021-08-13 PROCEDURE — 36415 COLL VENOUS BLD VENIPUNCTURE: CPT

## 2021-08-14 LAB
ALBUMIN SERPL-MCNC: 3 G/DL (ref 3.4–5)
ALP SERPL-CCNC: 92 U/L (ref 40–150)
ALT SERPL W P-5'-P-CCNC: 31 U/L (ref 0–50)
AST SERPL W P-5'-P-CCNC: 55 U/L (ref 0–45)
BILIRUB DIRECT SERPL-MCNC: 0.3 MG/DL (ref 0–0.2)
BILIRUB SERPL-MCNC: 0.9 MG/DL (ref 0.2–1.3)
GGT SERPL-CCNC: 78 U/L (ref 0–40)
PROT SERPL-MCNC: 6.9 G/DL (ref 6.8–8.8)

## 2021-08-25 ENCOUNTER — TELEPHONE (OUTPATIENT)
Dept: CARDIOLOGY | Facility: CLINIC | Age: 86
End: 2021-08-25

## 2021-08-25 NOTE — TELEPHONE ENCOUNTER
[facility]    Phone Message    May a detailed message be left on voicemail: yes     Reason for Call: Requesting Results   Name/type of test: Lab results - Pt does not use [facility]  Date of test: 8/13/2021  Was test done at a location other than [facility] (Please fill in the location if not [facility])?: No      Action Taken: Message routed to:  [facility]    Travel Screening: Not Applicable

## 2021-08-26 NOTE — TELEPHONE ENCOUNTER
Called pt to update her about lab results. Mentioned her liver labs have improved. She was thankful to hear this. Stated Dr. Davis will see her in September and determine if she needs repeat testing. Pt reports understanding and denies further questions.     Omero Velasco, RN   Cardiology Nurse Coordinator

## 2021-09-05 ENCOUNTER — HEALTH MAINTENANCE LETTER (OUTPATIENT)
Age: 86
End: 2021-09-05

## 2021-11-02 ENCOUNTER — TELEPHONE (OUTPATIENT)
Dept: INTERNAL MEDICINE | Facility: CLINIC | Age: 86
End: 2021-11-02

## 2021-11-02 DIAGNOSIS — I10 BENIGN ESSENTIAL HYPERTENSION: ICD-10-CM

## 2021-11-02 NOTE — TELEPHONE ENCOUNTER
[facility]    Phone Message    May a detailed message be left on voicemail: yes     Reason for Call: Medication Refill Request    Has the patient contacted the pharmacy for the refill? Yes   Name of medication being requested: valsartan (DIOVAN) 160 MG tablet  Provider who prescribed the medication: Wants logeais to refill  Pharmacy: [id number] - [place], [place] - [address] AT [facility] [address]    Date medication is needed: Will be out soon         Action Taken: Message routed to:  [facility]    Travel Screening: Not Applicable

## 2021-11-03 RX ORDER — VALSARTAN 160 MG/1
160 TABLET ORAL DAILY
Qty: 90 TABLET | Refills: 0 | Status: SHIPPED | OUTPATIENT
Start: 2021-11-03 | End: 2021-12-20

## 2021-12-20 ENCOUNTER — OFFICE VISIT (OUTPATIENT)
Dept: INTERNAL MEDICINE | Facility: CLINIC | Age: 86
End: 2021-12-20
Payer: MEDICARE

## 2021-12-20 ENCOUNTER — LAB (OUTPATIENT)
Dept: LAB | Facility: CLINIC | Age: 86
End: 2021-12-20
Payer: MEDICARE

## 2021-12-20 VITALS
OXYGEN SATURATION: 98 % | BODY MASS INDEX: 28.04 KG/M2 | HEIGHT: 67 IN | DIASTOLIC BLOOD PRESSURE: 65 MMHG | HEART RATE: 67 BPM | SYSTOLIC BLOOD PRESSURE: 162 MMHG

## 2021-12-20 DIAGNOSIS — R79.89 ELEVATED LFTS: ICD-10-CM

## 2021-12-20 DIAGNOSIS — R51.9 SINUS HEADACHE: ICD-10-CM

## 2021-12-20 DIAGNOSIS — I10 BENIGN ESSENTIAL HYPERTENSION: ICD-10-CM

## 2021-12-20 DIAGNOSIS — I10 ESSENTIAL HYPERTENSION: ICD-10-CM

## 2021-12-20 DIAGNOSIS — I35.0 AORTIC VALVE STENOSIS, ETIOLOGY OF CARDIAC VALVE DISEASE UNSPECIFIED: ICD-10-CM

## 2021-12-20 DIAGNOSIS — L85.3 XEROSIS CUTIS: ICD-10-CM

## 2021-12-20 DIAGNOSIS — I25.10 CORONARY ARTERY DISEASE INVOLVING NATIVE CORONARY ARTERY OF NATIVE HEART WITHOUT ANGINA PECTORIS: ICD-10-CM

## 2021-12-20 DIAGNOSIS — E03.4 HYPOTHYROIDISM DUE TO ACQUIRED ATROPHY OF THYROID: ICD-10-CM

## 2021-12-20 DIAGNOSIS — Z23 NEED FOR VACCINATION: Primary | ICD-10-CM

## 2021-12-20 LAB
ALBUMIN SERPL-MCNC: 3.4 G/DL (ref 3.4–5)
ALP SERPL-CCNC: 70 U/L (ref 40–150)
ALT SERPL W P-5'-P-CCNC: 23 U/L (ref 0–50)
ANION GAP SERPL CALCULATED.3IONS-SCNC: 7 MMOL/L (ref 3–14)
AST SERPL W P-5'-P-CCNC: 30 U/L (ref 0–45)
BILIRUB SERPL-MCNC: 0.9 MG/DL (ref 0.2–1.3)
BUN SERPL-MCNC: 20 MG/DL (ref 7–30)
CALCIUM SERPL-MCNC: 9.2 MG/DL (ref 8.5–10.1)
CHLORIDE BLD-SCNC: 104 MMOL/L (ref 94–109)
CO2 SERPL-SCNC: 25 MMOL/L (ref 20–32)
CREAT SERPL-MCNC: 0.84 MG/DL (ref 0.52–1.04)
GFR SERPL CREATININE-BSD FRML MDRD: 63 ML/MIN/1.73M2
GLUCOSE BLD-MCNC: 110 MG/DL (ref 70–99)
POTASSIUM BLD-SCNC: 4.2 MMOL/L (ref 3.4–5.3)
PROT SERPL-MCNC: 7.8 G/DL (ref 6.8–8.8)
SODIUM SERPL-SCNC: 136 MMOL/L (ref 133–144)
T4 FREE SERPL-MCNC: 1.73 NG/DL (ref 0.76–1.46)
TSH SERPL DL<=0.005 MIU/L-ACNC: 0.36 MU/L (ref 0.4–4)

## 2021-12-20 PROCEDURE — 80053 COMPREHEN METABOLIC PANEL: CPT | Performed by: PATHOLOGY

## 2021-12-20 PROCEDURE — G0439 PPPS, SUBSEQ VISIT: HCPCS | Performed by: INTERNAL MEDICINE

## 2021-12-20 PROCEDURE — 84443 ASSAY THYROID STIM HORMONE: CPT | Performed by: PATHOLOGY

## 2021-12-20 PROCEDURE — 90662 IIV NO PRSV INCREASED AG IM: CPT | Performed by: INTERNAL MEDICINE

## 2021-12-20 PROCEDURE — 99213 OFFICE O/P EST LOW 20 MIN: CPT | Mod: 25 | Performed by: INTERNAL MEDICINE

## 2021-12-20 PROCEDURE — 84439 ASSAY OF FREE THYROXINE: CPT | Performed by: PATHOLOGY

## 2021-12-20 PROCEDURE — 36415 COLL VENOUS BLD VENIPUNCTURE: CPT | Performed by: PATHOLOGY

## 2021-12-20 PROCEDURE — G0008 ADMIN INFLUENZA VIRUS VAC: HCPCS | Performed by: INTERNAL MEDICINE

## 2021-12-20 RX ORDER — LEVOTHYROXINE SODIUM 75 UG/1
75 TABLET ORAL DAILY
Qty: 90 TABLET | Refills: 3 | Status: SHIPPED | OUTPATIENT
Start: 2021-12-20 | End: 2022-05-11

## 2021-12-20 RX ORDER — VALSARTAN 160 MG/1
160 TABLET ORAL DAILY
Qty: 90 TABLET | Refills: 3 | Status: SHIPPED | OUTPATIENT
Start: 2021-12-20 | End: 2022-04-20

## 2021-12-20 RX ORDER — METOPROLOL SUCCINATE 100 MG/1
100 TABLET, EXTENDED RELEASE ORAL DAILY
Qty: 90 TABLET | Refills: 3 | Status: SHIPPED | OUTPATIENT
Start: 2021-12-20 | End: 2022-11-07

## 2021-12-20 ASSESSMENT — PAIN SCALES - GENERAL: PAINLEVEL: NO PAIN (0)

## 2021-12-20 NOTE — PROGRESS NOTES
History of Present Illness:  Ms. Yeh is a 86 year old female who presents for  Chief Complaint   Patient presents with     Recheck Medication     Annual wellness check     Hx of CAD, NSTEMI 6/20 with PCI x 3 to LAD, aortic stenosis s/p TAVR 7/20, HFrEF, pafib, lichen sclerosis, gout and c diff.  Last ziopatches without afib so no longer on eliquis. Completed 12 months of plavix therapy.  She declines a statin.  She hasn't checked BP at home lately.  Doesn't want to increase meds today. States meds don't agree with her.    Lives alone, not feeling isolated, not depressed, daughter lives close by.    Was taking APAP daily and had elevated LFTs.  Was taking 1-2 tylenol daily.  She wakes up with headache, frontal, bilat, zyrtec helped, no N/V, no vision changes, no post nasal gtt, no snoring/gasping at night.    She reports a rash, states she saw a Derm, got a lotion. Using vaseline. Saw Derm last spring and rec lidex solution, ketoconazole, TAC ointment.    Reports nocturnal cramps, no recent gout flares or c diff      Review of external notes as documented above                   A detailed Review of Systems was performed, verified and is negative except as documented in the HPI.  All health questionnaires were reviewed, verified and relevant information documented above.      Medicare Annual Wellness Visit Previsit note    This 86 year old year old female presents for a  Medicare Wellness Exam.        What is your marital status:    Who lives in your household? Dulce    Does your home have loose rugs in the hallway?   No    Does your home have grab bars in the bathroom?   Yes    Does your home have handrails on the stairs?   Yes     Does your home have poorly lit areas?  No    How many times have you fallen in the past year? 0    How many times have you been in the Emergency Room in the last year?  0    How many times have you been hospitalized in the last year? 0    Do you feel threatened or  controlled by a partner, ex-partner or anyone in your life? No    Has anyone hurt you physically, for example by pushing, hitting, slapping or kicking you   or forcing you to have sex? No    Are you sexually active? No     If yes, with men, women, or both? n/a    If yes, do you have more than one current partner? No    If yes, are you using condoms? N/A     Have you had any sexually transmitted infections in the last year? No    Do you have any sexual concerns? No       FOR WOMEN ONLY:  1. What year did you stop having periods (approximate age)? n/a    2.   Any vaginal bleeding in the last year? No    3.   Have you ever had an abnormal Pap smear? No        Do you need help with the phone, transportation, shopping, preparing meals, housework, laundry, medications or managing money? No     Have you noticed any hearing difficulties? No    Do you wear hearing aids? No    Have you seen a hearing professional such as an audiologist in the past year? No    Do you have vision difficulties? n/a    Do you wear glasses or contacts? yes    Have you seen an eye doctor in the past year? Yes    How many servings of fruits and vegetables do you eat a day (1 serving is 1 cup)? 2+    How often do you exercise in a week? covid makes it hard to go to x    What kind of exercise do you do and how long? n/a    Do you wear a seatbelt when driving or riding in a vehicle? Yes    Do you use tobacco?  No    Do you use e-cigarettes?  No    Do you use any other drugs? No         Do you drink alcohol? No    If you drink alcohol, how many drinks per week? n/a      PHQ-2: Over the past 2 weeks, how often have you been bothered by any of the following problems?  1) Little interest or pleasure in doing things: 0    2) Feeling down, depressed, or hopeless: 0    Total = 0    Have you completed an Advance Directives document? Yes    If yes, have you given a copy to the clinic? No    Would you like information on Advance Directives today? Niki Tran  [place] EMT at 12:28 PM on 12/20/2021.        Past Medical History:  Past Medical History:   Diagnosis Date     Allergy      Aortic stenosis 10/11/2011     C. 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 RHC, possible LHC for TV gradient;  Surgeon: Tu Hedrick MD;  Location:  [facility]     CV PCI STENT DRUG ELUTING N/A 6/19/2020    Procedure: Percutaneous Coronary Intervention Stent Drug Eluting;  Surgeon: Tu Hedrick MD;  Location:  [facility]     CV TRANSCATHETER AORTIC VALVE REPLACEMENT Right 7/29/2020    Procedure: Transfemoral (Paez) Transcatheter Aortic Valve Replacement with Paez Kyra Ultra 23 mm. Intra-operative transthoracic echocardiogram;  Surgeon: Tu Hedrick MD;  Location: [facility] OR     HEART CATH FEMORAL CANNULIZATION WITH OPEN STANDBY REPAIR AORTIC VALVE N/A 7/29/2020    Procedure: Cardiopulmonary on standby.;  Surgeon: Ricardo Ireland MD;  Location: [facility] OR     ORTHOPEDIC SURGERY         Active Meds:  Current Outpatient Medications   Medication     ascorbic acid (VITAMIN C) 500 MG tablet     aspirin (ASA) 81 MG EC tablet     BIOTIN PO     calcium carb 1250 mg, 500 mg [place],/vitamin D 200 units (OSCAL WITH D) 500-200 MG-UNIT per tablet     Cetirizine HCl (ZYRTEC PO)     coenzyme Q-10 200 MG CAPS     cyanocolbalamin (VITAMIN B-12) 1000 MCG tablet     Glucosamine-Chondroit-Vit C-Mn (GLUCOSAMINE  "CHONDR 1500 COMPLX) CAPS     levothyroxine (SYNTHROID/LEVOTHROID) 88 MCG tablet     Magnesium 300 MG CAPS     metoprolol succinate ER (TOPROL-XL) 100 MG 24 hr tablet     valsartan (DIOVAN) 160 MG tablet     No current facility-administered medications for this visit.        Allergies:  Sulfa drugs, Clonidine, and Diltiazem    Family History:  family history includes Abdominal Aortic Aneurysm in her brother; Cancer in her mother; Diabetes in her brother; Rheumatic fever in her brother; Testicular cancer in her father.    Social History:  Social History     Tobacco Use     Smoking status: Never Smoker     Smokeless tobacco: Never Used   Substance Use Topics     Alcohol use: No     Drug use: No       Physical Exam:  Vitals: BP (!) 162/65 (BP Location: Right arm, Patient Position: Sitting, Cuff Size: Adult Regular)   Pulse 67   Ht 1.702 m (5' 7\")   LMP  (LMP Unknown)   SpO2 98%   BMI 28.04 kg/m    Constitutional: Alert, oriented, pleasant, no acute distress  Head: Normocephalic, atraumatic  Eyes: Extra-ocular movements intact, pupils equally round and reactive bilaterally, no scleral icterus  ENT: Oropharynx clear, moist mucus membranes, good dentition  Neck: Supple, no lymphadenopathy  Cardiovascular: Regular rate and rhythm, no murmurs, rubs or gallops, peripheral pulses full/symmetric  Respiratory: Good air movement bilaterally, lungs clear, no wheezes/rales/rhonchi  GI: Abdomen soft, bowel sounds present, nondistended, nontender, no organomegaly or masses, no rebound/guarding  Musculoskeletal: Trace RLE edema, normal muscle tone, normal gait  Neurologic: Alert and oriented, cranial nerves 2-12 intact, grossly non-focal  Skin: Very few scattered excoriations on sacrum, arms bilat  Psychiatric: normal mentation, affect and mood      Diagnostics:  Labs reviewed in Epic          Assessment and Plan:  Dulce was seen today for recheck medication.    Diagnoses and all orders for this visit:    Need for " vaccination  -     FLU VACCINE HIGH DOSE PRESERVATIVE FREE, AGE =>65 YR    Sinus headache  Okay to use zyrtec regularly. Advised using APAP only rarely. Discuss LFT abnormalities and risk of rebound HA.    Essential hypertension  Advised monitoring BP at home versus increasing meds today; she declines change in her meds.    Elevated LFTs  -     Comprehensive metabolic panel; Future    Xerosis cutis  Likely stems from dry skin, prurits, reviewed Derm recs.    Aortic valve stenosis, etiology of cardiac valve disease unspecified  Coronary artery disease involving native coronary artery of native heart without angina pectoris  Patient not willing to take statin, advised continuing ASA 81 mg, BB, ARB.    Hypothyroidism due to acquired atrophy of thyroid  -     TSH with free T4 reflex; Future            Gilda Hogan MD  Internal Medicine

## 2021-12-20 NOTE — NURSING NOTE
Chief Complaint   Patient presents with     Recheck Medication     Annual wellness check       NORY Arteaga at 11:35 AM on 12/20/2021.

## 2021-12-20 NOTE — PROGRESS NOTES
Medicare Annual Wellness Visit Previsit note    This 86 year old year old female presents for a  Medicare Wellness Exam.        What is your marital status:    Who lives in your household? Dulce    Does your home have loose rugs in the hallway?   No    Does your home have grab bars in the bathroom?   Yes    Does your home have handrails on the stairs?   Yes     Does your home have poorly lit areas?  No    How many times have you fallen in the past year? 0    How many times have you been in the Emergency Room in the last year?  0    How many times have you been hospitalized in the last year? 0    Do you feel threatened or controlled by a partner, ex-partner or anyone in your life? No    Has anyone hurt you physically, for example by pushing, hitting, slapping or kicking you   or forcing you to have sex? No    Are you sexually active? No     If yes, with men, women, or both? n/a    If yes, do you have more than one current partner? No    If yes, are you using condoms? N/A     Have you had any sexually transmitted infections in the last year? No    Do you have any sexual concerns? No       FOR WOMEN ONLY:  1. What year did you stop having periods (approximate age)? n/a    2.   Any vaginal bleeding in the last year? No    3.   Have you ever had an abnormal Pap smear? No        Do you need help with the phone, transportation, shopping, preparing meals, housework, laundry, medications or managing money? No     Have you noticed any hearing difficulties? No    Do you wear hearing aids? No    Have you seen a hearing professional such as an audiologist in the past year? No    Do you have vision difficulties? n/a    Do you wear glasses or contacts? yes    Have you seen an eye doctor in the past year? Yes    How many servings of fruits and vegetables do you eat a day (1 serving is 1 cup)? 2+    How often do you exercise in a week? covid makes it hard to go to x    What kind of exercise do you do and how long? n/a    Do  you wear a seatbelt when driving or riding in a vehicle? Yes    Do you use tobacco?  No    Do you use e-cigarettes?  No    Do you use any other drugs? No         Do you drink alcohol? No    If you drink alcohol, how many drinks per week? n/a      PHQ-2: Over the past 2 weeks, how often have you been bothered by any of the following problems?  1) Little interest or pleasure in doing things: 0    2) Feeling down, depressed, or hopeless: 0    Total = 0    Have you completed an Advance Directives document? Yes    If yes, have you given a copy to the clinic? No    Would you like information on Advance Directives today? No      Marc Steiner, EMT at 12:28 PM on 12/20/2021.

## 2022-02-12 DIAGNOSIS — E03.4 HYPOTHYROIDISM DUE TO ACQUIRED ATROPHY OF THYROID: ICD-10-CM

## 2022-02-16 RX ORDER — LEVOTHYROXINE SODIUM 88 UG/1
TABLET ORAL
Qty: 90 TABLET | Refills: 1 | OUTPATIENT
Start: 2022-02-16

## 2022-02-23 ENCOUNTER — TELEPHONE (OUTPATIENT)
Dept: INTERNAL MEDICINE | Facility: CLINIC | Age: 87
End: 2022-02-23
Payer: MEDICARE

## 2022-02-23 DIAGNOSIS — E03.4 HYPOTHYROIDISM DUE TO ACQUIRED ATROPHY OF THYROID: Primary | ICD-10-CM

## 2022-02-23 NOTE — TELEPHONE ENCOUNTER
[facility]    Phone Message    May a detailed message be left on voicemail: yes     Reason for Call: Other: Patient calling to discuss her lowered dose of her thyroid medication, Patient called stating she was never told until she recieved her new medication. thank you.      Action Taken: Message routed to:  [facility]    Travel Screening: Not Applicable

## 2022-02-24 NOTE — TELEPHONE ENCOUNTER
Spoke to patient.  She does not check her [employer] messages or account.  Patient decline to deactivate [employer].  Patient was not aware of thyroid med changes until today when she open the new bottle and the medication size and color is different.    Patient would like a call when her tests are abnormal concerning or if her medication changes.    Patient would like test result mailed to her home address.  Home address on file confirmed.     Check with Dr. Hogan when she want a thyroid recheck.  Patient will not start the lower dose thyroid med until tomorrow.      Test result mailed to patient home address.      Francisco Leyva CMA ([facility]) at 10:13 AM on 2/24/2022

## 2022-03-03 NOTE — TELEPHONE ENCOUNTER
Spoke to patient.  Dr Hogan want to recheck thyroid in 2 months, around early May.  Patient will get lab done at a [facility] location closer to home.    Patient sometimes get headaches.  Wonders what kind of OTC she can take.  Advise to take Tylenol.  Do not take aspirin or NSAID.  If Tylenol does not work, schedule an appointment with Dr. Hogan.      Francisco Leyva CMA ([facility]) at 8:57 AM on 3/3/2022

## 2022-04-04 ENCOUNTER — NURSE TRIAGE (OUTPATIENT)
Dept: NURSING | Facility: CLINIC | Age: 87
End: 2022-04-04
Payer: MEDICARE

## 2022-04-04 ENCOUNTER — OFFICE VISIT (OUTPATIENT)
Dept: URGENT CARE | Facility: URGENT CARE | Age: 87
End: 2022-04-04
Payer: MEDICARE

## 2022-04-04 ENCOUNTER — HOSPITAL ENCOUNTER (INPATIENT)
Facility: CLINIC | Age: 87
LOS: 2 days | Discharge: HOME | DRG: 305 | End: 2022-04-06
Attending: FAMILY MEDICINE | Admitting: INTERNAL MEDICINE
Payer: MEDICARE

## 2022-04-04 ENCOUNTER — APPOINTMENT (OUTPATIENT)
Dept: GENERAL RADIOLOGY | Facility: CLINIC | Age: 87
DRG: 305 | End: 2022-04-04
Attending: FAMILY MEDICINE
Payer: MEDICARE

## 2022-04-04 VITALS
SYSTOLIC BLOOD PRESSURE: 181 MMHG | OXYGEN SATURATION: 96 % | HEART RATE: 74 BPM | BODY MASS INDEX: 29.03 KG/M2 | WEIGHT: 185 LBS | DIASTOLIC BLOOD PRESSURE: 77 MMHG | TEMPERATURE: 97.6 F | HEIGHT: 67 IN

## 2022-04-04 DIAGNOSIS — Z11.52 ENCOUNTER FOR SCREENING LABORATORY TESTING FOR SEVERE ACUTE RESPIRATORY SYNDROME CORONAVIRUS 2 (SARS-COV-2): ICD-10-CM

## 2022-04-04 DIAGNOSIS — I50.20 SYSTOLIC HEART FAILURE, UNSPECIFIED HF CHRONICITY (H): ICD-10-CM

## 2022-04-04 DIAGNOSIS — I48.0 PAROXYSMAL ATRIAL FIBRILLATION (H): ICD-10-CM

## 2022-04-04 DIAGNOSIS — I10 ESSENTIAL HYPERTENSION: ICD-10-CM

## 2022-04-04 DIAGNOSIS — I50.22 CHRONIC SYSTOLIC (CONGESTIVE) HEART FAILURE (H): ICD-10-CM

## 2022-04-04 DIAGNOSIS — R79.89 ELEVATED TROPONIN: ICD-10-CM

## 2022-04-04 DIAGNOSIS — R03.0 ELEVATED BLOOD PRESSURE READING: Primary | ICD-10-CM

## 2022-04-04 LAB
ALBUMIN SERPL-MCNC: 3.6 G/DL (ref 3.4–5)
ALBUMIN UR-MCNC: NEGATIVE MG/DL
ALP SERPL-CCNC: 63 U/L (ref 40–150)
ALT SERPL W P-5'-P-CCNC: 20 U/L (ref 0–50)
ANION GAP SERPL CALCULATED.3IONS-SCNC: 7 MMOL/L (ref 3–14)
APPEARANCE UR: CLEAR
APTT PPP: 35 SECONDS (ref 22–38)
AST SERPL W P-5'-P-CCNC: 29 U/L (ref 0–45)
ATRIAL RATE - MUSE: 58 BPM
BACTERIA #/AREA URNS HPF: ABNORMAL /HPF
BASOPHILS # BLD AUTO: 0.1 10E3/UL (ref 0–0.2)
BASOPHILS NFR BLD AUTO: 2 %
BILIRUB SERPL-MCNC: 0.8 MG/DL (ref 0.2–1.3)
BILIRUB UR QL STRIP: NEGATIVE
BUN SERPL-MCNC: 19 MG/DL (ref 7–30)
CALCIUM SERPL-MCNC: 9.3 MG/DL (ref 8.5–10.1)
CHLORIDE BLD-SCNC: 105 MMOL/L (ref 94–109)
CO2 SERPL-SCNC: 24 MMOL/L (ref 20–32)
COLOR UR AUTO: ABNORMAL
CREAT SERPL-MCNC: 0.76 MG/DL (ref 0.52–1.04)
DIASTOLIC BLOOD PRESSURE - MUSE: NORMAL MMHG
EOSINOPHIL # BLD AUTO: 0.3 10E3/UL (ref 0–0.7)
EOSINOPHIL NFR BLD AUTO: 4 %
ERYTHROCYTE [DISTWIDTH] IN BLOOD BY AUTOMATED COUNT: 15.9 % (ref 10–15)
GFR SERPL CREATININE-BSD FRML MDRD: 75 ML/MIN/1.73M2
GLUCOSE BLD-MCNC: 104 MG/DL (ref 70–99)
GLUCOSE UR STRIP-MCNC: NEGATIVE MG/DL
HCT VFR BLD AUTO: 38.8 % (ref 35–47)
HGB BLD-MCNC: 12.6 G/DL (ref 11.7–15.7)
HGB UR QL STRIP: NEGATIVE
HOLD SPECIMEN: NORMAL
IMM GRANULOCYTES # BLD: 0 10E3/UL
IMM GRANULOCYTES NFR BLD: 0 %
INR PPP: 1.11 (ref 0.85–1.15)
INTERPRETATION ECG - MUSE: NORMAL
KETONES UR STRIP-MCNC: NEGATIVE MG/DL
LEUKOCYTE ESTERASE UR QL STRIP: NEGATIVE
LYMPHOCYTES # BLD AUTO: 1.9 10E3/UL (ref 0.8–5.3)
LYMPHOCYTES NFR BLD AUTO: 28 %
MAGNESIUM SERPL-MCNC: 1.8 MG/DL (ref 1.6–2.3)
MCH RBC QN AUTO: 27.3 PG (ref 26.5–33)
MCHC RBC AUTO-ENTMCNC: 32.5 G/DL (ref 31.5–36.5)
MCV RBC AUTO: 84 FL (ref 78–100)
MONOCYTES # BLD AUTO: 0.8 10E3/UL (ref 0–1.3)
MONOCYTES NFR BLD AUTO: 12 %
NEUTROPHILS # BLD AUTO: 3.5 10E3/UL (ref 1.6–8.3)
NEUTROPHILS NFR BLD AUTO: 54 %
NITRATE UR QL: NEGATIVE
NRBC # BLD AUTO: 0 10E3/UL
NRBC BLD AUTO-RTO: 0 /100
NT-PROBNP SERPL-MCNC: 578 PG/ML (ref 0–1800)
P AXIS - MUSE: 39 DEGREES
PH UR STRIP: 6.5 [PH] (ref 5–7)
PLATELET # BLD AUTO: 152 10E3/UL (ref 150–450)
POTASSIUM BLD-SCNC: 3.5 MMOL/L (ref 3.4–5.3)
PR INTERVAL - MUSE: 220 MS
PROT SERPL-MCNC: 8 G/DL (ref 6.8–8.8)
QRS DURATION - MUSE: 152 MS
QT - MUSE: 504 MS
QTC - MUSE: 494 MS
R AXIS - MUSE: -16 DEGREES
RBC # BLD AUTO: 4.62 10E6/UL (ref 3.8–5.2)
RBC URINE: 0 /HPF
SARS-COV-2 RNA RESP QL NAA+PROBE: NEGATIVE
SODIUM SERPL-SCNC: 136 MMOL/L (ref 133–144)
SP GR UR STRIP: 1.01 (ref 1–1.03)
SYSTOLIC BLOOD PRESSURE - MUSE: NORMAL MMHG
T AXIS - MUSE: 118 DEGREES
TROPONIN I SERPL HS-MCNC: 109 NG/L
TROPONIN I SERPL HS-MCNC: 143 NG/L
TSH SERPL DL<=0.005 MIU/L-ACNC: 2.21 MU/L (ref 0.4–4)
UROBILINOGEN UR STRIP-MCNC: NORMAL MG/DL
VENTRICULAR RATE- MUSE: 58 BPM
WBC # BLD AUTO: 6.6 10E3/UL (ref 4–11)
WBC URINE: 0 /HPF

## 2022-04-04 PROCEDURE — 84484 ASSAY OF TROPONIN QUANT: CPT | Performed by: FAMILY MEDICINE

## 2022-04-04 PROCEDURE — U0003 INFECTIOUS AGENT DETECTION BY NUCLEIC ACID (DNA OR RNA); SEVERE ACUTE RESPIRATORY SYNDROME CORONAVIRUS 2 (SARS-COV-2) (CORONAVIRUS DISEASE [COVID-19]), AMPLIFIED PROBE TECHNIQUE, MAKING USE OF HIGH THROUGHPUT TECHNOLOGIES AS DESCRIBED BY CMS-2020-01-R: HCPCS | Performed by: FAMILY MEDICINE

## 2022-04-04 PROCEDURE — 214N000001 HC R&B CCU UMMC

## 2022-04-04 PROCEDURE — 93010 ELECTROCARDIOGRAM REPORT: CPT | Mod: 77 | Performed by: FAMILY MEDICINE

## 2022-04-04 PROCEDURE — 250N000013 HC RX MED GY IP 250 OP 250 PS 637: Performed by: FAMILY MEDICINE

## 2022-04-04 PROCEDURE — 85004 AUTOMATED DIFF WBC COUNT: CPT | Performed by: FAMILY MEDICINE

## 2022-04-04 PROCEDURE — 250N000011 HC RX IP 250 OP 636: Performed by: FAMILY MEDICINE

## 2022-04-04 PROCEDURE — C9803 HOPD COVID-19 SPEC COLLECT: HCPCS | Performed by: FAMILY MEDICINE

## 2022-04-04 PROCEDURE — 71045 X-RAY EXAM CHEST 1 VIEW: CPT | Mod: 26 | Performed by: RADIOLOGY

## 2022-04-04 PROCEDURE — 250N000011 HC RX IP 250 OP 636: Performed by: INTERNAL MEDICINE

## 2022-04-04 PROCEDURE — 99222 1ST HOSP IP/OBS MODERATE 55: CPT | Mod: GC | Performed by: INTERNAL MEDICINE

## 2022-04-04 PROCEDURE — 84443 ASSAY THYROID STIM HORMONE: CPT | Performed by: FAMILY MEDICINE

## 2022-04-04 PROCEDURE — 80053 COMPREHEN METABOLIC PANEL: CPT | Performed by: FAMILY MEDICINE

## 2022-04-04 PROCEDURE — 82040 ASSAY OF SERUM ALBUMIN: CPT | Performed by: FAMILY MEDICINE

## 2022-04-04 PROCEDURE — 81001 URINALYSIS AUTO W/SCOPE: CPT | Performed by: FAMILY MEDICINE

## 2022-04-04 PROCEDURE — 96374 THER/PROPH/DIAG INJ IV PUSH: CPT | Performed by: FAMILY MEDICINE

## 2022-04-04 PROCEDURE — 85730 THROMBOPLASTIN TIME PARTIAL: CPT | Performed by: FAMILY MEDICINE

## 2022-04-04 PROCEDURE — 93000 ELECTROCARDIOGRAM COMPLETE: CPT | Performed by: PHYSICIAN ASSISTANT

## 2022-04-04 PROCEDURE — 93005 ELECTROCARDIOGRAM TRACING: CPT | Performed by: FAMILY MEDICINE

## 2022-04-04 PROCEDURE — 99285 EMERGENCY DEPT VISIT HI MDM: CPT | Mod: 25 | Performed by: FAMILY MEDICINE

## 2022-04-04 PROCEDURE — 99207 PR INPT ADMISSION FROM CLINIC: CPT | Performed by: PHYSICIAN ASSISTANT

## 2022-04-04 PROCEDURE — 83880 ASSAY OF NATRIURETIC PEPTIDE: CPT | Performed by: INTERNAL MEDICINE

## 2022-04-04 PROCEDURE — 83735 ASSAY OF MAGNESIUM: CPT | Performed by: FAMILY MEDICINE

## 2022-04-04 PROCEDURE — 85610 PROTHROMBIN TIME: CPT | Performed by: FAMILY MEDICINE

## 2022-04-04 PROCEDURE — 36415 COLL VENOUS BLD VENIPUNCTURE: CPT | Performed by: FAMILY MEDICINE

## 2022-04-04 PROCEDURE — 71045 X-RAY EXAM CHEST 1 VIEW: CPT

## 2022-04-04 RX ORDER — ACETAMINOPHEN 500 MG
1000 TABLET ORAL ONCE
Status: COMPLETED | OUTPATIENT
Start: 2022-04-04 | End: 2022-04-04

## 2022-04-04 RX ORDER — LABETALOL HYDROCHLORIDE 5 MG/ML
10 INJECTION, SOLUTION INTRAVENOUS ONCE
Status: DISCONTINUED | OUTPATIENT
Start: 2022-04-04 | End: 2022-04-04

## 2022-04-04 RX ORDER — VALSARTAN 80 MG/1
320 TABLET ORAL DAILY
Status: DISCONTINUED | OUTPATIENT
Start: 2022-04-05 | End: 2022-04-06 | Stop reason: HOSPADM

## 2022-04-04 RX ORDER — ACETAMINOPHEN 325 MG/1
650 TABLET ORAL EVERY 6 HOURS PRN
Status: DISCONTINUED | OUTPATIENT
Start: 2022-04-04 | End: 2022-04-06 | Stop reason: HOSPADM

## 2022-04-04 RX ORDER — LEVOTHYROXINE SODIUM 75 UG/1
75 TABLET ORAL DAILY
Status: DISCONTINUED | OUTPATIENT
Start: 2022-04-05 | End: 2022-04-06 | Stop reason: HOSPADM

## 2022-04-04 RX ORDER — HYDRALAZINE HYDROCHLORIDE 20 MG/ML
10 INJECTION INTRAMUSCULAR; INTRAVENOUS ONCE
Status: COMPLETED | OUTPATIENT
Start: 2022-04-04 | End: 2022-04-04

## 2022-04-04 RX ORDER — ONDANSETRON 2 MG/ML
4 INJECTION INTRAMUSCULAR; INTRAVENOUS EVERY 6 HOURS PRN
Status: DISCONTINUED | OUTPATIENT
Start: 2022-04-04 | End: 2022-04-06 | Stop reason: HOSPADM

## 2022-04-04 RX ORDER — ASPIRIN 81 MG/1
81 TABLET ORAL DAILY
Status: DISCONTINUED | OUTPATIENT
Start: 2022-04-05 | End: 2022-04-06 | Stop reason: HOSPADM

## 2022-04-04 RX ORDER — METOPROLOL SUCCINATE 100 MG/1
100 TABLET, EXTENDED RELEASE ORAL DAILY
Status: DISCONTINUED | OUTPATIENT
Start: 2022-04-05 | End: 2022-04-05

## 2022-04-04 RX ORDER — AMOXICILLIN 250 MG
2 CAPSULE ORAL 2 TIMES DAILY PRN
Status: DISCONTINUED | OUTPATIENT
Start: 2022-04-04 | End: 2022-04-06 | Stop reason: HOSPADM

## 2022-04-04 RX ORDER — HYDRALAZINE HYDROCHLORIDE 20 MG/ML
10 INJECTION INTRAMUSCULAR; INTRAVENOUS EVERY 6 HOURS PRN
Status: DISCONTINUED | OUTPATIENT
Start: 2022-04-04 | End: 2022-04-06 | Stop reason: HOSPADM

## 2022-04-04 RX ORDER — LIDOCAINE 40 MG/G
CREAM TOPICAL
Status: DISCONTINUED | OUTPATIENT
Start: 2022-04-04 | End: 2022-04-06 | Stop reason: HOSPADM

## 2022-04-04 RX ORDER — POLYETHYLENE GLYCOL 3350 17 G/17G
17 POWDER, FOR SOLUTION ORAL DAILY PRN
Status: DISCONTINUED | OUTPATIENT
Start: 2022-04-04 | End: 2022-04-06 | Stop reason: HOSPADM

## 2022-04-04 RX ORDER — ONDANSETRON 4 MG/1
4 TABLET, ORALLY DISINTEGRATING ORAL EVERY 6 HOURS PRN
Status: DISCONTINUED | OUTPATIENT
Start: 2022-04-04 | End: 2022-04-06 | Stop reason: HOSPADM

## 2022-04-04 RX ORDER — AMOXICILLIN 250 MG
1 CAPSULE ORAL 2 TIMES DAILY PRN
Status: DISCONTINUED | OUTPATIENT
Start: 2022-04-04 | End: 2022-04-06 | Stop reason: HOSPADM

## 2022-04-04 RX ORDER — VALSARTAN 160 MG/1
160 TABLET ORAL DAILY
Status: DISCONTINUED | OUTPATIENT
Start: 2022-04-05 | End: 2022-04-04

## 2022-04-04 RX ADMIN — ACETAMINOPHEN 500 MG: 500 TABLET ORAL at 19:50

## 2022-04-04 RX ADMIN — HYDRALAZINE HYDROCHLORIDE 10 MG: 20 INJECTION INTRAMUSCULAR; INTRAVENOUS at 19:49

## 2022-04-04 RX ADMIN — HYDRALAZINE HYDROCHLORIDE 10 MG: 20 INJECTION INTRAMUSCULAR; INTRAVENOUS at 23:03

## 2022-04-04 ASSESSMENT — ACTIVITIES OF DAILY LIVING (ADL)
ADLS_ACUITY_SCORE: 6

## 2022-04-04 ASSESSMENT — ENCOUNTER SYMPTOMS
SHORTNESS OF BREATH: 0
WEAKNESS: 0
DIFFICULTY URINATING: 0
FEVER: 0
SHORTNESS OF BREATH: 0
HEADACHES: 1
CONFUSION: 0
COLOR CHANGE: 0
FEVER: 0
ARTHRALGIAS: 0
NECK STIFFNESS: 0
ABDOMINAL PAIN: 0
BACK PAIN: 1
PALPITATIONS: 0
EYE REDNESS: 0

## 2022-04-04 NOTE — PROGRESS NOTES
SUBJECTIVE:   Dulce Yeh is a 87 year old female presenting with a chief complaint of   Chief Complaint   Patient presents with     Urgent Care     Pt in clinic to have eval for hypertension.     Hypertension       She is an established patient of [facility].  Patient presents with complaints of elevated blood pressure for 3 days.  She had an episode of dizziness on Saturday.  Patient states headaches on and off.  Same headache as usual.  Patient called PCP and told to come here.          Review of Systems   Constitutional: Negative for fever.   Respiratory: Negative for shortness of breath.    Cardiovascular: Negative for chest pain, palpitations and leg swelling.   Neurological: Positive for headaches. Negative for weakness.   All other systems reviewed and are negative.      Past Medical History:   Diagnosis Date     Allergy      Aortic stenosis 10/11/2011     C. 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       aspirin (ASA) 81 MG EC tablet Take 1 tablet (81 mg) by mouth daily 90 tablet 3     BIOTIN PO Take by mouth daily       calcium carb 1250 mg, 500 mg [place],/vitamin D 200 units (OSCAL WITH D) 500-200 MG-UNIT per tablet Take 1 tablet by mouth 2 times daily (with  "meals).       Cetirizine HCl (ZYRTEC PO) Take 10 mg by mouth daily as needed        coenzyme Q-10 200 MG CAPS Take 200 mg by mouth daily       cyanocolbalamin (VITAMIN B-12) 1000 MCG tablet Take 1,000 mcg by mouth daily.       Glucosamine-Chondroit-Vit C-Mn (GLUCOSAMINE CHONDR 1500 COMPLX) CAPS Take 1 tablet by mouth daily        levothyroxine (SYNTHROID/LEVOTHROID) 75 MCG tablet Take 1 tablet (75 mcg) by mouth daily 90 tablet 3     Magnesium 300 MG CAPS Take  by mouth.       metoprolol succinate ER (TOPROL-XL) 100 MG 24 hr tablet Take 1 tablet (100 mg) by mouth daily 90 tablet 3     valsartan (DIOVAN) 160 MG tablet Take 1 tablet (160 mg) by mouth daily 90 tablet 3     Social History     Tobacco Use     Smoking status: Never Smoker     Smokeless tobacco: Never Used   Substance Use Topics     Alcohol use: No       OBJECTIVE  BP (!) 181/77   Pulse 74   Temp 97.6  F (36.4  C) (Temporal)   Ht 1.702 m (5' 7\")   Wt 83.9 kg (185 lb)   LMP  (LMP Unknown)   SpO2 96%   BMI 28.98 kg/m      Physical Exam    Labs:  No results found for this or any previous visit (from the past 24 hour(s)).    X-Ray was not done.  EKG:  HR of 61, LBBB, relatively unchanged.      ASSESSMENT:      ICD-10-CM    1. Elevated blood pressure reading  R03.0 EKG 12-lead complete w/read - Clinics   2. Chronic systolic (congestive) heart failure (H)  I50.22    3. Paroxysmal atrial fibrillation (H)  I48.0         Medical Decision Making:    Differential Diagnosis:  HTN    Serious Comorbid Conditions:  Adult:  reviewed    PLAN:    Patient has a very complicated cardiac history, including heart valve replacement.  I have recommended she go to ED for evaluation and treatment, given that she has headaches and has had Bps in the 200's for 3 days now along with the dizziness.  Her son will take her to the ED.  No PE.    Followup:    If not improving or if condition worsens, follow up with your Primary Care Provider    There are no Patient Instructions on " file for this visit.

## 2022-04-04 NOTE — ED PROVIDER NOTES
[place] EMERGENCY DEPARTMENT ([facility])  4/04/22      History     Chief Complaint   Patient presents with     Hypertension     The history is provided by the patient and medical records.     Dulce Yeh is a 87 year old female with a past medical history significant for CAD, NSTEMI 6/20 with PCI x 3 to LAD, aortic stenosis s/p TAVR 7/20, HFrEF, pafib, lichen sclerosis, gout who presents to the ED for evaluation of hypertension.  Per chart review patient was seen at urgent care [place] 4/4/2022 today for evaluation of hypertension. EKG showed HR of 61, LBBB, relatively unchanged.  Due to her cardiac history she was recommended to present to the ED.  Here in the ED, patient states she has a 2-year history of hypertension for which she is taking metoprolol and valsartan.  States she also takes aspirin. She states she has a porcine heart valve replacement.  She denies any recent medication changes.  Patient states that she noticed Saturday, 2 days ago that her systolic blood pressure was 220.  She also states she had high blood pressure yesterday as well.  Endorses dizziness on Saturday.  Endorses pressure in the head but no headache.  She states that feels more like she wants to scratch her head.  She also notes dermatitis on her head.  Denies leg swelling.  Denies abdominal pain.  Denies chest pain. Endorses baseline back pain.    Past Medical History  Past Medical History:   Diagnosis Date     Allergy      Aortic stenosis 10/11/2011     C. 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 RHC, possible LHC for TV gradient;  Surgeon: Tu Hedrick MD;  Location:  HEART CARDIAC CATH LAB     CV PCI STENT DRUG ELUTING N/A 6/19/2020    Procedure: Percutaneous Coronary Intervention Stent Drug Eluting;  Surgeon: Tu Hedrick MD;  Location:  HEART CARDIAC CATH LAB     CV TRANSCATHETER AORTIC VALVE REPLACEMENT Right 7/29/2020    Procedure: Transfemoral (Paez) Transcatheter Aortic Valve Replacement with Paez Kyra Ultra 23 mm. Intra-operative transthoracic echocardiogram;  Surgeon: Tu Hedrick MD;  Location: [facility] OR     HEART CATH FEMORAL CANNULIZATION WITH OPEN STANDBY REPAIR AORTIC VALVE N/A 7/29/2020    Procedure: Cardiopulmonary on standby.;  Surgeon: Ricardo Ireland MD;  Location: [facility] OR     ORTHOPEDIC SURGERY       No current outpatient medications on file.    Allergies   Allergen Reactions     Sulfa Drugs Hives     Clonidine Rash     Diltiazem Rash     Hydralazine Other (See Comments)     pt perceived eye swelling and new localized skin irritation     Family History  Family History   Problem Relation Age of Onset     Cancer Mother         lung cancer     Testicular cancer Father      Abdominal Aortic Aneurysm Brother      Rheumatic fever Brother      Diabetes Brother      Anesthesia Reaction No family hx of      Deep Vein Thrombosis (DVT) No family hx of      Social History   Social History     Tobacco Use     Smoking status: Never Smoker     Smokeless tobacco: Never Used   Substance Use Topics     Alcohol use: No     Drug use: No      Past medical history, past surgical history, medications, allergies, family history, and social history were reviewed with the patient. No additional pertinent items.       Review of Systems   Constitutional: Negative for activity change, appetite change and fever.   HENT: Negative for congestion, sinus  "pressure, trouble swallowing and voice change.    Eyes: Negative for redness.   Respiratory: Negative for cough, choking and shortness of breath.    Cardiovascular: Negative for chest pain, palpitations and leg swelling.   Gastrointestinal: Negative for abdominal pain, nausea and vomiting.   Genitourinary: Negative for difficulty urinating and hematuria.   Musculoskeletal: Positive for back pain ('baseline'). Negative for arthralgias, neck pain and neck stiffness.   Skin: Negative for color change and wound.   Allergic/Immunologic: Negative for immunocompromised state.   Neurological: Positive for headaches (minimal). Negative for tremors, weakness, light-headedness and numbness.   Hematological: Does not bruise/bleed easily.   Psychiatric/Behavioral: Negative for confusion, decreased concentration and dysphoric mood. The patient is not nervous/anxious.    All other systems reviewed and are negative.    A complete review of systems was performed with pertinent positives and negatives noted in the HPI, and all other systems negative.    Physical Exam   BP: (!) 215/96  Pulse: 63  Temp: 98.3  F (36.8  C)  Resp: 18  Height: 170.2 cm (5' 7\")  Weight: 85.7 kg (188 lb 14.4 oz)  SpO2: 97 %  Physical Exam  Vitals and nursing note reviewed.   Constitutional:       General: She is in acute distress.      Appearance: She is well-developed. She is not toxic-appearing or diaphoretic.   HENT:      Head: Normocephalic and atraumatic.      Nose: Nose normal. No rhinorrhea.      Mouth/Throat:      Mouth: Mucous membranes are moist.      Pharynx: Oropharynx is clear.   Eyes:      General: No scleral icterus.     Extraocular Movements: Extraocular movements intact.      Conjunctiva/sclera: Conjunctivae normal.      Pupils: Pupils are equal, round, and reactive to light.   Cardiovascular:      Rate and Rhythm: Normal rate and regular rhythm.   Pulmonary:      Effort: No respiratory distress.      Breath sounds: No stridor.   Abdominal: "      General: There is no distension.      Palpations: Abdomen is soft. There is no mass.      Tenderness: There is no abdominal tenderness. There is no guarding or rebound.   Musculoskeletal:         General: No swelling, tenderness, deformity or signs of injury.      Cervical back: Normal range of motion and neck supple. No rigidity or tenderness.   Skin:     General: Skin is warm and dry.      Capillary Refill: Capillary refill takes less than 2 seconds.      Coloration: Skin is not jaundiced or pale.      Findings: No erythema or rash.   Neurological:      General: No focal deficit present.      Mental Status: She is alert and oriented to person, place, and time. Mental status is at baseline.   Psychiatric:         Mood and Affect: Mood normal.         Behavior: Behavior normal.         Thought Content: Thought content normal.         Judgment: Judgment normal.         ED Course     3:22 PM  The patient was seen and examined by Jose Armando Mehta MD in Room ED26.     Procedures     Patient valuate in the ER.  EKG done revealing sinus bradycardia without hyperacute changes.  Chest x-ray did not show any mediastinal widening.  Laboratory testing done patient sodium 136 potassium 3.5 bicarb 24 gap is 7 glucose is 104 liver function is normal limits  White count 6.6 hemoglobin 12.6.  INR 1.11 TSH 2.21.  Urinalysis negative troponin elevated .    Patient here in the ER blood pressure initially had improved to 170/77 without any intervention.  Discussed with cardiology feel at this point most likely hypertensive cause for elevated troponin as patient had a TAVR done a few years ago I believe at that point had an angiogram    Recommendations are to mid to medicine who I did talk to at this point patient blood pressure increased to 190s we gave 10 mg labetalol patient otherwise stable done here in the ER is comfortable being admitted for elevated hypertension with elevated troponin without hyperacute ischemic  changes on the EKG.  Covid testing negative.            EKG Interpretation:      Interpreted by Jose rAmando Mehta MD  Time reviewed: 4:38 PM  Symptoms at time of EKG: hypertension   Rhythm: sinus bradycardia  Rate: 58 bpm  Axis: Normal  Ectopy: none  Conduction: LBBB  ST Segments/ T Waves: No ST-T wave changes  Q Waves: none  Comparison to prior: Unchanged from 10/26/2020    Clinical Impression: no acute changes                          Results for orders placed or performed during the hospital encounter of 04/04/22   XR Chest Port 1 View     Status: None    Narrative    EXAM: XR CHEST PORT 1 VIEW  4/4/2022 3:54 PM     HISTORY:  hypertensive weakness       COMPARISON:  8/28/2020    FINDINGS:   Portable frontal radiograph of the chest. The trachea is midline. The  cardiac silhouette is at the upper limits of normal for size. TAVR. No  pneumothorax. No pleural effusion. Hazy left basilar opacities with  silhouetting of the left heart border. The visualized upper abdomen is  unremarkable. Multilevel degenerative changes of the spine.      Impression    IMPRESSION:   1. Hazy left basilar opacities with silhouetting of the left heart  border, which may represent atelectasis versus infection in the  appropriate clinical setting.  2. TAVR.    I have personally reviewed the examination and initial interpretation  and I agree with the findings.    ORESTES SALTER MD         SYSTEM ID:  [id number]   [place] Draw     Status: None    Narrative    The following orders were created for panel order [place] Draw.  Procedure                               Abnormality         Status                     ---------                               -----------         ------                     Extra Blue Top Tube[[id number]]                              Final result               Extra Red Top Tube[[id number]]                               Final result               Extra Green Top (Lithium...[[id number]]                      Final result                Extra Purple Top Tube[[id number]]                            Final result                 Please view results for these tests on the individual orders.   Extra Blue Top Tube     Status: None   Result Value Ref Range    Hold Specimen JIC    Extra Red Top Tube     Status: None   Result Value Ref Range    Hold Specimen JIC    Extra Green Top (Lithium Heparin) Tube     Status: None   Result Value Ref Range    Hold Specimen JIC    Extra Purple Top Tube     Status: None   Result Value Ref Range    Hold Specimen JIC    Partial thromboplastin time     Status: Normal   Result Value Ref Range    aPTT 35 22 - 38 Seconds   INR     Status: Normal   Result Value Ref Range    INR 1.11 0.85 - 1.15   Comprehensive metabolic panel     Status: Abnormal   Result Value Ref Range    Sodium 136 133 - 144 mmol/L    Potassium 3.5 3.4 - 5.3 mmol/L    Chloride 105 94 - 109 mmol/L    Carbon Dioxide (CO2) 24 20 - 32 mmol/L    Anion Gap 7 3 - 14 mmol/L    Urea Nitrogen 19 7 - 30 mg/dL    Creatinine 0.76 0.52 - 1.04 mg/dL    Calcium 9.3 8.5 - 10.1 mg/dL    Glucose 104 (H) 70 - 99 mg/dL    Alkaline Phosphatase 63 40 - 150 U/L    AST 29 0 - 45 U/L    ALT 20 0 - 50 U/L    Protein Total 8.0 6.8 - 8.8 g/dL    Albumin 3.6 3.4 - 5.0 g/dL    Bilirubin Total 0.8 0.2 - 1.3 mg/dL    GFR Estimate 75 >60 mL/min/1.73m2   Magnesium     Status: Normal   Result Value Ref Range    Magnesium 1.8 1.6 - 2.3 mg/dL   Troponin I     Status: Abnormal   Result Value Ref Range    Troponin I High Sensitivity 109 (H) <54 ng/L   TSH     Status: Normal   Result Value Ref Range    TSH 2.21 0.40 - 4.00 mU/L   UA with Microscopic reflex to Culture     Status: Abnormal    Specimen: Urine, Midstream   Result Value Ref Range    Color Urine Straw Colorless, Straw, Light Yellow, Yellow    Appearance Urine Clear Clear    Glucose Urine Negative Negative mg/dL    Bilirubin Urine Negative Negative    Ketones Urine Negative Negative mg/dL    Specific Gravity Urine 1.008 1.003 -  1.035    Blood Urine Negative Negative    pH Urine 6.5 5.0 - 7.0    Protein Albumin Urine Negative Negative mg/dL    Urobilinogen Urine Normal Normal, 2.0 mg/dL    Nitrite Urine Negative Negative    Leukocyte Esterase Urine Negative Negative    Bacteria Urine Few (A) None Seen /HPF    RBC Urine 0 <=2 /HPF    WBC Urine 0 <=5 /HPF    Narrative    Urine Culture not indicated   CBC with platelets and differential     Status: Abnormal   Result Value Ref Range    WBC Count 6.6 4.0 - 11.0 10e3/uL    RBC Count 4.62 3.80 - 5.20 10e6/uL    Hemoglobin 12.6 11.7 - 15.7 g/dL    Hematocrit 38.8 35.0 - 47.0 %    MCV 84 78 - 100 fL    MCH 27.3 26.5 - 33.0 pg    MCHC 32.5 31.5 - 36.5 g/dL    RDW 15.9 (H) 10.0 - 15.0 %    Platelet Count 152 150 - 450 10e3/uL    % Neutrophils 54 %    % Lymphocytes 28 %    % Monocytes 12 %    % Eosinophils 4 %    % Basophils 2 %    % Immature Granulocytes 0 %    NRBCs per 100 WBC 0 <1 /100    Absolute Neutrophils 3.5 1.6 - 8.3 10e3/uL    Absolute Lymphocytes 1.9 0.8 - 5.3 10e3/uL    Absolute Monocytes 0.8 0.0 - 1.3 10e3/uL    Absolute Eosinophils 0.3 0.0 - 0.7 10e3/uL    Absolute Basophils 0.1 0.0 - 0.2 10e3/uL    Absolute Immature Granulocytes 0.0 <=0.4 10e3/uL    Absolute NRBCs 0.0 10e3/uL   Troponin I     Status: Abnormal   Result Value Ref Range    Troponin I High Sensitivity 143 (HH) <54 ng/L   Asymptomatic COVID-19 Virus (Coronavirus) by PCR Nasopharyngeal     Status: Normal    Specimen: Nasopharyngeal; Swab   Result Value Ref Range    SARS CoV2 PCR Negative Negative, Testing sent to reference lab. Results will be returned via unsolicited result    Narrative    Testing was performed using the Xpert Xpress SARS-CoV-2 Assay on the  Cepheid Gene-Xpert Instrument Systems. Additional information about  this Emergency Use Authorization (EUA) assay can be found via the Lab  Guide. This test should be ordered for the detection of SARS-CoV-2 in  individuals who meet SARS-CoV-2 clinical and/or  epidemiological  criteria. Test performance is unknown in asymptomatic patients. This  test is for in vitro diagnostic use under the FDA EUA for  laboratories certified under CLIA to perform high complexity testing.  This test has not been FDA cleared or approved. A negative result  does not rule out the presence of PCR inhibitors in the specimen or  target RNA in concentration below the limit of detection for the  assay. The possibility of a false negative should be considered if  the patient's recent exposure or clinical presentation suggests  COVID-19. This test was validated by the [facility]. This laboratory is certified under  the Clinical Laboratory Improvement Amendments of 1988 (CLIA-88) as  qualified to perform high complexity laboratory testing.     Nt probnp inpatient     Status: Normal   Result Value Ref Range    N terminal Pro BNP Inpatient 578 0-1,800 pg/mL   Troponin I     Status: Abnormal   Result Value Ref Range    Troponin I High Sensitivity 311 (HH) <54 ng/L   EKG 12 lead     Status: None   Result Value Ref Range    Systolic Blood Pressure  mmHg    Diastolic Blood Pressure  mmHg    Ventricular Rate 58 BPM    Atrial Rate 58 BPM    TX Interval 220 ms    QRS Duration 152 ms     ms    QTc 494 ms    P Axis 39 degrees    R AXIS -16 degrees    T Axis 118 degrees    Interpretation ECG       Sinus bradycardia with 1st degree A-V block  Left bundle branch block  Abnormal ECG  Unconfirmed report - interpretation of this ECG is computer generated - see medical record for final interpretation  Confirmed by - EMERGENCY ROOM, PHYSICIAN ([id number]),  AMANDA EDWARDS ([id number]) on 4/4/2022 6:49:42 PM     CBC with platelets differential     Status: Abnormal    Narrative    The following orders were created for panel order CBC with platelets differential.  Procedure                               Abnormality         Status                     ---------                                -----------         ------                     CBC with platelets and d...[[id number]]  Abnormal            Final result                 Please view results for these tests on the individual orders.     Medications   lidocaine 1 % 0.1-1 mL (has no administration in time range)   lidocaine (LMX4) cream (has no administration in time range)   sodium chloride (PF) 0.9% PF flush 3 mL ( Intracatheter Canceled Entry 4/5/22 0029)   sodium chloride (PF) 0.9% PF flush 3 mL (has no administration in time range)   aspirin EC tablet 81 mg (has no administration in time range)   levothyroxine (SYNTHROID/LEVOTHROID) tablet 75 mcg (has no administration in time range)   metoprolol succinate ER (TOPROL-XL) 24 hr tablet 100 mg ( Oral Automatically Held 4/8/22 0800)   lidocaine 1 % 0.1-1 mL (has no administration in time range)   lidocaine (LMX4) cream (has no administration in time range)   sodium chloride (PF) 0.9% PF flush 3 mL (3 mLs Intracatheter Given 4/4/22 1950)   sodium chloride (PF) 0.9% PF flush 3 mL (has no administration in time range)   melatonin tablet 1 mg (has no administration in time range)   acetaminophen (TYLENOL) tablet 650 mg (has no administration in time range)   polyethylene glycol (MIRALAX) Packet 17 g (has no administration in time range)   senna-docusate (SENOKOT-S/PERICOLACE) 8.6-50 MG per tablet 1 tablet (has no administration in time range)     Or   senna-docusate (SENOKOT-S/PERICOLACE) 8.6-50 MG per tablet 2 tablet (has no administration in time range)   ondansetron (ZOFRAN-ODT) ODT tab 4 mg (has no administration in time range)     Or   ondansetron (ZOFRAN) injection 4 mg (has no administration in time range)   hydrALAZINE (APRESOLINE) injection 10 mg ( Intravenous Held by provider 4/5/22 0056)   valsartan (DIOVAN) tablet 320 mg ( Oral Automatically Held 4/8/22 0800)   lactated ringers BOLUS 500 mL (500 mLs Intravenous New Bag 4/5/22 0235)   acetaminophen (TYLENOL) tablet 1,000 mg  (500 mg Oral Given 4/4/22 1950)   hydrALAZINE (APRESOLINE) injection 10 mg (10 mg Intravenous Given 4/4/22 1949)        Assessments & Plan (with Medical Decision Making)  87-year-old female history of known hypertension is on losartan along with metoprolol been compliant but they did decrease decrease her losartan 6 months ago.  Patient noted some weakness with elevated blood pressure over the weekend presented ER for evaluation really no significant headache no neuro focal complaints otherwise no chest pain EKG did not show any hyperacute changes.  Chest x-ray done without acute findings patient history of TAVR 2 years ago.  Not on anticoagulants.  Other labs stable except for elevated troponin I 43.  Patient without chest pain otherwise stable discussed cardiology recommended medicine admission more likely hypertensive reason for elevated troponin has had angiogram of years ago with catheter.  At this point patient stable blood pressure did increase so did receive labetalol 10 mg otherwise stable here in the ER will be admitted to medicine to get a formal echo in the morning follow troponins and also control blood pressure.  Patient agrees with plan to be admitted to medicine service.       I have reviewed the nursing notes. I have reviewed the findings, diagnosis, plan and need for follow up with the patient.    Current Discharge Medication List          Final diagnoses:   Essential hypertension   Elevated troponin     I, Mana Fraire, am serving as a trained medical scribe to document services personally performed by Jose Armando Mehta MD based on the provider's statements to me on April 4, 2022.  This document has been checked and approved by the attending provider.    I, Jose Armando Mehta MD, was physically present and have reviewed and verified the accuracy of this note documented by Mana Fraire, medical scribe.      Jose Armando Mehta MD      --  Jose Armando Mehta  [facility] EMERGENCY  DEPARTMENT  4/4/2022    This note was created at least in part by the use of dragon voice dictation system. Inadvertent typographical errors may still exist.  Jose Armando Mehta MD.    Patient evaluated in the emergency department during the COVID-19 pandemic period. Careful attention to patients safety was addressed throughout the evaluation. Evaluation and treatment management was initiated with disposition made efficiently and appropriate as possible to minimize any risk of potential exposure to patient during this evaluation.       Jose Armando Mehta MD  04/05/22 0253

## 2022-04-04 NOTE — TELEPHONE ENCOUNTER
"Call from Dulce, see triage note.  [facility]  Agrees with [facility] care [facility].  Son will take her    Reason for Disposition    Systolic BP >= 180 OR Diastolic >= 110    Patient wants to be seen    Additional Information    Negative: Sounds like a life-threatening emergency to the triager    Negative: Pregnant > 20 weeks or postpartum (< 6 weeks after delivery) and new hand or face swelling    Negative: Pregnant > 20 weeks and BP > 140/90    Negative: Systolic BP >= 160 OR Diastolic >= 100, and any cardiac or neurologic symptoms (e.g., chest pain, difficulty breathing, unsteady gait, blurred vision)    Negative: Patient sounds very sick or weak to the triager    Negative: BP Systolic BP >= 140 OR Diastolic >= 90 and postpartum (from 0 to 6 weeks after delivery)    Negative: Systolic BP >= 180 OR Diastolic >= 110, and missed most recent dose of blood pressure medication    Answer Assessment - Initial Assessment Questions  1. BLOOD PRESSURE: \"What is the blood pressure?\" \"Did you take at least two measurements 5 minutes apart?\"      220/110 and  207/101, pulse 68  2. ONSET: \"When did you take your blood pressure?\"      Before call  3. HOW: \"How did you obtain the blood pressure?\" (e.g., visiting nurse, automatic home BP monitor)      Home blood pressure machine  4. HISTORY: \"Do you have a history of high blood pressure?\"      Yes, along with low blood pressure  5. MEDICATIONS: \"Are you taking any medications for blood pressure?\" \"Have you missed any doses recently?\"      denies  6. OTHER SYMPTOMS: \"Do you have any symptoms?\" (e.g., headache, chest pain, blurred vision, difficulty breathing, weakness)      Does admit to headaches, denies chest pain, vision changes, changes with breathing.  Admits to weakness, which reports is normal.  Admits to increased stress.  7. PREGNANCY: \"Is there any chance you are pregnant?\" \"When was your last menstrual period?\"      N/A    Protocols " used: HIGH BLOOD PRESSURE-A-OH

## 2022-04-04 NOTE — ED TRIAGE NOTES
87 year old female with history of hypertension has noted elevated pressures at (>200).  Patient called her PCP today and was instructed to come to ED.  Patient has been taking her medications.

## 2022-04-05 ENCOUNTER — APPOINTMENT (OUTPATIENT)
Dept: CARDIOLOGY | Facility: CLINIC | Age: 87
DRG: 305 | End: 2022-04-05
Attending: INTERNAL MEDICINE
Payer: MEDICARE

## 2022-04-05 LAB
ANION GAP SERPL CALCULATED.3IONS-SCNC: 10 MMOL/L (ref 3–14)
BUN SERPL-MCNC: 19 MG/DL (ref 7–30)
CALCIUM SERPL-MCNC: 8.6 MG/DL (ref 8.5–10.1)
CHLORIDE BLD-SCNC: 106 MMOL/L (ref 94–109)
CO2 SERPL-SCNC: 23 MMOL/L (ref 20–32)
CREAT SERPL-MCNC: 0.77 MG/DL (ref 0.52–1.04)
GFR SERPL CREATININE-BSD FRML MDRD: 74 ML/MIN/1.73M2
GLUCOSE BLD-MCNC: 111 MG/DL (ref 70–99)
HOLD SPECIMEN: NORMAL
LVEF ECHO: NORMAL
POTASSIUM BLD-SCNC: 3.9 MMOL/L (ref 3.4–5.3)
SODIUM SERPL-SCNC: 139 MMOL/L (ref 133–144)
TROPONIN I SERPL HS-MCNC: 303 NG/L
TROPONIN I SERPL HS-MCNC: 311 NG/L

## 2022-04-05 PROCEDURE — 250N000013 HC RX MED GY IP 250 OP 250 PS 637: Performed by: DENTIST

## 2022-04-05 PROCEDURE — 93306 TTE W/DOPPLER COMPLETE: CPT

## 2022-04-05 PROCEDURE — 214N000001 HC R&B CCU UMMC

## 2022-04-05 PROCEDURE — 84484 ASSAY OF TROPONIN QUANT: CPT | Performed by: INTERNAL MEDICINE

## 2022-04-05 PROCEDURE — 80048 BASIC METABOLIC PNL TOTAL CA: CPT | Performed by: INTERNAL MEDICINE

## 2022-04-05 PROCEDURE — 250N000013 HC RX MED GY IP 250 OP 250 PS 637: Performed by: FAMILY MEDICINE

## 2022-04-05 PROCEDURE — 999N000128 HC STATISTIC PERIPHERAL IV START W/O US GUIDANCE

## 2022-04-05 PROCEDURE — 93306 TTE W/DOPPLER COMPLETE: CPT | Mod: 26 | Performed by: INTERNAL MEDICINE

## 2022-04-05 PROCEDURE — 36415 COLL VENOUS BLD VENIPUNCTURE: CPT | Performed by: INTERNAL MEDICINE

## 2022-04-05 PROCEDURE — 258N000003 HC RX IP 258 OP 636: Performed by: STUDENT IN AN ORGANIZED HEALTH CARE EDUCATION/TRAINING PROGRAM

## 2022-04-05 PROCEDURE — 99233 SBSQ HOSP IP/OBS HIGH 50: CPT | Mod: GC | Performed by: STUDENT IN AN ORGANIZED HEALTH CARE EDUCATION/TRAINING PROGRAM

## 2022-04-05 RX ORDER — METOPROLOL SUCCINATE 100 MG/1
100 TABLET, EXTENDED RELEASE ORAL DAILY
Status: DISCONTINUED | OUTPATIENT
Start: 2022-04-05 | End: 2022-04-06 | Stop reason: HOSPADM

## 2022-04-05 RX ADMIN — ACETAMINOPHEN 325 MG: 325 TABLET ORAL at 09:13

## 2022-04-05 RX ADMIN — LEVOTHYROXINE SODIUM 75 MCG: 75 TABLET ORAL at 09:13

## 2022-04-05 RX ADMIN — METOPROLOL SUCCINATE 100 MG: 100 TABLET, EXTENDED RELEASE ORAL at 13:11

## 2022-04-05 RX ADMIN — ASPIRIN 81 MG: 81 TABLET, COATED ORAL at 09:12

## 2022-04-05 RX ADMIN — ACETAMINOPHEN 325 MG: 325 TABLET ORAL at 18:22

## 2022-04-05 RX ADMIN — SODIUM CHLORIDE, POTASSIUM CHLORIDE, SODIUM LACTATE AND CALCIUM CHLORIDE 500 ML: 600; 310; 30; 20 INJECTION, SOLUTION INTRAVENOUS at 02:35

## 2022-04-05 ASSESSMENT — ACTIVITIES OF DAILY LIVING (ADL)
ADLS_ACUITY_SCORE: 6
ADLS_ACUITY_SCORE: 8
ADLS_ACUITY_SCORE: 6
ADLS_ACUITY_SCORE: 8
ADLS_ACUITY_SCORE: 6
ADLS_ACUITY_SCORE: 8
ADLS_ACUITY_SCORE: 6
ADLS_ACUITY_SCORE: 6
ADLS_ACUITY_SCORE: 8
ADLS_ACUITY_SCORE: 6
ADLS_ACUITY_SCORE: 6
ADLS_ACUITY_SCORE: 8
ADLS_ACUITY_SCORE: 8
ADLS_ACUITY_SCORE: 6
ADLS_ACUITY_SCORE: 6
ADLS_ACUITY_SCORE: 8
ADLS_ACUITY_SCORE: 6
ADLS_ACUITY_SCORE: 8
ADLS_ACUITY_SCORE: 6
ADLS_ACUITY_SCORE: 8

## 2022-04-05 ASSESSMENT — ENCOUNTER SYMPTOMS
NECK PAIN: 0
LIGHT-HEADEDNESS: 0
NUMBNESS: 0
HEADACHES: 1
TREMORS: 0
WEAKNESS: 0
HEMATURIA: 0
NERVOUS/ANXIOUS: 0
TROUBLE SWALLOWING: 0
DYSPHORIC MOOD: 0
NAUSEA: 0
WOUND: 0
CHOKING: 0
ACTIVITY CHANGE: 0
BRUISES/BLEEDS EASILY: 0
COUGH: 0
VOMITING: 0
VOICE CHANGE: 0
PALPITATIONS: 0
DECREASED CONCENTRATION: 0
APPETITE CHANGE: 0
SINUS PRESSURE: 0

## 2022-04-05 NOTE — PROGRESS NOTES
OVERNIGHT BRIEF CROSS COVER NOTE    Patient admitted on 4/4 for hypertensive urgency.  Upon review, noted that patient over corrected to mid 140's systolic over 60's diastolic following last IV Hydralazine dose.  To note, patients blood pressure was >200/90 upon ED arrival.  Given concern for overcorrection, patient evaluated at the bedside.  She denied dizziness or light headedness lying down, sitting, and while walking to the bathroom.  Fully alert and oriented.  Exam remarkable for good perfusion to distal extremities with 2+ pulses.      Given signs of adequate perfusion on exam, I will hold off on interventions to bring up her blood pressure unless she becomes symptomatic.  I will hold her prn Hydralazine and monitor her BP closely throughout the remainder of the night.  I will also hold her morning planned increased Valsartan dose as well as her morning Metoprolol for now.  Blood pressure management plan to be determined based on BP trend throughout the remainder of the night.     Plan of care discussed briefly with night hospitalist, Dr. Gareth Gaxiola,    Paolo Gracia DO, MPH  Med-Peds PGY-2  [facility] Resident    ----  Addendum:  02:00 Update- Repeat blood pressure 130/63.  Per review, most recent EF 50-55% in 2020.  To try to prevent further drop, 500ml LR bolus ordered. Continue to hold antihypertensives.  No report of symptoms at this time.

## 2022-04-05 NOTE — H&P
[facility]    History and Physical - Medicine Service, [facility] TEAM        Date of Admission:  4/4/2022    Assessment & Plan      Dulce Yeh is a 87 year old female with a history of severe aortic stenosis s/p TAVR (07/2020), NSTEMI s/p PCI x3 (06/2020), HFrEF, paroxysmal afib not on anticoagulation, and HTN who was admitted on 4/4/2022 with hypertensive urgency.     Hypertensive urgency   Patient measured BP at home today and was around 220/110 so presented for further evaluation. BP max 202/90 in ED. Endorses mild headache but otherwise is asymptomatic. Labs with normal kidney and liver function. Evaluated patient in ED and gave hydralazine 10 mg IV (opted for hydralazine over labetalol as patient was bradycardic with HR in upper 50s) with reduction in BP from 201/89 to 164/91. Patient reports BP medication compliance (currently on metoprolol 100 mg daily and valsartan 160 mg daily). She has tried other BP medications in the past but has had adverse reactions to them. Based on brief chart review, she's had reactions to: Coreg (headache, dizziness), clonidine (dizzy, vision changes), hydrochlorothiazide (gout), diltiazem (rash, facial swelling), amlodipine (facial swelling and flushing).   - BP goal <160/100 tonight   - Hydralazine 10 mg IV q6h prn to meet BP goal  - Increase pta Valsartan to 320 mg daily (first dose tomorrow am)   - Continue pta metoprolol 100 mg daily   - Continuous cardiac monitoring    Elevated troponin   Trop elevated to 109, recheck 143. No chest pain or EKG changes. ED reached out to cardiology who suspect stress leak in the setting of hypertensive urgency.   - Trend to peak   - Echo ordered given elevated troponin, history of HFrEF, to ensure no wall motion abnormalities   - If continues to increase despite improvement in BP, would reach out to cardiology again     Severe aortic stenosis s/p TAVR (07/2020)  NSTEMI s/p PCI x3  "(06/2020)  HFrEF (EF 50-55% on last echo from 08/2020)  - Check BNP   - Echo ordered as above   - Continue pta aspirin 81 mg daily     Left lower lung haziness   Noted on CXR. Lungs sound clear on exam. No cough or fever and normal WBC count. Suspect atelectasis and not infectious.     Hx paroxysmal atrial fibrillation   Patient was previously on Eliquis though this was discontinued after she was monitored with Zio patch x2 after TAVR which did not show any episodes of afib. Currently in sinus rhythm     Hypothyroidism - continue pta levothyroxine 75 mcg      Diet: 2 Gram Sodium Diet    DVT Prophylaxis: Low Risk/Ambulatory with no VTE prophylaxis indicated  Benitez Catheter: Not present  Fluids: none  Central Lines: None  Cardiac Monitoring: ACTIVE order. Indication: Hypertensive urgency  Code Status: Full Code      Clinically Significant Risk Factors Present on Admission                # Platelet Defect: home medication list includes an antiplatelet medication   # Overweight: Estimated body mass index is 29.59 kg/m  as calculated from the following:    Height as of this encounter: 1.702 m (5' 7\").    Weight as of this encounter: 85.7 kg (188 lb 14.4 oz).      Disposition Plan   Expected Discharge: 1 day pending improvement in blood pressure   Anticipated discharge location:  Awaiting care coordination huddle  Delays:           The patient's care was discussed with the Attending Physician, Dr. Mohan and Patient.    Karen Barrios MD  Medicine Service, [facility]  Securely message with the Vocera Web Console (learn more here)  Text page via [employer] Paging/Directory   Please see signed in provider for up to date coverage information    ______________________________________________________________________    Chief Complaint   Hypertension    History is obtained from the patient    History of Present Illness   Dulce Yeh is a 87 year old female with a " history of severe aortic stenosis s/p TAVR (07/2020), NSTEMI s/p PCI x3 (06/2020), HFrEF, paroxysmal afib not on anticoagulation, HTN, hypothyroidism, and gout who presents with hypertension. Earlier today she measured her blood pressure at home due to feeling dizzy and noted it was elevated around 220/110. She called her doctor who recommended she go to urgent care. She then went to urgent care who recommended she go to the ED due to long waits and with her extensive cardiac history. She takes metoprolol 100 mg daily and valsartan 160 mg daily for hypertension. She takes the medications in the morning and has not missed any doses recently. No vomiting or diarrhea recently. She notes she has tried numerous other BP medications though these have all caused significant side effects for her. She says the only BP medications that have been tolerable are metoprolol and valsartan. She has a mild headache currently that is pressure-like. She did not have a headache this morning when her blood pressure was 220/110. She notes some dizziness today and 2 days ago. Her levothyroxine was recently decreased to 75 mcg daily from 88 mcg daily but otherwise no new medication changes. She was previously taking a higher dose of valsartan but this was reduced about 1 year ago. She says she has been under a decent amount of stress lately. She does not measure her blood pressure every day. She tells me that normally when she goes to the doctor her systolic blood pressure ranges from 140s-160s. No chest pain or shortness of breath. Also mentions that normally her blood pressure is lower when she exercises more often which she hasn't been doing recently due to the pandemic. Denies syncope. No vision changes. No nausea, vomiting, diarrhea, or abdominal pain. No focal weakness, numbness, or tingling.     Review of Systems    The 10 point Review of Systems is negative other than noted in the HPI or here.     Past Medical History    I have  reviewed this patient's medical history and updated it with pertinent information if needed.   Past Medical History:   Diagnosis Date     Allergy      Aortic stenosis 10/11/2011     C. 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  Past Surgical History:   Procedure Laterality Date     ANKLE SURGERY       APPENDECTOMY       bilateral tubal ligation       CHOLECYSTECTOMY       CV CORONARY ANGIOGRAM N/A 6/19/2020    Procedure: Coronary Angiogram with possible PCI. RHC, possible LHC for TV gradient;  Surgeon: Tu Hedrick MD;  Location:  [facility]     CV PCI STENT DRUG ELUTING N/A 6/19/2020    Procedure: Percutaneous Coronary Intervention Stent Drug Eluting;  Surgeon: Tu Hedrick MD;  Location:  [facility]     CV TRANSCATHETER AORTIC VALVE REPLACEMENT Right 7/29/2020    Procedure: Transfemoral (Paez) Transcatheter Aortic Valve Replacement with Paez Kyra Ultra 23 mm. Intra-operative transthoracic echocardiogram;  Surgeon: Tu Hedrick MD;  Location: [facility] OR     HEART CATH FEMORAL CANNULIZATION WITH OPEN STANDBY REPAIR AORTIC VALVE N/A 7/29/2020    Procedure: Cardiopulmonary on standby.;  Surgeon: Ricardo Ireland MD;  Location: [facility]     ORTHOPEDIC SURGERY          Social History   I have reviewed this patient's social history and updated it with pertinent information if needed. Dulce WADE Yeh  reports that she has never smoked. She has never used smokeless tobacco. She reports that she does not drink alcohol and does not use  drugs.    Family History   I have reviewed this patient's family history and updated it with pertinent information if needed.  Family History   Problem Relation Age of Onset     Cancer Mother         lung cancer     Testicular cancer Father      Abdominal Aortic Aneurysm Brother      Rheumatic fever Brother      Diabetes Brother      Anesthesia Reaction No family hx of      Deep Vein Thrombosis (DVT) No family hx of        Prior to Admission Medications   Prior to Admission Medications   Prescriptions Last Dose Informant Patient Reported? Taking?   BIOTIN PO 4/4/2022 at Unknown time  Yes Yes   Sig: Take by mouth daily   Cetirizine HCl (ZYRTEC PO) Unknown at Unknown time  Yes Yes   Sig: Take 10 mg by mouth daily as needed    Glucosamine-Chondroit-Vit C-Mn (GLUCOSAMINE CHONDR 1500 COMPLX) CAPS Past Week at Unknown time  Yes Yes   Sig: Take 1 tablet by mouth daily    Magnesium 300 MG CAPS 4/4/2022 at Unknown time  Yes Yes   Sig: Take  by mouth.   ascorbic acid (VITAMIN C) 500 MG tablet 4/4/2022 at Unknown time  Yes Yes   Sig: Take 500 mg by mouth daily.   aspirin (ASA) 81 MG EC tablet 4/4/2022 at Unknown time  No Yes   Sig: Take 1 tablet (81 mg) by mouth daily   calcium carb 1250 mg, 500 mg [place],/vitamin D 200 units (OSCAL WITH D) 500-200 MG-UNIT per tablet Unknown at Unknown time  Yes Yes   Sig: Take 1 tablet by mouth 2 times daily (with meals).   coenzyme Q-10 200 MG CAPS 4/4/2022 at Unknown time  Yes Yes   Sig: Take 200 mg by mouth daily   cyanocolbalamin (VITAMIN B-12) 1000 MCG tablet 4/4/2022 at Unknown time  Yes Yes   Sig: Take 1,000 mcg by mouth daily.   levothyroxine (SYNTHROID/LEVOTHROID) 75 MCG tablet 4/4/2022 at Unknown time  No Yes   Sig: Take 1 tablet (75 mcg) by mouth daily   metoprolol succinate ER (TOPROL-XL) 100 MG 24 hr tablet 4/4/2022 at Unknown time  No Yes   Sig: Take 1 tablet (100 mg) by mouth daily   valsartan (DIOVAN) 160 MG tablet 4/4/2022 at Unknown time  No Yes   Sig: Take 1 tablet (160 mg)  by mouth daily      Facility-Administered Medications: None     Allergies   Allergies   Allergen Reactions     Sulfa Drugs Hives     Clonidine Rash     Diltiazem Rash       Physical Exam   Vital Signs: Temp: 98  F (36.7  C) Temp src: Oral BP: (!) 164/91 Pulse: 70   Resp: 19 SpO2: 99 % O2 Device: None (Room air)    Weight: 188 lbs 14.4 oz    General: elderly woman resting comfortable in bed in no acute distress   HEENT: PERRL. No conjunctival injection or scleral icterus. Moist mucous membranes   CV: regular rate and rhythm. Systolic murmur noted at RUSB  Resp: breathing comfortably on room air. Lungs clear to auscultation bilaterally, no crackles or wheezing  Abd: soft, nontender, non-distended. No rebound or guarding  MSK: 1+ pitting edema to R mid calf which she says is chronic. No surrounding redness, swelling, or tenderness to palpation. No left lower extremity edema.   Skin: warm, well perfused, no obvious rashes to exposed skin   Neuro: alert, oriented. Conversant. no gross focal neurologic deficits. Cranial nerves II-XII intact. 5/5 strength to all 4 extremities     Data   Data reviewed today: I reviewed all medications, new labs and imaging results over the last 24 hours.     Recent Labs   Lab 04/04/22  1518   WBC 6.6   HGB 12.6   MCV 84      INR 1.11      POTASSIUM 3.5   CHLORIDE 105   CO2 24   BUN 19   CR 0.76   ANIONGAP 7   CR 9.3   *   ALBUMIN 3.6   PROTTOTAL 8.0   BILITOTAL 0.8   ALKPHOS 63   ALT 20   AST 29     Recent Results (from the past 24 hour(s))   XR Chest Port 1 View    Narrative    EXAM: XR CHEST PORT 1 VIEW  4/4/2022 3:54 PM     HISTORY:  hypertensive weakness       COMPARISON:  8/28/2020    FINDINGS:   Portable frontal radiograph of the chest. The trachea is midline. The  cardiac silhouette is at the upper limits of normal for size. TAVR. No  pneumothorax. No pleural effusion. Hazy left basilar opacities with  silhouetting of the left heart border. The visualized upper  abdomen is  unremarkable. Multilevel degenerative changes of the spine.      Impression    IMPRESSION:   1. Hazy left basilar opacities with silhouetting of the left heart  border, which may represent atelectasis versus infection in the  appropriate clinical setting.  2. TAVR.    I have personally reviewed the examination and initial interpretation  and I agree with the findings.    ORESTES SALTER MD         SYSTEM ID:  [id number]

## 2022-04-05 NOTE — PHARMACY-ADMISSION MEDICATION HISTORY
Admission Medication History Completed by Pharmacy    See [facility] Admission Navigator for allergy information, preferred outpatient pharmacy, prior to admission medications and immunization status.     Medication History Sources:     SureScripts    Patient Interview    Changes made to PTA medication list (reason):    Added: None    Deleted: None    Changed: None    Additional Information:    None    Prior to Admission medications    Medication Sig Last Dose Taking? Auth Provider   ascorbic acid (VITAMIN C) 500 MG tablet Take 500 mg by mouth daily. 4/4/2022 at Unknown time Yes Reported, Patient   aspirin (ASA) 81 MG EC tablet Take 1 tablet (81 mg) by mouth daily 4/4/2022 at Unknown time Yes Ginger Davis MD   BIOTIN PO Take by mouth daily 4/4/2022 at Unknown time Yes Reported, Patient   calcium carb 1250 mg, 500 mg [place],/vitamin D 200 units (OSCAL WITH D) 500-200 MG-UNIT per tablet Take 1 tablet by mouth 2 times daily (with meals). Unknown at Unknown time Yes Reported, Patient   Cetirizine HCl (ZYRTEC PO) Take 10 mg by mouth daily as needed  Unknown at Unknown time Yes Reported, Patient   coenzyme Q-10 200 MG CAPS Take 200 mg by mouth daily 4/4/2022 at Unknown time Yes Unknown, Entered By History   cyanocolbalamin (VITAMIN B-12) 1000 MCG tablet Take 1,000 mcg by mouth daily. 4/4/2022 at Unknown time Yes Reported, Patient   Glucosamine-Chondroit-Vit C-Mn (GLUCOSAMINE CHONDR 1500 COMPLX) CAPS Take 1 tablet by mouth daily  Past Week at Unknown time Yes Reported, Patient   levothyroxine (SYNTHROID/LEVOTHROID) 75 MCG tablet Take 1 tablet (75 mcg) by mouth daily 4/4/2022 at Unknown time Yes Gilda Hogan MD   Magnesium 400 MG TABS Take 1 tablet by mouth daily  4/4/2022 at Unknown time Yes Reported, Patient   metoprolol succinate ER (TOPROL-XL) 100 MG 24 hr tablet Take 1 tablet (100 mg) by mouth daily 4/4/2022 at Unknown time Yes Gilda Hogan MD   valsartan (DIOVAN) 160 MG tablet Take 1 tablet (160 mg) by mouth daily  4/4/2022 at Unknown time Yes Gilda Hogan MD       Date completed: 04/05/22    Medication history completed by: Jose Manuel Benavidez [facility]

## 2022-04-05 NOTE — PROGRESS NOTES
"SPIRITUAL HEALTH SERVICES  SPIRITUAL ASSESSMENT Progress Note  [facility] ([place]) 6C     REFERRAL SOURCE: New admit    Pt was receptive to  visit, and identified that she is hopeful she can get her blood pressure under control while she's in the hospital.  She expressed she has been having \"test after test,\" but knows that she will get better.  Pt said she normally is quite active at home, and has good support in her children who live nearby.  Pt belongs to a [employer] but does not attend regularly, although still finds meaning in her ricardo.  Pt was pleasant, and reported feeling hopeful about her recovery.    PLAN: [facility] will remain available for ongoing support as needed     Mili Foleyin  Pager: [phone]    "

## 2022-04-05 NOTE — PLAN OF CARE
I/A: A/Ox4, neuros intact q4h. VSS on RA. SR w/ 1*AVB and BBB on tele. PRN hydralazine given for SBP >160 (order now modified in MAR for >180, also provider held). LR IVFB given for down-trending BPs. Intermittent HA managed with tylenol. Denies lightheadedness or dizziness. Adequate UOP. LBM today. SBA/I. Awake frequently with restless legs and anxiousness.     Hours of care: [phone]

## 2022-04-05 NOTE — PROGRESS NOTES
Admission and Belongings    Diagnosis: Hypertensive urgency  Admitted from: home, [facility]  Accompanied by: Self  Belongings: Clothing, shoes, cell phone, 2 rings, glasses, and purse remain with pt. Wallet, cash, credit cards and keys sent to security per site process.   Teaching: orientation to unit, call don't fall, use of console, meal times, visiting hours, when to call for the RN (angina/sob/dizziness, etc.), and enforced importance of safety   Access: PIV x1  Telemetry: Placed on patient  Height/Weight: Complete

## 2022-04-05 NOTE — PLAN OF CARE
Pt admitted 4/4 with HTN urgency and elevated trop's.  BP trending down and restarted at home Metoprolol XL.  Trop's also trending down.  SR with 1st degree AVB and BBB, VS'S on RA with headache and medicated with prn Tylenol.  Neuro checks WNL q4h.  Otherwise, pt resting well and up independently.  Continue to monitor and with POC.

## 2022-04-05 NOTE — PROGRESS NOTES
[facility]    Progress Note - Medicine Service, MAROON TEAM 1       Date of Admission:  4/4/2022    Assessment & Plan      Dulce Yeh is a 87 year old female with a history of severe aortic stenosis s/p TAVR (07/2020), NSTEMI s/p PCI x3 (06/2020), HFrEF, paroxysmal afib not on anticoagulation, and HTN who was admitted on 4/4/2022 with hypertensive urgency. Her pressures have improved and she has remained stable through admission.     Hypertensive urgency   Patient measured BP at home on day of admission and was around 220/110 so presented for further evaluation. BP max 202/90 in ED. Endorsed mild headache but otherwise asymptomatic. Labs with normal kidney and liver function. Patient received hydralazine 10 mg IV  In ED (opted for hydralazine over labetalol as patient was bradycardic with HR in upper 50s) with reduction in BP from 201/89 to 164/91. Patient reports BP medication compliance (currently on metoprolol 100 mg daily and valsartan 160 mg daily). She has tried other BP medications in the past but has had adverse reactions to them. Based on brief chart review, she's had reactions to: Coreg (headache, dizziness), clonidine (dizzy, vision changes), hydrochlorothiazide (gout), diltiazem (rash, facial swelling), amlodipine (facial swelling and flushing).   Received an additional 10 mg of hydralazine and her pressure dropped to as low as 118/53. Stopped interventions, gave 500 ml bolus. Pressures up to 162/60 and 138/60.  - Systolic goal ~160  - Hold pta Valsartan to 160 mg today, (first dose tomorrow am), may double their dose to 320 mg on discharge.  - Continue pta metoprolol 100 mg daily today  - Continuous cardiac monitoring  - Likely discharge tomorrow pending BP trends with follow up with PCP.     Elevated troponin   Trop elevated to 311, trended back to 303. No chest pain or EKG changes. ED reached out to cardiology who suspect stress leak in the  "setting of hypertensive urgency.   - No need to further trend  - Echo 4/5: LV normal with some decreased function EF 50-55%, RV normal, TAVR in place, valve well seated with no paravalvular or valvular regurgitation, pulmonary artery systolic 33 mmHg plus right atrial pressure, no pericardial effusion.     Severe aortic stenosis s/p TAVR (07/2020)  NSTEMI s/p PCI x3 (06/2020)  HFrEF (EF 50-55% on last echo from 08/2020)  - Check BNP   - Echo ordered as above   - Continue pta aspirin 81 mg daily     Left lower lung haziness   Noted on CXR. Lungs sound clear on exam. No cough or fever and normal WBC count. Suspect atelectasis and not infectious.     Hx paroxysmal atrial fibrillation   Patient was previously on Eliquis though this was discontinued after she was monitored with Zio patch x2 after TAVR which did not show any episodes of afib. Currently in sinus rhythm     Hypothyroidism - continue pta levothyroxine 75 mcg      Diet: 2 Gram Sodium Diet    DVT Prophylaxis: Low Risk/Ambulatory with no VTE prophylaxis indicated  Benitez Catheter: Not present  Fluids: none  Central Lines: None  Cardiac Monitoring: ACTIVE order. Indication: Hypertensive urgency  Code Status: Full Code      Clinically Significant Risk Factors Present on Admission                # Platelet Defect: home medication list includes an antiplatelet medication   # Overweight: Estimated body mass index is 29.13 kg/m  as calculated from the following:    Height as of this encounter: 1.702 m (5' 7\").    Weight as of this encounter: 84.4 kg (186 lb).      Disposition Plan   Expected Discharge: 04/06/20221 day pending improvement in blood pressure   Anticipated discharge location:  Awaiting care coordination huddle  Delays:           The patient's care was discussed with the Attending Physician, Dr. Mohan and Patient.    Gian Redd MD  Medicine Service, [facility] TEAM [address] [place] [address]  Securely message with the [employer] Web " Console (learn more here)  Text page via [facility] Paging/Directory   Please see signed in provider for up to date coverage information    ______________________________________________________________________    Chief Complaint   Hypertension    History is obtained from the patient    History of Present Illness   Dulce Yhe is a 87 year old female with a history of severe aortic stenosis s/p TAVR (07/2020), NSTEMI s/p PCI x3 (06/2020), HFrEF, paroxysmal afib not on anticoagulation, HTN, hypothyroidism, and gout who presents with hypertension. Earlier today she measured her blood pressure at home due to feeling dizzy and noted it was elevated around 220/110. She called her doctor who recommended she go to urgent care. She then went to urgent care who recommended she go to the ED due to long waits and with her extensive cardiac history. She takes metoprolol 100 mg daily and valsartan 160 mg daily for hypertension. She takes the medications in the morning and has not missed any doses recently. No vomiting or diarrhea recently. She notes she has tried numerous other BP medications though these have all caused significant side effects for her. She says the only BP medications that have been tolerable are metoprolol and valsartan. She has a mild headache currently that is pressure-like. She did not have a headache this morning when her blood pressure was 220/110. She notes some dizziness today and 2 days ago. Her levothyroxine was recently decreased to 75 mcg daily from 88 mcg daily but otherwise no new medication changes. She was previously taking a higher dose of valsartan but this was reduced about 1 year ago. She says she has been under a decent amount of stress lately. She does not measure her blood pressure every day. She tells me that normally when she goes to the doctor her systolic blood pressure ranges from 140s-160s. No chest pain or shortness of breath. Also mentions that normally her blood pressure is  lower when she exercises more often which she hasn't been doing recently due to the pandemic. Denies syncope. No vision changes. No nausea, vomiting, diarrhea, or abdominal pain. No focal weakness, numbness, or tingling.     Review of Systems    The 10 point Review of Systems is negative other than noted in the HPI or here.     Past Medical History    I have reviewed this patient's medical history and updated it with pertinent information if needed.   Past Medical History:   Diagnosis Date     Allergy      Aortic stenosis 10/11/2011     C. 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  Past Surgical History:   Procedure Laterality Date     ANKLE SURGERY       APPENDECTOMY       bilateral tubal ligation       CHOLECYSTECTOMY       CV CORONARY ANGIOGRAM N/A 6/19/2020    Procedure: Coronary Angiogram with possible PCI. RHC, possible LHC for TV gradient;  Surgeon: Tu Hedrick MD;  Location:  HEART CARDIAC CATH LAB     CV PCI STENT DRUG ELUTING N/A 6/19/2020    Procedure: Percutaneous Coronary Intervention Stent Drug Eluting;  Surgeon: Tu Hedrick MD;  Location:  HEART CARDIAC CATH LAB     CV TRANSCATHETER AORTIC VALVE REPLACEMENT Right 7/29/2020    Procedure: Transfemoral (Paez) Transcatheter Aortic Valve Replacement with Paez Kyra Ultra 23 mm. Intra-operative transthoracic echocardiogram;  Surgeon: Tu Hedrick MD;  Location:  OR     HEART CATH FEMORAL CANNULIZATION WITH OPEN STANDBY REPAIR AORTIC VALVE N/A 7/29/2020    Procedure: Cardiopulmonary  on standby.;  Surgeon: Ricardo Ireland MD;  Location: [facility] OR     ORTHOPEDIC SURGERY          Social History   I have reviewed this patient's social history and updated it with pertinent information if needed. Dulce Yeh  reports that she has never smoked. She has never used smokeless tobacco. She reports that she does not drink alcohol and does not use drugs.    Family History   I have reviewed this patient's family history and updated it with pertinent information if needed.  Family History   Problem Relation Age of Onset     Cancer Mother         lung cancer     Testicular cancer Father      Abdominal Aortic Aneurysm Brother      Rheumatic fever Brother      Diabetes Brother      Anesthesia Reaction No family hx of      Deep Vein Thrombosis (DVT) No family hx of        Prior to Admission Medications   Prior to Admission Medications   Prescriptions Last Dose Informant Patient Reported? Taking?   BIOTIN PO 4/4/2022 at Unknown time  Yes Yes   Sig: Take by mouth daily   Cetirizine HCl (ZYRTEC PO) Unknown at Unknown time  Yes Yes   Sig: Take 10 mg by mouth daily as needed    Glucosamine-Chondroit-Vit C-Mn (GLUCOSAMINE CHONDR 1500 COMPLX) CAPS Past Week at Unknown time  Yes Yes   Sig: Take 1 tablet by mouth daily    Magnesium 400 MG TABS 4/4/2022 at Unknown time  Yes Yes   Sig: Take 1 tablet by mouth daily    ascorbic acid (VITAMIN C) 500 MG tablet 4/4/2022 at Unknown time  Yes Yes   Sig: Take 500 mg by mouth daily.   aspirin (ASA) 81 MG EC tablet 4/4/2022 at Unknown time  No Yes   Sig: Take 1 tablet (81 mg) by mouth daily   calcium carb 1250 mg, 500 mg [place],/vitamin D 200 units (OSCAL WITH D) 500-200 MG-UNIT per tablet Unknown at Unknown time  Yes Yes   Sig: Take 1 tablet by mouth 2 times daily (with meals).   coenzyme Q-10 200 MG CAPS 4/4/2022 at Unknown time  Yes Yes   Sig: Take 200 mg by mouth daily   cyanocolbalamin (VITAMIN B-12) 1000 MCG tablet 4/4/2022 at Unknown time  Yes Yes   Sig: Take 1,000 mcg by mouth  daily.   levothyroxine (SYNTHROID/LEVOTHROID) 75 MCG tablet 4/4/2022 at Unknown time  No Yes   Sig: Take 1 tablet (75 mcg) by mouth daily   metoprolol succinate ER (TOPROL-XL) 100 MG 24 hr tablet 4/4/2022 at Unknown time  No Yes   Sig: Take 1 tablet (100 mg) by mouth daily   valsartan (DIOVAN) 160 MG tablet 4/4/2022 at Unknown time  No Yes   Sig: Take 1 tablet (160 mg) by mouth daily      Facility-Administered Medications: None     Allergies   Allergies   Allergen Reactions     Sulfa Drugs Hives     Clonidine Rash     Diltiazem Rash     Hydralazine Other (See Comments)     pt perceived eye swelling and new localized skin irritation       Physical Exam   Vital Signs: Temp: 97.6  F (36.4  C) Temp src: Oral BP: 138/60 Pulse: 65   Resp: 18 SpO2: 96 % O2 Device: None (Room air)    Weight: 186 lbs 0 oz    General: elderly woman resting comfortable in bed in no acute distress   HEENT: PERRL. No conjunctival injection or scleral icterus. Moist mucous membranes   CV: regular rate and rhythm. Systolic murmur noted at RUSB  Resp: breathing comfortably on room air. Lungs clear to auscultation bilaterally, no crackles or wheezing  Abd: soft, nontender, non-distended. No rebound or guarding  MSK: 1+ pitting edema to R mid calf which she says is chronic. No surrounding redness, swelling, or tenderness to palpation. No left lower extremity edema.   Skin: warm, well perfused, no obvious rashes to exposed skin   Neuro: alert, oriented. Conversant. no gross focal neurologic deficits. Cranial nerves II-XII intact. 5/5 strength to all 4 extremities     Data   Data reviewed today: I reviewed all medications, new labs and imaging results over the last 24 hours.     Recent Labs   Lab 04/05/22  0617 04/04/22  1518   WBC  --  6.6   HGB  --  12.6   MCV  --  84   PLT  --  152   INR  --  1.11    136   POTASSIUM 3.9 3.5   CHLORIDE 106 105   CO2 23 24   BUN 19 19   CR 0.77 0.76   ANIONGAP 10 7   CR 8.6 9.3   * 104*   ALBUMIN  --   3.6   PROTTOTAL  --  8.0   BILITOTAL  --  0.8   ALKPHOS  --  63   ALT  --  20   AST  --  29     Recent Results (from the past 24 hour(s))   XR Chest Port 1 View    Narrative    EXAM: XR CHEST PORT 1 VIEW  2022 3:54 PM     HISTORY:  hypertensive weakness       COMPARISON:  2020    FINDINGS:   Portable frontal radiograph of the chest. The trachea is midline. The  cardiac silhouette is at the upper limits of normal for size. TAVR. No  pneumothorax. No pleural effusion. Hazy left basilar opacities with  silhouetting of the left heart border. The visualized upper abdomen is  unremarkable. Multilevel degenerative changes of the spine.      Impression    IMPRESSION:   1. Hazy left basilar opacities with silhouetting of the left heart  border, which may represent atelectasis versus infection in the  appropriate clinical setting.  2. TAVR.    I have personally reviewed the examination and initial interpretation  and I agree with the findings.    ORESTES SALTER MD         SYSTEM ID:  [id number]   Echo Complete   Result Value    LVEF  50-55% (borderline)    Narrative    [id number]  [id number]  [id number]  [id number]^REBECCA^KELSEY^ZOLTAN     [facility],[place]  Echocardiography Laboratory  [address] [place] [address]     Name: ESTEPHANIE HERNANDEZ  MRN: [id number]  : 1935  Study Date: 2022 09:17 AM  Age: 87 yrs  Gender: Female  Patient Location: [facility]  Reason For Study: Hypertension (HTN)  History: S/P TAVR with 23 mm Paez Kyra 3 Ultra prosthesis.  Ordering Physician: KELSEY FERNANDEZ  Performed By: Mercedez Hinson RDCS     BSA: 2.0 m2  Height: 67 in  Weight: 188 lb  HR: 70  BP: 162/60 mmHg  ______________________________________________________________________________  Procedure  Complete Portable Echo Adult.  ______________________________________________________________________________  Interpretation Summary  Left ventricular function is decreased. The ejection  fraction is 50-55%  (borderline).  Global right ventricular function is normal.  Post ARNIE with 23 mm Paez Kyra 3 Ultra prosthesis. Valve is well seated  with no paravalvular or valvular regurgtiation. Peak velocity 2.5 m/s and the  mean gradient 12 mmHg.  Estimated pulmonary artery systolic pressure is 33 mmHg plus right atrial  pressure.  IVC diameter and respiratory changes fall into an intermediate range  suggesting an RA pressure of 8 mmHg.  No pericardial effusion is present.  ______________________________________________________________________________  Left Ventricle  Left ventricular size is normal. Left ventricular function is decreased. The  ejection fraction is 50-55% (borderline). Grade II or moderate diastolic  dysfunction.     Right Ventricle  The right ventricle is normal size. Global right ventricular function is  normal.     Atria  The right atria appears normal. Mild left atrial enlargement is present.     Mitral Valve  Mitral leaflet thickness is increased . Mild to moderate mitral annular  calcification is present.     Aortic Valve  Post ARNIE with 23 mm Paez Kyra 3 Ultra prosthesis. Valve is well seated  with no paravalvular or valvular regurgtiation. Peak velocity 2.5 m/s and the  mean gradient 12 mmHg.     Tricuspid Valve  Mild tricuspid insufficiency is present. Estimated pulmonary artery systolic  pressure is 33 mmHg plus right atrial pressure.     Vessels  Ascending aorta 3.1 cm. IVC diameter and respiratory changes fall into an  intermediate range suggesting an RA pressure of 8 mmHg.     Pericardium  No pericardial effusion is present.     Compared to Previous Study  This study was compared with the study from 8.28.20 . TAVR gradients are  slightly higher. Mean RA is higher.     ______________________________________________________________________________  MMode/2D Measurements & Calculations  IVSd: 1.6 cm  LVIDd: 3.3 cm  LVIDs: 2.0 cm  LVPWd: 0.74 cm  FS: 41.1 %     LV  mass(C)d: 118.7 grams  LV mass(C)dI: 60.3 grams/m2  Ao root diam: 2.6 cm  asc Aorta Diam: 3.1 cm  LVOT diam: 1.9 cm  LVOT area: 2.8 cm2  LA Volume (BP): 60.0 ml  LA Volume Index (BP): 30.5 ml/m2  RWT: 0.45     Doppler Measurements & Calculations  MV E max jude: 125.0 cm/sec  MV A max jude: 146.0 cm/sec  MV E/A: 0.86     MV dec slope: 756.0 cm/sec2  Ao V2 max: 247.0 cm/sec  Ao max P.0 mmHg  Ao V2 mean: 164.0 cm/sec  Ao mean P.0 mmHg  Ao V2 VTI: 63.0 cm  DANIS(I,D): 1.4 cm2  DANIS(V,D): 1.6 cm2  LV V1 max P.6 mmHg  LV V1 max: 138.0 cm/sec  LV V1 VTI: 31.8 cm  SV(LVOT): 90.2 ml  SI(LVOT): 45.8 ml/m2  PA acc time: 0.07 sec  TR max jude: 289.0 cm/sec  TR max P.4 mmHg  AV Jude Ratio (DI): 0.56  DANIS Index (cm2/m2): 0.73  E/E' av.1  Lateral E/e': 18.0  Medial E/e': 30.3     ______________________________________________________________________________  Report approved by: Adalberto Hou 2022 11:10 AM

## 2022-04-06 VITALS
RESPIRATION RATE: 18 BRPM | SYSTOLIC BLOOD PRESSURE: 157 MMHG | HEART RATE: 60 BPM | HEIGHT: 67 IN | BODY MASS INDEX: 29.11 KG/M2 | TEMPERATURE: 98.5 F | WEIGHT: 185.5 LBS | DIASTOLIC BLOOD PRESSURE: 62 MMHG | OXYGEN SATURATION: 97 %

## 2022-04-06 PROCEDURE — 99239 HOSP IP/OBS DSCHRG MGMT >30: CPT | Mod: GC | Performed by: STUDENT IN AN ORGANIZED HEALTH CARE EDUCATION/TRAINING PROGRAM

## 2022-04-06 PROCEDURE — 250N000013 HC RX MED GY IP 250 OP 250 PS 637: Performed by: DENTIST

## 2022-04-06 PROCEDURE — 250N000013 HC RX MED GY IP 250 OP 250 PS 637: Performed by: FAMILY MEDICINE

## 2022-04-06 RX ADMIN — ASPIRIN 81 MG: 81 TABLET, COATED ORAL at 08:16

## 2022-04-06 RX ADMIN — METOPROLOL SUCCINATE 100 MG: 100 TABLET, EXTENDED RELEASE ORAL at 08:17

## 2022-04-06 RX ADMIN — ACETAMINOPHEN 325 MG: 325 TABLET ORAL at 04:44

## 2022-04-06 RX ADMIN — LEVOTHYROXINE SODIUM 75 MCG: 75 TABLET ORAL at 08:17

## 2022-04-06 ASSESSMENT — ACTIVITIES OF DAILY LIVING (ADL)
ADLS_ACUITY_SCORE: 6

## 2022-04-06 NOTE — PLAN OF CARE
I/A: A/Ox4, neuros intact q4h. VSS on RA. BPs 140s/150 sys overnight. SB/SR w/ 1*AVB and BBB on tele. Tylenol x1 for HA. Denies lightheadedness or dizziness. Adequate UOP. LBM 4/5. SBA. Appeared to sleep comfortably. Possible discharge to home today if medically indicated and safe disposition plan in place.     Hours of care: [id number]

## 2022-04-06 NOTE — PLAN OF CARE
Goal Outcome Evaluation:    Plan of Care Reviewed With: patient     Neuro: A&Ox4, forgetful at times.  Cardiac: Afebrile, SR with 1st degree AVB and BBB. SBP up to 177 this evening but normalized without intervention.   Respiratory: RA. Walked halls, reported some fatigue afterwards.  GI/: Voiding spontaneously. No BM this shift.   Diet/appetite: Tolerating 2gm Na diet. Denies nausea   Activity: Up with SBA.  Pain: . Denies   Skin: Intact.  Lines: PIV SL'd.  Replacements: None needed.  Plan: Possible discharge to home 4/6 if medically stable and safe to return to independent living.

## 2022-04-06 NOTE — PLAN OF CARE
D: Admitted 4/4 with hypertensive urgency  Hx: severe aortic stenosis s/p TAVR (07/2020), NSTEMI s/p PCI x3 (06/2020), HFrEF, paroxysmal afib not on anticoagulation, and HTN     I: Monitored vitals and assessed pt status.     A: A0x4. Neuros Q4hrs.  VSS, on RA. SR/SB with BBB and PACs (PACs increasing with frequency, team paged and aware). Afebrile. Denies pain. LBM 4/5. Voiding adequately. SBA. 2g Na+ diet. Denies chest pain and SOB. Denies numbness/tingling and dizziness.    P: Continue to monitor Pt status and report changes to Ad[facility] 1 team. Plan to discharge today.

## 2022-04-06 NOTE — PLAN OF CARE
Goal Outcome Evaluation:      Pt was calm and cooperative. A&O X4. Vitals signs WNL. Lung sounds clear. Left PIV intact saline locked.Neuro checks WNL q4 hrs. Denied headache or dizziness, nauseas and vomiting. Denied palpation or SOB.Pt is focusing on discharging process.Deneid pain/discofort at this time.

## 2022-04-06 NOTE — DISCHARGE SUMMARY
[facility]  Discharge Summary - Medicine & Pediatrics       Date of Admission:  4/4/2022  Date of Discharge:  4/6/2022  Discharging Provider: Gian Redd DDS and Jareth Claire MD  Discharge Service: Medicine Service, IRENA TEAM 1    Discharge Diagnoses   Hypertensive Urgency, Essential Hypertension    Follow-ups Needed After Discharge   Follow-up Appointments     Adult [facility] Follow-up and recommended labs and tests      Follow up with primary care provider, Gilda Hogan, within 7 days for   follow up on management of your blood pressure. Check BP every day. Watch   out for symptoms including headache, dizziness / lightheadedness, nausea,   passing out.     Appointments on [place] and/or [facility] (with [facility] or [facility]   provider or service). Call [phone] if you haven't heard regarding   these appointments within 7 days of discharge.         Instructed patient to follow up with PCP within a week, sooner with PCP or re-present to the ED for evaluation if symptoms occur or if BP>180.     Unresulted Labs Ordered in the Past 30 Days of this Admission     No orders found from 3/5/2022 to 4/5/2022.      These results will be followed up by PCP    Discharge Disposition   Discharged to home  Condition at discharge: Stable    Hospital Course   Dulce Yeh was admitted on 4/4/2022 for hypertensive urgency with BP's in 200/110's without evidence of end organ damage and without many symptoms besides a mild dizziness which made her take her BP and present to the ED originally. She received 10 mg hydralazine in the ED which brought her systolic down to mid 160's. She had a chest x-ray which showed her TAVR and some mild left basilar opacities which is very likely not infectious and is likely atelectasis. She also received an EKG and echocardiogram which were unchanged from 2020 without evidence of MI.  On 4/4, about 5 hours after the original dose of hydralazine, she  was given an additional 10 mg hydralazine which ended up over-correcting her down to as low as 118/53. BP decreasing interventions held and she was given .5L bolus of fluids and monitored. She reported no symptoms from the over-correction besides a mild headache. She remained stable the night of 4/4 and throughout 4/5. On 4/5 her PTA metoprolol was continued. Her PTA valsartan was held through her admission with instructions to start on discharge 4/6. She remained stable on 4/5 and 4/6 with systolic's around 150 and was discharged on the morning of 4/6 in a stable condition. She was instructed to start her PTA valsartan when she got home.     Patient to follow up with PCP in a week.     Patient to take daily BP's at home.     Discussed with her red flag symptoms including but not limited to: headache, dizziness, feeling faint, palpitations, chest pain, nausea, systolic BP >180, to either call her PCP to get in with them, or to re-present to the ED.        The following problems were addressed during her hospitalization:    Hypertensive urgency.     Consultations This Hospital Stay   VASCULAR ACCESS CARE ADULT IP CONSULT    Code Status   Full Code       The patient was discussed with Dr. Jareth Redd, [place]  OMFS PGY-1 on [facility] [address] [place] [address] [address] [address]  Phone: [phone]  ______________________________________________________________________    Physical Exam   Vital Signs: Temp: 98.5  F (36.9  C) Temp src: Oral BP: (!) 157/62 Pulse: 60   Resp: 18 SpO2: 97 % O2 Device: None (Room air)    Weight: 185 lbs 8 oz  Constitutional: awake, alert, cooperative, no apparent distress, and appears stated age  Eyes: Lids and lashes normal, pupils equal, round and reactive to light, extra ocular muscles intact, sclera clear, conjunctiva normal, vision at baseline, notes she has glaucoma   ENT: Normocephalic, without obvious abnormality, atraumatic, sinuses  nontender on palpation, external ears without lesions, oral pharynx with moist mucous membranes, tonsils without erythema or exudates, gums normal and good dentition.  Hematologic / Lymphatic: no cervical lymphadenopathy  Respiratory: No increased work of breathing, good air exchange, clear to auscultation bilaterally, no crackles or wheezing  Cardiovascular: Normal apical impulse, regular rate and rhythm, systolic murmur consistent with TAVR   GI: No scars, normal bowel sounds, soft, non-distended, non-tender, no masses palpated  Genitounirinary: Did not examine, normal urine output  Skin: no bruising or bleeding, normal skin color, texture, turgor, no redness, warmth, or swelling, no rashes, no lesions, nails normal without discoloration or clubbing and no jaundice  Musculoskeletal: There is no redness, warmth, or swelling of the joints.  Full range of motion noted. Tone is normal.  Right calf with trace edema, no edema on left.   full range of motion noted  Neurologic: Awake, alert, oriented to name, place and time.  Cranial nerves II-XII are grossly intact. Gait is normal.  Neuropsychiatric: General: normal, calm and normal eye contact      Primary Care Physician   Gilda Hogan    Discharge Orders      Reason for your hospital stay    You were admitted to the hospital on 4/4/22 for management of your blood pressure that was very high on arrival. We gave you medication, hydralazine, twice which dropped it a bit too much, so we held off on giving your home medications (metoprolol and valsartan) that evening, and gave you IV fluids and monitored you. You remained stable throughout that evening and through 4/5. On 4/5 we restarted your home metoprolol which you tolerated well. On 4/6 you were discharged in stable condition with instructions to resume your valsartan that same day and follow up with your primary care in a week.     Activity    Your activity upon discharge: activity as tolerated     Adult [facility]  Follow-up and recommended labs and tests    Follow up with primary care provider, Gilda Hogan, within 7 days for follow up on management of your blood pressure. Check BP every day. Watch out for symptoms including headache, dizziness / lightheadedness, nausea, passing out.     Appointments on [place] and/or [facility] (with [facility] or [facility] provider or service). Call [phone] if you haven't heard regarding these appointments within 7 days of discharge.     Full Code     Diet    Follow this diet upon discharge: Normal adult diet       Significant Results and Procedures   Results for orders placed or performed during the hospital encounter of 22   XR Chest Port 1 View    Narrative    EXAM: XR CHEST PORT 1 VIEW  2022 3:54 PM     HISTORY:  hypertensive weakness       COMPARISON:  2020    FINDINGS:   Portable frontal radiograph of the chest. The trachea is midline. The  cardiac silhouette is at the upper limits of normal for size. TAVR. No  pneumothorax. No pleural effusion. Hazy left basilar opacities with  silhouetting of the left heart border. The visualized upper abdomen is  unremarkable. Multilevel degenerative changes of the spine.      Impression    IMPRESSION:   1. Hazy left basilar opacities with silhouetting of the left heart  border, which may represent atelectasis versus infection in the  appropriate clinical setting.  2. TAVR.    I have personally reviewed the examination and initial interpretation  and I agree with the findings.    ORESTES SALTER MD         SYSTEM ID:  [id number]   Echo Complete     Value    LVEF  50-55% (borderline)    Narrative    [id number]  [id number]  [id number]  [id number]^KELSEY^G     [facility]  Echocardiography Laboratory  [address] [place] [id number]     Name: ESTEPHANIE HERNANDEZ  MRN: [id number]  : 1935  Study Date: 2022 09:17 AM  Age: 87 yrs  Gender: Female  Patient Location: [facility]  Reason For  Study: Hypertension (HTN)  History: S/P TAVR with 23 mm Paez Kyra 3 Ultra prosthesis.  Ordering Physician: KELSEY FERNANDEZ  Performed By: Mercedez Hinson RDCS     BSA: 2.0 m2  Height: 67 in  Weight: 188 lb  HR: 70  BP: 162/60 mmHg  ______________________________________________________________________________  Procedure  Complete Portable Echo Adult.  ______________________________________________________________________________  Interpretation Summary  Left ventricular function is decreased. The ejection fraction is 50-55%  (borderline).  Global right ventricular function is normal.  Post ARNIE with 23 mm Paez Kyra 3 Ultra prosthesis. Valve is well seated  with no paravalvular or valvular regurgtiation. Peak velocity 2.5 m/s and the  mean gradient 12 mmHg.  Estimated pulmonary artery systolic pressure is 33 mmHg plus right atrial  pressure.  IVC diameter and respiratory changes fall into an intermediate range  suggesting an RA pressure of 8 mmHg.  No pericardial effusion is present.  ______________________________________________________________________________  Left Ventricle  Left ventricular size is normal. Left ventricular function is decreased. The  ejection fraction is 50-55% (borderline). Grade II or moderate diastolic  dysfunction.     Right Ventricle  The right ventricle is normal size. Global right ventricular function is  normal.     Atria  The right atria appears normal. Mild left atrial enlargement is present.     Mitral Valve  Mitral leaflet thickness is increased . Mild to moderate mitral annular  calcification is present.     Aortic Valve  Post ARNIE with 23 mm Paez Kyra 3 Ultra prosthesis. Valve is well seated  with no paravalvular or valvular regurgtiation. Peak velocity 2.5 m/s and the  mean gradient 12 mmHg.     Tricuspid Valve  Mild tricuspid insufficiency is present. Estimated pulmonary artery systolic  pressure is 33 mmHg plus right atrial pressure.     Vessels  Ascending  aorta 3.1 cm. IVC diameter and respiratory changes fall into an  intermediate range suggesting an RA pressure of 8 mmHg.     Pericardium  No pericardial effusion is present.     Compared to Previous Study  This study was compared with the study from 20 . TAVR gradients are  slightly higher. Mean RA is higher.     ______________________________________________________________________________  MMode/2D Measurements & Calculations  IVSd: 1.6 cm  LVIDd: 3.3 cm  LVIDs: 2.0 cm  LVPWd: 0.74 cm  FS: 41.1 %     LV mass(C)d: 118.7 grams  LV mass(C)dI: 60.3 grams/m2  Ao root diam: 2.6 cm  asc Aorta Diam: 3.1 cm  LVOT diam: 1.9 cm  LVOT area: 2.8 cm2  LA Volume (BP): 60.0 ml  LA Volume Index (BP): 30.5 ml/m2  RWT: 0.45     Doppler Measurements & Calculations  MV E max jude: 125.0 cm/sec  MV A max jude: 146.0 cm/sec  MV E/A: 0.86     MV dec slope: 756.0 cm/sec2  Ao V2 max: 247.0 cm/sec  Ao max P.0 mmHg  Ao V2 mean: 164.0 cm/sec  Ao mean P.0 mmHg  Ao V2 VTI: 63.0 cm  DANIS(I,D): 1.4 cm2  DANIS(V,D): 1.6 cm2  LV V1 max P.6 mmHg  LV V1 max: 138.0 cm/sec  LV V1 VTI: 31.8 cm  SV(LVOT): 90.2 ml  SI(LVOT): 45.8 ml/m2  PA acc time: 0.07 sec  TR max jude: 289.0 cm/sec  TR max P.4 mmHg  AV Jude Ratio (DI): 0.56  DANIS Index (cm2/m2): 0.73  E/E' av.1  Lateral E/e': 18.0  Medial E/e': 30.3     ______________________________________________________________________________  Report approved by: Adalberto Hou 2022 11:10 AM               Discharge Medications   Current Discharge Medication List      CONTINUE these medications which have NOT CHANGED    Details   ascorbic acid (VITAMIN C) 500 MG tablet Take 500 mg by mouth daily.      aspirin (ASA) 81 MG EC tablet Take 1 tablet (81 mg) by mouth daily  Qty: 90 tablet, Refills: 3    Associated Diagnoses: Coronary artery disease involving native coronary artery of native heart without angina pectoris      BIOTIN PO Take by mouth daily      calcium carb 1250 mg, 500 mg  [place],/vitamin D 200 units (OSCAL WITH D) 500-200 MG-UNIT per tablet Take 1 tablet by mouth 2 times daily (with meals).      Cetirizine HCl (ZYRTEC PO) Take 10 mg by mouth daily as needed       coenzyme Q-10 200 MG CAPS Take 200 mg by mouth daily      cyanocolbalamin (VITAMIN B-12) 1000 MCG tablet Take 1,000 mcg by mouth daily.      Glucosamine-Chondroit-Vit C-Mn (GLUCOSAMINE CHONDR 1500 COMPLX) CAPS Take 1 tablet by mouth daily       levothyroxine (SYNTHROID/LEVOTHROID) 75 MCG tablet Take 1 tablet (75 mcg) by mouth daily  Qty: 90 tablet, Refills: 3    Associated Diagnoses: Hypothyroidism due to acquired atrophy of thyroid      Magnesium 400 MG TABS Take 1 tablet by mouth daily       metoprolol succinate ER (TOPROL-XL) 100 MG 24 hr tablet Take 1 tablet (100 mg) by mouth daily  Qty: 90 tablet, Refills: 3    Associated Diagnoses: Essential hypertension      valsartan (DIOVAN) 160 MG tablet Take 1 tablet (160 mg) by mouth daily  Qty: 90 tablet, Refills: 3    Associated Diagnoses: Benign essential hypertension           Allergies   Allergies   Allergen Reactions     Sulfa Drugs Hives     Clonidine Rash     Diltiazem Rash     Hydralazine Other (See Comments)     pt perceived eye swelling and new localized skin irritation

## 2022-04-06 NOTE — PROGRESS NOTES
DISCHARGE                           ----------------------------------------------------------------------------  Discharged to: Home  Via: Automobile  Accompanied by: Son  Discharge Instructions: diet, activity, medications, follow up appointments, when to call the MD, aftercare instructions, and what to watchout for (i.e. s/s of infection, increasing SOB, palpitations, chest pain, high BP)  Prescriptions: No new prescriptions  Follow Up Appointments: arranged; information given  Belongings: All sent with pt  IV: out  Telemetry: off  Pt exhibits understanding of above discharge instructions; all questions answered.    Discharge Paperwork: Signed, copied, and sent home with patient.

## 2022-04-07 ENCOUNTER — PATIENT OUTREACH (OUTPATIENT)
Dept: CARE COORDINATION | Facility: CLINIC | Age: 87
End: 2022-04-07
Payer: MEDICARE

## 2022-04-07 DIAGNOSIS — Z71.89 OTHER SPECIFIED COUNSELING: ICD-10-CM

## 2022-04-07 NOTE — PROGRESS NOTES
Clinic Care Coordination Contact  [facility]/Voicemail       Clinical Data: Care Coordinator Outreach  Outreach attempted x 1.  Left message on patient's voicemail with call back information and requested return call.  Plan: Care Coordinator will try to reach patient again in 1-2 business days.    .Shell PAYNE Community Health Worker  Clinic Care Coordination  [facility]  Phone: [phone]

## 2022-04-08 NOTE — PROGRESS NOTES
Clinic Care Coordination Contact  [facility]/Voicemail       Clinical Data: Care Coordinator Outreach  Outreach attempted x 2.  Left message on patient's voicemail with call back information and requested return call.  Plan:  Care Coordinator will do no further outreaches at this time.    Shell PAYNE Community Health Worker  Clinic Care Coordination  [facility]  Phone: [phone]

## 2022-04-20 ENCOUNTER — OFFICE VISIT (OUTPATIENT)
Dept: INTERNAL MEDICINE | Facility: CLINIC | Age: 87
End: 2022-04-20
Payer: MEDICARE

## 2022-04-20 VITALS
DIASTOLIC BLOOD PRESSURE: 77 MMHG | RESPIRATION RATE: 16 BRPM | HEIGHT: 67 IN | OXYGEN SATURATION: 100 % | SYSTOLIC BLOOD PRESSURE: 182 MMHG | BODY MASS INDEX: 29.05 KG/M2 | HEART RATE: 63 BPM

## 2022-04-20 DIAGNOSIS — L30.9 DERMATITIS: Primary | ICD-10-CM

## 2022-04-20 DIAGNOSIS — I16.0 HYPERTENSIVE URGENCY: ICD-10-CM

## 2022-04-20 DIAGNOSIS — G44.209 TENSION HEADACHE: ICD-10-CM

## 2022-04-20 PROCEDURE — 99495 TRANSJ CARE MGMT MOD F2F 14D: CPT | Performed by: INTERNAL MEDICINE

## 2022-04-20 RX ORDER — VALSARTAN 320 MG/1
320 TABLET ORAL DAILY
Qty: 90 TABLET | Refills: 3 | Status: SHIPPED | OUTPATIENT
Start: 2022-04-20 | End: 2022-05-06

## 2022-04-20 RX ORDER — FLUOCINONIDE TOPICAL SOLUTION USP, 0.05% 0.5 MG/ML
SOLUTION TOPICAL 2 TIMES DAILY
Status: CANCELLED | OUTPATIENT
Start: 2022-04-20

## 2022-04-20 ASSESSMENT — PAIN SCALES - GENERAL: PAINLEVEL: NO PAIN (0)

## 2022-04-20 NOTE — PATIENT INSTRUCTIONS
Let's increase your valsartan dose to 320 mg daily. You can take two of the 160s until you refill the prescription.  If blood pressure remains above 160/90.    Try using lidex solution/fluocinonide 1-2 times daily until rash goes away, may take 2 weeks or so.  After it's healed, use 2-3 times weekly ongoing.    Can also triamcinolone for skin rashes.    Get your covid booster at your pharmacy.

## 2022-04-20 NOTE — NURSING NOTE
Chief Complaint   Patient presents with     Hospital F/U       NORY Arteaga at 9:42 AM on 4/20/2022.

## 2022-04-20 NOTE — PROGRESS NOTES
History of Present Illness:  Ms. Yeh is a 87 year old female who presents for  Chief Complaint   Patient presents with     Hospital F/U     Hx of CAD, NSTEMI 6/20 with PCI x 3 to LAD, aortic stenosis s/p TAVR 7/20, HFrEF, pafib, HTN, lichen sclerosis, gout and c diff.    Hospitalized 4/4-4/6 for hypertensive urgency. Also had NSTEMI. She was managed with hydralazine.    She had an Echo:  ______________________________________________________________________________  Procedure  Complete Portable Echo Adult.  ______________________________________________________________________________  Interpretation Summary  Left ventricular function is decreased. The ejection fraction is 50-55%  (borderline).  Global right ventricular function is normal.  Post ARNIE with 23 mm Paez Kyra 3 Ultra prosthesis. Valve is well seated  with no paravalvular or valvular regurgtiation. Peak velocity 2.5 m/s and the  mean gradient 12 mmHg.  Estimated pulmonary artery systolic pressure is 33 mmHg plus right atrial  pressure.  IVC diameter and respiratory changes fall into an intermediate range  suggesting an RA pressure of 8 mmHg.  No pericardial effusion is present.  Last ziopatches without afib so no longer on eliquis. Completed 12 months of plavix therapy.      She had some stress and was working on her will and taxes.  She reports good help and social supports, doesn't feel it's interfering with daily activities.  Lives alone, not feeling isolated, not depressed, daughter lives close by.    Home BP was 150s yesterday, elevated today.  She is no longer on eliquis given no afib on ziopatch, she is off DAPT.  She reports intolerances to lisinopril, atacand, diltiazem, coreg, clinidine, diovan, norvasc.    Her TSH was was low, we reduced her dose recently.  Last TSH was WNL.    Was taking APAP daily and had elevated LFTs, now improved. Was also taken off eliquis.  She wakes up with headache, frontal, bilat, zyrtec helped, no N/V, no  vision changes, no post nasal gtt, no snoring/gasping at night.  Reports allergy HA and tension HA.    She reports ear problems on left.  There is pain, no ringing, hearing loss.    She reports dermatitis on skin. Per Derm:  Diffuse pruritus with dermatitis on the scalp, dorsal arms and sacrum.  Differential diagnosis would be due to a medication versus in the setting of nerve senescence versus secondary to metabolic issues, including hypothyroidism.  I recommended gentle skin care using a mild soap and thick emolliate daily.  Lidex solution to the scalp nightly as needed with ketoconazole shampoo twice weekly and triamcinolone 0.1% ointment twice daily to pruritic areas on the trunk and extremities.  Discussed the possibility of drug allergy testing, which the patient declines for now.          Review of external notes as documented above                   A detailed Review of Systems was performed, verified and is negative except as documented in the HPI.  All health questionnaires were reviewed, verified and relevant information documented above.    Past Medical History:  Past Medical History:   Diagnosis Date     Allergy      Aortic stenosis 10/11/2011     C. 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 RHC, possible LHC for TV gradient;  Surgeon: Tu Hedrick MD;  Location:  HEART CARDIAC CATH LAB     CV PCI STENT DRUG ELUTING N/A  "6/19/2020    Procedure: Percutaneous Coronary Intervention Stent Drug Eluting;  Surgeon: Tu Hedrick MD;  Location:  HEART CARDIAC CATH LAB     CV TRANSCATHETER AORTIC VALVE REPLACEMENT Right 7/29/2020    Procedure: Transfemoral (Paez) Transcatheter Aortic Valve Replacement with Paez Kyra Ultra 23 mm. Intra-operative transthoracic echocardiogram;  Surgeon: Tu Hedrick MD;  Location: [facility] OR     HEART CATH FEMORAL CANNULIZATION WITH OPEN STANDBY REPAIR AORTIC VALVE N/A 7/29/2020    Procedure: Cardiopulmonary on standby.;  Surgeon: Ricardo Ireland MD;  Location: [facility] OR     ORTHOPEDIC SURGERY         Active Meds:  Current Outpatient Medications   Medication     ascorbic acid (VITAMIN C) 500 MG tablet     aspirin (ASA) 81 MG EC tablet     BIOTIN PO     calcium carb 1250 mg, 500 mg [place],/vitamin D 200 units (OSCAL WITH D) 500-200 MG-UNIT per tablet     Cetirizine HCl (ZYRTEC PO)     coenzyme Q-10 200 MG CAPS     cyanocolbalamin (VITAMIN B-12) 1000 MCG tablet     Glucosamine-Chondroit-Vit C-Mn (GLUCOSAMINE CHONDR 1500 COMPLX) CAPS     levothyroxine (SYNTHROID/LEVOTHROID) 75 MCG tablet     Magnesium 400 MG TABS     metoprolol succinate ER (TOPROL-XL) 100 MG 24 hr tablet     valsartan (DIOVAN) 320 MG tablet     No current facility-administered medications for this visit.        Allergies:  Sulfa drugs, Clonidine, Diltiazem, and Hydralazine    Family History:  family history includes Abdominal Aortic Aneurysm in her brother; Cancer in her mother; Diabetes in her brother; Rheumatic fever in her brother; Testicular cancer in her father.    Social History:  Social History     Tobacco Use     Smoking status: Never Smoker     Smokeless tobacco: Never Used   Substance Use Topics     Alcohol use: No     Drug use: No       Physical Exam:  Vitals: BP (!) 182/77 (BP Location: Right arm, Patient Position: Sitting, Cuff Size: Adult Large)   Pulse 63   Resp 16   Ht 1.702 m (5' 7\")   LMP  (LMP Unknown)   SpO2 " 100%   BMI 29.05 kg/m    Constitutional: Alert, oriented, pleasant, no acute distress  Head: Normocephalic, atraumatic  Eyes: Extra-ocular movements intact, pupils equally round and reactive bilaterally, no scleral icterus  ENT: Oropharynx clear, moist mucus membranes, good dentition  Neck: Supple, no lymphadenopathy  Cardiovascular: Regular rate and rhythm, crisp S2, rubs or gallops, peripheral pulses full/symmetric  Respiratory: Good air movement bilaterally, lungs clear, no wheezes/rales/rhonchi  GI: Abdomen soft, bowel sounds present, nondistended, nontender, no organomegaly or masses, no rebound/guarding  Musculoskeletal: No edema, normal muscle tone, normal gait  Neurologic: Alert and oriented, cranial nerves 2-12 intact, grossly non-focal  Skin: Rare scattered healing papules on trunk/arms, patches of flaking white/erythematous skin on scalp  Psychiatric: normal mentation, affect and mood      Diagnostics:  Labs reviewed in Epic          Assessment and Plan:  Dulce was seen today for hospital f/u.    Diagnoses and all orders for this visit:    Dermatitis  Advised her to use lidex on scalp consistently, in addition to TAC for arms/legs.    Hypertensive urgency  Will increase dose given suboptimal control.  -     valsartan (DIOVAN) 320 MG tablet; Take 1 tablet (320 mg) by mouth daily    Tension headache  Okay to use APAP sparingly. Will monitor LFTs. Suspect prior insult may have been multifactorial --?colleen.          Gilda Hogan MD  Internal Medicine      >40 minutes spent today performing chart review, history and exam, documentation and further activities as noted above.

## 2022-05-04 DIAGNOSIS — I16.0 HYPERTENSIVE URGENCY: ICD-10-CM

## 2022-05-04 NOTE — TELEPHONE ENCOUNTER
[facility] Call Center    Phone Message    May a detailed message be left on voicemail: yes     Reason for Call: Medication Refill Request    Has the patient contacted the pharmacy for the refill? Yes   Name of medication being requested: valsartan (DIOVAN) 320 MG tablet  Provider who prescribed the medication: Dr Hogan  Pharmacy: [facility], phone: [phone]  Date medication is needed: ASAP. Per PT she has two days worth of pills and she needs a NEW RX send to her NEW Pharmacy         Action Taken: Message routed to:  [facility] PCC    Travel Screening: Not Applicable

## 2022-05-06 RX ORDER — VALSARTAN 320 MG/1
320 TABLET ORAL DAILY
Qty: 90 TABLET | Refills: 3 | Status: SHIPPED | OUTPATIENT
Start: 2022-05-06 | End: 2023-02-20

## 2022-05-06 NOTE — TELEPHONE ENCOUNTER
valsartan (DIOVAN) 320 MG tablet  Resent order to pharmacy.    Last order not received on 4/20/2022.   90 Tabs, 3 Refills sent to pharm 5/6/2022      Dora Marks RN  Central Triage Red Flags/Med Refills

## 2022-05-09 DIAGNOSIS — E03.4 HYPOTHYROIDISM DUE TO ACQUIRED ATROPHY OF THYROID: ICD-10-CM

## 2022-05-11 RX ORDER — LEVOTHYROXINE SODIUM 75 UG/1
75 TABLET ORAL DAILY
Qty: 90 TABLET | Refills: 3 | Status: SHIPPED | OUTPATIENT
Start: 2022-05-11 | End: 2023-02-20

## 2022-05-11 NOTE — TELEPHONE ENCOUNTER
levothyroxine (SYNTHROID/LEVOTHROID) 75 MCG tablet  Last Written Prescription Date:   12/20/2021  Last Fill Quantity: 90,   # refills: 3  Last Office Visit :  4/20/2022  Future Office visit:   6/24/2022  Sending new order to updated/alternative pharmacy for Pt care  90 Tabs, 3 Refills sent 5/11/2022      Dora Marks RN  Central Triage Red Flags/Med Refills

## 2022-06-24 ENCOUNTER — OFFICE VISIT (OUTPATIENT)
Dept: INTERNAL MEDICINE | Facility: CLINIC | Age: 87
End: 2022-06-24
Payer: MEDICARE

## 2022-06-24 VITALS
HEART RATE: 69 BPM | WEIGHT: 189.5 LBS | RESPIRATION RATE: 16 BRPM | BODY MASS INDEX: 29.74 KG/M2 | DIASTOLIC BLOOD PRESSURE: 79 MMHG | OXYGEN SATURATION: 96 % | SYSTOLIC BLOOD PRESSURE: 203 MMHG | HEIGHT: 67 IN

## 2022-06-24 DIAGNOSIS — I16.0 HYPERTENSIVE URGENCY: Primary | ICD-10-CM

## 2022-06-24 PROCEDURE — 99215 OFFICE O/P EST HI 40 MIN: CPT | Performed by: INTERNAL MEDICINE

## 2022-06-24 RX ORDER — CHLORTHALIDONE 25 MG/1
12.5 TABLET ORAL DAILY
Qty: 30 TABLET | Refills: 0 | Status: SHIPPED | OUTPATIENT
Start: 2022-06-24 | End: 2022-06-30

## 2022-06-24 NOTE — PROGRESS NOTES
History of Present Illness:  Ms. Yeh is a 87 year old female who presents for  Chief Complaint   Patient presents with     Follow Up     Two month follow-up--she reports no new health concerns.     Arthritis       Dulce is here today to f/u BP.     Hx of CAD, NSTEMI 6/20 with PCI x 3 to LAD, aortic stenosis s/p TAVR 7/20, HFrEF, pafib, HTN, lichen sclerosis, gout and c diff.    Hospitalized 4/4-4/6 for hypertensive urgency. Also had NSTEMI. She was managed with hydralazine.  Last visit in April we increased valsartan to max dose.     BP today is 200s/79.  States was previously in 150s systolic until about a week ago when it was 200 systolic and she stopped checking it. She does report anxiety and stress about this.  Otherwise, she if completely asymptomatic. No CP, dyspnea, edema, Ha, vision changes.    She reports intolerances to numerous medications in the past: lisinopril, candasartan, diltiazem, coreg, clinidine, diovan, norvasc.      Review of external notes as documented above                   A detailed Review of Systems was performed, verified and is negative except as documented in the HPI.  All health questionnaires were reviewed, verified and relevant information documented above.      Past Medical History:  Past Medical History:   Diagnosis Date     Allergy      Aortic stenosis 10/11/2011     C. 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    after TKA surgery     S/P cholecystectomy 10/11/2011     Unspecified hypothyroidism 10/11/2011       Active Meds:  Current Outpatient Medications   Medication     ascorbic acid (VITAMIN C) 500 MG tablet     aspirin (ASA) 81 MG EC tablet     BIOTIN PO     calcium carb 1250 mg, 500 mg [place],/vitamin D 200 units (OSCAL WITH D) 500-200 MG-UNIT per tablet  "    Cetirizine HCl (ZYRTEC PO)     coenzyme Q-10 200 MG CAPS     cyanocolbalamin (VITAMIN B-12) 1000 MCG tablet     Glucosamine-Chondroit-Vit C-Mn (GLUCOSAMINE CHONDR 1500 COMPLX) CAPS     levothyroxine (SYNTHROID/LEVOTHROID) 75 MCG tablet     Magnesium 400 MG TABS     metoprolol succinate ER (TOPROL-XL) 100 MG 24 hr tablet     valsartan (DIOVAN) 320 MG tablet     No current facility-administered medications for this visit.        Allergies:  Reviewed, refer to EMR    Relevant Social History:  Social History     Tobacco Use     Smoking status: Never Smoker     Smokeless tobacco: Never Used   Substance Use Topics     Alcohol use: No     Drug use: No        Physical Exam:  Vitals: BP (!) 203/79 (BP Location: Right arm, Patient Position: Sitting, Cuff Size: Adult Regular)   Pulse 69   Resp 16   Ht 1.702 m (5' 7\")   Wt 86 kg (189 lb 8 oz)   LMP  (LMP Unknown)   SpO2 96%   Breastfeeding No   BMI 29.68 kg/m    Constitutional: Alert, oriented, pleasant, no acute distress  Head: Normocephalic, atraumatic  Eyes: Extra-ocular movements intact, pupils equally round bilaterally, no scleral icterus  Neck: Supple, no lymphadenopathy  Cardiovascular: Regular rate and rhythm, no murmurs, rubs or gallops, peripheral pulses full/symmetric  Respiratory: Good air movement bilaterally, lungs clear, no wheezes/rales/rhonchi  Musculoskeletal: No edema, normal muscle tone, normal gait  Neurologic: Alert and oriented, cranial nerves 2-12 intact, grossly non-focal  Psychiatric: normal mentation, affect and mood      Diagnostics:  Labs reviewed in Epic          Assessment and Plan:  Dulce was seen today for follow up and arthritis.    Diagnoses and all orders for this visit:    Hypertensive urgency  Given numerous drug intolerances, will trial chlorthalidone. Of note, she has an ausculatory gap on measurement of BP which was significant between 160 mmHg and 180 mmHg. Advised f/u in 1 week with repeat BP and labs. Patient is to seek " more urgent care if she develops problems in the interim. Could consider retrial of amlodipine versus hydralazine if patient doesn't tolerate chlorthalidone.  -     chlorthalidone (HYGROTON) 25 MG tablet; Take 0.5 tablets (12.5 mg) by mouth daily  -     Basic metabolic panel; Future        Gilda Hogan MD  Internal Medicine

## 2022-06-24 NOTE — NURSING NOTE
"Dulce Yeh is a 87 year old female patient that presents today in clinic for the following:    Chief Complaint   Patient presents with     Follow Up     Two month follow-up--she reports no new health concerns.     Arthritis     The patient's allergies and medications were reviewed as noted. A set of vitals were recorded as noted without incident: BP (!) 203/79 (BP Location: Right arm, Patient Position: Sitting, Cuff Size: Adult Regular)   Pulse 69   Resp 16   Ht 1.702 m (5' 7\")   Wt 86 kg (189 lb 8 oz)   LMP  (LMP Unknown)   SpO2 96%   Breastfeeding No   BMI 29.68 kg/m  . The patient does not have any other questions for the provider.    Fermin Lorenzana, EMT at 11:33 AM on 6/24/2022  "

## 2022-06-30 ENCOUNTER — OFFICE VISIT (OUTPATIENT)
Dept: INTERNAL MEDICINE | Facility: CLINIC | Age: 87
End: 2022-06-30

## 2022-06-30 ENCOUNTER — LAB (OUTPATIENT)
Dept: LAB | Facility: CLINIC | Age: 87
End: 2022-06-30
Payer: MEDICARE

## 2022-06-30 VITALS
SYSTOLIC BLOOD PRESSURE: 180 MMHG | DIASTOLIC BLOOD PRESSURE: 76 MMHG | HEART RATE: 67 BPM | TEMPERATURE: 97.6 F | BODY MASS INDEX: 29.66 KG/M2 | HEIGHT: 67 IN | RESPIRATION RATE: 12 BRPM | OXYGEN SATURATION: 96 % | WEIGHT: 189 LBS

## 2022-06-30 DIAGNOSIS — E03.4 HYPOTHYROIDISM DUE TO ACQUIRED ATROPHY OF THYROID: Primary | ICD-10-CM

## 2022-06-30 DIAGNOSIS — I10 HYPERTENSION, UNSPECIFIED TYPE: ICD-10-CM

## 2022-06-30 DIAGNOSIS — I16.0 HYPERTENSIVE URGENCY: ICD-10-CM

## 2022-06-30 DIAGNOSIS — M19.042 PRIMARY OSTEOARTHRITIS OF BOTH HANDS: ICD-10-CM

## 2022-06-30 DIAGNOSIS — M19.041 PRIMARY OSTEOARTHRITIS OF BOTH HANDS: ICD-10-CM

## 2022-06-30 DIAGNOSIS — E03.4 HYPOTHYROIDISM DUE TO ACQUIRED ATROPHY OF THYROID: ICD-10-CM

## 2022-06-30 LAB
ANION GAP SERPL CALCULATED.3IONS-SCNC: 9 MMOL/L (ref 3–14)
BUN SERPL-MCNC: 27 MG/DL (ref 7–30)
CALCIUM SERPL-MCNC: 9.4 MG/DL (ref 8.5–10.1)
CHLORIDE BLD-SCNC: 98 MMOL/L (ref 94–109)
CO2 SERPL-SCNC: 26 MMOL/L (ref 20–32)
CREAT SERPL-MCNC: 0.93 MG/DL (ref 0.52–1.04)
GFR SERPL CREATININE-BSD FRML MDRD: 59 ML/MIN/1.73M2
GLUCOSE BLD-MCNC: 126 MG/DL (ref 70–99)
POTASSIUM BLD-SCNC: 4.3 MMOL/L (ref 3.4–5.3)
SODIUM SERPL-SCNC: 133 MMOL/L (ref 133–144)
TSH SERPL DL<=0.005 MIU/L-ACNC: 2.17 MU/L (ref 0.4–4)

## 2022-06-30 PROCEDURE — 36415 COLL VENOUS BLD VENIPUNCTURE: CPT | Performed by: PATHOLOGY

## 2022-06-30 PROCEDURE — 84443 ASSAY THYROID STIM HORMONE: CPT | Performed by: PATHOLOGY

## 2022-06-30 PROCEDURE — 99214 OFFICE O/P EST MOD 30 MIN: CPT | Performed by: INTERNAL MEDICINE

## 2022-06-30 PROCEDURE — 80048 BASIC METABOLIC PNL TOTAL CA: CPT | Performed by: PATHOLOGY

## 2022-06-30 RX ORDER — CHLORTHALIDONE 25 MG/1
12.5 TABLET ORAL DAILY
Qty: 45 TABLET | Refills: 1 | Status: SHIPPED | OUTPATIENT
Start: 2022-06-30 | End: 2023-02-20

## 2022-06-30 ASSESSMENT — PAIN SCALES - GENERAL: PAINLEVEL: NO PAIN (0)

## 2022-06-30 NOTE — NURSING NOTE
Chief Complaint   Patient presents with     Hypertension     Patient comes in for a follow up for hypertension.         Duong Gibson MA on 6/30/2022 at 11:25 AM

## 2022-06-30 NOTE — PROGRESS NOTES
"History of Present Illness:  Ms. Yeh is a 87 year old female who presents for  Chief Complaint   Patient presents with     Hypertension     Patient comes in for a follow up for hypertension.     Dulce is here today to f/u BP.     Hx of CAD, NSTEMI 6/20 with PCI x 3 to LAD, aortic stenosis s/p TAVR 7/20, HFrEF, pafib, HTN, lichen sclerosis, gout and c diff.    Hospitalized 4/4-4/6 for hypertensive urgency. Also had NSTEMI. She was managed with hydralazine.  Last visit in April we increased valsartan to max dose.    Today, she reports intermittent dizziness, fatigue. No falls.  Last visit BP was elevated 200s/79. We started 12.5 mg of chlorthalidone. She is \"tolerating\" it, feels she does to bathroom more.  Today is 180/76.  She also takes 320 valsartan, 100 mg metoprolol.    She also reports intolerance to her eye drops for glaucoma.    She reports intolerances to numerous medications in the past: lisinopril (cough), candasartan, diltiazem (facial swelling and flushing), coreg (swelling, dizzy), clonidine (dizziness), diovan, norvasc (facial swelling and flushing), hydrochlorothiazide (gout).      Review of external notes as documented above       A detailed Review of Systems was performed, verified and is negative except as documented in the HPI.  All health questionnaires were reviewed, verified and relevant information documented above.      Past Medical History:  Past Medical History:   Diagnosis Date     Allergy      Aortic stenosis 10/11/2011     C. 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    after TKA surgery     S/P cholecystectomy 10/11/2011     Unspecified hypothyroidism 10/11/2011       Active Meds:  Current Outpatient Medications   Medication     ascorbic acid (VITAMIN C) 500 MG tablet " "    aspirin (ASA) 81 MG EC tablet     BIOTIN PO     calcium carb 1250 mg, 500 mg [place],/vitamin D 200 units (OSCAL WITH D) 500-200 MG-UNIT per tablet     Cetirizine HCl (ZYRTEC PO)     chlorthalidone (HYGROTON) 25 MG tablet     coenzyme Q-10 200 MG CAPS     cyanocolbalamin (VITAMIN B-12) 1000 MCG tablet     Glucosamine-Chondroit-Vit C-Mn (GLUCOSAMINE CHONDR 1500 COMPLX) CAPS     levothyroxine (SYNTHROID/LEVOTHROID) 75 MCG tablet     Magnesium 400 MG TABS     metoprolol succinate ER (TOPROL-XL) 100 MG 24 hr tablet     valsartan (DIOVAN) 320 MG tablet     No current facility-administered medications for this visit.        Allergies:  Reviewed, refer to EMR    Relevant Social History:  Social History     Tobacco Use     Smoking status: Never Smoker     Smokeless tobacco: Never Used   Substance Use Topics     Alcohol use: No     Drug use: No        Physical Exam:  Vitals: BP (!) 180/76   Pulse 67   Temp 97.6  F (36.4  C) (Oral)   Resp 12   Ht 1.702 m (5' 7\")   Wt 85.7 kg (189 lb)   LMP  (LMP Unknown)   SpO2 96%   BMI 29.60 kg/m    Constitutional: Alert, oriented, pleasant, no acute distress  Head: Normocephalic, atraumatic  Eyes: Extra-ocular movements intact, pupils equally round bilaterally, no scleral icterus  Cardiovascular: Regular rate and rhythm, no murmurs, rubs or gallops, peripheral pulses full/symmetric  Respiratory: Good air movement bilaterally, lungs clear, no wheezes/rales/rhonchi  Musculoskeletal: No edema, multiple degenerative changes of bilat MCP, DIPs with swan neck deformities, no signs of tenovitis  Neurologic: Alert and oriented, cranial nerves 2-12 intact, grossly non-focal  Psychiatric: normal mentation, affect and mood      Diagnostics:  Labs reviewed in Epic          Assessment and Plan:  Dulce was seen today for hypertension.    Diagnoses and all orders for this visit:    Hypothyroidism due to acquired atrophy of thyroid  Stable today.    Hypertension, unspecified type  Much better " control today, suspect she has a bit of white coat hypertension. Encouraged her to do home monitoring. Do not want to over-treat given multiple drug intolerances. Labs reassuring today.  -     chlorthalidone (HYGROTON) 25 MG tablet; Take 0.5 tablets (12.5 mg) by mouth daily    Primary osteoarthritis of both hands  -     Orthopedic  Referral; Future              Gilda Hogan MD  Internal Medicine

## 2022-08-09 ENCOUNTER — ANCILLARY PROCEDURE (OUTPATIENT)
Dept: MAMMOGRAPHY | Facility: CLINIC | Age: 87
End: 2022-08-09
Attending: INTERNAL MEDICINE
Payer: MEDICARE

## 2022-08-09 DIAGNOSIS — Z12.31 VISIT FOR SCREENING MAMMOGRAM: ICD-10-CM

## 2022-08-09 PROCEDURE — 77063 BREAST TOMOSYNTHESIS BI: CPT | Mod: GC | Performed by: RADIOLOGY

## 2022-08-09 PROCEDURE — 77067 SCR MAMMO BI INCL CAD: CPT | Mod: GC | Performed by: RADIOLOGY

## 2022-10-21 ENCOUNTER — TELEPHONE (OUTPATIENT)
Dept: INTERNAL MEDICINE | Facility: CLINIC | Age: 87
End: 2022-10-21

## 2022-10-21 DIAGNOSIS — Z29.89 NEED FOR SUBACUTE BACTERIAL ENDOCARDITIS PROPHYLAXIS: Primary | ICD-10-CM

## 2022-10-21 NOTE — TELEPHONE ENCOUNTER
[facility] Call Center    Phone Message    May a detailed message be left on voicemail: yes     Reason for Call: Other: The patient was calling to get advice on what she should do in the process leading up to getting her teeth pulled on 10/25, she has a complicated medical history and wanted to discuss before she have the procedure, please review and follow up thank you.      Action Taken: Message routed to:  [facility]    Travel Screening: Not Applicable

## 2022-10-23 ENCOUNTER — HEALTH MAINTENANCE LETTER (OUTPATIENT)
Age: 87
End: 2022-10-23

## 2022-10-24 RX ORDER — AMOXICILLIN 500 MG/1
2000 CAPSULE ORAL
Qty: 4 CAPSULE | Refills: 2 | Status: SHIPPED | OUTPATIENT
Start: 2022-10-24 | End: 2023-07-05

## 2022-10-24 NOTE — TELEPHONE ENCOUNTER
Called patient and gave her instructions for amoxicillin ad ok to take baby aspirin.     Javier Diez RN on 10/24/2022 at 5:43 PM

## 2022-10-24 NOTE — TELEPHONE ENCOUNTER
Patient should take antibiotics prior to dental procedures.  I can send to pharmacy.  Gilda Hogan MD  Internal Medicine

## 2022-10-31 DIAGNOSIS — I10 ESSENTIAL HYPERTENSION: ICD-10-CM

## 2022-11-07 RX ORDER — METOPROLOL SUCCINATE 100 MG/1
100 TABLET, EXTENDED RELEASE ORAL DAILY
Qty: 90 TABLET | Refills: 2 | Status: SHIPPED | OUTPATIENT
Start: 2022-11-07 | End: 2023-01-19

## 2022-11-07 NOTE — TELEPHONE ENCOUNTER
Last Clinic Visit: 6-  PER CLINIC NOTE:  Hypertension, unspecified type  Much better control today, suspect she has a bit of white coat hypertension. Encouraged her to do home monitoring. Do not want to over-treat given multiple drug intolerances. Labs reassuring today.

## 2023-01-18 DIAGNOSIS — I10 ESSENTIAL HYPERTENSION: ICD-10-CM

## 2023-01-18 NOTE — TELEPHONE ENCOUNTER
[facility] Call Center    Phone Message    May a detailed message be left on voicemail: yes     Reason for Call: Medication Refill Request    Has the patient contacted the pharmacy for the refill? Yes   Name of medication being requested: metoprolol succinate ER (TOPROL XL) 100 MG 24 hr tablet  Provider who prescribed the medication: PCP  Pharmacy: [facility] [address] (Ph: [phone])  Date medication is needed: ASAP      Action Taken: Message routed to:  [facility] PCP    Travel Screening: Not Applicable

## 2023-01-19 RX ORDER — METOPROLOL SUCCINATE 100 MG/1
100 TABLET, EXTENDED RELEASE ORAL DAILY
Qty: 90 TABLET | Refills: 2 | Status: SHIPPED | OUTPATIENT
Start: 2023-01-19 | End: 2023-02-20

## 2023-01-19 NOTE — TELEPHONE ENCOUNTER
metoprolol succinate ER (TOPROL XL) 100 MG 24 hr tablet    Order not received back in November.  Resent order for Pt care  90 Tabs, 2 Refills sent to pharm 1/19/2023      Dora Marks RN  Central Triage Red Flags/Med Refills

## 2023-02-02 DIAGNOSIS — I16.0 HYPERTENSIVE URGENCY: ICD-10-CM

## 2023-02-06 RX ORDER — VALSARTAN 320 MG/1
TABLET ORAL
Qty: 90 TABLET | Refills: 3 | OUTPATIENT
Start: 2023-02-06

## 2023-02-20 ENCOUNTER — LAB (OUTPATIENT)
Dept: LAB | Facility: CLINIC | Age: 88
End: 2023-02-20
Payer: MEDICARE

## 2023-02-20 ENCOUNTER — OFFICE VISIT (OUTPATIENT)
Dept: INTERNAL MEDICINE | Facility: CLINIC | Age: 88
End: 2023-02-20
Payer: MEDICARE

## 2023-02-20 VITALS
DIASTOLIC BLOOD PRESSURE: 95 MMHG | OXYGEN SATURATION: 96 % | HEIGHT: 67 IN | WEIGHT: 184.9 LBS | HEART RATE: 65 BPM | BODY MASS INDEX: 29.02 KG/M2 | SYSTOLIC BLOOD PRESSURE: 169 MMHG

## 2023-02-20 DIAGNOSIS — M54.42 CHRONIC LEFT-SIDED LOW BACK PAIN WITH LEFT-SIDED SCIATICA: Primary | ICD-10-CM

## 2023-02-20 DIAGNOSIS — I35.0 AORTIC VALVE STENOSIS, ETIOLOGY OF CARDIAC VALVE DISEASE UNSPECIFIED: ICD-10-CM

## 2023-02-20 DIAGNOSIS — E03.4 HYPOTHYROIDISM DUE TO ACQUIRED ATROPHY OF THYROID: ICD-10-CM

## 2023-02-20 DIAGNOSIS — I10 HYPERTENSION, UNSPECIFIED TYPE: ICD-10-CM

## 2023-02-20 DIAGNOSIS — I25.10 CORONARY ARTERY DISEASE INVOLVING NATIVE CORONARY ARTERY OF NATIVE HEART WITHOUT ANGINA PECTORIS: ICD-10-CM

## 2023-02-20 DIAGNOSIS — I16.0 HYPERTENSIVE URGENCY: ICD-10-CM

## 2023-02-20 DIAGNOSIS — G89.29 CHRONIC LEFT-SIDED LOW BACK PAIN WITH LEFT-SIDED SCIATICA: Primary | ICD-10-CM

## 2023-02-20 DIAGNOSIS — Z95.2 S/P TAVR (TRANSCATHETER AORTIC VALVE REPLACEMENT): ICD-10-CM

## 2023-02-20 DIAGNOSIS — I10 ESSENTIAL HYPERTENSION: ICD-10-CM

## 2023-02-20 LAB
ANION GAP SERPL CALCULATED.3IONS-SCNC: 9 MMOL/L (ref 7–15)
BUN SERPL-MCNC: 21.9 MG/DL (ref 8–23)
CALCIUM SERPL-MCNC: 9.9 MG/DL (ref 8.8–10.2)
CHLORIDE SERPL-SCNC: 101 MMOL/L (ref 98–107)
CHOLEST SERPL-MCNC: 181 MG/DL
CREAT SERPL-MCNC: 0.96 MG/DL (ref 0.51–0.95)
DEPRECATED HCO3 PLAS-SCNC: 27 MMOL/L (ref 22–29)
GFR SERPL CREATININE-BSD FRML MDRD: 57 ML/MIN/1.73M2
GLUCOSE SERPL-MCNC: 127 MG/DL (ref 70–99)
HDLC SERPL-MCNC: 44 MG/DL
LDLC SERPL CALC-MCNC: 114 MG/DL
NONHDLC SERPL-MCNC: 137 MG/DL
POTASSIUM SERPL-SCNC: 4.5 MMOL/L (ref 3.4–5.3)
SODIUM SERPL-SCNC: 137 MMOL/L (ref 136–145)
TRIGL SERPL-MCNC: 116 MG/DL
TSH SERPL DL<=0.005 MIU/L-ACNC: 7.28 UIU/ML (ref 0.3–4.2)

## 2023-02-20 PROCEDURE — 84443 ASSAY THYROID STIM HORMONE: CPT | Performed by: PATHOLOGY

## 2023-02-20 PROCEDURE — 36415 COLL VENOUS BLD VENIPUNCTURE: CPT | Performed by: PATHOLOGY

## 2023-02-20 PROCEDURE — 80061 LIPID PANEL: CPT | Performed by: PATHOLOGY

## 2023-02-20 PROCEDURE — 80048 BASIC METABOLIC PNL TOTAL CA: CPT | Performed by: PATHOLOGY

## 2023-02-20 PROCEDURE — 99214 OFFICE O/P EST MOD 30 MIN: CPT | Performed by: INTERNAL MEDICINE

## 2023-02-20 RX ORDER — LEVOTHYROXINE SODIUM 75 UG/1
75 TABLET ORAL DAILY
Qty: 90 TABLET | Refills: 3 | Status: SHIPPED | OUTPATIENT
Start: 2023-02-20 | End: 2024-02-29

## 2023-02-20 RX ORDER — VALSARTAN 320 MG/1
320 TABLET ORAL DAILY
Qty: 90 TABLET | Refills: 3 | Status: SHIPPED | OUTPATIENT
Start: 2023-02-20 | End: 2024-02-29

## 2023-02-20 RX ORDER — METOPROLOL SUCCINATE 100 MG/1
100 TABLET, EXTENDED RELEASE ORAL DAILY
Qty: 90 TABLET | Refills: 3 | Status: SHIPPED | OUTPATIENT
Start: 2023-02-20 | End: 2024-01-29

## 2023-02-20 RX ORDER — CHLORTHALIDONE 25 MG/1
12.5 TABLET ORAL DAILY
Qty: 45 TABLET | Refills: 3 | Status: SHIPPED | OUTPATIENT
Start: 2023-02-20 | End: 2024-02-29

## 2023-02-20 NOTE — NURSING NOTE
"Dulce Yeh is a 88 year old female patient that presents today in clinic for the following:    Chief Complaint   Patient presents with     Recheck Medication     Pt has medication questions     Refill Request     Hypertension     Blood pressure check     Blood Draw     Pt believes they are due for routine blood work, pt is fasting     The patient's allergies and medications were reviewed as noted. A set of vitals were recorded as noted without incident: BP (!) 169/95 (BP Location: Right arm, Patient Position: Sitting, Cuff Size: Adult Regular)   Pulse 65   Ht 1.702 m (5' 7.01\")   Wt 83.9 kg (184 lb 14.4 oz)   LMP  (LMP Unknown)   SpO2 96%   BMI 28.95 kg/m  . The patient does not have any other questions for the provider.    Juwan Abad, Visit Facilitator 8:53 AM on 2/20/2023   "

## 2023-02-20 NOTE — PROGRESS NOTES
History of Present Illness:  Ms. Yeh is a 88 year old female who presents for  Chief Complaint   Patient presents with     Recheck Medication     Pt has medication questions     Refill Request     Hypertension     Blood pressure check     Blood Draw     Pt believes they are due for routine blood work, pt is fasting     Dulce is here today for follow up.    Hx of CAD, NSTEMI 6/20 with PCI x 3 to LAD, aortic stenosis s/p TAVR 7/20, HFrEF, pafib, HTN, lichen sclerosis, gout and c diff.    Hospitalized 4/4-4/6/22 for hypertensive urgency. Also had NSTEMI. She was managed with hydralazine.  Last visit in April we increased valsartan to max dose.  We started 12.5 mg of chlorthalidone last August. She seemed to be tolerating and labs were stable after starting.  She also takes 320 valsartan, 100 mg metoprolol.    In the interim, she stopped chlorthalidone on her own after it ran out.    ROS-L sided sciatica, denies dyspnea, no chest pain, no palpitations  States she checks BP at home and it's in the 150s systolic    Of note, she reports intolerances to numerous medications in the past: lisinopril (cough), candasartan, diltiazem (facial swelling and flushing), coreg (swelling, dizzy), clonidine (dizziness), diovan, norvasc (facial swelling and flushing), hydrochlorothiazide (gout).        Review of external notes as documented above                   A detailed Review of Systems was performed, verified and is negative except as documented in the HPI.  All health questionnaires were reviewed, verified and relevant information documented above.      Past Medical History:  Past Medical History:   Diagnosis Date     Allergy      Aortic stenosis 10/11/2011     C. 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     "after TKA surgery     S/P cholecystectomy 10/11/2011     Unspecified hypothyroidism 10/11/2011       Active Meds:  Current Outpatient Medications   Medication     amoxicillin (AMOXIL) 500 MG capsule     ascorbic acid (VITAMIN C) 500 MG tablet     aspirin (ASA) 81 MG EC tablet     BIOTIN PO     calcium carb 1250 mg, 500 mg [place],/vitamin D 200 units (OSCAL WITH D) 500-200 MG-UNIT per tablet     Cetirizine HCl (ZYRTEC PO)     chlorthalidone (HYGROTON) 25 MG tablet     coenzyme Q-10 200 MG CAPS     cyanocolbalamin (VITAMIN B-12) 1000 MCG tablet     Glucosamine-Chondroit-Vit C-Mn (GLUCOSAMINE CHONDR 1500 COMPLX) CAPS     levothyroxine (SYNTHROID/LEVOTHROID) 75 MCG tablet     Magnesium 400 MG TABS     metoprolol succinate ER (TOPROL XL) 100 MG 24 hr tablet     valsartan (DIOVAN) 320 MG tablet     No current facility-administered medications for this visit.        Allergies:  Reviewed, refer to EMR    Relevant Social History:  Social History     Tobacco Use     Smoking status: Never     Smokeless tobacco: Never   Substance Use Topics     Alcohol use: No     Drug use: No        Physical Exam:  Vitals: BP (!) 169/95 (BP Location: Right arm, Patient Position: Sitting, Cuff Size: Adult Regular)   Pulse 65   Ht 1.702 m (5' 7.01\")   Wt 83.9 kg (184 lb 14.4 oz)   LMP  (LMP Unknown)   SpO2 96%   BMI 28.95 kg/m    Constitutional: Alert, oriented, pleasant, no acute distress  Head: Normocephalic, atraumatic  Eyes: Extra-ocular movements intact, pupils equally round bilaterally, no scleral icterus  Neck: Supple, no lymphadenopathy  Cardiovascular: Regular rate and rhythm, no murmurs, rubs or gallops, peripheral pulses full/symmetric  Respiratory: Good air movement bilaterally, lungs clear, no wheezes/rales/rhonchi  Musculoskeletal: No edema, normal muscle tone, normal gait, SLRT neg on L, no pain with hip rotation  Neurologic: Alert and oriented, cranial nerves 2-12 intact, grossly non-focal  Skin: White/erythematous plaque in " scalp, scattered excoriated papules on upper back/lower legs bilat  Psychiatric: normal mentation, affect and mood      Diagnostics:  Labs reviewed in Epic          Assessment and Plan:  Dulce was seen today for recheck medication, refill request, hypertension and blood draw.    Diagnoses and all orders for this visit:    Chronic left-sided low back pain with left-sided sciatica  -     Physical Therapy Referral; Future    Hypertensive urgency  Essential hypertension  Not controlled, this has been a longstanding challenge. Patient reports intolerances to mediations, self-discontinues. Discussed importance of good BP control given medical issues.  She declines both ambulatory BP monitoring and treatment for anxiety. She is open to trying chlorthalidone again, which she seemed to tolerate in the past. Advised her to let me know if she stops it so we can try something else--perhaps hydralazine.    -     metoprolol succinate ER (TOPROL XL) 100 MG 24 hr tablet; Take 1 tablet (100 mg) by mouth daily   -     chlorthalidone (HYGROTON) 25 MG tablet; Take 0.5 tablets (12.5 mg) by mouth daily  -     valsartan (DIOVAN) 320 MG tablet; Take 1 tablet (320 mg) by mouth daily    Hypothyroidism due to acquired atrophy of thyroid  -     levothyroxine (SYNTHROID/LEVOTHROID) 75 MCG tablet; Take 1 tablet (75 mcg) by mouth daily  -     TSH; Future    Hypertension, unspecified type  Coronary artery disease involving native coronary artery of native heart without angina pectoris  S/P TAVR (transcatheter aortic valve replacement)  Aortic valve stenosis, etiology of cardiac valve disease unspecified  -     Basic metabolic panel  (Ca, Cl, CO2, Creat, Gluc, K, Na, BUN); Future  -     Lipid panel reflex to direct LDL Fasting; Future  -     Adult Cardiology Richie Araujo Referral; Future            Gilda Hogan MD  Internal Medicine    >30 minutes spent today performing chart review, history and exam, counseling, care coordination,  documentation and further activities as noted above exclusive of any procedures or EKG interpretation

## 2023-02-20 NOTE — PATIENT INSTRUCTIONS
Cardiology:    Dr. Ryan Cr    Schedule PT appointment for your back.    **Let me know if you stop the chlorthalidone or if your blood pressure remains above 140/90.

## 2023-02-22 RX ORDER — ROSUVASTATIN CALCIUM 5 MG/1
5 TABLET, COATED ORAL DAILY
Qty: 90 TABLET | Refills: 3 | Status: SHIPPED | OUTPATIENT
Start: 2023-02-22 | End: 2024-04-11

## 2023-03-07 ENCOUNTER — TRANSFERRED RECORDS (OUTPATIENT)
Dept: HEALTH INFORMATION MANAGEMENT | Facility: CLINIC | Age: 88
End: 2023-03-07
Payer: MEDICARE

## 2023-03-09 ENCOUNTER — TELEPHONE (OUTPATIENT)
Dept: INTERNAL MEDICINE | Facility: CLINIC | Age: 88
End: 2023-03-09
Payer: MEDICARE

## 2023-03-09 NOTE — TELEPHONE ENCOUNTER
[facility] Call Center    Phone Message    May a detailed message be left on voicemail: yes     Reason for Call: Other: Patient calling for lab test result from visit on 2/20/23 with Dr. Hogan. Please mail her a copy for her records. Any questions please call her at [phone]. Patient expressed that this is the way she wants to be updated and informed of her care. Thank you     Action Taken: Message routed to:  [facility]    Travel Screening: Not Applicable

## 2023-03-10 NOTE — TELEPHONE ENCOUNTER
In order for pt to receive mailed test result, need to de-activate mychart account.  Pt Mychart de-activated.  Last login was 03/2022.  Pt can re-activate in future if want.    02/20/23 results mailed out to pt home address at:  [address] [place] [address]          Francisco Leyva CMA ([facility]) at 10:13 AM on 3/10/2023

## 2023-03-14 ENCOUNTER — TRANSFERRED RECORDS (OUTPATIENT)
Dept: HEALTH INFORMATION MANAGEMENT | Facility: CLINIC | Age: 88
End: 2023-03-14
Payer: MEDICARE

## 2023-04-05 ENCOUNTER — TRANSFERRED RECORDS (OUTPATIENT)
Dept: HEALTH INFORMATION MANAGEMENT | Facility: CLINIC | Age: 88
End: 2023-04-05

## 2023-05-11 ENCOUNTER — TRANSFERRED RECORDS (OUTPATIENT)
Dept: HEALTH INFORMATION MANAGEMENT | Facility: CLINIC | Age: 88
End: 2023-05-11
Payer: MEDICARE

## 2023-05-22 ENCOUNTER — LAB (OUTPATIENT)
Dept: LAB | Facility: CLINIC | Age: 88
End: 2023-05-22
Payer: MEDICARE

## 2023-05-22 ENCOUNTER — OFFICE VISIT (OUTPATIENT)
Dept: INTERNAL MEDICINE | Facility: CLINIC | Age: 88
End: 2023-05-22
Payer: MEDICARE

## 2023-05-22 VITALS
TEMPERATURE: 98 F | WEIGHT: 183.5 LBS | HEIGHT: 67 IN | BODY MASS INDEX: 28.8 KG/M2 | DIASTOLIC BLOOD PRESSURE: 81 MMHG | OXYGEN SATURATION: 99 % | SYSTOLIC BLOOD PRESSURE: 174 MMHG | HEART RATE: 66 BPM

## 2023-05-22 DIAGNOSIS — R19.7 DIARRHEA, UNSPECIFIED TYPE: ICD-10-CM

## 2023-05-22 DIAGNOSIS — E03.4 HYPOTHYROIDISM DUE TO ACQUIRED ATROPHY OF THYROID: ICD-10-CM

## 2023-05-22 DIAGNOSIS — I10 ESSENTIAL HYPERTENSION: ICD-10-CM

## 2023-05-22 DIAGNOSIS — R19.7 DIARRHEA, UNSPECIFIED TYPE: Primary | ICD-10-CM

## 2023-05-22 LAB
ALBUMIN SERPL BCG-MCNC: 4.5 G/DL (ref 3.5–5.2)
ALP SERPL-CCNC: 53 U/L (ref 35–104)
ALT SERPL W P-5'-P-CCNC: 14 U/L (ref 10–35)
ANION GAP SERPL CALCULATED.3IONS-SCNC: 12 MMOL/L (ref 7–15)
AST SERPL W P-5'-P-CCNC: 33 U/L (ref 10–35)
BILIRUB SERPL-MCNC: 0.6 MG/DL
BUN SERPL-MCNC: 24.2 MG/DL (ref 8–23)
CALCIUM SERPL-MCNC: 10 MG/DL (ref 8.8–10.2)
CHLORIDE SERPL-SCNC: 105 MMOL/L (ref 98–107)
CREAT SERPL-MCNC: 0.92 MG/DL (ref 0.51–0.95)
CRP SERPL-MCNC: <3 MG/L
DEPRECATED HCO3 PLAS-SCNC: 22 MMOL/L (ref 22–29)
ERYTHROCYTE [DISTWIDTH] IN BLOOD BY AUTOMATED COUNT: 14.6 % (ref 10–15)
GFR SERPL CREATININE-BSD FRML MDRD: 60 ML/MIN/1.73M2
GLUCOSE SERPL-MCNC: 116 MG/DL (ref 70–99)
HCT VFR BLD AUTO: 41.7 % (ref 35–47)
HGB BLD-MCNC: 13.8 G/DL (ref 11.7–15.7)
MCH RBC QN AUTO: 28.9 PG (ref 26.5–33)
MCHC RBC AUTO-ENTMCNC: 33.1 G/DL (ref 31.5–36.5)
MCV RBC AUTO: 87 FL (ref 78–100)
PLATELET # BLD AUTO: 136 10E3/UL (ref 150–450)
POTASSIUM SERPL-SCNC: 4.2 MMOL/L (ref 3.4–5.3)
PROT SERPL-MCNC: 8.2 G/DL (ref 6.4–8.3)
RBC # BLD AUTO: 4.78 10E6/UL (ref 3.8–5.2)
SODIUM SERPL-SCNC: 139 MMOL/L (ref 136–145)
TSH SERPL DL<=0.005 MIU/L-ACNC: 6.59 UIU/ML (ref 0.3–4.2)
WBC # BLD AUTO: 6.5 10E3/UL (ref 4–11)

## 2023-05-22 PROCEDURE — 85027 COMPLETE CBC AUTOMATED: CPT | Performed by: PATHOLOGY

## 2023-05-22 PROCEDURE — 80053 COMPREHEN METABOLIC PANEL: CPT | Performed by: PATHOLOGY

## 2023-05-22 PROCEDURE — 36415 COLL VENOUS BLD VENIPUNCTURE: CPT | Performed by: PATHOLOGY

## 2023-05-22 PROCEDURE — 84443 ASSAY THYROID STIM HORMONE: CPT | Performed by: PATHOLOGY

## 2023-05-22 PROCEDURE — 99214 OFFICE O/P EST MOD 30 MIN: CPT | Performed by: INTERNAL MEDICINE

## 2023-05-22 PROCEDURE — 86140 C-REACTIVE PROTEIN: CPT | Performed by: PATHOLOGY

## 2023-05-22 PROCEDURE — 84439 ASSAY OF FREE THYROXINE: CPT | Performed by: PATHOLOGY

## 2023-05-22 NOTE — PROGRESS NOTES
"History of Present Illness:  Ms. Yeh is a 88 year old female who presents for  Chief Complaint   Patient presents with     RECHECK     Discuss diarrhea.  General health.  Medication review.     Dulce is here today for follow up.    Hx of CAD, NSTEMI 6/20 with PCI x 3 to LAD, aortic stenosis s/p TAVR 7/20, HFrEF, pafib, HTN, lichen sclerosis, gout and c diff.    Today, Dulce reports diarrhea. Has a hx of c diff in past. Reports urgency. She also takes amox for dental.  Started a month ago.  In beginning was going nigh and day, now less.  Rarely has accidents.  It's loose.  Some cramps in beginning. No fevers, no blood.  No triggers.  Amox x 5 month ago.  Happens in AM after breakfast, sometimes before bed.  Not triggered by food.  Not losing weight.  Taking amalia and probiotics.  No nsaids.    She reports itching/rashes since TAVR.    She reports seeing outside Derm and being given clinda, fluocinoline, fluocinonide, TAC, keotconozole shampoo, clobetasol topical.  This cleared it up.    Blood pressure at home is \"doing great\".  States it's 150-160s, then lower if rechecks and reports one 120.  She never started statin and doesn't want one.  In the interim, she stopped chlorthalidone on her own after it ran out and didn't resume.    Previous Documentation:  Hospitalized 4/4-4/6/22 for hypertensive urgency. Also had NSTEMI. She was managed with hydralazine.  Last visit in April we increased valsartan to max dose.  We started 12.5 mg of chlorthalidone last August. She seemed to be tolerating and labs were stable after starting.  She also takes 320 valsartan, 100 mg metoprolol.    Of note, she reports intolerances to numerous medications in the past: lisinopril (cough), candasartan, diltiazem (facial swelling and flushing), coreg (swelling, dizzy), clonidine (dizziness), diovan, norvasc (facial swelling and flushing), hydrochlorothiazide (gout).                          A detailed Review of Systems was performed, " "verified and is negative except as documented in the HPI.  All health questionnaires were reviewed, verified and relevant information documented above.      Past Medical History:  Past Medical History:   Diagnosis Date     Allergy      Aortic stenosis 10/11/2011     C. 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    after TKA surgery     S/P cholecystectomy 10/11/2011     Unspecified hypothyroidism 10/11/2011       Active Meds:  Current Outpatient Medications   Medication     ascorbic acid (VITAMIN C) 500 MG tablet     aspirin (ASA) 81 MG EC tablet     BIOTIN PO     calcium carb 1250 mg, 500 mg [place],/vitamin D 200 units (OSCAL WITH D) 500-200 MG-UNIT per tablet     Cetirizine HCl (ZYRTEC PO)     coenzyme Q-10 200 MG CAPS     cyanocolbalamin (VITAMIN B-12) 1000 MCG tablet     Glucosamine-Chondroit-Vit C-Mn (GLUCOSAMINE CHONDR 1500 COMPLX) CAPS     levothyroxine (SYNTHROID/LEVOTHROID) 75 MCG tablet     metoprolol succinate ER (TOPROL XL) 100 MG 24 hr tablet     valsartan (DIOVAN) 320 MG tablet     amoxicillin (AMOXIL) 500 MG capsule     chlorthalidone (HYGROTON) 25 MG tablet     rosuvastatin (CRESTOR) 5 MG tablet     No current facility-administered medications for this visit.        Allergies:  Reviewed, refer to EMR    Relevant Social History:  Social History     Tobacco Use     Smoking status: Never     Smokeless tobacco: Never   Substance Use Topics     Alcohol use: No     Drug use: No        Physical Exam:  Vitals: BP (!) 174/81 (BP Location: Right arm, Patient Position: Sitting, Cuff Size: Adult Regular)   Pulse 66   Temp 98  F (36.7  C) (Oral)   Ht 1.702 m (5' 7\")   Wt 83.2 kg (183 lb 8 oz)   LMP  (LMP Unknown)   SpO2 99%   BMI 28.74 kg/m    Constitutional: Alert, oriented, pleasant, no acute " distress  Head: Normocephalic, atraumatic  Eyes: Extra-ocular movements intact, pupils equally round bilaterally, no scleral icterus  ENT: Oropharynx clear, moist mucus membranes  Neck: Supple, no lymphadenopathy  Cardiovascular: Regular rate and rhythm, no murmurs, rubs or gallops, peripheral pulses full/symmetric  Respiratory: Good air movement bilaterally, lungs clear, no wheezes/rales/rhonchi  GI: Abdomen soft, bowel sounds present, nondistended, nontender, no organomegaly or masses, no rebound/guarding  Musculoskeletal: No edema, normal muscle tone, normal gait  Neurologic: Alert and oriented, cranial nerves 2-12 intact, grossly non-focal  Psychiatric: normal mentation, affect and mood      Diagnostics:  Labs reviewed in Epic          Assessment and Plan:  Dulce was seen today for recheck.    Diagnoses and all orders for this visit:    Diarrhea, unspecified type  If c diff positive, would consider changing dental ppx   -     C. difficile Toxin B PCR with reflex to C. difficile Antigen and Toxins A/B EIA; Future  -     Comprehensive metabolic panel (BMP + Alb, Alk Phos, ALT, AST, Total. Bili, TP); Future    Essential hypertension  Patient has declines increased BP control or statin therapy.    -     CBC with platelets; Future  -     CRP, inflammation; Future    Hypothyroidism due to acquired atrophy of thyroid  -     TSH; Future            Gilda Hogan MD  Internal Medicine    >30 minutes spent today performing chart review, history and exam, counseling, care coordination, documentation and further activities as noted above exclusive of any procedures or EKG interpretation

## 2023-05-22 NOTE — PATIENT INSTRUCTIONS
Add in fiber (citrucel, metamucil, benefiber, mirafiber). Start 1 tsp daily and slowly increase to 2 tsp after 2 weeks. You may experience more bloating/gas in the beginning. It may take several weeks to help regulate symtpoms, but try to stick with it to see if you can improve digestive health and regularity.  Can take up to 2 tb twice daily.    Send in a stool sample for  c diff.

## 2023-05-22 NOTE — NURSING NOTE
"Dulce Yeh is a 88 year old female patient that presents today in clinic for the following:    Chief Complaint   Patient presents with     RECHECK     Discuss diarrhea.  General health.  Medication review.     The patient's allergies and medications were reviewed as noted. A set of vitals were recorded as noted without incident: BP (!) 174/81 (BP Location: Right arm, Patient Position: Sitting, Cuff Size: Adult Regular)   Pulse 66   Temp 98  F (36.7  C) (Oral)   Ht 1.702 m (5' 7\")   Wt 83.2 kg (183 lb 8 oz)   LMP  (LMP Unknown)   SpO2 99%   BMI 28.74 kg/m  . The patient does not have any other questions for the provider.    Dorita Ayala, EMT at 11:00 AM on 5/22/2023.  Primary care clinic: [phone]  "

## 2023-05-23 ENCOUNTER — APPOINTMENT (OUTPATIENT)
Dept: LAB | Facility: CLINIC | Age: 88
End: 2023-05-23
Payer: MEDICARE

## 2023-05-23 ENCOUNTER — TELEPHONE (OUTPATIENT)
Dept: INTERNAL MEDICINE | Facility: CLINIC | Age: 88
End: 2023-05-23

## 2023-05-23 DIAGNOSIS — R19.7 DIARRHEA, UNSPECIFIED TYPE: Primary | ICD-10-CM

## 2023-05-23 LAB — C DIFF TOX B STL QL: NEGATIVE

## 2023-05-23 PROCEDURE — 87493 C DIFF AMPLIFIED PROBE: CPT | Performed by: INTERNAL MEDICINE

## 2023-05-23 NOTE — TELEPHONE ENCOUNTER
Please call patient (she doesn't use mychart):  C diff was negative.  I recommend using metamucil and if the diarrhea doesn't improve in the next week or two, she should let us know and we can consider further stool tests and/or imaging.  Thanks,  Gilda Hogan MD  Internal Medicine

## 2023-05-25 LAB — T4 FREE SERPL-MCNC: 1.33 NG/DL (ref 0.9–1.7)

## 2023-05-25 NOTE — TELEPHONE ENCOUNTER
"RN called patient and relayed results per Dr. Hogan. Pt did report that her stools and symptoms are improving, not yet formed, but some particles and pt is satisfied with that. RN informed patient of other lab results as well per pt request. Pt would like labs to be sent to patient via paper copy, RN informed pt that would send them.   Pt did have some questions regarding her thyroid being elevated \"for her age\" as pt did not like that statement and is concerned that her levels are elevated. RN tried to explain to patient that Dr. Hogan is ok with the levels as they are, and that levels must change to still be in a normal range with aging, but pt wanted to hear from Dr. Hogan.     ROC COLIN RN on 5/25/2023 at 8:09 AM    "

## 2023-06-05 ENCOUNTER — THERAPY VISIT (OUTPATIENT)
Dept: PHYSICAL THERAPY | Facility: CLINIC | Age: 88
End: 2023-06-05
Payer: MEDICARE

## 2023-06-05 DIAGNOSIS — M54.42 CHRONIC LEFT-SIDED LOW BACK PAIN WITH LEFT-SIDED SCIATICA: ICD-10-CM

## 2023-06-05 DIAGNOSIS — G89.29 CHRONIC LEFT-SIDED LOW BACK PAIN WITH LEFT-SIDED SCIATICA: ICD-10-CM

## 2023-06-05 PROCEDURE — 97110 THERAPEUTIC EXERCISES: CPT | Mod: GP | Performed by: PHYSICAL THERAPIST

## 2023-06-05 PROCEDURE — 97161 PT EVAL LOW COMPLEX 20 MIN: CPT | Mod: GP | Performed by: PHYSICAL THERAPIST

## 2023-06-05 NOTE — PROGRESS NOTES
PHYSICAL THERAPY EVALUATION  Type of Visit: Evaluation    Patient states that her pain [facility], and feels her pain is from being less active. She feels pain when standing for prolonged periods of time, and is walking shorter distances due to pain. Pain has been present since 2020, most recently in the past 2 months is getting better.     See electronic medical record for Abuse and Falls Screening details.    Subjective      Presenting condition or subjective complaint: Left sided leg pain  Date of onset: 02/20/23 (MD Order)    Relevant medical history: Arthritis; High blood pressure; Implanted device; Thyroid problems   Dates & types of surgery:      Prior diagnostic imaging/testing results:       Prior therapy history for the same diagnosis, illness or injury: No      Prior Level of Function   Transfers: Independent  Ambulation: Independent  ADL: Independent  IADL: Housekeeping, Laundry, Meal preparation, Medication management, Yard work    Living Environment  Social support: Alone   Type of home: House; Basement   Stairs to enter the home: Yes 2 Is there a railing: Yes   Ramp: No   Stairs inside the home: Yes 12 Is there a railing: Yes   Help at home:    Equipment owned:       Employment:   Retired  Hobbies/Interests: busy    Patient goals for therapy: stand longer, walk longer    Pain assessment: Location: left glute to lateral thigh to left knee/Rating: 3/10     Objective   LUMBAR SPINE EVALUATION  PAIN: Pain Quality: Burning  Pain is Exacerbated By: walking and standing  Pain is Relieved By: rest and sitting  POSTURE: Standing Posture: Lordosis decreased  ROM:   (Degrees) Left AROM Left PROM  Right AROM Right PROM   Hip Flexion  115,++  120                        Hip Internal Rotation  25  30   Hip External Rotation  40  40                 Lumbar Side glide Nil loss Nil loss   Lumbar Flexion Min loss, positive for stretch in glute   Lumbar Extension Min loss    Pain:   End feel:     MYOTOMES:    Left Right   T12-L3 (Hip Flexion) 5 5   L2-4 (Quads)  5 5   L4 (Ankle DF) 5 5   L5 (Great Toe Ext) 5 5   S1 (Toe Raise) 5 5     DTR S: WNL  CORD SIGNS: WNL  DERMATOMES: WNL  NEURAL TENSION: Positive for increased neural tension with slump on L but no increase in pain  FLEXIBILITY: WNL   FUNCTIONAL TESTS: Double Leg Squat: Anterior knee translation, Knee valgus, Hip internal rotation and Improper use of glutes/hips  PALPATION: Tenderness to palpation along left glute med and left piriformis with reproduction of patients symptoms  Assessment & Plan   CLINICAL IMPRESSIONS   Medical Diagnosis: Chronic left-sided low back pain with left-sided sciatica    Treatment Diagnosis: Piriformis syndrome   Impression/Assessment: Patient is a 88 year old female with left sided leg pain complaints.  The following significant findings have been identified: Pain, Decreased ROM/flexibility and Decreased strength. These impairments interfere with their ability to perform self care tasks, recreational activities and household chores as compared to previous level of function.       Clinical Decision Making (Complexity):   Clinical Presentation: Stable/Uncomplicated  Clinical Presentation Rationale: based on medical and personal factors listed in PT evaluation  Clinical Decision Making (Complexity): Low complexity    PLAN OF CARE  Treatment Interventions:  Interventions: Manual Therapy, Neuromuscular Re-education, Therapeutic Activity, Therapeutic Exercise, Self-Care/Home Management    Long Term Goals     PT Goal 1  Goal Identifier: Walking  Goal Description: Patient will walk for 1 mile on even surface with 1/10 pain  Rationale: to maximize safety and independence within the community  [place] Date: 08/28/23      Frequency of Treatment: 1x per week for 4 weeks, 2x per month for 2 months  Duration of Treatment: 3 months    Recommended Referrals to Other Professionals: none  Education Assessment:    Learner/Method: Patient;No Barriers to Learning    Risks and benefits of evaluation/treatment have been explained.   Patient/Family/caregiver agrees with Plan of Care.     Evaluation Time:     PT Eval, Low Complexity Minutes (06303): 20   Present: No: not applicable    Signing Clinician: Lian Thompson, PT      [facility]                                                                                   OUTPATIENT PHYSICAL THERAPY      PLAN OF TREATMENT FOR OUTPATIENT REHABILITATION   Patient's Last Name, First Name, Dulce Juárez YOB: 1935   Provider's Name   [facility]   Medical Record No.  [id number]     Onset Date: 02/20/23 (MD Order)  Start of Care Date: 06/05/23     Medical Diagnosis:  Chronic left-sided low back pain with left-sided sciatica      PT Treatment Diagnosis:  Piriformis syndrome Plan of Treatment  Frequency/Duration: 1x per week for 4 weeks, 2x per month for 2 months/ 3 months    Certification date from 06/05/23 to 08/28/23         See note for plan of treatment details and functional goals     Lian Thompson, PT                         I CERTIFY THE NEED FOR THESE SERVICES FURNISHED UNDER        THIS PLAN OF TREATMENT AND WHILE UNDER MY CARE     (Physician attestation of this document indicates review and certification of the therapy plan).                  Referring Provider:  Gilda Hogan      Initial Assessment  See Epic Evaluation- Start of Care Date: 06/05/23

## 2023-06-12 ENCOUNTER — THERAPY VISIT (OUTPATIENT)
Dept: PHYSICAL THERAPY | Facility: CLINIC | Age: 88
End: 2023-06-12
Payer: MEDICARE

## 2023-06-12 DIAGNOSIS — G89.29 CHRONIC LEFT-SIDED LOW BACK PAIN WITH LEFT-SIDED SCIATICA: Primary | ICD-10-CM

## 2023-06-12 DIAGNOSIS — M54.42 CHRONIC LEFT-SIDED LOW BACK PAIN WITH LEFT-SIDED SCIATICA: Primary | ICD-10-CM

## 2023-06-12 PROCEDURE — 97110 THERAPEUTIC EXERCISES: CPT | Mod: GP | Performed by: PHYSICAL THERAPIST

## 2023-06-12 PROCEDURE — 97140 MANUAL THERAPY 1/> REGIONS: CPT | Mod: GP | Performed by: PHYSICAL THERAPIST

## 2023-06-28 ENCOUNTER — THERAPY VISIT (OUTPATIENT)
Dept: PHYSICAL THERAPY | Facility: CLINIC | Age: 88
End: 2023-06-28
Payer: MEDICARE

## 2023-06-28 DIAGNOSIS — M54.42 CHRONIC LEFT-SIDED LOW BACK PAIN WITH LEFT-SIDED SCIATICA: Primary | ICD-10-CM

## 2023-06-28 DIAGNOSIS — G89.29 CHRONIC LEFT-SIDED LOW BACK PAIN WITH LEFT-SIDED SCIATICA: Primary | ICD-10-CM

## 2023-06-28 PROCEDURE — 97140 MANUAL THERAPY 1/> REGIONS: CPT | Mod: GP | Performed by: PHYSICAL THERAPIST

## 2023-06-28 PROCEDURE — 97110 THERAPEUTIC EXERCISES: CPT | Mod: GP | Performed by: PHYSICAL THERAPIST

## 2023-06-30 DIAGNOSIS — Z29.89 NEED FOR SUBACUTE BACTERIAL ENDOCARDITIS PROPHYLAXIS: ICD-10-CM

## 2023-07-05 NOTE — TELEPHONE ENCOUNTER
amoxicillin 500 mg capsule      Last Written Prescription Date:  10/24/22  Last Fill Quantity: 4,   # refills: 2  Last Office Visit : 5/22/23  Future Office visit:  none    Routing refill request to provider for review/approval because:  Not on refill protocol for diagnosis    Associated Diagnoses    Need for subacute bacterial endocarditis prophylaxis [Z29.8]  - Primary

## 2023-07-06 RX ORDER — AMOXICILLIN 500 MG/1
2000 CAPSULE ORAL
Qty: 4 CAPSULE | Refills: 2 | Status: SHIPPED | OUTPATIENT
Start: 2023-07-06 | End: 2024-02-29

## 2023-07-06 NOTE — TELEPHONE ENCOUNTER
Pt was last seen in clinic on 5/22/23. Pt last had amoxicillin (AMOXIL) 500 MG capsule refilled on  for a 12/10/22 for a 8 capsule supply by PCP.  Medication refill routed to provider for approval.     ROC COLIN RN on 7/6/2023 at 11:34 AM

## 2023-07-30 DIAGNOSIS — Z29.89 NEED FOR SUBACUTE BACTERIAL ENDOCARDITIS PROPHYLAXIS: ICD-10-CM

## 2023-08-01 RX ORDER — AMOXICILLIN 500 MG/1
2000 CAPSULE ORAL
Qty: 4 CAPSULE | Refills: 2 | OUTPATIENT
Start: 2023-08-01

## 2023-10-02 PROBLEM — G89.29 CHRONIC LEFT-SIDED LOW BACK PAIN WITH LEFT-SIDED SCIATICA: Status: RESOLVED | Noted: 2023-06-05 | Resolved: 2023-10-02

## 2023-10-02 PROBLEM — M54.42 CHRONIC LEFT-SIDED LOW BACK PAIN WITH LEFT-SIDED SCIATICA: Status: RESOLVED | Noted: 2023-06-05 | Resolved: 2023-10-02

## 2023-10-02 NOTE — PROGRESS NOTES
Discharge Note    Progress reporting period is from initial evaluation date (please see noted date below) to Jun 28, 2023.  Linked Episodes   Type: Episode: Status: Noted: Resolved: Last update: Updated by:   PHYSICAL THERAPY low back pain 6/5/2023 Active 6/5/2023 6/28/2023  1:43 PM Lian Templeton, PT      Comments:       Dulce has not returned for physical therapy follow up and current status is unknown.  Please see information below for last relevant information on current status.  Patient seen for   visits.    SUBJECTIVE  Subjective changes noted by patient:     .  Current pain level is  .     Previous pain level was   .   Changes in function:  Yes (See Goal flowsheet attached for changes in current functional level)  Adverse reaction to treatment or activity: None    OBJECTIVE  Changes noted in objective findings:       ASSESSMENT/PLAN      Updated problem list and treatment plan:   Pain - HEP  Decreased ROM/flexibility - HEP  Decreased function - HEP  Decreased strength - HEP  STG/LTGs have been met or progress has been made towards goals:  Yes, please see goal flowsheet for most current information  Assessment of Progress: current status is unknown.    Last current status:     Self Management Plans:  HEP  I have re-evaluated this patient and find that the nature, scope, duration and intensity of the therapy is appropriate for the medical condition of the patient.  Chance continues to require the following intervention to meet STG and LTG's:  HEP.    Recommendations:  Discharge with current home program.  Patient to follow up with MD as needed.    Please refer to the daily flowsheet for treatment today, total treatment time and time spent performing 1:1 timed codes.

## 2024-01-16 ENCOUNTER — ANCILLARY PROCEDURE (OUTPATIENT)
Dept: MAMMOGRAPHY | Facility: CLINIC | Age: 89
End: 2024-01-16
Attending: INTERNAL MEDICINE
Payer: MEDICARE

## 2024-01-16 DIAGNOSIS — Z12.31 VISIT FOR SCREENING MAMMOGRAM: ICD-10-CM

## 2024-01-16 PROCEDURE — 77063 BREAST TOMOSYNTHESIS BI: CPT | Performed by: RADIOLOGY

## 2024-01-16 PROCEDURE — 77067 SCR MAMMO BI INCL CAD: CPT | Performed by: RADIOLOGY

## 2024-01-29 DIAGNOSIS — I10 ESSENTIAL HYPERTENSION: ICD-10-CM

## 2024-01-29 NOTE — TELEPHONE ENCOUNTER
metoprolol succinate ER (TOPROL XL) 100 MG 24 hr tablet 90 tablet 3 2/20/2023         Last Office Visit : 5/22/23  Future Office visit:  2/29/24    Routing refill request to provider for review/approval because:  Blood pressure out of range   174/81   Patient is also taking a new medicine which is a beta blocker, she is concerned about taking these two together, please call to discuss.

## 2024-01-29 NOTE — TELEPHONE ENCOUNTER
[facility] Call Center    Phone Message    May a detailed message be left on voicemail: yes     Reason for Call: Patient called stating she has changed Pharmacies, she is waiting on metropolol, please fill and send to [facility] in [employer] in [place], [address], [place].        Patient is also taking a new medicine which is a beta blocker, she is concerned about taking these two together, please call to discuss.         Please call after 4pm.    Action Taken: Message routed to:  [facility]    Travel Screening: Not Applicable

## 2024-01-30 RX ORDER — METOPROLOL SUCCINATE 100 MG/1
100 TABLET, EXTENDED RELEASE ORAL DAILY
Qty: 90 TABLET | Refills: 1 | Status: SHIPPED | OUTPATIENT
Start: 2024-01-30 | End: 2024-02-29

## 2024-02-29 ENCOUNTER — OFFICE VISIT (OUTPATIENT)
Dept: INTERNAL MEDICINE | Facility: CLINIC | Age: 89
End: 2024-02-29
Payer: MEDICARE

## 2024-02-29 VITALS
SYSTOLIC BLOOD PRESSURE: 187 MMHG | OXYGEN SATURATION: 99 % | WEIGHT: 168.6 LBS | BODY MASS INDEX: 26.41 KG/M2 | HEART RATE: 59 BPM | DIASTOLIC BLOOD PRESSURE: 84 MMHG

## 2024-02-29 DIAGNOSIS — Z29.89 NEED FOR SUBACUTE BACTERIAL ENDOCARDITIS PROPHYLAXIS: ICD-10-CM

## 2024-02-29 DIAGNOSIS — Z00.00 HEALTH CARE MAINTENANCE: ICD-10-CM

## 2024-02-29 DIAGNOSIS — I10 ESSENTIAL HYPERTENSION: Primary | ICD-10-CM

## 2024-02-29 DIAGNOSIS — E03.4 HYPOTHYROIDISM DUE TO ACQUIRED ATROPHY OF THYROID: ICD-10-CM

## 2024-02-29 DIAGNOSIS — I16.0 HYPERTENSIVE URGENCY: ICD-10-CM

## 2024-02-29 DIAGNOSIS — I50.22 CHRONIC SYSTOLIC (CONGESTIVE) HEART FAILURE (H): ICD-10-CM

## 2024-02-29 DIAGNOSIS — L30.9 DERMATITIS: ICD-10-CM

## 2024-02-29 PROCEDURE — 99214 OFFICE O/P EST MOD 30 MIN: CPT | Performed by: INTERNAL MEDICINE

## 2024-02-29 RX ORDER — AMOXICILLIN 500 MG/1
2000 CAPSULE ORAL
Qty: 4 CAPSULE | Refills: 2 | Status: SHIPPED | OUTPATIENT
Start: 2024-02-29

## 2024-02-29 RX ORDER — VALSARTAN 320 MG/1
320 TABLET ORAL DAILY
Qty: 90 TABLET | Refills: 3 | Status: SHIPPED | OUTPATIENT
Start: 2024-02-29

## 2024-02-29 RX ORDER — RESPIRATORY SYNCYTIAL VIRUS VACCINE 120MCG/0.5
0.5 KIT INTRAMUSCULAR ONCE
Qty: 1 EACH | Refills: 0 | Status: CANCELLED | OUTPATIENT
Start: 2024-02-29 | End: 2024-02-29

## 2024-02-29 RX ORDER — METOPROLOL SUCCINATE 100 MG/1
100 TABLET, EXTENDED RELEASE ORAL DAILY
Qty: 90 TABLET | Refills: 1 | Status: CANCELLED | OUTPATIENT
Start: 2024-02-29

## 2024-02-29 RX ORDER — CARVEDILOL 6.25 MG/1
6.25 TABLET ORAL 2 TIMES DAILY WITH MEALS
Qty: 180 TABLET | Refills: 2 | Status: SHIPPED | OUTPATIENT
Start: 2024-02-29 | End: 2024-04-11

## 2024-02-29 RX ORDER — LEVOTHYROXINE SODIUM 75 UG/1
75 TABLET ORAL DAILY
Qty: 90 TABLET | Refills: 0 | Status: SHIPPED | OUTPATIENT
Start: 2024-02-29 | End: 2024-06-23

## 2024-02-29 ASSESSMENT — ENCOUNTER SYMPTOMS
LIGHT-HEADEDNESS: 1
PALPITATIONS: 0
VOMITING: 0
FEVER: 0
ROS SKIN COMMENTS: POSITIVE FOR PRURITIS
ABDOMINAL PAIN: 0
SHORTNESS OF BREATH: 0
CHILLS: 0
DIZZINESS: 1
COUGH: 0
NERVOUS/ANXIOUS: 1
HEADACHES: 1

## 2024-02-29 NOTE — PROGRESS NOTES
"  Assessment & Plan     Essential hypertension  Hypertensive urgency  /84, 172/76 on recheck today. Home BPs have been elevated as well. She has a history of chronic, uncontrolled hypertension with multiple medication trials that were not tolerated due to side effects (see HPI). Discussed plan to switch Metoprolol to Carvedilol today and will follow-up with clinic staff via phone within one week to report how this is going. Continue monitoring home BPs. Additionally, discussed indications to go to the emergency department including chest pain, shortness of breath, severe headache, blurred vision, palpitations, confusion, or any other concerns.   - Comprehensive metabolic panel (BMP + Alb, Alk Phos, ALT, AST, Total. Bili, TP)  - valsartan (DIOVAN) 320 MG tablet  Dispense: 90 tablet; Refill: 3  - carvedilol (COREG) 6.25 MG tablet  Dispense: 180 tablet; Refill: 2    Chronic systolic (congestive) heart failure (H)  Has declined statin previously and today. Recommend regular follow-up with cardiology.  - Lipid panel reflex to direct LDL Non-fasting    Need for subacute bacterial endocarditis prophylaxis  - amoxicillin (AMOXIL) 500 MG capsule  Dispense: 4 capsule; Refill: 2    Hypothyroidism due to acquired atrophy of thyroid  - levothyroxine (SYNTHROID/LEVOTHROID) 75 MCG tablet  Dispense: 90 tablet; Refill: 0  - TSH with free T4 reflex    Dermatitis  Has seen dermatology x2 and did not find interventions to be helpful, including Lidex solution, ketoconazole shampoo, selsum blue, triamcinolone 0.1% ointment, declines refills. I recommended gentle skin care using a mild soap and thick emolliate daily.     Health care maintenance  - REVIEW OF HEALTH MAINTENANCE PROTOCOL ORDERS          BMI  Estimated body mass index is 26.41 kg/m  as calculated from the following:    Height as of 5/22/23: 1.702 m (5' 7\").    Weight as of this encounter: 76.5 kg (168 lb 9.6 oz).     Return in about 6 weeks (around 4/11/2024) for with " "me, in person, with labs prior.    Subjective   Dulce is a 89 year old, presenting for the following health issues:  Follow Up        2/29/2024     9:28 AM   Additional Questions   Roomed by MR     History of Present Illness       Reason for visit:  Medication renewal   She is taking medications regularly.     History of glaucoma - taking timolol twice daily for the past month but experiencing side effects including \"sticky goopy mouth/eyes\" when I wake up in the morning, headache, some lightheadedness/dizziness. Because of this, decrease to once daily but that did not make much of a difference. Wondering if it is okay to stop.     Itchy patches on scalp on body and has seen dermatology x2 but currently not using anything because that did not help. Has tried clinda, fluocinoline, fluocinonide, TAC, keotconozole shampoo, clobetasol topical in the past.     Arhritis pain in wrists and hands is worse.    BP has been high at home. 180-190 systolic in the morning. \"Lowest I can get\" it is 140 systolic later in the day. Has experienced least side effects on Metoprolol and Valsartan but had \"side effects with everything else\" (see below). Additionally, endorses worries and stressors, like getting her phone fixed, but notes \"other people have more problems than me.\" Feels that she has a great support system, son and daughter live nearby.     Past medication trials:  Amlodipine - facial swelling and flushing  Candasartan  Carvedilol - swelling, dizzy  Chlorthalidone - did not like going to the bathroom more   Clonidine - dizziness, vision changes, rash  Diltiazem - facial swelling and flushing, rash  Hydralazine - had perceived eye swelling and new localized skin irritation   Hydrochlorothiazide - gout  Lisinopril - cough    Of note, she has also never taken a statin and states she does not want one.      Review of Systems   Constitutional:  Negative for chills and fever.   Respiratory:  Negative for cough and shortness of " breath.    Cardiovascular:  Negative for chest pain, palpitations and leg swelling.   Gastrointestinal:  Negative for abdominal pain and vomiting.   Genitourinary: Negative.    Skin:         Positive for pruritis   Neurological:  Positive for dizziness, light-headedness and headaches.   Psychiatric/Behavioral:  The patient is nervous/anxious.    All other systems reviewed and are negative.        Objective    BP (!) 187/84 (BP Location: Left arm, Patient Position: Sitting, Cuff Size: Adult Regular)   Pulse 59   Wt 76.5 kg (168 lb 9.6 oz)   LMP  (LMP Unknown)   SpO2 99%   BMI 26.41 kg/m    Body mass index is 26.41 kg/m .  Physical Exam   GENERAL: alert and no distress  EYES: Eyes grossly normal to inspection, PERRL and conjunctivae and sclerae normal  NECK: no adenopathy, no asymmetry, masses, or scars  RESP: lungs clear to auscultation - no rales, rhonchi or wheezes  CV: regular rates and rhythm, systolic ejection murmur, peripheral pulses strong, no peripheral edema,  ABDOMEN: soft, nontender, no hepatosplenomegaly, no masses and bowel sounds normal  MS: no gross musculoskeletal defects noted, no edema  SKIN: xerosis and scale throughout the bilateral lateral scalp and occipital scalp, a few scattered dry patches on upper and lower extremities        Marissa Razo MS3  Attending Addendum:  The medical student acted as scribe.  The patient was seen and examined with medical student.  The history and physical were independently verified by myself.  The above documentation represents our joint assessment and plan.    Signed Electronically by: Gilda Hogan MD

## 2024-03-06 ENCOUNTER — TELEPHONE (OUTPATIENT)
Dept: INTERNAL MEDICINE | Facility: CLINIC | Age: 89
End: 2024-03-06
Payer: MEDICARE

## 2024-03-06 NOTE — CONFIDENTIAL NOTE
Please call patient:  We switched metoprolol to carvedilol last week. Wondering if she started it and how she's tolerating it? What have her BP been at home?  We told her we'd follow-up with her after med change given her sensitivity to drugs.  Thanks,  ML

## 2024-03-07 NOTE — TELEPHONE ENCOUNTER
RN called and spoke to the patient.     The patient stated that she wanted to discontinue Coreg and re-start Metoprolol.     RN offered to schedule the patient for a virtual visit with Dr. Hogan, which she declined. The patient indicated her preference to follow up as previously scheduled in April with Dr. Hogan.

## 2024-03-07 NOTE — TELEPHONE ENCOUNTER
"RN called and talked to the patient.     The patient states that overall she has not been doing well since starting the Coreg and stopping the metoprolol and that she last took Coreg last night. The patient is wondering about next steps. Specifically, she is wondering if there is another alternative to Coreg, if going back on the metoprolol would be an option, or if Dr. Hogan felt that Lisinopril might be an option for her.      Her blood pressures yesterday were: 142/75, 168/75, and 152/71. She has not taken her blood pressure yet today as she was feeling anxious.     The patient's primary concern since starting the Coreg is bladder incontinence upon standing that was large enough volume for the patient to need to change her clothes. The patient states she had similar issues with her bladder while on Coreg in the past. She states she has been proactively toileting more frequently than her baseline to try and prevent further episodes of incontinence, but denies feeling urinary frequency. The patient denies urinary odor, burning with urination, fevers, chills, bowel incontinence, or back pain.     The patient also reports noticing her heart \"beat\" and \"thump\", which she also describes as \"chest heaviness\" while she is laying in bed. The patient denies having chest heaviness currently and denies chest pain or shortness of breath. She states that she did not hear her heart \"beat\" or \"thump\" while up and moving about her home due to ambient noise when out of bed. When asked if the not feeling well while on Coreg has caused her to feel anxious, the patient replied, \"very much so.\"     Additionally, the patient reports having a headache in the back, top, and sides of her head. She rates this as, \"not bad\" and wonders if it might be related to trying to limit her coffee intake. The patient states Dr. Hogan had recommended taking Tylenol for headaches and that this has helped in the past. The patient stated she planned " to take a Tylenol for the headache.     Furthermore, the patient reports having episodes of bilateral hand tingling upon waking in the morning that improve after she moves her hands. She states that she has had episodes of bilateral hand tingling before starting the Coreg. Additionally, the patient states that prior to starting Coreg that she has felt dizzy upon standing and that she has to be careful to not stand up too quickly. The patient feels these episodes of dizziness upon standing are not different or worse than her baseline prior to starting Coreg.     The patient told the nurse that she did not want to take Coreg and wanted to take leftover metoprolol. The RN advised the patient to not modify her medication regimen without physician approval and that it was best to take her medications as prescribed.     RN educated the patient that she should present to the ED if she develops chest pain, shortness of breath, worsening dizziness, or trouble with her bowels. The RN recommended the patient seek care at Urgent Care (as the clinic does not have any appointments until next week) if she develops burning or odor with urination, fevers, or chills.

## 2024-03-07 NOTE — CONFIDENTIAL NOTE
Coreg is unlikely to cause bladder incontinence and I don't see any mention of this size effect previously.  She could consider sticking it out for another week to see if body adjusts.    If she's not amenable to that, okay to switch back to metoprolol.  Could try adding spironolactone. Looks like she took this >10 years ago. No mention of side effects.  Happy to send this in but would need her to get labs checked 7-10 days after starting to monitor K as may rarely cause increased K so need to monitor.    I might suggest we meet virtually next Tuesday to discuss further.      Gilda Hogan MD  Internal Medicine

## 2024-03-21 DIAGNOSIS — I10 ESSENTIAL HYPERTENSION: Primary | ICD-10-CM

## 2024-03-21 RX ORDER — METOPROLOL SUCCINATE 100 MG/1
100 TABLET, EXTENDED RELEASE ORAL DAILY
Qty: 90 TABLET | Refills: 1 | Status: SHIPPED | OUTPATIENT
Start: 2024-03-21

## 2024-03-27 DIAGNOSIS — I10 ESSENTIAL HYPERTENSION: ICD-10-CM

## 2024-04-04 RX ORDER — METOPROLOL SUCCINATE 100 MG/1
100 TABLET, EXTENDED RELEASE ORAL DAILY
Qty: 90 TABLET | Refills: 3 | OUTPATIENT
Start: 2024-04-04

## 2024-04-09 ENCOUNTER — LAB (OUTPATIENT)
Dept: LAB | Facility: CLINIC | Age: 89
End: 2024-04-09
Payer: MEDICARE

## 2024-04-09 DIAGNOSIS — E03.4 HYPOTHYROIDISM DUE TO ACQUIRED ATROPHY OF THYROID: ICD-10-CM

## 2024-04-09 DIAGNOSIS — I10 ESSENTIAL HYPERTENSION: ICD-10-CM

## 2024-04-09 DIAGNOSIS — I50.22 CHRONIC SYSTOLIC (CONGESTIVE) HEART FAILURE (H): ICD-10-CM

## 2024-04-09 LAB
ALBUMIN SERPL BCG-MCNC: 4.2 G/DL (ref 3.5–5.2)
ALP SERPL-CCNC: 59 U/L (ref 40–150)
ALT SERPL W P-5'-P-CCNC: 14 U/L (ref 0–50)
ANION GAP SERPL CALCULATED.3IONS-SCNC: 10 MMOL/L (ref 7–15)
AST SERPL W P-5'-P-CCNC: 32 U/L (ref 0–45)
BILIRUB SERPL-MCNC: 0.7 MG/DL
BUN SERPL-MCNC: 18.1 MG/DL (ref 8–23)
CALCIUM SERPL-MCNC: 9.5 MG/DL (ref 8.8–10.2)
CHLORIDE SERPL-SCNC: 102 MMOL/L (ref 98–107)
CHOLEST SERPL-MCNC: 186 MG/DL
CREAT SERPL-MCNC: 0.9 MG/DL (ref 0.51–0.95)
DEPRECATED HCO3 PLAS-SCNC: 27 MMOL/L (ref 22–29)
EGFRCR SERPLBLD CKD-EPI 2021: 61 ML/MIN/1.73M2
FASTING STATUS PATIENT QL REPORTED: YES
GLUCOSE SERPL-MCNC: 110 MG/DL (ref 70–99)
HDLC SERPL-MCNC: 49 MG/DL
LDLC SERPL CALC-MCNC: 115 MG/DL
NONHDLC SERPL-MCNC: 137 MG/DL
POTASSIUM SERPL-SCNC: 4.9 MMOL/L (ref 3.4–5.3)
PROT SERPL-MCNC: 7.8 G/DL (ref 6.4–8.3)
SODIUM SERPL-SCNC: 139 MMOL/L (ref 135–145)
T4 FREE SERPL-MCNC: 1.24 NG/DL (ref 0.9–1.7)
TRIGL SERPL-MCNC: 112 MG/DL
TSH SERPL DL<=0.005 MIU/L-ACNC: 6.28 UIU/ML (ref 0.3–4.2)

## 2024-04-09 PROCEDURE — 80053 COMPREHEN METABOLIC PANEL: CPT | Performed by: PATHOLOGY

## 2024-04-09 PROCEDURE — 36415 COLL VENOUS BLD VENIPUNCTURE: CPT | Performed by: PATHOLOGY

## 2024-04-09 PROCEDURE — 80061 LIPID PANEL: CPT | Performed by: PATHOLOGY

## 2024-04-09 PROCEDURE — 84439 ASSAY OF FREE THYROXINE: CPT | Performed by: PATHOLOGY

## 2024-04-09 PROCEDURE — 84443 ASSAY THYROID STIM HORMONE: CPT | Performed by: PATHOLOGY

## 2024-04-11 ENCOUNTER — OFFICE VISIT (OUTPATIENT)
Dept: INTERNAL MEDICINE | Facility: CLINIC | Age: 89
End: 2024-04-11
Payer: MEDICARE

## 2024-04-11 VITALS
WEIGHT: 171.2 LBS | SYSTOLIC BLOOD PRESSURE: 193 MMHG | HEART RATE: 63 BPM | BODY MASS INDEX: 26.81 KG/M2 | OXYGEN SATURATION: 99 % | DIASTOLIC BLOOD PRESSURE: 76 MMHG

## 2024-04-11 DIAGNOSIS — I10 HYPERTENSION, UNSPECIFIED TYPE: ICD-10-CM

## 2024-04-11 DIAGNOSIS — Z95.2 S/P TAVR (TRANSCATHETER AORTIC VALVE REPLACEMENT): ICD-10-CM

## 2024-04-11 DIAGNOSIS — Z29.11 NEED FOR VACCINATION AGAINST RESPIRATORY SYNCYTIAL VIRUS: ICD-10-CM

## 2024-04-11 DIAGNOSIS — I21.4 NSTEMI (NON-ST ELEVATED MYOCARDIAL INFARCTION) (H): Primary | ICD-10-CM

## 2024-04-11 DIAGNOSIS — I25.10 CORONARY ARTERY DISEASE INVOLVING NATIVE CORONARY ARTERY OF NATIVE HEART WITHOUT ANGINA PECTORIS: ICD-10-CM

## 2024-04-11 PROCEDURE — 99214 OFFICE O/P EST MOD 30 MIN: CPT | Performed by: INTERNAL MEDICINE

## 2024-04-11 PROCEDURE — G2211 COMPLEX E/M VISIT ADD ON: HCPCS | Performed by: INTERNAL MEDICINE

## 2024-04-11 RX ORDER — AMLODIPINE BESYLATE 2.5 MG/1
2.5 TABLET ORAL DAILY
Qty: 30 TABLET | Refills: 11 | Status: SHIPPED | OUTPATIENT
Start: 2024-04-11

## 2024-04-11 RX ORDER — RESPIRATORY SYNCYTIAL VIRUS VACCINE 120MCG/0.5
0.5 KIT INTRAMUSCULAR ONCE
Qty: 1 EACH | Refills: 0 | Status: CANCELLED | OUTPATIENT
Start: 2024-04-11 | End: 2024-04-11

## 2024-04-11 RX ORDER — TIMOLOL MALEATE 6.8 MG/ML
SOLUTION OPHTHALMIC
COMMUNITY
Start: 2024-03-11

## 2024-04-11 NOTE — PROGRESS NOTES
Assessment & Plan       NSTEMI (non-ST elevated myocardial infarction) (H)  S/P TAVR (transcatheter aortic valve replacement)  - Echocardiogram Complete; Future  - Adult Cardiology Eval  Referral; Future    Hypertension, unspecified type  Uncontrolled d/t intolerances to multiple medications. She is aware of the risks of untreated hypertension as it relates to cardiovascular morbidity. Willing to re-trial CCB.  - amLODIPine (NORVASC) 2.5 MG tablet; Take 1 tablet (2.5 mg) by mouth daily    Coronary artery disease involving native coronary artery of native heart without angina pectoris  - Echocardiogram Complete; Future  - Adult Cardiology Eval  Referral; Future    Advised Derm follow-up for rash and routine Cards follow-up.           The longitudinal plan of care for the diagnosis(es)/condition(s) as documented were addressed during this visit. Due to the added complexity in care, I will continue to support Dulce in the subsequent management and with ongoing continuity of care.          No follow-ups on file.    Subjective   Dulce is a 89 year old, presenting for the following health issues:  Follow Up (Pt here to follow up from previous visit and discuss recent labs. )      4/11/2024    11:40 AM   Additional Questions   Roomed by MICHAEL, EMT     History of Present Illness       Hypertension: She presents for follow up of hypertension.  She does not check blood pressure  regularly outside of the clinic. Outside blood pressures have been over 140/90. She does not follow a low salt diet.     Hypothyroidism:     Since last visit, patient describes the following symptoms::  Anxiety, Dry skin and Hair loss    She eats 2-3 servings of fruits and vegetables daily.She consumes 0 sweetened beverage(s) daily.She exercises with enough effort to increase her heart rate 30 to 60 minutes per day.  She exercises with enough effort to increase her heart rate 7 days per week.   She is taking medications  "regularly.       Hx of CAD, NSTEMI 6/20 with PCI x 3 to LAD, aortic stenosis s/p TAVR 7/20, HFrEF, pafib, uncontrolled HTN, lichen sclerosis, gout and c diff.     Dulce has labs done today, we reviewed them.  Her BP remains high. We have tried numerous medications (detailed below) and she has had reactions to them as listed.  She states today she is willing to \"norvasc\" again.  She declines a statin despite a discussion of her cardiovascular risks and the benefits of statins.  Otherwise, she reports feeling very well and denies cardiopulmonary symptoms. Has not seen Cards in a while. 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      Past medication trials:  Amlodipine - facial swelling and flushing  Candasartan  Carvedilol - swelling, dizzy, urinary incontinence  Chlorthalidone - did not like going to the bathroom more   Clonidine - dizziness, vision changes, rash  Diltiazem - facial swelling and flushing, rash  Hydralazine - had perceived eye swelling and new localized skin irritation   Hydrochlorothiazide - gout  Lisinopril - cough                    Objective    BP (!) 193/76 (BP Location: Right arm, Patient Position: Sitting, Cuff Size: Adult Regular)   Pulse 63   Wt 77.7 kg (171 lb 3.2 oz)   LMP  (LMP Unknown)   SpO2 99%   BMI 26.81 kg/m    Body mass index is 26.81 kg/m .  Physical Exam   GENERAL: alert and no distress  NECK: no adenopathy, no asymmetry, masses, or scars  RESP: lungs clear to auscultation - no rales, rhonchi or wheezes  CV: regular rate and rhythm, normal S1 S2, no S3 or S4, crisp HS, no peripheral edema  ABDOMEN: soft, nontender, no hepatosplenomegaly, no masses and bowel sounds normal  MS: no gross musculoskeletal defects noted, no edema            Signed Electronically by: Gilda Hogan MD    "

## 2024-06-10 DIAGNOSIS — E03.4 HYPOTHYROIDISM DUE TO ACQUIRED ATROPHY OF THYROID: ICD-10-CM

## 2024-06-19 NOTE — TELEPHONE ENCOUNTER
levothyroxine (SYNTHROID/LEVOTHROID) 75 MCG tablet   Last Written Prescription Date:  2/29/2024  Last Fill Quantity: 90,   # refills: 0  Last Office Visit : 4/11/2024  Future Office visit:  none  Routing levothyroxine (SYNTHROID/LEVOTHROID) 75 MCG tablet refill request to provider for review/approval because: failed - abnormal lab  - TSH (H)    TSH   Date Value Ref Range Status   04/09/2024 6.28 (H) 0.30 - 4.20 uIU/mL Final   06/30/2022 2.17 0.40 - 4.00 mU/L Final   08/13/2020 1.22 0.40 - 4.00 mU/L Final

## 2024-06-23 RX ORDER — LEVOTHYROXINE SODIUM 75 UG/1
75 TABLET ORAL DAILY
Qty: 90 TABLET | Refills: 0 | Status: SHIPPED | OUTPATIENT
Start: 2024-06-23

## 2024-07-28 DIAGNOSIS — E03.4 HYPOTHYROIDISM DUE TO ACQUIRED ATROPHY OF THYROID: ICD-10-CM

## 2024-08-01 RX ORDER — LEVOTHYROXINE SODIUM 75 UG/1
75 TABLET ORAL DAILY
Qty: 90 TABLET | Refills: 0 | OUTPATIENT
Start: 2024-08-01

## 2024-10-30 DIAGNOSIS — E03.4 HYPOTHYROIDISM DUE TO ACQUIRED ATROPHY OF THYROID: ICD-10-CM

## 2024-10-30 DIAGNOSIS — I10 ESSENTIAL HYPERTENSION: ICD-10-CM

## 2024-11-04 RX ORDER — LEVOTHYROXINE SODIUM 75 UG/1
75 TABLET ORAL DAILY
Qty: 90 TABLET | Refills: 0 | Status: SHIPPED | OUTPATIENT
Start: 2024-11-04

## 2024-11-04 NOTE — TELEPHONE ENCOUNTER
METOPROLOL SUCC  MG TAB       Last Written Prescription Date:  3-21-24  Last Fill Quantity: 90,   # refills: 1  Last Office Visit : 4-11-24  Future Office visit:  none    BP Readings from Last 3 Encounters:   04/11/24 (!) 193/76   02/29/24 (!) 187/84   05/22/23 (!) 174/81        Routing refill request to provider for review/approval because:  Blood pressure out of range          levothyroxine (SYNTHROID/LEVOTHROID) 75 MCG tablet       Last Written Prescription Date:  6-23-24  Last Fill Quantity: 90,   # refills: 0  4-11-24 TSH lab result  Dulce,  Labs look stable. Your TSH level is a thyroid test and the level of 6 is normal for your age.   RF 90 t :0   "Chart reviewed. Call to patient. C/o elevated pulse. Pt states he was changing the garbage this morning when he became SOB. Pt sat down to rest. Pt checked BP and pulse. 0820 am- /82 P 163  3-4 minutes later /79 P 158  3-4 minutes later /84 P 147  3-4 minutes later /81 P 83  Another recheck, pt unsure what time  /74 P 73  Pt did not feel any palpations or racing heart. Denies any dizziness or lightheadedness. No chest pain. Pt is asymptomatic at this time. Pt did receive his first corona virus vaccine yesterday around 3:30 pm. Reports being recently treated for a UTI with several antibiotics, last dose taken Monday night 3/1. Pt states he noticed ""very dark, almost black\"" stools \""around 2/24. \"" States stools were normal color 2 days after that. Same day appt scheduled. Reason for Disposition  â¢ [1] Heart beating very rapidly (e.g., > 140 / minute) AND [2] not present now  (Exception: during exercise)    Protocols used: HEART RATE AND HEARTBEAT [id number]    Negative Covid-19 screen:  Pt/household denies fever > 100.0. Pt/household denies cough. Pt/household denies SOB/difficulty breathing. Pt/household denies sore throat. Pt/household denies GI symptoms of N/V/D. Pt/household denies new onset of loss of taste or smell. Pt/household denies congestion or runny nose. Pt/household denies chills or shaking. Pt/household denies muscle pain or headache. Pt/household denies fatigue. Pt denies anyone in the household waiting for Covid-19 test results. Pt denies anyone in the household being exposed to someone with Covid-19 in the last 14 days. Pt denies anyone in the household testing positive for Covid-19 within the last 14 days.     "

## 2024-11-05 RX ORDER — METOPROLOL SUCCINATE 100 MG/1
100 TABLET, EXTENDED RELEASE ORAL DAILY
Qty: 90 TABLET | Refills: 1 | Status: SHIPPED | OUTPATIENT
Start: 2024-11-05

## 2025-04-27 DIAGNOSIS — I16.0 HYPERTENSIVE URGENCY: ICD-10-CM

## 2025-04-29 RX ORDER — VALSARTAN 320 MG/1
320 TABLET ORAL
Qty: 30 TABLET | Refills: 1 | Status: SHIPPED | OUTPATIENT
Start: 2025-04-29

## 2025-04-29 NOTE — TELEPHONE ENCOUNTER
Last Written Prescription:   Disp Refills Start End DAW   valsartan (DIOVAN) 320 MG tablet 90 tablet 0 4/18/2025 -- No   Sig - Route: Take 1 tablet (320 mg) by mouth daily. - Oral     ----------------------  Last Visit Date:   4/11/2024  [facility] [place]      Future Visit Date: 6/13/2025  ----------------------    A one-time tino refill given, pt due for labs. FYI sent to care team.      BP elevated >140/90, FYI sent to care team per protocol.     GFR Estimate   Date Value Ref Range Status   04/09/2024 61 >60 mL/min/1.73m2 Final   08/28/2020 56 (L) >60 mL/min/[1.73_m2] Final     Comment:     Non  GFR Calc  Starting 12/18/2018, serum creatinine based estimated GFR (eGFR) will be   calculated using the Chronic Kidney Disease Epidemiology Collaboration   (CKD-EPI) equation.       Potassium   Date Value Ref Range Status   04/09/2024 4.9 3.4 - 5.3 mmol/L Final   06/30/2022 4.3 3.4 - 5.3 mmol/L Final   08/28/2020 4.6 3.4 - 5.3 mmol/L Final        Request from pharmacy:  Requested Prescriptions   Pending Prescriptions Disp Refills    valsartan (DIOVAN) 320 MG tablet [Pharmacy Med Name: VALSARTAN 320 MG TABLET] 90 tablet 1     Sig: TAKE 1 TABLET BY MOUTH EVERY DAY       Angiotensin-II Receptors Failed - 4/29/2025 10:23 AM        Failed - Most recent blood pressure under 140/90 in past 12 months     BP Readings from Last 3 Encounters:   04/11/24 (!) 193/76   02/29/24 (!) 187/84   05/22/23 (!) 174/81       No data recorded            Failed - Medication is active on med list and the sig matches. RN to manually verify dose and sig if red X/fail.     If the protocol passes (green check), you do not need to verify med dose and sig.    A prescription matches if they are the same clinical intention.    For Example: once daily and every morning are the same.    The protocol can not identify upper and lower case letters as matching and will fail.     For Example: Take 1 tablet  (50 mg) by mouth daily     TAKE 1 TABLET (50 MG) BY MOUTH DAILY    For all fails (red x), verify dose and sig.    If the refill does match what is on file, the RN can still proceed to approve the refill request.       If they do not match, route to the appropriate provider.             Failed - Has GFR on file in past 12 months and most recent value is normal        Failed - Normal serum potassium on file in past 12 months     Recent Labs   Lab Test 04/09/24  1052   POTASSIUM 4.9                    Passed - Medication indicated for associated diagnosis     The medication is prescribed for one or more of the following conditions:    Chronic Kidney Disease (CDK)    Heart Failure (HF)    Diabetes, Nephropathy   Hypertension    Coronary Artery Disease (CAD)   Raynaud's Disease   Ischemic cardiomyopathy   Cardiomyopathy   Pulmonary Hypertension          Passed - Recent (12 mo) or future (90days) visit within the authorizing provider's specialty     The patient must have completed an in-person or virtual visit within the past 12 months or has a future visit scheduled within the next 90 days with the authorizing provider s specialty.  Urgent care and e-visits do not qualify as an office visit for this protocol.          Passed - Patient is age 18 or older        Passed - No active pregnancy on record        Passed - No positive pregnancy test in past 12 months

## 2025-06-13 PROCEDURE — 83036 HEMOGLOBIN GLYCOSYLATED A1C: CPT | Performed by: INTERNAL MEDICINE

## 2025-06-13 PROCEDURE — 99000 SPECIMEN HANDLING OFFICE-LAB: CPT | Performed by: PATHOLOGY

## 2025-06-17 ENCOUNTER — RESULTS FOLLOW-UP (OUTPATIENT)
Dept: INTERNAL MEDICINE | Facility: CLINIC | Age: OVER 89
End: 2025-06-17

## 2025-07-29 DIAGNOSIS — I10 HYPERTENSION, UNSPECIFIED TYPE: Primary | ICD-10-CM

## 2025-07-29 RX ORDER — AMLODIPINE BESYLATE 2.5 MG/1
2.5 TABLET ORAL DAILY
Qty: 30 TABLET | Refills: 11 | Status: SHIPPED | OUTPATIENT
Start: 2025-07-29

## 2025-07-30 ENCOUNTER — TELEPHONE (OUTPATIENT)
Dept: CARDIOLOGY | Facility: CLINIC | Age: OVER 89
End: 2025-07-30
Payer: MEDICARE

## 2025-07-30 NOTE — TELEPHONE ENCOUNTER
Left Voicemail (1st Attempt) for the patient to call back and schedule the following:    Appointment type: 24 BPM and new card  Provider: next avail  Return date: next avail  Specialty phone number: [phone] xt. 1  Additional appointment(s) needed: n/a  Additonal Notes: first attempt, lvm, needs to schedule a 24 BP monitor and a new card with any, first avail, EC    
(3) no apparent problem

## (undated) DEVICE — PACK HEART LEFT CUSTOM

## (undated) DEVICE — MANIFOLD KIT ANGIO AUTOMATED 014613

## (undated) DEVICE — GLOVE PROTEXIS POWDER FREE 7.0 ORTHOPEDIC 2D73ET70

## (undated) DEVICE — BASIN EMESIS STERILE  SSK9005A

## (undated) DEVICE — WIRE GUIDE 0.035"X260CM SAFE-T-J EXCHANGE G00517

## (undated) DEVICE — KIT HAND CONTROL ANGIOTOUCH ACIST 65CM AT-P65

## (undated) DEVICE — SYR 50ML SLIP TIP W/O NDL 309654

## (undated) DEVICE — BOWL 32OZ STERILE 01232

## (undated) DEVICE — INTRO SHEATH 7FRX25CM PINNACLE RSS706

## (undated) DEVICE — WIRE GUIDE 0.035"X150CM EMERALD J TIP 502521

## (undated) DEVICE — RAD KNIFE HANDLE W/11 BLADE DISPOSABLE 371611

## (undated) DEVICE — CATH GUIDING BLUE YELLOW PTFE XB3.5 6FRX100CM 67005400

## (undated) DEVICE — KIT HAND CONTROL ACIST 014644 AR-P54

## (undated) DEVICE — DRAPE IOBAN INCISE 23X17" 6650EZ

## (undated) DEVICE — CATH ANGIO INFINITI 3DRC 6FRX100CM 534676T

## (undated) DEVICE — LIGHT HANDLE X1 31140133

## (undated) DEVICE — SYR 50ML LL W/O NDL 309653

## (undated) DEVICE — TUBING PRESSURE 30"

## (undated) DEVICE — VALVE HEMOSTASIS .096" COPILOT MECH 1003331

## (undated) DEVICE — CONNECTOR STOPCOCK 3 WAY MALE LL HI-FLO MX9311L

## (undated) DEVICE — RAD CLOSURE ANGIOSEAL 8FR  610131

## (undated) DEVICE — CATH ANGIO LANGSTRON 6FRX110CM 145DEG 4H 5540

## (undated) DEVICE — WIRE GUIDE LUNDERQUIST 0.035"X260CM DBL CVD

## (undated) DEVICE — GUIDEWIRE ASAHI SION BLUE 0.014"X180CM J-TIP AHW14R004J

## (undated) DEVICE — FASTENER CATH BALLOON CLAMPX2 STATLOCK 0684-00-493

## (undated) DEVICE — PROTECTOR ARM ONE-STEP TRENDELENBURG 40418

## (undated) DEVICE — INTRO SHEATH 6FRX25CM PINNACLE RSS606

## (undated) DEVICE — LINEN TOWEL PACK X30 5481

## (undated) DEVICE — ADH SKIN CLOSURE PREMIERPRO EXOFIN 1.0ML 3470

## (undated) DEVICE — PACK GOWN 3/PK DISP XL SBA32GPFCB

## (undated) DEVICE — CATH BALLOON EMERGE 2.0X12MM H7493918912200

## (undated) DEVICE — KIT ACCESSORY INTRO INFLATION SYS 20/30 PRIORITY 1000186-115

## (undated) DEVICE — KIT RIGHT HEART CATH 60130719

## (undated) DEVICE — DRSG TEGADERM 4X4 3/4" 1626W

## (undated) DEVICE — CATH ANGIO INFINITI PIGTAIL 145 6 SH 6FRX110CM  534-652S

## (undated) DEVICE — CLOSURE DEVICE 6FR VASC PROGLIDE MEDICATED SUTURE 12673-03

## (undated) DEVICE — INFLATION DEVICE ATRION QL2530

## (undated) DEVICE — Device

## (undated) DEVICE — SET INTRODUCER SHEATH 14FR 914ES

## (undated) DEVICE — SYR INJ LOWER VISCOSITY ACIST KIT 016605

## (undated) DEVICE — SOL NACL 0.9% IRRIG 1000ML BOTTLE 2F7124

## (undated) DEVICE — CATH EP PACEL 5FRX110CM 1MM RIGHT HEART CURVE 401763

## (undated) DEVICE — SPONGE RAY-TEC 4X8" 7318

## (undated) DEVICE — GUIDEWIRE VASC SAFARI2 0.035X275CM H74939406XS1

## (undated) DEVICE — DRSG PRIMAPORE 02X3" 7133

## (undated) DEVICE — CATH ANGIO SUPERTORQUE PLUS JL4 6FRX100CM 533620

## (undated) DEVICE — DRAPE STERI FLUOROSCOPE 35X43"1012 LATEX FREE

## (undated) DEVICE — DRSG TEGADERM 8X12" 1629

## (undated) DEVICE — WIRE GUIDE 0.035"X150CM EMERALD STR 502542

## (undated) DEVICE — INTRO SHEATH AVANTI 4FRX23CM 504604T

## (undated) DEVICE — DEFIB PADPRO CONMED ADULT/CHILD 2001Z-C

## (undated) DEVICE — SUCTION MANIFOLD DORNOCH ULTRA CART UL-CL500

## (undated) DEVICE — CATH ANGIO SUPERTORQUE AL1 6FRX100CM 532-645

## (undated) DEVICE — LINEN TOWEL PACK X5 5464

## (undated) DEVICE — LINEN TOWEL PACK X6 WHITE 5487

## (undated) DEVICE — SYR ANGIOGRAPHY MULTIUSE KIT ACIST 014612

## (undated) DEVICE — PREP CHLORAPREP 26ML TINTED ORANGE  260815

## (undated) RX ORDER — PROPOFOL 10 MG/ML
INJECTION, EMULSION INTRAVENOUS
Status: DISPENSED
Start: 2020-07-29

## (undated) RX ORDER — HEPARIN SODIUM 1000 [USP'U]/ML
INJECTION, SOLUTION INTRAVENOUS; SUBCUTANEOUS
Status: DISPENSED
Start: 2020-07-29

## (undated) RX ORDER — FENTANYL CITRATE 50 UG/ML
INJECTION, SOLUTION INTRAMUSCULAR; INTRAVENOUS
Status: DISPENSED
Start: 2020-06-19

## (undated) RX ORDER — NITROGLYCERIN 5 MG/ML
VIAL (ML) INTRAVENOUS
Status: DISPENSED
Start: 2020-06-19

## (undated) RX ORDER — METOPROLOL TARTRATE 1 MG/ML
INJECTION, SOLUTION INTRAVENOUS
Status: DISPENSED
Start: 2020-06-19

## (undated) RX ORDER — SODIUM CHLORIDE, SODIUM LACTATE, POTASSIUM CHLORIDE, CALCIUM CHLORIDE 600; 310; 30; 20 MG/100ML; MG/100ML; MG/100ML; MG/100ML
INJECTION, SOLUTION INTRAVENOUS
Status: DISPENSED
Start: 2020-07-29

## (undated) RX ORDER — HEPARIN SODIUM 1000 [USP'U]/ML
INJECTION, SOLUTION INTRAVENOUS; SUBCUTANEOUS
Status: DISPENSED
Start: 2020-06-19

## (undated) RX ORDER — FUROSEMIDE 10 MG/ML
INJECTION INTRAMUSCULAR; INTRAVENOUS
Status: DISPENSED
Start: 2020-06-19

## (undated) RX ORDER — FENTANYL CITRATE 50 UG/ML
INJECTION, SOLUTION INTRAMUSCULAR; INTRAVENOUS
Status: DISPENSED
Start: 2020-07-29

## (undated) RX ORDER — LIDOCAINE HYDROCHLORIDE 20 MG/ML
INJECTION, SOLUTION EPIDURAL; INFILTRATION; INTRACAUDAL; PERINEURAL
Status: DISPENSED
Start: 2020-07-29

## (undated) RX ORDER — NOREPINEPHRINE BITARTRATE 0.06 MG/ML
INJECTION, SOLUTION INTRAVENOUS
Status: DISPENSED
Start: 2020-06-19

## (undated) RX ORDER — DEXAMETHASONE SODIUM PHOSPHATE 4 MG/ML
INJECTION, SOLUTION INTRA-ARTICULAR; INTRALESIONAL; INTRAMUSCULAR; INTRAVENOUS; SOFT TISSUE
Status: DISPENSED
Start: 2020-07-29

## (undated) RX ORDER — NICARDIPINE HYDROCHLORIDE 2.5 MG/ML
INJECTION INTRAVENOUS
Status: DISPENSED
Start: 2020-07-29

## (undated) RX ORDER — ONDANSETRON 2 MG/ML
INJECTION INTRAMUSCULAR; INTRAVENOUS
Status: DISPENSED
Start: 2020-07-29

## (undated) RX ORDER — PROTAMINE SULFATE 10 MG/ML
INJECTION, SOLUTION INTRAVENOUS
Status: DISPENSED
Start: 2020-07-29

## (undated) RX ORDER — CEFAZOLIN SODIUM 2 G/100ML
INJECTION, SOLUTION INTRAVENOUS
Status: DISPENSED
Start: 2020-07-29